# Patient Record
Sex: FEMALE | Race: WHITE | HISPANIC OR LATINO | Employment: PART TIME | URBAN - METROPOLITAN AREA
[De-identification: names, ages, dates, MRNs, and addresses within clinical notes are randomized per-mention and may not be internally consistent; named-entity substitution may affect disease eponyms.]

---

## 2017-03-19 ENCOUNTER — HOSPITAL ENCOUNTER (EMERGENCY)
Facility: MEDICAL CENTER | Age: 22
End: 2017-03-19
Attending: EMERGENCY MEDICINE
Payer: MEDICAID

## 2017-03-19 VITALS
SYSTOLIC BLOOD PRESSURE: 124 MMHG | RESPIRATION RATE: 18 BRPM | HEART RATE: 85 BPM | HEIGHT: 60 IN | TEMPERATURE: 98.2 F | OXYGEN SATURATION: 99 % | DIASTOLIC BLOOD PRESSURE: 71 MMHG | BODY MASS INDEX: 30.56 KG/M2 | WEIGHT: 155.65 LBS

## 2017-03-19 DIAGNOSIS — J02.9 ACUTE PHARYNGITIS, UNSPECIFIED ETIOLOGY: ICD-10-CM

## 2017-03-19 DIAGNOSIS — H65.192 ACUTE NONSUPPURATIVE OTITIS MEDIA OF LEFT EAR: ICD-10-CM

## 2017-03-19 LAB
APPEARANCE UR: CLEAR
BACTERIA #/AREA URNS HPF: ABNORMAL /HPF
BILIRUB UR QL STRIP.AUTO: NEGATIVE
COLOR UR: YELLOW
DEPRECATED S PYO AG THROAT QL EIA: NORMAL
EPI CELLS #/AREA URNS HPF: ABNORMAL /HPF
GLUCOSE UR STRIP.AUTO-MCNC: NEGATIVE MG/DL
HCG UR QL: NEGATIVE
KETONES UR STRIP.AUTO-MCNC: NEGATIVE MG/DL
LEUKOCYTE ESTERASE UR QL STRIP.AUTO: NEGATIVE
MICRO URNS: ABNORMAL
MUCOUS THREADS #/AREA URNS HPF: ABNORMAL /HPF
NITRITE UR QL STRIP.AUTO: NEGATIVE
PH UR STRIP.AUTO: 8 [PH]
PROT UR QL STRIP: 30 MG/DL
RBC # URNS HPF: ABNORMAL /HPF
RBC UR QL AUTO: ABNORMAL
SIGNIFICANT IND 70042: NORMAL
SITE SITE: NORMAL
SOURCE SOURCE: NORMAL
SP GR UR REFRACTOMETRY: 1.02
WBC #/AREA URNS HPF: ABNORMAL /HPF

## 2017-03-19 PROCEDURE — 99284 EMERGENCY DEPT VISIT MOD MDM: CPT

## 2017-03-19 PROCEDURE — 81025 URINE PREGNANCY TEST: CPT

## 2017-03-19 PROCEDURE — 700102 HCHG RX REV CODE 250 W/ 637 OVERRIDE(OP): Performed by: EMERGENCY MEDICINE

## 2017-03-19 PROCEDURE — 81001 URINALYSIS AUTO W/SCOPE: CPT

## 2017-03-19 PROCEDURE — A9270 NON-COVERED ITEM OR SERVICE: HCPCS | Performed by: EMERGENCY MEDICINE

## 2017-03-19 PROCEDURE — 87081 CULTURE SCREEN ONLY: CPT

## 2017-03-19 PROCEDURE — 87880 STREP A ASSAY W/OPTIC: CPT

## 2017-03-19 RX ORDER — AMOXICILLIN 875 MG/1
875 TABLET, COATED ORAL 2 TIMES DAILY
Qty: 20 TAB | Refills: 0 | Status: SHIPPED | OUTPATIENT
Start: 2017-03-19 | End: 2017-04-11

## 2017-03-19 RX ORDER — IBUPROFEN 600 MG/1
600 TABLET ORAL ONCE
Status: COMPLETED | OUTPATIENT
Start: 2017-03-19 | End: 2017-03-19

## 2017-03-19 RX ORDER — AMOXICILLIN 875 MG/1
875 TABLET, COATED ORAL 2 TIMES DAILY
Qty: 20 TAB | Refills: 0 | Status: SHIPPED | OUTPATIENT
Start: 2017-03-19 | End: 2017-03-19

## 2017-03-19 RX ORDER — AMOXICILLIN 250 MG/1
1000 CAPSULE ORAL ONCE
Status: COMPLETED | OUTPATIENT
Start: 2017-03-19 | End: 2017-03-19

## 2017-03-19 RX ADMIN — AMOXICILLIN 1000 MG: 250 CAPSULE ORAL at 02:25

## 2017-03-19 RX ADMIN — IBUPROFEN 600 MG: 600 TABLET, FILM COATED ORAL at 02:25

## 2017-03-19 ASSESSMENT — PAIN SCALES - GENERAL
PAINLEVEL_OUTOF10: 9
PAINLEVEL_OUTOF10: 6

## 2017-03-19 NOTE — ED AVS SNAPSHOT
Center'd Access Code: [email or web address]  Expires: 4/18/2017  2:32 AM    Center'd  A secure, online tool to manage your health information     [employer]’s Center'd® is a secure, online tool that connects you to your personalized health information from the privacy of your home -- day or night - making it very easy for you to manage your healthcare. Once the activation process is completed, you can even access your medical information using the Center'd destinee, which is available for free in the Apple Destinee store or Google Play store.     Center'd provides the following levels of access (as shown below):   My Chart Features   [facility] Primary Care Doctor [facility]  Specialists [facility]  Urgent  Care Non-[facility]  Primary Care  Doctor   Email your healthcare team securely and privately 24/7 X X X X   Manage appointments: schedule your next appointment; view details of past/upcoming appointments X      Request prescription refills. X      View recent personal medical records, including lab and immunizations X X X X   View health record, including health history, allergies, medications X X X X   Read reports about your outpatient visits, procedures, consult and ER notes X X X X   See your discharge summary, which is a recap of your hospital and/or ER visit that includes your diagnosis, lab results, and care plan. X X       How to register for Center'd:  1. Go to  [email or web address].  2. Click on the Sign Up Now box, which takes you to the New Member Sign Up page. You will need to provide the following information:  a. Enter your Center'd Access Code exactly as it appears at the top of this page. (You will not need to use this code after you’ve completed the sign-up process. If you do not sign up before the expiration date, you must request a new code.)   b. Enter your date of birth.   c. Enter your home email address.   d. Click Submit, and follow the next screen’s instructions.  3. Create a Center'd ID. This will be your Center'd  login ID and cannot be changed, so think of one that is secure and easy to remember.  4. Create a [employer] password. You can change your password at any time.  5. Enter your Password Reset Question and Answer. This can be used at a later time if you forget your password.   6. Enter your e-mail address. This allows you to receive e-mail notifications when new information is available in Tangible Play.  7. Click Sign Up. You can now view your health information.    For assistance activating your [employer] account, call [phone]

## 2017-03-19 NOTE — ED AVS SNAPSHOT
3/19/2017          Kristal Spivey  [address] [place] [address]    Dear Kristal:    [facility] wants to ensure your discharge home is safe and you or your loved ones have had all your questions answered regarding your care after you leave the hospital.    You may receive a telephone call within two days of your discharge.  This call is to make certain you understand your discharge instructions as well as ensure we provided you with the best care possible during your stay with us.     The call will only last approximately 3-5 minutes and will be done by a nurse.    Once again, we want to ensure your discharge home is safe and that you have a clear understanding of any next steps in your care.  If you have any questions or concerns, please do not hesitate to contact us, we are here for you.  Thank you for choosing [facility] for your healthcare needs.    Sincerely,    William Wells    [facility]

## 2017-03-19 NOTE — ED PROVIDER NOTES
"ED Provider Note    ED Provider Note    Scribed for Laurie Peñaloza MD by Laurie Peñaloza. 3/19/2017, 2:16 AM.    Primary care provider: Pcp Pt States None  Means of arrival: Private  History obtained from: Patient  History limited by: None    CHIEF COMPLAINT  Chief Complaint   Patient presents with   • Sore Throat   • Ear Pain       HPI  Kristal Spivey is a 21 y.o. female who presents to the Emergency Department for evaluation of sore throat, ear pain, and tactile fever. Patient relates she initially had nausea and vomiting and diarrhea about 2 weeks ago, symptoms resolved. She relates over the last 2 days of mild bilateral sore throat, pain in left ear, described tactile fever with chills and \"fever blisters\" to her tongue. No dysuria, no particular ill contacts around the home, no current vomiting, no dyspnea, no significant cough. Patient's taken no recent over-the-counter remedies other than ibuprofen which he takes to baseline for menstrual cramping, she is otherwise healthy and is status post cholecystectomy after her last pregnancy    REVIEW OF SYSTEMS  Pertinent positives include tactile fever, sore throat, ear pain. Pertinent negatives include no dyspnea, no significant cough.      PAST MEDICAL HISTORY   has a past medical history of Gallstones (2/4/2016); Heart burn; and Cold (2/14/16).    SURGICAL HISTORY   has past surgical history that includes ariana by laparoscopy (3/7/2016).    SOCIAL HISTORY  Social History   Substance Use Topics   • Smoking status: Never Smoker    • Smokeless tobacco: Never Used   • Alcohol Use: No      History   Drug Use No       FAMILY HISTORY  No family history on file.    CURRENT MEDICATIONS  Home Medications     **Home medications have not yet been reviewed for this encounter**          ALLERGIES  Allergies   Allergen Reactions   • Other Environmental Rash     \"dogs\" - \"my skin turns red\"       PHYSICAL EXAM  VITAL SIGNS: /71 mmHg  Pulse 85  " Temp(Src) 36.8 °C (98.2 °F)  Resp 18  Ht 1.524 m (5')  Wt 70.6 kg (155 lb 10.3 oz)  BMI 30.40 kg/m2  SpO2 99%  LMP  (Approximate)    General: Alert, non acute distress  Skin: Warm, dry, normal for ethnicity  Head: Normocephalic, atraumatic  Neck: Trachea midline, no tenderness  Eye: PERRL, normal conjunctiva  ENMT: Oral mucosa moist, no significant pharyngeal erythema nor edema but there is mild exudate noted at the right tonsillar bed. Left tympanic membrane is erythematous and somewhat dull with loss of cone of light reflex.  Cardiovascular: Regular rate and rhythm, No murmur, Normal peripheral perfusion  Respiratory: Lungs CTA, respirations are non-labored, breath sounds are equal  Gastrointestinal: Soft, nontender, non distended, no CVA tenderness.  Musculoskeletal: No swelling, no deformity  Neurological: Alert and oriented to person, place, time, and situation  Lymphatics: Subtle right anterior cervical lymphadenopathy  Psychiatric: Cooperative, appropriate mood & affect      DIAGNOSTIC STUDIES/PROCEDURES    LABS  Results for orders placed or performed during the hospital encounter of 03/19/17   URINALYSIS   Result Value Ref Range    Micro Urine Req Microscopic     Color Yellow     Character Clear     Ph 8.0 5.0-8.0    Glucose Negative Negative mg/dL    Ketones Negative Negative mg/dL    Protein 30 (A) Negative mg/dL    Bilirubin Negative Negative    Nitrite Negative Negative    Leukocyte Esterase Negative Negative    Occult Blood Moderate (A) Negative   BETA-HCG QUALITATIVE URINE   Result Value Ref Range    Beta-Hcg Urine Negative Negative   REFRACTOMETER SG   Result Value Ref Range    Specific Gravity 1.025    RAPID STREP,CULT IF INDICATED   Result Value Ref Range    Significant Indicator NEG     Source THRT     Site      Rapid Strep Screen       Negative for Group A streptococcus.  A negative result may be obtained if the specimen is  inadequate or antigen concentration is below the  sensitivity of  the test. Therefore,a negative result  obtained from a rapid group A Strep test should be followed  up with a culture.     URINE MICROSCOPIC (W/UA)   Result Value Ref Range    WBC 0-2 /hpf    -150 (A) /hpf    Bacteria Few (A) None /hpf    Epithelial Cells Few Few /hpf    Mucous Threads Moderate /hpf     All labs reviewed by me, artifactual hematuria given patient is currently on menses.    COURSE & MEDICAL DECISION MAKING  Pertinent Labs & Imaging studies reviewed. (See chart for details)    2:16 AM - Patient seen and examined at bedside. Patient will be treated with ibuprofen and amoxicillin. Ordered a rapid strep as well as urinalysis to evaluate her symptoms. The differential diagnoses include but are not limited to: Otitis media, streptococcal pharyngitis, viral pharyngitis    Patient Vitals for the past 24 hrs:   BP Temp Pulse Resp SpO2 Height Weight   03/19/17 0241 124/71 mmHg - 85 18 99 % - -   03/19/17 0149 127/77 mmHg 36.8 °C (98.2 °F) 87 18 99 % 1.524 m (5') 70.6 kg (155 lb 10.3 oz)       Decision Making:  This is a 21 y.o. year old female who presents with tactile fever, sore throat, ear pain, lack of cough and ear pain. Percentile criteria, she is high risk for streptococcal pharyngitis, but given only minimal lymphadenopathy and afebrile here I have obtained rapid strep which is negative. However given evidence of otitis media and reactive are indicated, written for amoxicillin appropriate NSAID. Patient otherwise well-appearing and nontoxic.     The patient will return for new or worsening symptoms and is stable at the time of discharge.        DISPOSITION:  Patient will be discharged home in stable condition.    FOLLOW UP:  Grady Jensen M.D.  [id number]  [address]  [facility] [address]  [phone]    Schedule an appointment as soon as possible for a visit in 1 week        OUTPATIENT MEDICATIONS:  Discharge Medication List as of 3/19/2017  2:33 AM              FINAL IMPRESSION  1. Acute  nonsuppurative otitis media of left ear    2. Acute pharyngitis, unspecified etiology          Laurie CALERO (Scribe), am scribing for, and in the presence of, Laurie Peñaloza MD.    Electronically signed by: Laurie Peñaloza (Scribe), 3/19/2017    Laurie CALERO MD personally performed the services described in this documentation, as scribed by Laurie Peñaloza in my presence, and it is both accurate and complete    The note accurately reflects work and decisions made by me.  Laurie Peñaloza  3/19/2017  4:19 AM

## 2017-03-19 NOTE — DISCHARGE INSTRUCTIONS
Otitis Media, Adult  Otitis media is redness, soreness, and puffiness (swelling) in the space just behind your eardrum (middle ear). It may be caused by allergies or infection. It often happens along with a cold.  HOME CARE  · Take your medicine as told. Finish it even if you start to feel better.  · Only take over-the-counter or prescription medicines for pain, discomfort, or fever as told by your doctor.  · Follow up with your doctor as told.  GET HELP IF:  · You have otitis media only in one ear, or bleeding from your nose, or both.  · You notice a lump on your neck.  · You are not getting better in 3-5 days.  · You feel worse instead of better.  GET HELP RIGHT AWAY IF:   · You have pain that is not helped with medicine.  · You have puffiness, redness, or pain around your ear.  · You get a stiff neck.  · You cannot move part of your face (paralysis).  · You notice that the bone behind your ear hurts when you touch it.  MAKE SURE YOU:   · Understand these instructions.  · Will watch your condition.  · Will get help right away if you are not doing well or get worse.     This information is not intended to replace advice given to you by your health care provider. Make sure you discuss any questions you have with your health care provider.     Document Released: 06/05/2009 Document Revised: 01/08/2016 Document Reviewed: 07/15/2014  "[employer] Interactive Patient Education ©2016 "[employer] Inc.

## 2017-03-19 NOTE — ED AVS SNAPSHOT
Home Care Instructions                                                                                                                Kristal Spivey   MRN: [id number]    Department:  [facility], Emergency Dept   Date of Visit:  3/19/2017            [facility], Emergency Dept    [address]    [place] [address]    Phone:  [phone]      You were seen by     Laurie Peñaloza M.D.      Your Diagnosis Was     Acute nonsuppurative otitis media of left ear     H65.192       These are the medications you received during your hospitalization from 03/19/2017 0144 to 03/19/2017 0233     Date/Time Order Dose Route Action    03/19/2017 0225 amoxicillin (AMOXIL) capsule 1,000 mg 1,000 mg Oral Given    03/19/2017 0225 ibuprofen (MOTRIN) tablet 600 mg 600 mg Oral Given      Follow-up Information     1. Follow up with Grady Jensen M.D.. Schedule an appointment as soon as possible for a visit in 1 week.    Specialty:  Family Medicine    Contact information    [address] [place] [address] [address]  [address]  [place] [address]  [phone]        Medication Information     Review all of your home medications and newly ordered medications with your primary doctor and/or pharmacist as soon as possible. Follow medication instructions as directed by your doctor and/or pharmacist.     Please keep your complete medication list with you and share with your physician. Update the information when medications are discontinued, doses are changed, or new medications (including over-the-counter products) are added; and carry medication information at all times in the event of emergency situations.               Medication List      START taking these medications        Instructions    Morning Afternoon Evening Bedtime    amoxicillin 875 MG tablet   Commonly known as:  AMOXIL        Take 1 Tab by mouth 2 times a day.   Dose:  875 mg                          ASK your doctor about these  medications        Instructions    Morning Afternoon Evening Bedtime    citalopram 20 MG Tabs   Commonly known as:  CELEXA        Take 20 mg by mouth every day.   Dose:  20 mg                        ibuprofen 800 MG Tabs   Last time this was given:  600 mg on 3/19/2017  2:25 AM   Commonly known as:  MOTRIN        Take 1 Tab by mouth every 8 hours as needed (Cramping).   Dose:  800 mg                        NUVARING VA        Insert  in vagina.                             Where to Get Your Medications      You can get these medications from any pharmacy     Bring a paper prescription for each of these medications    - amoxicillin 875 MG tablet            Procedures and tests performed during your visit     RAPID STREP,CULT IF INDICATED        Discharge Instructions       Otitis Media, Adult  Otitis media is redness, soreness, and puffiness (swelling) in the space just behind your eardrum (middle ear). It may be caused by allergies or infection. It often happens along with a cold.  HOME CARE  · Take your medicine as told. Finish it even if you start to feel better.  · Only take over-the-counter or prescription medicines for pain, discomfort, or fever as told by your doctor.  · Follow up with your doctor as told.  GET HELP IF:  · You have otitis media only in one ear, or bleeding from your nose, or both.  · You notice a lump on your neck.  · You are not getting better in 3-5 days.  · You feel worse instead of better.  GET HELP RIGHT AWAY IF:   · You have pain that is not helped with medicine.  · You have puffiness, redness, or pain around your ear.  · You get a stiff neck.  · You cannot move part of your face (paralysis).  · You notice that the bone behind your ear hurts when you touch it.  MAKE SURE YOU:   · Understand these instructions.  · Will watch your condition.  · Will get help right away if you are not doing well or get worse.     This information is not intended to replace advice given to you by your health  care provider. Make sure you discuss any questions you have with your health care provider.     Document Released: 06/05/2009 Document Revised: 01/08/2016 Document Reviewed: 07/15/2014  Elsevier Interactive Patient Education ©2016 [employer]            Patient Information     Patient Information    Following emergency treatment: all patient requiring follow-up care must return either to a private physician or a clinic if your condition worsens before you are able to obtain further medical attention, please return to the emergency room.     Billing Information    [facility] we work to make the billing process streamlined for our patients.  Our Representatives are here to answer any questions you may have regarding your hospital bill.  If you have insurance coverage and have supplied your insurance information to us, we will submit a claim to your insurer on your behalf.  Should you have any questions regarding your bill, we can be reached online or by phone as follows:  Online: You are able pay your bills online or live chat with our representatives about any billing questions you may have. We are here to help Monday - Friday from 8:00am to 7:30pm and 9:00am - 12:00pm on Saturdays.  Please visit [email or web address]  for more information.   Phone:  [phone] or [phone]    Please note that your emergency physician, surgeon, pathologist, radiologist, anesthesiologist, and other specialists are not employed by [facility] and will therefore bill separately for their services.  Please contact them directly for any questions concerning their bills at the numbers below:     Emergency Physician Services:  [phone]  [facility]:  [phone]  [facility]:  [phone]  [facility]:  [phone]    1. Your final bill may vary from the amount quoted upon discharge if all procedures are not complete at that time, or if your  doctor has additional procedures of which we are not aware. You will receive an additional bill if you return to the Emergency Department at [facility] for suture removal regardless of the facility of which the sutures were placed.     2. Please arrange for settlement of this account at the emergency registration.    3. All self-pay accounts are due in full at the time of treatment.  If you are unable to meet this obligation then payment is expected within 4-5 days.     4. If you have had radiology studies (CT, X-ray, Ultrasound, MRI), you have received a preliminary result during your emergency department visit. Please contact the radiology department [phone] to receive a copy of your final result. Please discuss the Final result with your primary physician or with the follow up physician provided.     Crisis Hotline:  [place] Crisis Hotline:  4-744-WIMHGGN or 1-402.293.5295  [place] Crisis Hotline:    1-116.321.7159 or 783-795-3977         ED Discharge Follow Up Questions    1. In order to provide you with very good care, we would like to follow up with a phone call in the next few days.  May we have your permission to contact you?     YES /  NO    2. What is the best phone number to call you? (       )_____-__________    3. What is the best time to call you?      Morning  /  Afternoon  /  Evening                   Patient Signature:  ____________________________________________________________    Date:  ____________________________________________________________

## 2017-03-21 LAB
S PYO SPEC QL CULT: NORMAL
SIGNIFICANT IND 70042: NORMAL
SITE SITE: NORMAL
SOURCE SOURCE: NORMAL

## 2017-04-11 ENCOUNTER — HOSPITAL ENCOUNTER (EMERGENCY)
Facility: MEDICAL CENTER | Age: 22
End: 2017-04-11
Attending: EMERGENCY MEDICINE
Payer: MEDICAID

## 2017-04-11 VITALS
BODY MASS INDEX: 30.82 KG/M2 | SYSTOLIC BLOOD PRESSURE: 116 MMHG | HEART RATE: 74 BPM | WEIGHT: 156.97 LBS | HEIGHT: 60 IN | TEMPERATURE: 98.2 F | OXYGEN SATURATION: 96 % | RESPIRATION RATE: 16 BRPM | DIASTOLIC BLOOD PRESSURE: 73 MMHG

## 2017-04-11 DIAGNOSIS — R42 VERTIGO: ICD-10-CM

## 2017-04-11 LAB
ALBUMIN SERPL BCP-MCNC: 4.1 G/DL (ref 3.2–4.9)
ALBUMIN/GLOB SERPL: 1.5 G/DL
ALP SERPL-CCNC: 118 U/L (ref 30–99)
ALT SERPL-CCNC: 67 U/L (ref 2–50)
ANION GAP SERPL CALC-SCNC: 3 MMOL/L (ref 0–11.9)
APPEARANCE UR: ABNORMAL
AST SERPL-CCNC: 41 U/L (ref 12–45)
BASOPHILS # BLD AUTO: 0.2 % (ref 0–1.8)
BASOPHILS # BLD: 0.02 K/UL (ref 0–0.12)
BILIRUB SERPL-MCNC: 0.5 MG/DL (ref 0.1–1.5)
BILIRUB UR QL STRIP.AUTO: NEGATIVE
BUN SERPL-MCNC: 10 MG/DL (ref 8–22)
CALCIUM SERPL-MCNC: 9.2 MG/DL (ref 8.5–10.5)
CHLORIDE SERPL-SCNC: 107 MMOL/L (ref 96–112)
CO2 SERPL-SCNC: 28 MMOL/L (ref 20–33)
COLOR UR: ABNORMAL
CREAT SERPL-MCNC: 0.47 MG/DL (ref 0.5–1.4)
CULTURE IF INDICATED INDCX: NO UA CULTURE
EOSINOPHIL # BLD AUTO: 0.18 K/UL (ref 0–0.51)
EOSINOPHIL NFR BLD: 2.2 % (ref 0–6.9)
EPI CELLS #/AREA URNS HPF: NORMAL /HPF
ERYTHROCYTE [DISTWIDTH] IN BLOOD BY AUTOMATED COUNT: 38.5 FL (ref 35.9–50)
GFR SERPL CREATININE-BSD FRML MDRD: >60 ML/MIN/1.73 M 2
GLOBULIN SER CALC-MCNC: 2.7 G/DL (ref 1.9–3.5)
GLUCOSE SERPL-MCNC: 85 MG/DL (ref 65–99)
GLUCOSE UR STRIP.AUTO-MCNC: NEGATIVE MG/DL
HCG SERPL QL: NEGATIVE
HCT VFR BLD AUTO: 41.7 % (ref 37–47)
HGB BLD-MCNC: 13.9 G/DL (ref 12–16)
IMM GRANULOCYTES # BLD AUTO: 0.02 K/UL (ref 0–0.11)
IMM GRANULOCYTES NFR BLD AUTO: 0.2 % (ref 0–0.9)
KETONES UR STRIP.AUTO-MCNC: NEGATIVE MG/DL
LEUKOCYTE ESTERASE UR QL STRIP.AUTO: NEGATIVE
LIPASE SERPL-CCNC: 19 U/L (ref 11–82)
LYMPHOCYTES # BLD AUTO: 2.97 K/UL (ref 1–4.8)
LYMPHOCYTES NFR BLD: 36.1 % (ref 22–41)
MCH RBC QN AUTO: 28.1 PG (ref 27–33)
MCHC RBC AUTO-ENTMCNC: 33.3 G/DL (ref 33.6–35)
MCV RBC AUTO: 84.2 FL (ref 81.4–97.8)
MICRO URNS: ABNORMAL
MONOCYTES # BLD AUTO: 0.58 K/UL (ref 0–0.85)
MONOCYTES NFR BLD AUTO: 7 % (ref 0–13.4)
NEUTROPHILS # BLD AUTO: 4.46 K/UL (ref 2–7.15)
NEUTROPHILS NFR BLD: 54.3 % (ref 44–72)
NITRITE UR QL STRIP.AUTO: NEGATIVE
NRBC # BLD AUTO: 0 K/UL
NRBC BLD AUTO-RTO: 0 /100 WBC
PH UR STRIP.AUTO: 8.5 [PH]
PLATELET # BLD AUTO: 240 K/UL (ref 164–446)
PMV BLD AUTO: 11.5 FL (ref 9–12.9)
POTASSIUM SERPL-SCNC: 3.9 MMOL/L (ref 3.6–5.5)
PROT SERPL-MCNC: 6.8 G/DL (ref 6–8.2)
PROT UR QL STRIP: NEGATIVE MG/DL
RBC # BLD AUTO: 4.95 M/UL (ref 4.2–5.4)
RBC UR QL AUTO: NEGATIVE
SODIUM SERPL-SCNC: 138 MMOL/L (ref 135–145)
SP GR UR STRIP.AUTO: 1.01
WBC # BLD AUTO: 8.2 K/UL (ref 4.8–10.8)

## 2017-04-11 PROCEDURE — 96374 THER/PROPH/DIAG INJ IV PUSH: CPT

## 2017-04-11 PROCEDURE — 83690 ASSAY OF LIPASE: CPT

## 2017-04-11 PROCEDURE — 81001 URINALYSIS AUTO W/SCOPE: CPT

## 2017-04-11 PROCEDURE — 80053 COMPREHEN METABOLIC PANEL: CPT

## 2017-04-11 PROCEDURE — 36415 COLL VENOUS BLD VENIPUNCTURE: CPT

## 2017-04-11 PROCEDURE — 84703 CHORIONIC GONADOTROPIN ASSAY: CPT

## 2017-04-11 PROCEDURE — A9270 NON-COVERED ITEM OR SERVICE: HCPCS | Performed by: EMERGENCY MEDICINE

## 2017-04-11 PROCEDURE — 700105 HCHG RX REV CODE 258: Performed by: EMERGENCY MEDICINE

## 2017-04-11 PROCEDURE — 700111 HCHG RX REV CODE 636 W/ 250 OVERRIDE (IP): Performed by: EMERGENCY MEDICINE

## 2017-04-11 PROCEDURE — 99284 EMERGENCY DEPT VISIT MOD MDM: CPT

## 2017-04-11 PROCEDURE — 85025 COMPLETE CBC W/AUTO DIFF WBC: CPT

## 2017-04-11 PROCEDURE — 700102 HCHG RX REV CODE 250 W/ 637 OVERRIDE(OP): Performed by: EMERGENCY MEDICINE

## 2017-04-11 RX ORDER — SODIUM CHLORIDE 9 MG/ML
1000 INJECTION, SOLUTION INTRAVENOUS CONTINUOUS
Status: ACTIVE | OUTPATIENT
Start: 2017-04-11 | End: 2017-04-11

## 2017-04-11 RX ORDER — MECLIZINE HYDROCHLORIDE 25 MG/1
25 TABLET ORAL ONCE
Status: COMPLETED | OUTPATIENT
Start: 2017-04-11 | End: 2017-04-11

## 2017-04-11 RX ORDER — MECLIZINE HYDROCHLORIDE 25 MG/1
25 TABLET ORAL 3 TIMES DAILY PRN
Qty: 30 TAB | Refills: 0 | Status: SHIPPED | OUTPATIENT
Start: 2017-04-11 | End: 2017-11-01

## 2017-04-11 RX ORDER — ONDANSETRON 4 MG/1
4 TABLET, ORALLY DISINTEGRATING ORAL EVERY 8 HOURS PRN
Qty: 10 TAB | Refills: 1 | Status: SHIPPED | OUTPATIENT
Start: 2017-04-11 | End: 2017-11-01

## 2017-04-11 RX ORDER — ONDANSETRON 2 MG/ML
4 INJECTION INTRAMUSCULAR; INTRAVENOUS ONCE
Status: COMPLETED | OUTPATIENT
Start: 2017-04-11 | End: 2017-04-11

## 2017-04-11 RX ADMIN — ONDANSETRON 4 MG: 2 INJECTION INTRAMUSCULAR; INTRAVENOUS at 21:11

## 2017-04-11 RX ADMIN — MECLIZINE HYDROCHLORIDE 25 MG: 25 TABLET ORAL at 21:02

## 2017-04-11 RX ADMIN — SODIUM CHLORIDE 1000 ML: 9 INJECTION, SOLUTION INTRAVENOUS at 21:16

## 2017-04-11 ASSESSMENT — PAIN SCALES - GENERAL
PAINLEVEL_OUTOF10: 7
PAINLEVEL_OUTOF10: 7

## 2017-04-11 NOTE — ED AVS SNAPSHOT
Home Care Instructions                                                                                                                Kristal Spivey   MRN: [id number]    Department:  [facility], Emergency Dept   Date of Visit:  4/11/2017            [facility], Emergency Dept    [address] [address]    Phone:  [phone]      You were seen by     Kory Vega D.O.      Your Diagnosis Was     Vertigo     R42       These are the medications you received during your hospitalization from 04/11/2017 1933 to 04/11/2017 2334     Date/Time Order Dose Route Action    04/11/2017 2116 NS infusion 1,000 mL 1,000 mL Intravenous New Bag    04/11/2017 2111 ondansetron (ZOFRAN) syringe/vial injection 4 mg 4 mg Intravenous Given    04/11/2017 2102 meclizine (ANTIVERT) tablet 25 mg 25 mg Oral Given      Follow-up Information     1. Schedule an appointment as soon as possible for a visit with [facility].    Contact information    [address] [address]  [phone]        Medication Information     Review all of your home medications and newly ordered medications with your primary doctor and/or pharmacist as soon as possible. Follow medication instructions as directed by your doctor and/or pharmacist.     Please keep your complete medication list with you and share with your physician. Update the information when medications are discontinued, doses are changed, or new medications (including over-the-counter products) are added; and carry medication information at all times in the event of emergency situations.               Medication List      START taking these medications        Instructions    Morning Afternoon Evening Bedtime    meclizine 25 MG Tabs   Last time this was given:  25 mg on 4/11/2017  9:02 PM   Commonly known as:  ANTIVERT        Take 1 Tab by mouth 3 times a day as needed for Dizziness.   Dose:  25  mg                        ondansetron 4 MG Tbdp   Commonly known as:  ZOFRAN ODT        Take 1 Tab by mouth every 8 hours as needed.   Dose:  4 mg                          ASK your doctor about these medications        Instructions    Morning Afternoon Evening Bedtime    citalopram 20 MG Tabs   Commonly known as:  CELEXA        Take 20 mg by mouth every day.   Dose:  20 mg                        ibuprofen 800 MG Tabs   Commonly known as:  MOTRIN        Take 1 Tab by mouth every 8 hours as needed (Cramping).   Dose:  800 mg                        NUVARING VA        Insert  in vagina.                             Where to Get Your Medications      You can get these medications from any pharmacy     Bring a paper prescription for each of these medications    - meclizine 25 MG Tabs  - ondansetron 4 MG Tbdp            Procedures and tests performed during your visit     CBC WITH DIFFERENTIAL    COMP METABOLIC PANEL    ESTIMATED GFR    HCG QUAL SERUM    LIPASE    URINALYSIS,CULTURE IF INDICATED    URINE MICROSCOPIC (W/UA)        Discharge Instructions       Benign Positional Vertigo  Vertigo means you feel like you or your surroundings are moving when they are not. Benign positional vertigo is the most common form of vertigo. Benign means that the cause of your condition is not serious. Benign positional vertigo is more common in older adults.  CAUSES   Benign positional vertigo is the result of an upset in the labyrinth system. This is an area in the middle ear that helps control your balance. This may be caused by a viral infection, head injury, or repetitive motion. However, often no specific cause is found.  SYMPTOMS   Symptoms of benign positional vertigo occur when you move your head or eyes in different directions. Some of the symptoms may include:  · Loss of balance and falls.  · Vomiting.  · Blurred vision.  · Dizziness.  · Nausea.  · Involuntary eye movements (nystagmus).  DIAGNOSIS   Benign positional vertigo is  usually diagnosed by physical exam. If the specific cause of your benign positional vertigo is unknown, your caregiver may perform imaging tests, such as magnetic resonance imaging (MRI) or computed tomography (CT).  TREATMENT   Your caregiver may recommend movements or procedures to correct the benign positional vertigo. Medicines such as meclizine, benzodiazepines, and medicines for nausea may be used to treat your symptoms. In rare cases, if your symptoms are caused by certain conditions that affect the inner ear, you may need surgery.  HOME CARE INSTRUCTIONS   · Follow your caregiver's instructions.  · Move slowly. Do not make sudden body or head movements.  · Avoid driving.  · Avoid operating heavy machinery.  · Avoid performing any tasks that would be dangerous to you or others during a vertigo episode.  · Drink enough fluids to keep your urine clear or pale yellow.  SEEK IMMEDIATE MEDICAL CARE IF:   · You develop problems with walking, weakness, numbness, or using your arms, hands, or legs.  · You have difficulty speaking.  · You develop severe headaches.  · Your nausea or vomiting continues or gets worse.  · You develop visual changes.  · Your family or friends notice any behavioral changes.  · Your condition gets worse.  · You have a fever.  · You develop a stiff neck or sensitivity to light.  MAKE SURE YOU:   · Understand these instructions.  · Will watch your condition.  · Will get help right away if you are not doing well or get worse.     This information is not intended to replace advice given to you by your health care provider. Make sure you discuss any questions you have with your health care provider.     Document Released: 09/25/2007 Document Revised: 03/11/2013 Document Reviewed: 04/11/2016  [employer] Interactive Patient Education ©2016 [employer]    Dizziness  Dizziness is a common problem. It makes you feel unsteady or lightheaded. You may feel like you are about to pass out (faint).  Dizziness can lead to injury if you stumble or fall. Anyone can get dizzy, but dizziness is more common in older adults. This condition can be caused by a number of things, including:  · Medicines.  · Dehydration.  · Illness.  HOME CARE  Following these instructions may help with your condition:  Eating and Drinking  · Drink enough fluid to keep your pee (urine) clear or pale yellow. This helps to keep you from getting dehydrated. Try to drink more clear fluids, such as water.  · Do not drink alcohol.  · Limit how much caffeine you drink or eat if told by your doctor.  · Limit how much salt you drink or eat if told by your doctor.  Activity  · Avoid making quick movements.  ¨ When you stand up from sitting in a chair, steady yourself until you feel okay.  ¨ In the morning, first sit up on the side of the bed. When you feel okay, stand slowly while you hold onto something. Do this until you know that your balance is fine.  · Move your legs often if you need to  one place for a long time. Tighten and relax your muscles in your legs while you are standing.  · Do not drive or use heavy machinery if you feel dizzy.  · Avoid bending down if you feel dizzy. Place items in your home so that they are easy for you to reach without leaning over.  Lifestyle  · Do not use any tobacco products, including cigarettes, chewing tobacco, or electronic cigarettes. If you need help quitting, ask your doctor.  · Try to lower your stress level, such as with yoga or meditation. Talk with your doctor if you need help.  General Instructions  · Watch your dizziness for any changes.  · Take medicines only as told by your doctor. Talk with your doctor if you think that your dizziness is caused by a medicine that you are taking.  · Tell a friend or a family member that you are feeling dizzy. If he or she notices any changes in your behavior, have this person call your doctor.  · Keep all follow-up visits as told by your doctor. This is  important.  GET HELP IF:  · Your dizziness does not go away.  · Your dizziness or light-headedness gets worse.  · You feel sick to your stomach (nauseous).  · You have trouble hearing.  · You have new symptoms.  · You are unsteady on your feet or you feel like the room is spinning.  GET HELP RIGHT AWAY IF:  · You throw up (vomit) or have diarrhea and are unable to eat or drink anything.  · You have trouble:  ¨ Talking.  ¨ Walking.  ¨ Swallowing.  ¨ Using your arms, hands, or legs.  · You feel generally weak.  · You are not thinking clearly or you have trouble forming sentences. It may take a friend or family member to notice this.  · You have:  ¨ Chest pain.  ¨ Pain in your belly (abdomen).  ¨ Shortness of breath.  ¨ Sweating.  · Your vision changes.  · You are bleeding.  · You have a headache.  · You have neck pain or a stiff neck.  · You have a fever.     This information is not intended to replace advice given to you by your health care provider. Make sure you discuss any questions you have with your health care provider.     Document Released: 12/06/2012 Document Revised: 05/03/2016 Document Reviewed: 12/14/2015  [employer] Interactive Patient Education ©2016 [employer]            Patient Information     Patient Information    Following emergency treatment: all patient requiring follow-up care must return either to a private physician or a clinic if your condition worsens before you are able to obtain further medical attention, please return to the emergency room.     Billing Information    At [facility], we work to make the billing process streamlined for our patients.  Our Representatives are here to answer any questions you may have regarding your hospital bill.  If you have insurance coverage and have supplied your insurance information to us, we will submit a claim to your insurer on your behalf.  Should you have any questions regarding your bill, we can be reached online or by phone as  follows:  Online: You are able pay your bills online or live chat with our representatives about any billing questions you may have. We are here to help Monday - Friday from 8:00am to 7:30pm and 9:00am - 12:00pm on Saturdays.  Please visit [email or web address]  for more information.   Phone:  [phone] or [phone]    Please note that your emergency physician, surgeon, pathologist, radiologist, anesthesiologist, and other specialists are not employed by [facility] and will therefore bill separately for their services.  Please contact them directly for any questions concerning their bills at the numbers below:     Emergency Physician Services:  [phone]  [facility]:  [phone]  [facility]:  [phone]  [facility]:  [phone]    1. Your final bill may vary from the amount quoted upon discharge if all procedures are not complete at that time, or if your doctor has additional procedures of which we are not aware. You will receive an additional bill if you return to the Emergency Department at [facility] hospital for suture removal regardless of the facility of which the sutures were placed.     2. Please arrange for settlement of this account at the emergency registration.    3. All self-pay accounts are due in full at the time of treatment.  If you are unable to meet this obligation then payment is expected within 4-5 days.     4. If you have had radiology studies (CT, X-ray, Ultrasound, MRI), you have received a preliminary result during your emergency department visit. Please contact the radiology department [phone] to receive a copy of your final result. Please discuss the Final result with your primary physician or with the follow up physician provided.     Crisis Hotline:  [place] Crisis Hotline:  3-598-VUJFVIB or 1-870.308.2288  [place] Crisis Hotline:    1-756.957.7681 or 661-086-2315         ED Discharge Follow  Up Questions    1. In order to provide you with very good care, we would like to follow up with a phone call in the next few days.  May we have your permission to contact you?     YES /  NO    2. What is the best phone number to call you? (       )_____-__________    3. What is the best time to call you?      Morning  /  Afternoon  /  Evening                   Patient Signature:  ____________________________________________________________    Date:  ____________________________________________________________

## 2017-04-11 NOTE — ED AVS SNAPSHOT
Etonkids Access Code: [email or web address]  Expires: 4/18/2017  2:32 AM    Etonkids  A secure, online tool to manage your health information     [employer]’s Etonkids® is a secure, online tool that connects you to your personalized health information from the privacy of your home -- day or night - making it very easy for you to manage your healthcare. Once the activation process is completed, you can even access your medical information using the Etonkids destinee, which is available for free in the Apple Destinee store or Google Play store.     Etonkids provides the following levels of access (as shown below):   My Chart Features   [facility] Primary Care Doctor [facility]  Specialists [facility]  Urgent  Care Non-[facility]  Primary Care  Doctor   Email your healthcare team securely and privately 24/7 X X X X   Manage appointments: schedule your next appointment; view details of past/upcoming appointments X      Request prescription refills. X      View recent personal medical records, including lab and immunizations X X X X   View health record, including health history, allergies, medications X X X X   Read reports about your outpatient visits, procedures, consult and ER notes X X X X   See your discharge summary, which is a recap of your hospital and/or ER visit that includes your diagnosis, lab results, and care plan. X X       How to register for Etonkids:  1. Go to  [email or web address].  2. Click on the Sign Up Now box, which takes you to the New Member Sign Up page. You will need to provide the following information:  a. Enter your E[employer]kids Access Code exactly as it appears at the top of this page. (You will not need to use this code after you’ve completed the sign-up process. If you do not sign up before the expiration date, you must request a new code.)   b. Enter your date of birth.   c. Enter your home email address.   d. Click Submit, and follow the next screen’s instructions.  3. Create a Etonkids ID. This will be your Etonkids  login ID and cannot be changed, so think of one that is secure and easy to remember.  4. Create a [employer] password. You can change your password at any time.  5. Enter your Password Reset Question and Answer. This can be used at a later time if you forget your password.   6. Enter your e-mail address. This allows you to receive e-mail notifications when new information is available in [employer].  7. Click Sign Up. You can now view your health information.    For assistance activating your [employer] account, call [phone]

## 2017-04-11 NOTE — ED AVS SNAPSHOT
4/11/2017          Kristal Spivey  [address] [place] [address]    Dear Kristal:    [facility] wants to ensure your discharge home is safe and you or your loved ones have had all your questions answered regarding your care after you leave the hospital.    You may receive a telephone call within two days of your discharge.  This call is to make certain you understand your discharge instructions as well as ensure we provided you with the best care possible during your stay with us.     The call will only last approximately 3-5 minutes and will be done by a nurse.    Once again, we want to ensure your discharge home is safe and that you have a clear understanding of any next steps in your care.  If you have any questions or concerns, please do not hesitate to contact us, we are here for you.  Thank you for choosing [facility] for your healthcare needs.    Sincerely,    William Wells    [facility]

## 2017-04-12 NOTE — ED PROVIDER NOTES
"ED Provider Note    CHIEF COMPLAINT  Chief Complaint   Patient presents with   • Nausea   • Vomiting   • Chills   • Urinary Frequency       HPI  Kristal Spivey is a 21 y.o. female who presents to emergency room today with multiple complaints. Patient states she says chills with vomiting and nausea. She's had some cloudiness to her urine but no frequency. She also admits to dizziness which she describes as worsening with position changes of her head. Symptoms have been present for 3 weeks getting worse. No diarrhea symptoms occur home in the context of her normal activities.    REVIEW OF SYSTEMS  See HPI for further details. All other systems are negative.     PAST MEDICAL HISTORY  Past Medical History   Diagnosis Date   • Gallstones 2/4/2016   • Heart burn    • Cold 2/14/16     mostly resolved       FAMILY HISTORY  [unfilled]    SOCIAL HISTORY  Social History     Social History   • Marital Status: Single     Spouse Name: N/A   • Number of Children: N/A   • Years of Education: N/A     Social History Main Topics   • Smoking status: Never Smoker    • Smokeless tobacco: Never Used   • Alcohol Use: No   • Drug Use: No   • Sexual Activity:     Partners: Male      Comment: Unplanned pregnancy     Other Topics Concern   • None     Social History Narrative       SURGICAL HISTORY  Past Surgical History   Procedure Laterality Date   • Reagan by laparoscopy  3/7/2016     Procedure: REAGAN BY LAPAROSCOPY;  Surgeon: John H Ganser, M.D.;  Location: SURGERY [facility];  Service:        CURRENT MEDICATIONS  Home Medications     Reviewed by Cheryl Tovar R.N. (Registered Nurse) on 04/11/17 at 2251  Med List Status: Not Addressed    Medication Last Dose Status    citalopram (CELEXA) 20 MG Tab 4/11/2017 Active    Etonogestrel-Ethinyl Estradiol (NUVARING VA) 4/11/2017 Active    ibuprofen (MOTRIN) 800 MG Tab prn Active                ALLERGIES  Allergies   Allergen Reactions   • Other Environmental Rash     \"dogs\" - \"my " "skin turns red\"       PHYSICAL EXAM  VITAL SIGNS: /73 mmHg  Pulse 74  Temp(Src) 36.8 °C (98.2 °F)  Resp 16  Ht 1.524 m (5')  Wt 71.2 kg (156 lb 15.5 oz)  BMI 30.66 kg/m2  SpO2 96%  LMP 03/21/2017 (Approximate) Room air O2: 97    Constitutional: Well developed, Well nourished, No acute distress, Non-toxic appearance.   HENT: Normocephalic, Atraumatic, Bilateral external ears normal, Oropharynx moist, No oral exudates, Nose normal.   Eyes: PERRLA, EOMI, Conjunctiva normal, No discharge. Horizontal nystagmus consistent with vertigo and worsening with changes in position changes of the head  Neck: Normal range of motion, No tenderness, Supple, No stridor.   Lymphatic: No lymphadenopathy noted.   Cardiovascular: Normal heart rate, Normal rhythm, No murmurs, No rubs, No gallops.   Thorax & Lungs: Normal breath sounds, No respiratory distress, No wheezing, No chest tenderness.   Abdomen: Bowel sounds normal, Soft, No tenderness, No masses, No pulsatile masses.   Skin: Warm, Dry, No erythema, No rash.   Back: No tenderness, No CVA tenderness.   Extremities: Intact distal pulses, No edema, No tenderness, No cyanosis, No clubbing.   Musculoskeletal: Good range of motion in all major joints. No tenderness to palpation or major deformities noted.   Neurologic: Alert & oriented x 3, Normal motor function, Normal sensory function, No focal deficits noted.   Psychiatric: Affect normal, Judgment normal, Mood normal.       COURSE & MEDICAL DECISION MAKING  Pertinent Labs & Imaging studies reviewed. (See chart for details)  Patient given IV fluids and Zofran with relief of her vomiting she is able to hold down fluids without difficulty. Her liver enzymes are mildly elevated she is already had a cholecystectomy little over a year ago and will need to have these monitored by her primary care physician I discussed this with her. She is placed on meclizine and Zofran for her nausea and benign positional vertigo. I do not " feel that her symptoms are related to her IUD being placed however she will follow-up with her GYN physician and her primary care physician [facility] this week or next 24 hours if her symptoms persist or get worse return promptly to the emergency room. She verbalizes understanding instructions need for follow-up.    FINAL IMPRESSION  1. Acute benign positional vertigo  2. Nausea/vomiting  3.         Electronically signed by: Kory Vega, 4/12/2017 2:33 AM

## 2017-04-12 NOTE — DISCHARGE INSTRUCTIONS
Benign Positional Vertigo  Vertigo means you feel like you or your surroundings are moving when they are not. Benign positional vertigo is the most common form of vertigo. Benign means that the cause of your condition is not serious. Benign positional vertigo is more common in older adults.  CAUSES   Benign positional vertigo is the result of an upset in the labyrinth system. This is an area in the middle ear that helps control your balance. This may be caused by a viral infection, head injury, or repetitive motion. However, often no specific cause is found.  SYMPTOMS   Symptoms of benign positional vertigo occur when you move your head or eyes in different directions. Some of the symptoms may include:  · Loss of balance and falls.  · Vomiting.  · Blurred vision.  · Dizziness.  · Nausea.  · Involuntary eye movements (nystagmus).  DIAGNOSIS   Benign positional vertigo is usually diagnosed by physical exam. If the specific cause of your benign positional vertigo is unknown, your caregiver may perform imaging tests, such as magnetic resonance imaging (MRI) or computed tomography (CT).  TREATMENT   Your caregiver may recommend movements or procedures to correct the benign positional vertigo. Medicines such as meclizine, benzodiazepines, and medicines for nausea may be used to treat your symptoms. In rare cases, if your symptoms are caused by certain conditions that affect the inner ear, you may need surgery.  HOME CARE INSTRUCTIONS   · Follow your caregiver's instructions.  · Move slowly. Do not make sudden body or head movements.  · Avoid driving.  · Avoid operating heavy machinery.  · Avoid performing any tasks that would be dangerous to you or others during a vertigo episode.  · Drink enough fluids to keep your urine clear or pale yellow.  SEEK IMMEDIATE MEDICAL CARE IF:   · You develop problems with walking, weakness, numbness, or using your arms, hands, or legs.  · You have difficulty speaking.  · You develop  severe headaches.  · Your nausea or vomiting continues or gets worse.  · You develop visual changes.  · Your family or friends notice any behavioral changes.  · Your condition gets worse.  · You have a fever.  · You develop a stiff neck or sensitivity to light.  MAKE SURE YOU:   · Understand these instructions.  · Will watch your condition.  · Will get help right away if you are not doing well or get worse.     This information is not intended to replace advice given to you by your health care provider. Make sure you discuss any questions you have with your health care provider.     Document Released: 09/25/2007 Document Revised: 03/11/2013 Document Reviewed: 04/11/2016  [employer] Interactive Patient Education ©2016 Elsevier Inc.    Dizziness  Dizziness is a common problem. It makes you feel unsteady or lightheaded. You may feel like you are about to pass out (faint). Dizziness can lead to injury if you stumble or fall. Anyone can get dizzy, but dizziness is more common in older adults. This condition can be caused by a number of things, including:  · Medicines.  · Dehydration.  · Illness.  HOME CARE  Following these instructions may help with your condition:  Eating and Drinking  · Drink enough fluid to keep your pee (urine) clear or pale yellow. This helps to keep you from getting dehydrated. Try to drink more clear fluids, such as water.  · Do not drink alcohol.  · Limit how much caffeine you drink or eat if told by your doctor.  · Limit how much salt you drink or eat if told by your doctor.  Activity  · Avoid making quick movements.  ¨ When you stand up from sitting in a chair, steady yourself until you feel okay.  ¨ In the morning, first sit up on the side of the bed. When you feel okay, stand slowly while you hold onto something. Do this until you know that your balance is fine.  · Move your legs often if you need to  one place for a long time. Tighten and relax your muscles in your legs while you are  standing.  · Do not drive or use heavy machinery if you feel dizzy.  · Avoid bending down if you feel dizzy. Place items in your home so that they are easy for you to reach without leaning over.  Lifestyle  · Do not use any tobacco products, including cigarettes, chewing tobacco, or electronic cigarettes. If you need help quitting, ask your doctor.  · Try to lower your stress level, such as with yoga or meditation. Talk with your doctor if you need help.  General Instructions  · Watch your dizziness for any changes.  · Take medicines only as told by your doctor. Talk with your doctor if you think that your dizziness is caused by a medicine that you are taking.  · Tell a friend or a family member that you are feeling dizzy. If he or she notices any changes in your behavior, have this person call your doctor.  · Keep all follow-up visits as told by your doctor. This is important.  GET HELP IF:  · Your dizziness does not go away.  · Your dizziness or light-headedness gets worse.  · You feel sick to your stomach (nauseous).  · You have trouble hearing.  · You have new symptoms.  · You are unsteady on your feet or you feel like the room is spinning.  GET HELP RIGHT AWAY IF:  · You throw up (vomit) or have diarrhea and are unable to eat or drink anything.  · You have trouble:  ¨ Talking.  ¨ Walking.  ¨ Swallowing.  ¨ Using your arms, hands, or legs.  · You feel generally weak.  · You are not thinking clearly or you have trouble forming sentences. It may take a friend or family member to notice this.  · You have:  ¨ Chest pain.  ¨ Pain in your belly (abdomen).  ¨ Shortness of breath.  ¨ Sweating.  · Your vision changes.  · You are bleeding.  · You have a headache.  · You have neck pain or a stiff neck.  · You have a fever.     This information is not intended to replace advice given to you by your health care provider. Make sure you discuss any questions you have with your health care provider.     Document Released:  12/06/2012 Document Revised: 05/03/2016 Document Reviewed: 12/14/2015  Elsevier Interactive Patient Education ©2016 Elsevier Inc.

## 2017-04-12 NOTE — ED NOTES
"PO challenge complete, pt tolerated well stating \"I don't have any problems except my head feels dizzy\".  "

## 2017-04-12 NOTE — ED NOTES
Patient to ED triage with complaints of n/v x3 weeks. She states started around the time she had an IUD placed. She does not think she is pregnant and LMP was approximately 3 weeks ago. C/o fo LLQ pain that radiates to low back. She states she does have urinary frequency and today had cloudy urine. She does report chills in last 24 hrs.     Urine specimen cup provided and pt educated on clean catch sample.     Pt educated on ED process and asked to wait in lobby. Patient educated on importance of alerting staff to new or worsening symptoms or concerns.

## 2017-04-15 ENCOUNTER — HOSPITAL ENCOUNTER (EMERGENCY)
Facility: MEDICAL CENTER | Age: 22
End: 2017-04-15
Attending: EMERGENCY MEDICINE
Payer: MEDICAID

## 2017-04-15 VITALS
RESPIRATION RATE: 18 BRPM | TEMPERATURE: 98 F | BODY MASS INDEX: 29.45 KG/M2 | HEART RATE: 86 BPM | SYSTOLIC BLOOD PRESSURE: 123 MMHG | OXYGEN SATURATION: 97 % | HEIGHT: 60 IN | WEIGHT: 150 LBS | DIASTOLIC BLOOD PRESSURE: 84 MMHG

## 2017-04-15 DIAGNOSIS — S05.91XA EYE INJURY, NON-PENETRATING, RIGHT, INITIAL ENCOUNTER: ICD-10-CM

## 2017-04-15 PROCEDURE — 99282 EMERGENCY DEPT VISIT SF MDM: CPT

## 2017-04-15 PROCEDURE — A9270 NON-COVERED ITEM OR SERVICE: HCPCS | Performed by: EMERGENCY MEDICINE

## 2017-04-15 PROCEDURE — 700102 HCHG RX REV CODE 250 W/ 637 OVERRIDE(OP): Performed by: EMERGENCY MEDICINE

## 2017-04-15 RX ORDER — ACETAMINOPHEN 325 MG/1
650 TABLET ORAL ONCE
Status: COMPLETED | OUTPATIENT
Start: 2017-04-15 | End: 2017-04-15

## 2017-04-15 RX ADMIN — ACETAMINOPHEN 650 MG: 325 TABLET, FILM COATED ORAL at 18:35

## 2017-04-15 ASSESSMENT — PAIN SCALES - GENERAL: PAINLEVEL_OUTOF10: 9

## 2017-04-15 NOTE — ED AVS SNAPSHOT
4/15/2017    Kristal Spivey  [address] [place] [address]    Dear Kristal:    [facility] hospital wants to ensure your discharge home is safe and you or your loved ones have had all of your questions answered regarding your care after you leave the hospital.    Below is a list of resources and contact information should you have any questions regarding your hospital stay, follow-up instructions, or active medical symptoms.    Questions or Concerns Regarding… Contact   Medical Questions Related to Your Discharge  (7 days a week, 8am-5pm) Contact a Nurse Care Coordinator   [phone]   Medical Questions Not Related to Your Discharge  (24 hours a day / 7 days a week)  Contact the Nurse Health Line   [phone]    Medications or Discharge Instructions Refer to your discharge packet   or contact your [facility] Primary Care Provider   [phone]   Follow-up Appointment(s) Schedule your appointment via [employer]   or contact Scheduling [phone]   Billing Review your statement via [employer]  or contact Billing [phone]   Medical Records Review your records via [employer]   or contact Medical Records [phone]     You may receive a telephone call within two days of discharge. This call is to make certain you understand your discharge instructions and have the opportunity to have any questions answered. You can also easily access your medical information, test results and upcoming appointments via the [employer] free online health management tool. You can learn more and sign up at [employer]. For assistance setting up your [employer] account, please call [phone].    Once again, we want to ensure your discharge home is safe and that you have a clear understanding of any next steps in your care. If you have any questions or concerns, please do not hesitate to contact us, we are here for you. Thank you for choosing [facility] for your healthcare needs.    Sincerely,    Your [facility] Healthcare Team

## 2017-04-15 NOTE — ED AVS SNAPSHOT
Home Care Instructions                                                                                                                Kristal Spivey   MRN: [id number]    Department:  [facility], Emergency Dept   Date of Visit:  4/15/2017            [facility], Emergency Dept    [address] [address]    Phone:  [phone]      You were seen by     Isac Montano M.D.      Your Diagnosis Was     Eye injury, non-penetrating, right, initial encounter     S05.91XA       These are the medications you received during your hospitalization from 04/15/2017 1506 to 04/15/2017 1842     Date/Time Order Dose Route Action    04/15/2017 1835 acetaminophen (TYLENOL) tablet 650 mg 650 mg Oral Given      Follow-up Information     1. Follow up with Ken Benton M.D..    Specialty:  Ophthalmology    Why:  If symptoms worsen    Contact information    [address]  [place] [address]  [phone]        Medication Information     Review all of your home medications and newly ordered medications with your primary doctor and/or pharmacist as soon as possible. Follow medication instructions as directed by your doctor and/or pharmacist.     Please keep your complete medication list with you and share with your physician. Update the information when medications are discontinued, doses are changed, or new medications (including over-the-counter products) are added; and carry medication information at all times in the event of emergency situations.               Medication List      ASK your doctor about these medications        Instructions    Morning Afternoon Evening Bedtime    citalopram 20 MG Tabs   Commonly known as:  CELEXA        Take 20 mg by mouth every day.   Dose:  20 mg                        ibuprofen 800 MG Tabs   Commonly known as:  MOTRIN        Take 1 Tab by mouth every 8 hours as needed (Cramping).   Dose:  800 mg                           meclizine 25 MG Tabs   Commonly known as:  ANTIVERT        Take 1 Tab by mouth 3 times a day as needed for Dizziness.   Dose:  25 mg                        NUVARING VA        Insert  in vagina.                        ondansetron 4 MG Tbdp   Commonly known as:  ZOFRAN ODT        Take 1 Tab by mouth every 8 hours as needed.   Dose:  4 mg                                Procedures and tests performed during your visit     Nursing Communication        Discharge Instructions       Tylenol or ibuprofen for pain          Patient Information     Patient Information    Following emergency treatment: all patient requiring follow-up care must return either to a private physician or a clinic if your condition worsens before you are able to obtain further medical attention, please return to the emergency room.     Billing Information    At [facility], we work to make the billing process streamlined for our patients.  Our Representatives are here to answer any questions you may have regarding your hospital bill.  If you have insurance coverage and have supplied your insurance information to us, we will submit a claim to your insurer on your behalf.  Should you have any questions regarding your bill, we can be reached online or by phone as follows:  Online: You are able pay your bills online or live chat with our representatives about any billing questions you may have. We are here to help Monday - Friday from 8:00am to 7:30pm and 9:00am - 12:00pm on Saturdays.  Please visit [email or web address]  for more information.   Phone:  [phone] or [phone]    Please note that your emergency physician, surgeon, pathologist, radiologist, anesthesiologist, and other specialists are not employed by [facility] and will therefore bill separately for their services.  Please contact them directly for any questions concerning their bills at the numbers below:     Emergency Physician Services:   [phone]  [facility]:  [phone]  [facility]:  [phone]  [facility]:  [phone]    1. Your final bill may vary from the amount quoted upon discharge if all procedures are not complete at that time, or if your doctor has additional procedures of which we are not aware. You will receive an additional bill if you return to the Emergency Department at [facility] for suture removal regardless of the facility of which the sutures were placed.     2. Please arrange for settlement of this account at the emergency registration.    3. All self-pay accounts are due in full at the time of treatment.  If you are unable to meet this obligation then payment is expected within 4-5 days.     4. If you have had radiology studies (CT, X-ray, Ultrasound, MRI), you have received a preliminary result during your emergency department visit. Please contact the radiology department [phone] to receive a copy of your final result. Please discuss the Final result with your primary physician or with the follow up physician provided.     Crisis Hotline:  [place] Crisis Hotline:  5-230-LBAPCFX or 1-987.339.9250  [place] Crisis Hotline:    1-191.935.7343 or 739-111-4438         ED Discharge Follow Up Questions    1. In order to provide you with very good care, we would like to follow up with a phone call in the next few days.  May we have your permission to contact you?     YES /  NO    2. What is the best phone number to call you? (       )_____-__________    3. What is the best time to call you?      Morning  /  Afternoon  /  Evening                   Patient Signature:  ____________________________________________________________    Date:  ____________________________________________________________
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
## 2017-04-15 NOTE — ED AVS SNAPSHOT
adicate timeads Access Code: [email or web address]  Expires: 4/18/2017  2:32 AM    adicate timeads  A secure, online tool to manage your health information     [employer]’s adicate timeads® is a secure, online tool that connects you to your personalized health information from the privacy of your home -- day or night - making it very easy for you to manage your healthcare. Once the activation process is completed, you can even access your medical information using the adicate timeads destinee, which is available for free in the Apple Destinee store or Google Play store.     adicate timeads provides the following levels of access (as shown below):   My Chart Features   [facility] Primary Care Doctor [facility]  Specialists [facility]  Urgent  Care Non-[facility]  Primary Care  Doctor   Email your healthcare team securely and privately 24/7 X X X X   Manage appointments: schedule your next appointment; view details of past/upcoming appointments X      Request prescription refills. X      View recent personal medical records, including lab and immunizations X X X X   View health record, including health history, allergies, medications X X X X   Read reports about your outpatient visits, procedures, consult and ER notes X X X X   See your discharge summary, which is a recap of your hospital and/or ER visit that includes your diagnosis, lab results, and care plan. X X       How to register for adicate timeads:  1. Go to  [email or web address].  2. Click on the Sign Up Now box, which takes you to the New Member Sign Up page. You will need to provide the following information:  a. Enter your adicate timeads Access Code exactly as it appears at the top of this page. (You will not need to use this code after you’ve completed the sign-up process. If you do not sign up before the expiration date, you must request a new code.)   b. Enter your date of birth.   c. Enter your home email address.   d. Click Submit, and follow the next screen’s instructions.  3. Create a adicate timeads ID. This will be your adicate timeads  login ID and cannot be changed, so think of one that is secure and easy to remember.  4. Create a [employer] password. You can change your password at any time.  5. Enter your Password Reset Question and Answer. This can be used at a later time if you forget your password.   6. Enter your e-mail address. This allows you to receive e-mail notifications when new information is available in [employer].  7. Click Sign Up. You can now view your health information.    For assistance activating your [employer] account, call [phone]
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
## 2017-04-16 NOTE — ED NOTES
Discharge instructions given to pt including follow up w/opthalmoligst and verbalized understanding. Questions answered by RN. Discharged w/steady gait and stable vitals.

## 2017-07-19 ENCOUNTER — OFFICE VISIT (OUTPATIENT)
Dept: MEDICAL GROUP | Facility: MEDICAL CENTER | Age: 22
End: 2017-07-19
Attending: INTERNAL MEDICINE
Payer: MEDICAID

## 2017-07-19 ENCOUNTER — HOSPITAL ENCOUNTER (OUTPATIENT)
Dept: LAB | Facility: MEDICAL CENTER | Age: 22
End: 2017-07-19
Attending: INTERNAL MEDICINE
Payer: MEDICAID

## 2017-07-19 VITALS
TEMPERATURE: 97.3 F | SYSTOLIC BLOOD PRESSURE: 110 MMHG | DIASTOLIC BLOOD PRESSURE: 70 MMHG | RESPIRATION RATE: 16 BRPM | BODY MASS INDEX: 30.04 KG/M2 | HEIGHT: 60 IN | HEART RATE: 88 BPM | WEIGHT: 153 LBS | OXYGEN SATURATION: 96 %

## 2017-07-19 DIAGNOSIS — Z97.5 IUD CONTRACEPTION: ICD-10-CM

## 2017-07-19 DIAGNOSIS — F33.1 MODERATE EPISODE OF RECURRENT MAJOR DEPRESSIVE DISORDER (HCC): ICD-10-CM

## 2017-07-19 DIAGNOSIS — E66.9 OBESITY (BMI 30-39.9): ICD-10-CM

## 2017-07-19 DIAGNOSIS — Z02.1 PRE-EMPLOYMENT HEALTH SCREENING EXAMINATION: ICD-10-CM

## 2017-07-19 PROBLEM — R42 VERTIGO: Status: ACTIVE | Noted: 2017-07-19

## 2017-07-19 PROBLEM — K76.0 FATTY LIVER: Status: ACTIVE | Noted: 2017-07-19

## 2017-07-19 LAB — HBV SURFACE AB SERPL IA-ACNC: 535.9 MIU/ML (ref 0–10)

## 2017-07-19 PROCEDURE — 99204 OFFICE O/P NEW MOD 45 MIN: CPT | Performed by: INTERNAL MEDICINE

## 2017-07-19 PROCEDURE — 86787 VARICELLA-ZOSTER ANTIBODY: CPT

## 2017-07-19 PROCEDURE — 86735 MUMPS ANTIBODY: CPT

## 2017-07-19 PROCEDURE — 86762 RUBELLA ANTIBODY: CPT

## 2017-07-19 PROCEDURE — 36415 COLL VENOUS BLD VENIPUNCTURE: CPT

## 2017-07-19 PROCEDURE — 86765 RUBEOLA ANTIBODY: CPT

## 2017-07-19 PROCEDURE — 86706 HEP B SURFACE ANTIBODY: CPT

## 2017-07-19 RX ORDER — BUPROPION HYDROCHLORIDE 150 MG/1
TABLET, EXTENDED RELEASE ORAL
Qty: 60 TAB | Refills: 1 | Status: SHIPPED | OUTPATIENT
Start: 2017-07-19 | End: 2017-10-12

## 2017-07-19 ASSESSMENT — PATIENT HEALTH QUESTIONNAIRE - PHQ9: CLINICAL INTERPRETATION OF PHQ2 SCORE: 0

## 2017-07-19 ASSESSMENT — PAIN SCALES - GENERAL: PAINLEVEL: 4=SLIGHT-MODERATE PAIN

## 2017-07-19 NOTE — ASSESSMENT & PLAN NOTE
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 She brings in a copy of her immunizations from [facility] today and she has completed all of her vaccines series.

## 2017-07-19 NOTE — ASSESSMENT & PLAN NOTE
Patient reports struggling with depression for several years. About one year ago, she saw her previous PCP. Initially they tried therapy which the patient did not find effective. After that, she was started on Celexa 20 mg daily which she took for about 5 months. She did not find this medication helpful and was subsequently switched to sertraline 50 mg daily. She has been on this for the past 3 months. She has not tried any other medications for depression. She does not feel like the sertraline has helped much. She states that she feels fatigued all the time, has been shutting everyone out of her life, feels lack of motivation, is not eating well.  Her aunt is a nurse and recommended she try Wellbutrin. She also states that her grandfather takes Wellbutrin for depression and it works well for him.

## 2017-07-19 NOTE — MR AVS SNAPSHOT
"        Kristal NazarioAideArsenio   2017 3:30 PM   Office Visit   MRN: [id number]    [facility]   Dept Phone:  [phone]    Description:  Female : 1995   Provider:  Margareth Villegas M.D.           Reason for Visit     Establish Care need PCP, TB, Pain, tingling       Allergies as of 2017     Allergen Noted Reactions    Other Environmental 2011   Rash    \"dogs\" - \"my skin turns red\"      You were diagnosed with     Vertigo   [229706]       Moderate episode of recurrent major depressive disorder (CMS-HCC)   [8647659]       Obesity (BMI 30-39.9)   [030110]       Pre-employment health screening examination   [601489]         Vital Signs     Blood Pressure Pulse Temperature Respirations Height Weight    110/70 mmHg 88 36.3 °C (97.3 °F) 16 1.52 m (4' 11.84\") 69.4 kg (153 lb)    Body Mass Index Oxygen Saturation Last Menstrual Period Breastfeeding? Smoking Status       30.04 kg/m2 96% (Approximate) No Never Smoker        Basic Information     Date Of Birth Sex Race Ethnicity Preferred Language    1995 Female White  Origin ([place] Islander,[place] African,[place], etc) English      Your appointments     Aug 22, 2017  3:30 PM   Established Patient with Margareth Villegas M.D.   [facility] ([facility])    [address] [place] [id number]   [phone]           You will be receiving a confirmation call a few days before your appointment from our automated call confirmation system.              Problem List              ICD-10-CM Priority Class Noted - Resolved    Gallstones K80.20   2016 - Present    Cholelithiasis K80.20   3/7/2016 - Present    Fatty liver K76.0   2017 - Present    Vertigo R42   2017 - Present    Moderate episode of recurrent major depressive disorder (CMS-HCC) F33.1   2017 - Present    Obesity (BMI 30-39.9) E66.9   2017 - Present    Pre-employment health screening examination Z02.1   2017 - Present      "   Health Maintenance        Date Due Completion Dates    IMM HEP B VACCINE (1 of 3 - Primary Series) 1995 ---    IMM HEP A VACCINE (1 of 2 - Standard Series) 7/9/1997 ---    IMM HPV VACCINE (1 of 3 - Female 3 Dose Series) 7/9/2007 ---    IMM VARICELLA (CHICKENPOX) VACCINE (1 of 2 - 2 Dose Adolescent Series) 7/9/2009 ---    IMM MENINGOCOCCAL VACCINE (MCV4) (1 of 1) 7/9/2012 ---    PAP SMEAR 7/9/2017 ---    IMM INFLUENZA (1) 9/1/2017 ---    IMM DTaP/Tdap/Td Vaccine (2 - Td) 10/16/2025 10/16/2015            Current Immunizations     Tdap Vaccine 10/16/2015  4:03 PM    Tuberculin Skin Test  Incomplete, 7/31/2015, 7/24/2015      Below and/or attached are the medications your provider expects you to take. Review all of your home medications and newly ordered medications with your provider and/or pharmacist. Follow medication instructions as directed by your provider and/or pharmacist. Please keep your medication list with you and share with your provider. Update the information when medications are discontinued, doses are changed, or new medications (including over-the-counter products) are added; and carry medication information at all times in the event of emergency situations     Allergies:  OTHER ENVIRONMENTAL - Rash               Medications  Valid as of: July 19, 2017 -  4:14 PM    Generic Name Brand Name Tablet Size Instructions for use    BuPROPion HCl (TABLET SR 12 HR) WELLBUTRIN- MG Take one tablet by mouth daily for three days then increase to one tablet in the morning and one in the evening.        Ibuprofen (Tab) MOTRIN 800 MG Take 1 Tab by mouth every 8 hours as needed (Cramping).        Meclizine HCl (Tab) ANTIVERT 25 MG Take 1 Tab by mouth 3 times a day as needed for Dizziness.        Ondansetron (TABLET DISPERSIBLE) ZOFRAN ODT 4 MG Take 1 Tab by mouth every 8 hours as needed.        .                 Medicines prescribed today were sent to:     [id number] - [place], [place] - [address] [facility]     [address] [place] [address]    Phone: [phone] Fax: [phone]    Open 24 Hours?: No      Medication refill instructions:       If your prescription bottle indicates you have medication refills left, it is not necessary to call your provider’s office. Please contact your pharmacy and they will refill your medication.    If your prescription bottle indicates you do not have any refills left, you may request refills at any time through one of the following ways: The online [email or web address] [email or web address] cn system (except Urgent Care), by calling your provider’s office, or by asking your pharmacy to contact your provider’s office with a refill request. Medication refills are processed only during regular business hours and may not be available until the next business day. Your provider may request additional information or to have a follow-up visit with you prior to refilling your medication.   *Please Note: Medication refills are assigned a new Rx number when refilled electronically. Your pharmacy may indicate that no refills were authorized even though a new prescription for the same medication is available at the pharmacy. Please request the medicine by name with the pharmacy before contacting your provider for a refill.        Your To Do List     Future Labs/Procedures Complete By Expires    HEP B SURFACE AB  As directed 7/20/2018    MEASLES/MUMPS/RUBELLA IMMUNITY  As directed 7/19/2018    VARICELLA ZOSTER IGG AB  As directed 7/20/2018         [place]. com. cn Status: Patient Declined

## 2017-07-19 NOTE — ASSESSMENT & PLAN NOTE
Patient reports that since she has been more depressed lately, she is not eating very much. She has maybe one meal a day. She has not been motivated to do any exercise.

## 2017-07-20 ENCOUNTER — TELEPHONE (OUTPATIENT)
Dept: MEDICAL GROUP | Facility: MEDICAL CENTER | Age: 22
End: 2017-07-20

## 2017-07-20 LAB
MEV IGG SER IA-ACNC: NORMAL
MUV IGG SER IA-ACNC: POSITIVE
RUBV AB SER QL: 133.8 IU/ML
VZV IGG SER IA-ACNC: POSITIVE

## 2017-07-20 NOTE — TELEPHONE ENCOUNTER
----- Message from Margareth Villegas M.D. 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  We can print out the results and fill out her paperwork when she comes in on Monday for her TB testing.    Margareth Villegas M.D.

## 2017-07-20 NOTE — TELEPHONE ENCOUNTER
Pt. Aware that results will be printed and given on Monday by Phyllis in office when she comes in for her PPD

## 2017-07-20 NOTE — PROGRESS NOTES
Kristal Cesar is a 22 y.o. female here for depression, need for labs and PPD test, establish care    HPI: Previous PCP through [facility], discharged from their practice for no shows    Moderate episode of recurrent major depressive disorder (CMS-HCC)  Patient reports struggling with depression for several years. About one year ago, she saw her previous PCP. Initially they tried therapy which the patient did not find effective. After that, she was started on Celexa 20 mg daily which she took for about 5 months. She did not find this medication helpful and was subsequently switched to sertraline 50 mg daily. She has been on this for the past 3 months. She has not tried any other medications for depression. She does not feel like the sertraline has helped much. She states that she feels fatigued all the time, has been shutting everyone out of her life, feels lack of motivation, is not eating well.  Her aunt is a nurse and recommended she try Wellbutrin. She also states that her grandfather takes Wellbutrin for depression and it works well for him.     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, and is required to have titers of measles, mumps, rubella, hep B, and varicella checked prior to starting the program. She brings in a copy of her immunizations from [facility] today and she has completed all of her vaccines series.    Obesity (BMI 30-39.9)  Patient reports that since she has been more depressed lately, she is not eating very much. She has maybe one meal a day. She has not been motivated to do any exercise.      Current medicines (including changes today)  Current Outpatient Prescriptions   Medication Sig Dispense Refill   • buPROPion SR (WELLBUTRIN-SR) 150 MG TABLET SR 12 HR sustained-release tablet Take one tablet by mouth daily for three days then increase to one tablet in the morning and one in the evening. 60 Tab 1   • ibuprofen (MOTRIN) 800 MG  "Tab Take 1 Tab by mouth every 8 hours as needed (Cramping). 30 Tab 1   • ondansetron (ZOFRAN ODT) 4 MG TABLET DISPERSIBLE Take 1 Tab by mouth every 8 hours as needed. 10 Tab 1   • meclizine (ANTIVERT) 25 MG Tab Take 1 Tab by mouth 3 times a day as needed for Dizziness. 30 Tab 0     No current facility-administered medications for this visit.     She  has a past medical history of Depression.  She  has past surgical history that includes ariana by laparoscopy (3/7/2016).  Social History   Substance Use Topics   • Smoking status: Never Smoker    • Smokeless tobacco: Never Used   • Alcohol Use: No     Social History     Social History Narrative     Family History   Problem Relation Age of Onset   • No Known Problems Mother    • Diabetes Father    • Hypertension Father    • Hyperlipidemia Father    • No Known Problems Sister    • Cancer Neg Hx            ROS  As above in history of present illness  All other systems reviewed and are negative     Objective:     Blood pressure 110/70, pulse 88, temperature 36.3 °C (97.3 °F), resp. rate 16, height 1.52 m (4' 11.84\"), weight 69.4 kg (153 lb), SpO2 96 %, not currently breastfeeding. Body mass index is 30.04 kg/(m^2).  Physical Exam:    Constitutional: Alert, no distress.  Skin: Warm, dry, good turgor, no rashes in visible areas.  Eye: Equal, round and reactive, conjunctiva clear, lids normal.  ENMT: Lips without lesions, good dentition, oropharynx clear.  Neck: Trachea midline, no masses, no thyromegaly. No cervical or supraclavicular lymphadenopathy.  Respiratory: Unlabored respiratory effort, lungs clear to auscultation, no wheezes, no ronchi.  Cardiovascular: Normal S1, S2, no murmur, no edema.  Abdomen: Soft, non-tender, no masses, no hepatosplenomegaly.  Psych: Alert and oriented x3, depressed mood, normal affect.        Assessment and Plan:   The following treatment plan was discussed    1. Moderate episode of recurrent major depressive disorder (CMS-HCC)  Patient has " not had benefit with the citalopram or Zoloft although they were both in moderate doses. We discussed increasing her Zoloft 200 mg but she would prefer to switch to a different medication. She would like to try bupropion as this has worked well for her grandfather. Then today with weaning instructions offered Zoloft as below.  We discussed common side effects, patient to follow-up in 4 weeks  -Decrease Zoloft to one half tablet for 3 days then stop. Concurrently start Wellbutrin 150 mg once a day for 3 days and then increase to twice daily  - buPROPion SR (WELLBUTRIN-SR) 150 MG TABLET SR 12 HR sustained-release tablet; Take one tablet by mouth daily for three days then increase to one tablet in the morning and one in the evening.  Dispense: 60 Tab; Refill: 1  -Follow up in 4 weeks    2. Obesity (BMI 30-39.9)  - Patient identified as having weight management issue.  Appropriate orders and counseling given.    3. 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 These labs were ordered today. She will return next week for the first PPD placement.   - PPD SKIN TEST  - MEASLES/MUMPS/RUBELLA IMMUNITY; Future  - VARICELLA ZOSTER IGG AB; Future  - HEP B SURFACE AB; Future    4. IUD contraception  Currently has an IUD in place which she is tolerating well. We will need to address removal in 10 years    HCM  Patient has a gynecologist, plans to have her Pap done through them    Followup: Return in about 4 weeks (around 8/16/2017) for depression.

## 2017-07-24 ENCOUNTER — NON-PROVIDER VISIT (OUTPATIENT)
Dept: MEDICAL GROUP | Facility: MEDICAL CENTER | Age: 22
End: 2017-07-24
Attending: INTERNAL MEDICINE
Payer: MEDICAID

## 2017-07-24 DIAGNOSIS — Z11.1 PPD SCREENING TEST: ICD-10-CM

## 2017-08-04 ENCOUNTER — NON-PROVIDER VISIT (OUTPATIENT)
Dept: MEDICAL GROUP | Facility: MEDICAL CENTER | Age: 22
End: 2017-08-04
Attending: INTERNAL MEDICINE
Payer: MEDICAID

## 2017-08-04 DIAGNOSIS — Z02.0 PRE-SCHOOL HEALTH EXAMINATION: ICD-10-CM

## 2017-08-06 ENCOUNTER — HOSPITAL ENCOUNTER (EMERGENCY)
Facility: MEDICAL CENTER | Age: 22
End: 2017-08-07
Attending: EMERGENCY MEDICINE
Payer: MEDICAID
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
## 2017-08-06 DIAGNOSIS — S06.0X0A CONCUSSION, WITHOUT LOC, INITIAL ENCOUNTER: ICD-10-CM

## 2017-08-06 DIAGNOSIS — S00.83XA FACIAL CONTUSION, INITIAL ENCOUNTER: ICD-10-CM

## 2017-08-06 PROCEDURE — A9270 NON-COVERED ITEM OR SERVICE: HCPCS | Performed by: EMERGENCY MEDICINE

## 2017-08-06 PROCEDURE — 700102 HCHG RX REV CODE 250 W/ 637 OVERRIDE(OP): Performed by: EMERGENCY MEDICINE

## 2017-08-06 PROCEDURE — 99283 EMERGENCY DEPT VISIT LOW MDM: CPT

## 2017-08-06 RX ORDER — ACETAMINOPHEN 325 MG/1
650 TABLET ORAL ONCE
Status: COMPLETED | OUTPATIENT
Start: 2017-08-07 | End: 2017-08-06

## 2017-08-06 RX ADMIN — ACETAMINOPHEN 650 MG: 325 TABLET, FILM COATED ORAL at 23:50
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
## 2017-08-06 NOTE — ED AVS SNAPSHOT
Codealike Access Code: Activation code not generated  Current Codealike Status: Patient Declined    [employer]  A secure, online tool to manage your health information     [employer]’s Codealike® is a secure, online tool that connects you to your personalized health information from the privacy of your home -- day or night - making it very easy for you to manage your healthcare. Once the activation process is completed, you can even access your medical information using the Codealike destinee, which is available for free in the Apple Destinee store or Google Play store.     Codealike provides the following levels of access (as shown below):   My Chart Features   [facility] Primary Care Doctor [facility]  Specialists [facility]  Urgent  Care Non-[facility]  Primary Care  Doctor   Email your healthcare team securely and privately 24/7 X X X X   Manage appointments: schedule your next appointment; view details of past/upcoming appointments X      Request prescription refills. X      View recent personal medical records, including lab and immunizations X X X X   View health record, including health history, allergies, medications X X X X   Read reports about your outpatient visits, procedures, consult and ER notes X X X X   See your discharge summary, which is a recap of your hospital and/or ER visit that includes your diagnosis, lab results, and care plan. X X       How to register for Codealike:  1. Go to  [email or web address].  2. Click on the Sign Up Now box, which takes you to the New Member Sign Up page. You will need to provide the following information:  a. Enter your Codealike Access Code exactly as it appears at the top of this page. (You will not need to use this code after you’ve completed the sign-up process. If you do not sign up before the expiration date, you must request a new code.)   b. Enter your date of birth.   c. Enter your home email address.   d. Click Submit, and follow the next screen’s instructions.  3. Create a Codealike ID.  This will be your [employer] login ID and cannot be changed, so think of one that is secure and easy to remember.  4. Create a [employer] password. You can change your password at any time.  5. Enter your Password Reset Question and Answer. This can be used at a later time if you forget your password.   6. Enter your e-mail address. This allows you to receive e-mail notifications when new information is available in [employer].  7. Click Sign Up. You can now view your health information.    For assistance activating your [employer] account, call [phone]
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
## 2017-08-07 ENCOUNTER — APPOINTMENT (OUTPATIENT)
Dept: RADIOLOGY | Facility: MEDICAL CENTER | Age: 22
End: 2017-08-07
Attending: EMERGENCY MEDICINE
Payer: MEDICAID

## 2017-08-07 VITALS
WEIGHT: 148.81 LBS | RESPIRATION RATE: 16 BRPM | HEIGHT: 60 IN | SYSTOLIC BLOOD PRESSURE: 115 MMHG | TEMPERATURE: 98 F | OXYGEN SATURATION: 98 % | DIASTOLIC BLOOD PRESSURE: 64 MMHG | HEART RATE: 70 BPM | BODY MASS INDEX: 29.22 KG/M2

## 2017-08-07 LAB — TB WHEAL 3D P 5 TU DIAM: NORMAL MM

## 2017-08-07 PROCEDURE — 70450 CT HEAD/BRAIN W/O DYE: CPT

## 2017-08-07 PROCEDURE — 72125 CT NECK SPINE W/O DYE: CPT
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
## 2017-08-07 NOTE — DISCHARGE INSTRUCTIONS
Concussion, Adult  A concussion, or closed-head injury, is a brain injury caused by a direct blow to the head or by a quick and sudden movement (jolt) of the head or neck. Concussions are usually not life-threatening. Even so, the effects of a concussion can be serious. If you have had a concussion before, you are more likely to experience concussion-like symptoms after a direct blow to the head.   CAUSES  · Direct blow to the head, such as from running into another player during a soccer game, being hit in a fight, or hitting your head on a hard surface.  · A jolt of the head or neck that causes the brain to move back and forth inside the skull, such as in a car crash.  SIGNS AND SYMPTOMS  The signs of a concussion can be hard to notice. Early on, they may be missed by you, family members, and health care providers. You may look fine but act or feel differently.  Symptoms are usually temporary, but they may last for days, weeks, or even longer. Some symptoms may appear right away while others may not show up for hours or days. Every head injury is different. Symptoms include:  · Mild to moderate headaches that will not go away.  · A feeling of pressure inside your head.  · Having more trouble than usual:  ¨ Learning or remembering things you have heard.  ¨ Answering questions.  ¨ Paying attention or concentrating.  ¨ Organizing daily tasks.  ¨ Making decisions and solving problems.  · Slowness in thinking, acting or reacting, speaking, or reading.  · Getting lost or being easily confused.  · Feeling tired all the time or lacking energy (fatigued).  · Feeling drowsy.  · Sleep disturbances.  ¨ Sleeping more than usual.  ¨ Sleeping less than usual.  ¨ Trouble falling asleep.  ¨ Trouble sleeping (insomnia).  · Loss of balance or feeling lightheaded or dizzy.  · Nausea or vomiting.  · Numbness or tingling.  · Increased sensitivity to:  ¨ Sounds.  ¨ Lights.  ¨ Distractions.  · Vision problems or eyes that tire  easily.  · Diminished sense of taste or smell.  · Ringing in the ears.  · Mood changes such as feeling sad or anxious.  · Becoming easily irritated or angry for little or no reason.  · Lack of motivation.  · Seeing or hearing things other people do not see or hear (hallucinations).  DIAGNOSIS  Your health care provider can usually diagnose a concussion based on a description of your injury and symptoms. He or she will ask whether you passed out (lost consciousness) and whether you are having trouble remembering events that happened right before and during your injury.  Your evaluation might include:  · A brain scan to look for signs of injury to the brain. Even if the test shows no injury, you may still have a concussion.  · Blood tests to be sure other problems are not present.  TREATMENT  · Concussions are usually treated in an emergency department, in urgent care, or at a clinic. You may need to stay in the hospital overnight for further treatment.  · Tell your health care provider if you are taking any medicines, including prescription medicines, over-the-counter medicines, and natural remedies. Some medicines, such as blood thinners (anticoagulants) and aspirin, may increase the chance of complications. Also tell your health care provider whether you have had alcohol or are taking illegal drugs. This information may affect treatment.  · Your health care provider will send you home with important instructions to follow.  · How fast you will recover from a concussion depends on many factors. These factors include how severe your concussion is, what part of your brain was injured, your age, and how healthy you were before the concussion.  · Most people with mild injuries recover fully. Recovery can take time. In general, recovery is slower in older persons. Also, persons who have had a concussion in the past or have other medical problems may find that it takes longer to recover from their current injury.  HOME  CARE INSTRUCTIONS  General Instructions  · Carefully follow the directions your health care provider gave you.  · Only take over-the-counter or prescription medicines for pain, discomfort, or fever as directed by your health care provider.  · Take only those medicines that your health care provider has approved.  · Do not drink alcohol until your health care provider says you are well enough to do so. Alcohol and certain other drugs may slow your recovery and can put you at risk of further injury.  · If it is harder than usual to remember things, write them down.  · If you are easily distracted, try to do one thing at a time. For example, do not try to watch TV while fixing dinner.  · Talk with family members or close friends when making important decisions.  · Keep all follow-up appointments. Repeated evaluation of your symptoms is recommended for your recovery.  · Watch your symptoms and tell others to do the same. Complications sometimes occur after a concussion. Older adults with a brain injury may have a higher risk of serious complications, such as a blood clot on the brain.  · Tell your teachers, school nurse, school counselor, , , or  about your injury, symptoms, and restrictions. Tell them about what you can or cannot do. They should watch for:  ¨ Increased problems with attention or concentration.  ¨ Increased difficulty remembering or learning new information.  ¨ Increased time needed to complete tasks or assignments.  ¨ Increased irritability or decreased ability to cope with stress.  ¨ Increased symptoms.  · Rest. Rest helps the brain to heal. Make sure you:  ¨ Get plenty of sleep at night. Avoid staying up late at night.  ¨ Keep the same bedtime hours on weekends and weekdays.  ¨ Rest during the day. Take daytime naps or rest breaks when you feel tired.  · Limit activities that require a lot of thought or concentration. These include:  ¨ Doing homework or job-related  work.  ¨ Watching TV.  ¨ Working on the computer.  · Avoid any situation where there is potential for another head injury (football, hockey, soccer, basketball, martial arts, downhill snow sports and horseback riding). Your condition will get worse every time you experience a concussion. You should avoid these activities until you are evaluated by the appropriate follow-up health care providers.  Returning To Your Regular Activities  You will need to return to your normal activities slowly, not all at once. You must give your body and brain enough time for recovery.  · Do not return to sports or other athletic activities until your health care provider tells you it is safe to do so.  · Ask your health care provider when you can drive, ride a bicycle, or operate heavy machinery. Your ability to react may be slower after a brain injury. Never do these activities if you are dizzy.  · Ask your health care provider about when you can return to work or school.  Preventing Another Concussion  It is very important to avoid another brain injury, especially before you have recovered. In rare cases, another injury can lead to permanent brain damage, brain swelling, or death. The risk of this is greatest during the first 7-10 days after a head injury. Avoid injuries by:  · Wearing a seat belt when riding in a car.  · Drinking alcohol only in moderation.  · Wearing a helmet when biking, skiing, skateboarding, skating, or doing similar activities.  · Avoiding activities that could lead to a second concussion, such as contact or recreational sports, until your health care provider says it is okay.  · Taking safety measures in your home.  ¨ Remove clutter and tripping hazards from floors and stairways.  ¨ Use grab bars in bathrooms and handrails by stairs.  ¨ Place non-slip mats on floors and in bathtubs.  ¨ Improve lighting in dim areas.  SEEK MEDICAL CARE IF:  · You have increased problems paying attention or  concentrating.  · You have increased difficulty remembering or learning new information.  · You need more time to complete tasks or assignments than before.  · You have increased irritability or decreased ability to cope with stress.  · You have more symptoms than before.  Seek medical care if you have any of the following symptoms for more than 2 weeks after your injury:  · Lasting (chronic) headaches.  · Dizziness or balance problems.  · Nausea.  · Vision problems.  · Increased sensitivity to noise or light.  · Depression or mood swings.  · Anxiety or irritability.  · Memory problems.  · Difficulty concentrating or paying attention.  · Sleep problems.  · Feeling tired all the time.  SEEK IMMEDIATE MEDICAL CARE IF:  · You have severe or worsening headaches. These may be a sign of a blood clot in the brain.  · You have weakness (even if only in one hand, leg, or part of the face).  · You have numbness.  · You have decreased coordination.  · You vomit repeatedly.  · You have increased sleepiness.  · One pupil is larger than the other.  · You have convulsions.  · You have slurred speech.  · You have increased confusion. This may be a sign of a blood clot in the brain.  · You have increased restlessness, agitation, or irritability.  · You are unable to recognize people or places.  · You have neck pain.  · It is difficult to wake you up.  · You have unusual behavior changes.  · You lose consciousness.  MAKE SURE YOU:  · Understand these instructions.  · Will watch your condition.  · Will get help right away if you are not doing well or get worse.     This information is not intended to replace advice given to you by your health care provider. Make sure you discuss any questions you have with your health care provider.     Document Released: 03/09/2005 Document Revised: 01/08/2016 Document Reviewed: 07/10/2014  [employer] Interactive Patient Education ©2016 [employer]

## 2017-08-08 ENCOUNTER — PATIENT OUTREACH (OUTPATIENT)
Dept: HEALTH INFORMATION MANAGEMENT | Facility: OTHER | Age: 22
End: 2017-08-08

## 2017-08-31 PROCEDURE — 99284 EMERGENCY DEPT VISIT MOD MDM: CPT

## 2017-08-31 ASSESSMENT — PAIN SCALES - GENERAL: PAINLEVEL_OUTOF10: 9

## 2017-09-01 ENCOUNTER — HOSPITAL ENCOUNTER (EMERGENCY)
Facility: MEDICAL CENTER | Age: 22
End: 2017-09-01
Attending: EMERGENCY MEDICINE
Payer: MEDICAID

## 2017-09-01 VITALS
DIASTOLIC BLOOD PRESSURE: 78 MMHG | HEART RATE: 67 BPM | WEIGHT: 152.78 LBS | SYSTOLIC BLOOD PRESSURE: 114 MMHG | HEIGHT: 60 IN | TEMPERATURE: 98.3 F | OXYGEN SATURATION: 97 % | BODY MASS INDEX: 29.99 KG/M2 | RESPIRATION RATE: 16 BRPM

## 2017-09-01 DIAGNOSIS — R55 NEAR SYNCOPE: ICD-10-CM

## 2017-09-01 LAB
ALBUMIN SERPL BCP-MCNC: 4.4 G/DL (ref 3.2–4.9)
ALBUMIN/GLOB SERPL: 1.5 G/DL
ALP SERPL-CCNC: 96 U/L (ref 30–99)
ALT SERPL-CCNC: 28 U/L (ref 2–50)
ANION GAP SERPL CALC-SCNC: 10 MMOL/L (ref 0–11.9)
APPEARANCE UR: CLEAR
AST SERPL-CCNC: 21 U/L (ref 12–45)
BACTERIA #/AREA URNS HPF: ABNORMAL /HPF
BASOPHILS # BLD AUTO: 0.2 % (ref 0–1.8)
BASOPHILS # BLD: 0.02 K/UL (ref 0–0.12)
BILIRUB SERPL-MCNC: 0.6 MG/DL (ref 0.1–1.5)
BILIRUB UR QL STRIP.AUTO: NEGATIVE
BUN SERPL-MCNC: 9 MG/DL (ref 8–22)
CALCIUM SERPL-MCNC: 9.6 MG/DL (ref 8.5–10.5)
CHLORIDE SERPL-SCNC: 104 MMOL/L (ref 96–112)
CO2 SERPL-SCNC: 26 MMOL/L (ref 20–33)
COLOR UR: YELLOW
CREAT SERPL-MCNC: 0.51 MG/DL (ref 0.5–1.4)
EKG IMPRESSION: NORMAL
EOSINOPHIL # BLD AUTO: 0.11 K/UL (ref 0–0.51)
EOSINOPHIL NFR BLD: 1.2 % (ref 0–6.9)
EPI CELLS #/AREA URNS HPF: ABNORMAL /HPF
ERYTHROCYTE [DISTWIDTH] IN BLOOD BY AUTOMATED COUNT: 37.2 FL (ref 35.9–50)
GFR SERPL CREATININE-BSD FRML MDRD: >60 ML/MIN/1.73 M 2
GLOBULIN SER CALC-MCNC: 2.9 G/DL (ref 1.9–3.5)
GLUCOSE SERPL-MCNC: 88 MG/DL (ref 65–99)
GLUCOSE UR STRIP.AUTO-MCNC: NEGATIVE MG/DL
HCG SERPL QL: NEGATIVE
HCT VFR BLD AUTO: 40.3 % (ref 37–47)
HGB BLD-MCNC: 13.2 G/DL (ref 12–16)
IMM GRANULOCYTES # BLD AUTO: 0.03 K/UL (ref 0–0.11)
IMM GRANULOCYTES NFR BLD AUTO: 0.3 % (ref 0–0.9)
KETONES UR STRIP.AUTO-MCNC: NEGATIVE MG/DL
LEUKOCYTE ESTERASE UR QL STRIP.AUTO: ABNORMAL
LYMPHOCYTES # BLD AUTO: 3.52 K/UL (ref 1–4.8)
LYMPHOCYTES NFR BLD: 38.8 % (ref 22–41)
MCH RBC QN AUTO: 26.7 PG (ref 27–33)
MCHC RBC AUTO-ENTMCNC: 32.8 G/DL (ref 33.6–35)
MCV RBC AUTO: 81.4 FL (ref 81.4–97.8)
MICRO URNS: ABNORMAL
MONOCYTES # BLD AUTO: 0.48 K/UL (ref 0–0.85)
MONOCYTES NFR BLD AUTO: 5.3 % (ref 0–13.4)
NEUTROPHILS # BLD AUTO: 4.91 K/UL (ref 2–7.15)
NEUTROPHILS NFR BLD: 54.2 % (ref 44–72)
NITRITE UR QL STRIP.AUTO: NEGATIVE
NRBC # BLD AUTO: 0 K/UL
NRBC BLD AUTO-RTO: 0 /100 WBC
PH UR STRIP.AUTO: 6 [PH]
PLATELET # BLD AUTO: 273 K/UL (ref 164–446)
PMV BLD AUTO: 11.5 FL (ref 9–12.9)
POTASSIUM SERPL-SCNC: 3.6 MMOL/L (ref 3.6–5.5)
PROT SERPL-MCNC: 7.3 G/DL (ref 6–8.2)
PROT UR QL STRIP: 30 MG/DL
RBC # BLD AUTO: 4.95 M/UL (ref 4.2–5.4)
RBC # URNS HPF: ABNORMAL /HPF
RBC UR QL AUTO: ABNORMAL
SODIUM SERPL-SCNC: 140 MMOL/L (ref 135–145)
SP GR UR STRIP.AUTO: 1.01
UROBILINOGEN UR STRIP.AUTO-MCNC: NORMAL MG/DL
WBC # BLD AUTO: 9.1 K/UL (ref 4.8–10.8)
WBC #/AREA URNS HPF: ABNORMAL /HPF

## 2017-09-01 PROCEDURE — 84703 CHORIONIC GONADOTROPIN ASSAY: CPT

## 2017-09-01 PROCEDURE — 700105 HCHG RX REV CODE 258: Performed by: EMERGENCY MEDICINE

## 2017-09-01 PROCEDURE — A9270 NON-COVERED ITEM OR SERVICE: HCPCS | Performed by: EMERGENCY MEDICINE

## 2017-09-01 PROCEDURE — 700102 HCHG RX REV CODE 250 W/ 637 OVERRIDE(OP): Performed by: EMERGENCY MEDICINE

## 2017-09-01 PROCEDURE — 36415 COLL VENOUS BLD VENIPUNCTURE: CPT

## 2017-09-01 PROCEDURE — 96360 HYDRATION IV INFUSION INIT: CPT

## 2017-09-01 PROCEDURE — 93005 ELECTROCARDIOGRAM TRACING: CPT | Performed by: EMERGENCY MEDICINE

## 2017-09-01 PROCEDURE — 85025 COMPLETE CBC W/AUTO DIFF WBC: CPT

## 2017-09-01 PROCEDURE — 81001 URINALYSIS AUTO W/SCOPE: CPT

## 2017-09-01 PROCEDURE — 80053 COMPREHEN METABOLIC PANEL: CPT

## 2017-09-01 RX ORDER — ACETAMINOPHEN 325 MG/1
650 TABLET ORAL ONCE
Status: COMPLETED | OUTPATIENT
Start: 2017-09-01 | End: 2017-09-01

## 2017-09-01 RX ORDER — SODIUM CHLORIDE 9 MG/ML
1000 INJECTION, SOLUTION INTRAVENOUS ONCE
Status: COMPLETED | OUTPATIENT
Start: 2017-09-01 | End: 2017-09-01

## 2017-09-01 RX ADMIN — ACETAMINOPHEN 650 MG: 325 TABLET, FILM COATED ORAL at 01:35

## 2017-09-01 RX ADMIN — SODIUM CHLORIDE 1000 ML: 9 INJECTION, SOLUTION INTRAVENOUS at 01:35

## 2017-09-01 NOTE — ED PROVIDER NOTES
ED Provider Note    CHIEF COMPLAINT  Chief Complaint   Patient presents with   • Dizziness     sudden onset 1700   • N/V     sudden onset 1700, x3 emesis   • Headache     sudden onset 1700, constant throbbing pain   • Abdominal Pain     right and left lower quadrants, radiating to low back   • Chills   • Near Syncopal     1700       [facility]  Kristal Cesar is a 22 y.o. female who presentsTo the emergency department with dizziness. The patient states she was in class when she stood up and started to feel dizzy. She went to the bathroom and had multiple episodes of emesis. Subsequent she also developed a headache that she describes as throbbing and diffusely located. She is also developed some cramping abdominal pain in the right lower quadrant and left upper quadrant. She said some periodic chills but no fevers. She also felt like she was given a pass out. She did not have any focal weakness. She states that she is finishing her menstrual cycle currently. She has not otherwise had any diarrhea.    REVIEW OF SYSTEMS  See HPI for further details. All other systems are negative.     PAST MEDICAL HISTORY  Past Medical History:   Diagnosis Date   • Depression        SOCIAL HISTORY  Social History     Social History   • Marital status: Single     Spouse name: N/A   • Number of children: N/A   • Years of education: N/A     Social History Main Topics   • Smoking status: Never Smoker   • Smokeless tobacco: Never Used   • Alcohol use No   • Drug use: No   • Sexual activity: Not Currently     Partners: Male     Birth control/ protection: IUD      Comment: Unplanned pregnancy     Other Topics Concern   • Not on file     Social History Narrative   • No narrative on file           PHYSICAL EXAM  VITAL SIGNS: /78   Pulse 70   Temp 36.8 °C (98.3 °F)   Resp 16   Ht 1.524 m (5')   Wt 69.3 kg (152 lb 12.5 oz)   LMP  (Approximate)   SpO2 98%   BMI 29.84 kg/m²   Constitutional: Well developed, Well nourished, No  acute distress, Non-toxic appearance.   HENT: Normocephalic, Atraumatic, tympanic membranes are intact and nonerythematous bilaterally, Oropharynx moist without exudates or erythema, Nose normal.   Eyes: PERRLA, EOMI, Conjunctiva normal.  Neck: Supple without meningismus  Lymphatic: No lymphadenopathy noted.   Cardiovascular: Normal heart rate, Normal rhythm, No murmurs, No rubs, No gallops.   Thorax & Lungs: Normal breath sounds, No respiratory distress, No wheezing, No chest tenderness.   Abdomen:Right lower quadrant discomfort to deep palpation, left upper quadrant to deep palpation, no rebound, no guarding, normal bowel sounds  Skin: Warm, Dry, No erythema, No rash.   Back: No tenderness, No CVA tenderness.   Extremities: Atraumatic with symmetric distal pulses, No edema, No tenderness, No cyanosis, No clubbing.   Neurologic: Alert & oriented x 3, cranial nerves II through XII are intact, Normal motor function, Normal sensory function, No focal deficits noted.   Psychiatric: Affect normal, Judgment normal, Mood normal.     EKG  Twelve-lead EKG interpreted by myself shows a normal sinus rhythm with a ventricular rate of 63, normal QRS, normal intervals, no ST segment elevation or depression, normal T waves.      COURSE & MEDICAL DECISION MAKING  Pertinent Labs & Imaging studies reviewed. (See chart for details)  This a 22-year-old female who presents emergency department after near syncopal episode when she stood up. I suspect this to be more from dehydration. The blood work does not show any evidence of anemia. Metabolic panel does not show any metabolic derangements. I could not appreciate any evidence of an infectious source. She also has some right lower quadrant and left upper quadrant abdominal pain. This been ongoing for quite some time. This could be some inflammatory versus irritable bowel. Again she does not have a leukocytosis nor fever to support a surgical source. The patient's urine pregnancy test  is negative. Her urine is slightly contaminated but she does not have any dysuria therefore it unlikely she has a urinary tract infection. The patient received intravenous fluids. She continues to have some abdominal pain at the time of discharge but is neurologically intact and she is mechanically stable. She'll be discharged with clear instructions to drink lots of fluids and follow up with her primary care provider in 4-5 days for continued outpatient workup.    FINAL IMPRESSION  1. Near syncope  2. Dizziness  3. Abdominal pain     Disposition  The patient will be discharged in stable condition    Electronically signed by: Wes Delaney, 9/1/2017 1:26 AM

## 2017-09-01 NOTE — ED NOTES
Kristal Cesar ambulatory to triage:  Chief Complaint   Patient presents with   • Dizziness     sudden onset 1700   • N/V     sudden onset 1700, x3 emesis   • Headache     sudden onset 1700, constant throbbing pain   • Abdominal Pain     right and left lower quadrants, radiating to low back   • Chills   • Syncope     1700     Explained wait time and triage process to pt. Pt placed in senior lobby, told to notify ED tech or triage RN of any changes, verbalized understanding.

## 2017-09-01 NOTE — ED NOTES
Patient was educated on discharge instructions.  Patient was informed about diagnosis, symptom management, risks, and home care instructions.  Patient verbalized understanding and signed discharge instructions.  Copy of discharge instructions in chart.  Patient ambulated out with steady gait.  Patient has personal belongings and PIV removed, tip intact.

## 2017-09-01 NOTE — DISCHARGE INSTRUCTIONS
Near-Syncope  Near-syncope (commonly known as near fainting) is sudden weakness, dizziness, or feeling like you might pass out. During an episode of near-syncope, you may also develop pale skin, have tunnel vision, or feel sick to your stomach (nauseous). Near-syncope may occur when getting up after sitting or while standing for a long time. It is caused by a sudden decrease in blood flow to the brain. This decrease can result from various causes or triggers, most of which are not serious. However, because near-syncope can sometimes be a sign of something serious, a medical evaluation is required. The specific cause is often not determined.  HOME CARE INSTRUCTIONS   Monitor your condition for any changes. The following actions may help to alleviate any discomfort you are experiencing:  · Have someone stay with you until you feel stable.  · Lie down right away and prop your feet up if you start feeling like you might faint. Breathe deeply and steadily. Wait until all the symptoms have passed. Most of these episodes last only a few minutes. You may feel tired for several hours.    · Drink enough fluids to keep your urine clear or pale yellow.    · If you are taking blood pressure or heart medicine, get up slowly when seated or lying down. Take several minutes to sit and then stand. This can reduce dizziness.  · Follow up with your health care provider as directed.   SEEK IMMEDIATE MEDICAL CARE IF:   · You have a severe headache.    · You have unusual pain in the chest, abdomen, or back.    · You are bleeding from the mouth or rectum, or you have black or tarry stool.    · You have an irregular or very fast heartbeat.    · You have repeated fainting or have seizure-like jerking during an episode.    · You faint when sitting or lying down.    · You have confusion.    · You have difficulty walking.    · You have severe weakness.    · You have vision problems.    MAKE SURE YOU:   · Understand these instructions.  · Will  watch your condition.  · Will get help right away if you are not doing well or get worse.     This information is not intended to replace advice given to you by your health care provider. Make sure you discuss any questions you have with your health care provider.     Document Released: 12/18/2006 Document Revised: 12/23/2014 Document Reviewed: 05/23/2014  [email or web address] Interactive Patient Education ©2016 [employer]

## 2017-09-02 ENCOUNTER — PATIENT OUTREACH (OUTPATIENT)
Dept: HEALTH INFORMATION MANAGEMENT | Facility: OTHER | Age: 22
End: 2017-09-02

## 2017-10-12 ENCOUNTER — OFFICE VISIT (OUTPATIENT)
Dept: MEDICAL GROUP | Facility: MEDICAL CENTER | Age: 22
End: 2017-10-12
Attending: INTERNAL MEDICINE
Payer: MEDICAID

## 2017-10-12 VITALS
BODY MASS INDEX: 30.23 KG/M2 | RESPIRATION RATE: 16 BRPM | WEIGHT: 154 LBS | HEIGHT: 60 IN | SYSTOLIC BLOOD PRESSURE: 112 MMHG | OXYGEN SATURATION: 95 % | HEART RATE: 110 BPM | TEMPERATURE: 97.9 F | DIASTOLIC BLOOD PRESSURE: 78 MMHG

## 2017-10-12 DIAGNOSIS — F33.41 RECURRENT MAJOR DEPRESSIVE DISORDER, IN PARTIAL REMISSION (HCC): ICD-10-CM

## 2017-10-12 DIAGNOSIS — Z97.5 IUD CONTRACEPTION: ICD-10-CM

## 2017-10-12 DIAGNOSIS — J06.9 VIRAL URI WITH COUGH: ICD-10-CM

## 2017-10-12 PROCEDURE — 99214 OFFICE O/P EST MOD 30 MIN: CPT | Performed by: INTERNAL MEDICINE

## 2017-10-12 PROCEDURE — 99213 OFFICE O/P EST LOW 20 MIN: CPT | Performed by: INTERNAL MEDICINE

## 2017-10-12 RX ORDER — BUPROPION HYDROCHLORIDE 200 MG/1
200 TABLET, EXTENDED RELEASE ORAL 2 TIMES DAILY
Qty: 60 TAB | Refills: 2 | Status: SHIPPED | OUTPATIENT
Start: 2017-10-12 | End: 2018-03-08

## 2017-10-12 RX ORDER — GUAIFENESIN 600 MG/1
600 TABLET, EXTENDED RELEASE ORAL
Qty: 20 TAB | Refills: 0 | Status: SHIPPED | OUTPATIENT
Start: 2017-10-12 | End: 2017-11-01

## 2017-10-12 ASSESSMENT — PAIN SCALES - GENERAL: PAINLEVEL: NO PAIN

## 2017-10-13 PROBLEM — F33.41 RECURRENT MAJOR DEPRESSIVE DISORDER, IN PARTIAL REMISSION (HCC): Status: ACTIVE | Noted: 2017-07-19

## 2017-10-13 PROBLEM — Z02.1 PRE-EMPLOYMENT HEALTH SCREENING EXAMINATION: Status: RESOLVED | Noted: 2017-07-19 | Resolved: 2017-10-13

## 2017-10-13 RX ORDER — FEXOFENADINE HCL 180 MG/1
180 TABLET ORAL DAILY
Qty: 30 TAB | COMMUNITY
Start: 2017-10-13 | End: 2018-11-09

## 2017-10-13 NOTE — ASSESSMENT & PLAN NOTE
Patient reports that she was previously being followed at the [place] [facility]. Her previous provider had concern that her IUD was out of place. She was going to order an ultrasound but the clinic closed down so the patient never had this done.

## 2017-10-13 NOTE — ASSESSMENT & PLAN NOTE
Patient reports improvement in her depression symptoms since starting on the Wellbutrin 150 twice a day, however she still states that about 3 days a week, she'll have a bad day where she doesn't want to interact with people, just wants to stay in her room, and feels sad. She denies suicidal ideation. Overall, she feels she is at least 50% improved since starting the Wellbutrin.

## 2017-10-13 NOTE — PROGRESS NOTES
Subjective:   Kristal Cesar is a 22 y.o. female here today for Follow-up depression, requesting ultrasound for concern for misplaced IUD, cough and sore throat    Viral URI with cough  Patient reports lately she's been having worsened sore throat, cough, congestion for about a week now. She usually has bad seasonal allergies this time of year and has been taking over-the-counter Allegra but this doesn't seem to be helping. She's been using NyQuil and ibuprofen for her symptoms. She also reports itchy watery eyes.    Recurrent major depressive disorder, in partial remission (CMS-HCC)  Patient reports improvement in her depression symptoms since starting on the Wellbutrin 150 twice a day, however she still states that about 3 days a week, she'll have a bad day where she doesn't want to interact with people, just wants to stay in her room, and feels sad. She denies suicidal ideation. Overall, she feels she is at least 50% improved since starting the Wellbutrin.    IUD contraception  Patient reports that she was previously being followed at the [place] [facility]. Her previous provider had concern that her IUD was out of place. She was going to order an ultrasound but the clinic closed down so the patient never had this done.       Current medicines (including changes today)  Current Outpatient Prescriptions   Medication Sig Dispense Refill   • fexofenadine (ALLEGRA) 180 MG tablet Take 1 Tab by mouth every day. 30 Tab    • buPROPion (WELLBUTRIN SR) 200 MG SR tablet Take 1 Tab by mouth 2 times a day. 60 Tab 2   • guaifenesin LA (MUCINEX) 600 MG TABLET SR 12 HR Take 1 Tab by mouth 2 times daily with meals as needed. 20 Tab 0   • ibuprofen (MOTRIN) 800 MG Tab Take 1 Tab by mouth every 8 hours as needed (Cramping). 30 Tab 1   • ondansetron (ZOFRAN ODT) 4 MG TABLET DISPERSIBLE Take 1 Tab by mouth every 8 hours as needed. 10 Tab 1   • meclizine (ANTIVERT) 25 MG Tab Take 1 Tab by mouth 3 times a day as  needed for Dizziness. 30 Tab 0     No current facility-administered medications for this visit.      She  has a past medical history of Depression.    ROS   Denies fevers and chills  Denies chest pain  As above in HPI     Objective:     Blood pressure 112/78, pulse (!) 110, temperature 36.6 °C (97.9 °F), resp. rate 16, height 1.524 m (5'), weight 69.9 kg (154 lb), SpO2 95 %, not currently breastfeeding. Body mass index is 30.08 kg/m².   Physical Exam:  Constitutional: Alert, no distress.  Skin: Warm, dry, good turgor, no rashes in visible areas.  Eye: Equal, round and reactive, conjunctiva clear, lids normal.  ENMT: Lips without lesions, good dentition, oropharynx clear.  Neck: Trachea midline, no masses, no thyromegaly. No cervical or supraclavicular lymphadenopathy  Respiratory: Unlabored respiratory effort, lungs clear to auscultation, no wheezes, no ronchi.  Psych: Alert and oriented x3, normal affect and mood.      Assessment and Plan:   The following treatment plan was discussed    1. Viral URI with cough  Symptoms most consistent with viral upper respiratory symptoms on top of seasonal allergy symptoms. I advised patient to continue her over-the-counter Allegra and start taking some over-the-counter decongestants as well. We discussed Afrin nasal spray for 3-4 days, Sudafed sinus, allergy eye drops for itchy watery eyes.  Supportive care advised.  - guaifenesin LA (MUCINEX) 600 MG TABLET SR 12 HR; Take 1 Tab by mouth 2 times daily with meals as needed.  Dispense: 20 Tab; Refill: 0    2. IUD contraception  Concern IUD is out of place. We will obtain the ultrasound for further evaluation  - US-GYN-PELVIS TRANSVAGINAL; Future    3. Recurrent major depressive disorder, in partial remission (CMS-HCC)  Partially controlled although patient is still having 3 bad days a week. We will increase her Wellbutrin dose to 200 mg twice daily and see her back in 4 weeks.  - buPROPion (WELLBUTRIN SR) 200 MG SR tablet; Take 1  Tab by mouth 2 times a day.  Dispense: 60 Tab; Refill: 2  -follow up 4 weeks      Followup: Return in about 4 weeks (around 11/9/2017) for depression.

## 2017-10-13 NOTE — ASSESSMENT & PLAN NOTE
Patient reports lately she's been having worsened sore throat, cough, congestion for about a week now. She usually has bad seasonal allergies this time of year and has been taking over-the-counter Allegra but this doesn't seem to be helping. She's been using NyQuil and ibuprofen for her symptoms. She also reports itchy watery eyes.

## 2017-11-01 ENCOUNTER — OFFICE VISIT (OUTPATIENT)
Dept: MEDICAL GROUP | Facility: MEDICAL CENTER | Age: 22
End: 2017-11-01
Attending: INTERNAL MEDICINE
Payer: MEDICAID

## 2017-11-01 ENCOUNTER — HOSPITAL ENCOUNTER (OUTPATIENT)
Facility: MEDICAL CENTER | Age: 22
End: 2017-11-01
Attending: NURSE PRACTITIONER
Payer: MEDICAID

## 2017-11-01 ENCOUNTER — HOSPITAL ENCOUNTER (EMERGENCY)
Facility: MEDICAL CENTER | Age: 22
End: 2017-11-01
Payer: MEDICAID

## 2017-11-01 VITALS
RESPIRATION RATE: 12 BRPM | WEIGHT: 155.42 LBS | SYSTOLIC BLOOD PRESSURE: 127 MMHG | OXYGEN SATURATION: 96 % | DIASTOLIC BLOOD PRESSURE: 77 MMHG | HEIGHT: 63 IN | TEMPERATURE: 99 F | BODY MASS INDEX: 27.54 KG/M2 | HEART RATE: 90 BPM

## 2017-11-01 VITALS
DIASTOLIC BLOOD PRESSURE: 60 MMHG | HEART RATE: 80 BPM | BODY MASS INDEX: 30.43 KG/M2 | RESPIRATION RATE: 16 BRPM | HEIGHT: 60 IN | OXYGEN SATURATION: 100 % | SYSTOLIC BLOOD PRESSURE: 100 MMHG | TEMPERATURE: 97.5 F | WEIGHT: 155 LBS

## 2017-11-01 DIAGNOSIS — N89.8 ITCHING IN THE VAGINAL AREA: ICD-10-CM

## 2017-11-01 DIAGNOSIS — B37.31 YEAST VAGINITIS: ICD-10-CM

## 2017-11-01 DIAGNOSIS — R30.0 DYSURIA: ICD-10-CM

## 2017-11-01 DIAGNOSIS — N92.6 MISSED PERIOD: ICD-10-CM

## 2017-11-01 LAB
APPEARANCE UR: CLEAR
APPEARANCE UR: NORMAL
BACTERIA #/AREA URNS HPF: ABNORMAL /HPF
BILIRUB UR QL STRIP.AUTO: NEGATIVE
BILIRUB UR STRIP-MCNC: NEGATIVE MG/DL
COLOR UR AUTO: YELLOW
COLOR UR: YELLOW
CULTURE IF INDICATED INDCX: YES UA CULTURE
EPI CELLS #/AREA URNS HPF: ABNORMAL /HPF
GLUCOSE UR STRIP.AUTO-MCNC: NEGATIVE MG/DL
GLUCOSE UR STRIP.AUTO-MCNC: NEGATIVE MG/DL
HYALINE CASTS #/AREA URNS LPF: ABNORMAL /LPF
INT CON NEG: NEGATIVE
INT CON POS: POSITIVE
KETONES UR STRIP.AUTO-MCNC: NEGATIVE MG/DL
KETONES UR STRIP.AUTO-MCNC: NEGATIVE MG/DL
LEUKOCYTE ESTERASE UR QL STRIP.AUTO: ABNORMAL
LEUKOCYTE ESTERASE UR QL STRIP.AUTO: NORMAL
MICRO URNS: ABNORMAL
NITRITE UR QL STRIP.AUTO: NEGATIVE
NITRITE UR QL STRIP.AUTO: POSITIVE
PH UR STRIP.AUTO: 8 [PH]
PH UR STRIP.AUTO: 8 [PH] (ref 5–8)
POC URINE PREGNANCY TEST: NEGATIVE
PROT UR QL STRIP: NEGATIVE MG/DL
PROT UR QL STRIP: NORMAL MG/DL
RBC # URNS HPF: ABNORMAL /HPF
RBC UR QL AUTO: ABNORMAL
RBC UR QL AUTO: NORMAL
SP GR UR STRIP.AUTO: 1.01
SP GR UR STRIP.AUTO: 1.01
UROBILINOGEN UR STRIP-MCNC: 0.2 MG/DL
UROBILINOGEN UR STRIP.AUTO-MCNC: 0.2 MG/DL
WBC #/AREA URNS HPF: ABNORMAL /HPF

## 2017-11-01 PROCEDURE — 302449 STATCHG TRIAGE ONLY (STATISTIC)

## 2017-11-01 PROCEDURE — 99214 OFFICE O/P EST MOD 30 MIN: CPT | Performed by: NURSE PRACTITIONER

## 2017-11-01 PROCEDURE — 81001 URINALYSIS AUTO W/SCOPE: CPT

## 2017-11-01 PROCEDURE — 81025 URINE PREGNANCY TEST: CPT | Performed by: NURSE PRACTITIONER

## 2017-11-01 PROCEDURE — 81002 URINALYSIS NONAUTO W/O SCOPE: CPT | Performed by: NURSE PRACTITIONER

## 2017-11-01 PROCEDURE — 87186 SC STD MICRODIL/AGAR DIL: CPT

## 2017-11-01 PROCEDURE — 87591 N.GONORRHOEAE DNA AMP PROB: CPT

## 2017-11-01 PROCEDURE — 87660 TRICHOMONAS VAGIN DIR PROBE: CPT

## 2017-11-01 PROCEDURE — 87070 CULTURE OTHR SPECIMN AEROBIC: CPT | Mod: 59

## 2017-11-01 PROCEDURE — 87077 CULTURE AEROBIC IDENTIFY: CPT

## 2017-11-01 PROCEDURE — 87510 GARDNER VAG DNA DIR PROBE: CPT

## 2017-11-01 PROCEDURE — 87480 CANDIDA DNA DIR PROBE: CPT

## 2017-11-01 PROCEDURE — 87205 SMEAR GRAM STAIN: CPT

## 2017-11-01 PROCEDURE — 87086 URINE CULTURE/COLONY COUNT: CPT

## 2017-11-01 PROCEDURE — 99213 OFFICE O/P EST LOW 20 MIN: CPT | Performed by: NURSE PRACTITIONER

## 2017-11-01 PROCEDURE — 87491 CHLMYD TRACH DNA AMP PROBE: CPT

## 2017-11-01 RX ORDER — NITROFURANTOIN 25; 75 MG/1; MG/1
100 CAPSULE ORAL 2 TIMES DAILY
Qty: 10 CAP | Refills: 0 | Status: SHIPPED | OUTPATIENT
Start: 2017-11-01 | End: 2017-11-14

## 2017-11-01 RX ORDER — FLUCONAZOLE 150 MG/1
TABLET ORAL
Qty: 2 TAB | Refills: 0 | Status: SHIPPED | OUTPATIENT
Start: 2017-11-01 | End: 2017-11-14

## 2017-11-01 ASSESSMENT — PAIN SCALES - GENERAL: PAINLEVEL: NO PAIN

## 2017-11-01 NOTE — ED NOTES
".  Chief Complaint   Patient presents with   • Painful Urination     x 1 week with blood in urine   • Vaginal Discharge     Yellow discharge x 1 week, denies new sexual partners or unprotected sex     ./77   Pulse 90   Temp 37.2 °C (99 °F)   Resp 12   Ht 1.6 m (5' 3\")   Wt 70.5 kg (155 lb 6.8 oz)   LMP  (Approximate)   SpO2 96%   BMI 27.53 kg/m²     Ambulatory to triage with above complaints, given urine specimen cup and clean catch instructions, educated on triage process, placed in lobby, told to inform staff of any changes in condition.    "

## 2017-11-01 NOTE — ASSESSMENT & PLAN NOTE
"Pt reports itchy to vaginal area.  When \"pee it hurts\".  Tried hot water showers and did not help.  Hx of Yeast infection.  REports yellow discharge. Slight odor.    Monogomous.  "

## 2017-11-01 NOTE — PROGRESS NOTES
"    Chief Complaint: pain on urination, vaginal area itchiness    HPI:  Jeferson presents to the clinic as Same Day appt.  Her PCP, Dr Villegas is not available to see her today.    Jeferson's PMH includes:    Depression  Fatty Liver  IUD Contraception  Obesity  Vertigo  Viral Illness w cough  Laparoscopic Miranda    Review of Records shows    11/1/17 Presentation to [facility] ER Triage w reported Painful Urination 1 wk with blood in urine, Vaginal Yellow Discharge x 1 wk,  No new sexual partners or unprotected sex per note. Pt left w/o being seen and made appt to be seen here today.    10/12/17 Clinic Appt w Dr Villegas rt f/u on Depression, URI w cough, and IUD concern  Mucinex RX, Wellbutrin RX, U/S GYN Pelvis/transvaginal ordered to determine IUD placement    Dysuria  Pt reports she has had some burning on urination off and on for about 7 days.  She is unsure when her LMP was. She is asking for a pregnancy test today.  Urine POC pregn Test in clinic today = Negative  Urine Dip:   Results for JEFERSON FRANCO (MRN [id number]) as of 11/1/2017 17:38   Ref. Range 11/1/2017 16:51   POC Color Latest Ref Range: Negative  yellow   POC Appearance Latest Ref Range: Negative  cloudy   POC Specific Gravity Latest Ref Range: <1.005 - >1.030  1.010   POC Urine PH Latest Ref Range: 5.0 - 8.0  8.0   POC Glucose Latest Ref Range: Negative mg/dL negative   POC Ketones Latest Ref Range: Negative mg/dL negative   POC Protein Latest Ref Range: Negative mg/dL trace   POC Nitrites Latest Ref Range: Negative  negative   POC Leukocyte Esterase Latest Ref Range: Negative  moderate   POC Blood Latest Ref Range: Negative  moderate   POC Biliruben Latest Ref Range: Negative mg/dL negative   POC Urobiligen Latest Ref Range: Negative (0.2) mg/dL 0.2           Itching in the vaginal area  Pt reports itchy to vaginal area.  When \"pee it hurts\". Itching to vaginal area for about a week.  Tried hot water showers and did not help.  Hx of Yeast " "infection. Pt reports   \"It sort of feels like a yeast infection\"  REports yellow discharge. Slight odor.  Monogamous and denies partner has any symptoms.  Does have IUD she is concerned about and has U/S scheduled for 11/6/17      Patient Active Problem List    Diagnosis Date Noted   • Dysuria 11/01/2017   • Itching in the vaginal area 11/01/2017   • Viral URI with cough 10/12/2017   • Fatty liver 07/19/2017   • Vertigo 07/19/2017   • Recurrent major depressive disorder, in partial remission (CMS-HCC) 07/19/2017   • Obesity (BMI 30-39.9) 07/19/2017   • IUD contraception 07/19/2017       Allergies:Other environmental    Current Outpatient Prescriptions   Medication Sig Dispense Refill   • fluconazole (DIFLUCAN) 150 MG tablet Take one tablet today. If symptoms persist after 3 days may take 2nd tablet. 2 Tab 0   • nitrofurantoin monohydr macro (MACROBID) 100 MG Cap Take 1 Cap by mouth 2 times a day. 10 Cap 0   • fexofenadine (ALLEGRA) 180 MG tablet Take 1 Tab by mouth every day. 30 Tab    • buPROPion (WELLBUTRIN SR) 200 MG SR tablet Take 1 Tab by mouth 2 times a day. 60 Tab 2   • ibuprofen (MOTRIN) 800 MG Tab Take 1 Tab by mouth every 8 hours as needed (Cramping). 30 Tab 1     No current facility-administered medications for this visit.        Social History   Substance Use Topics   • Smoking status: Never Smoker   • Smokeless tobacco: Never Used   • Alcohol use No       Family History   Problem Relation Age of Onset   • No Known Problems Mother    • Diabetes Father    • Hypertension Father    • Hyperlipidemia Father    • No Known Problems Sister    • Cancer Neg Hx        ROS:  Review of Systems   See HPI Above      Exam:  Blood pressure 100/60, pulse 80, temperature 36.4 °C (97.5 °F), resp. rate 16, height 1.524 m (5'), weight 70.3 kg (155 lb), SpO2 100 %, not currently breastfeeding.  General:  Well nourished, well developed female in NAD  HENT:Head is grossly normal. PERRL.  Neck: Supple. Trachea is " "midline.  Pulmonary: Speaks in full sentences with ease.  Normal effort.  Cardiovascular: Regular rate and rhythm.  Abdomen-Abdomen is soft, No tenderness.  Upper extremities-  Good ROM  Lower extremities- neg for edema, redness, tenderness.  Neuro- A & O x 4. Speech clear and appropriate.    Pelvic Exam- Chaperone by Diamante Jimenez MA. Speculum exam showing normal labia, mild creamy white liquid discharge,  No obvious lesions or ulcerations. Culture swabs obtained and Pt tolerated well.      Current medications, allergies, and problem list reviewed with patient and updated in  [facility] today.    Assessment/Plan: Differential includes UTI, cystitis, Vaginal Bacterial infection, Yeast Vaginitis  1. Dysuria ( R/O UTI) URINALYSIS,CULTURE IF INDICATED  Pt to call in 2-3 days for results of urine tests      nitrofurantoin monohydr macro (MACROBID) 100 MG Cap-Cecilia to hold onto this \"contingent\" antibiotic pending results of UA/culture.    POCT Urinalysis-occ bld, leuks, protein   2. Itching in the vaginal area  VAGINAL PATHOGENS DNA PANEL    CHLAMYDIA/GC PCR URINE OR SWAB    CULTURE UROGENITAL W/ GRAM STN    Refrain from sex until free of symptoms   3. Yeast vaginitis  fluconazole (DIFLUCAN) 150 MG tablet   May repeat in 3 days if symptoms persist.   4. Missed period  POCT Pregnancy= Negative   Follow up by phone in 2-3 days. Call or return if questions, concerns, or worsening condition.  "

## 2017-11-02 ENCOUNTER — TELEPHONE (OUTPATIENT)
Dept: MEDICAL GROUP | Facility: MEDICAL CENTER | Age: 22
End: 2017-11-02

## 2017-11-02 LAB
C TRACH DNA SPEC QL NAA+PROBE: NEGATIVE
CANDIDA DNA VAG QL PROBE+SIG AMP: POSITIVE
G VAGINALIS DNA VAG QL PROBE+SIG AMP: NEGATIVE
GRAM STN SPEC: NORMAL
N GONORRHOEA DNA SPEC QL NAA+PROBE: NEGATIVE
SIGNIFICANT IND 70042: NORMAL
SOURCE SOURCE: NORMAL
SPECIMEN SOURCE: NORMAL
T VAGINALIS DNA VAG QL PROBE+SIG AMP: NEGATIVE

## 2017-11-02 NOTE — TELEPHONE ENCOUNTER
Please call Kristal and let her know the preliminary Urine test shows  Moderate bacteria in urine (gram pos Rods) and small amt of yeast.  I want her to fill the Macrobid antibiotic RX we gave her and start that  For probable Bladder infection.  We will get more final results on the urine and vaginal specimens  In 2-3 days.  Thanks  Caleb

## 2017-11-04 LAB
BACTERIA UR CULT: ABNORMAL
SIGNIFICANT IND 70042: ABNORMAL
SOURCE SOURCE: ABNORMAL

## 2017-11-05 LAB
BACTERIA GENITAL AEROBE CULT: NORMAL
GRAM STN SPEC: NORMAL
SIGNIFICANT IND 70042: NORMAL
SOURCE SOURCE: NORMAL

## 2017-11-06 ENCOUNTER — HOSPITAL ENCOUNTER (OUTPATIENT)
Dept: RADIOLOGY | Facility: MEDICAL CENTER | Age: 22
End: 2017-11-06
Attending: INTERNAL MEDICINE
Payer: MEDICAID

## 2017-11-06 DIAGNOSIS — Z97.5 IUD CONTRACEPTION: ICD-10-CM

## 2017-11-14 ENCOUNTER — APPOINTMENT (OUTPATIENT)
Dept: MEDICAL GROUP | Facility: MEDICAL CENTER | Age: 22
End: 2017-11-14
Attending: INTERNAL MEDICINE
Payer: MEDICAID

## 2017-11-14 VITALS
HEIGHT: 60 IN | HEART RATE: 98 BPM | DIASTOLIC BLOOD PRESSURE: 78 MMHG | WEIGHT: 155 LBS | OXYGEN SATURATION: 96 % | TEMPERATURE: 98.9 F | SYSTOLIC BLOOD PRESSURE: 108 MMHG | RESPIRATION RATE: 16 BRPM | BODY MASS INDEX: 30.43 KG/M2

## 2017-11-14 DIAGNOSIS — E66.9 OBESITY (BMI 30-39.9): ICD-10-CM

## 2017-11-14 DIAGNOSIS — Z13.29 SCREENING FOR THYROID DISORDER: ICD-10-CM

## 2017-11-14 PROBLEM — R30.0 DYSURIA: Status: RESOLVED | Noted: 2017-11-01 | Resolved: 2017-11-14

## 2017-11-14 PROBLEM — N89.8 ITCHING IN THE VAGINAL AREA: Status: RESOLVED | Noted: 2017-11-01 | Resolved: 2017-11-14

## 2017-11-14 PROBLEM — J06.9 VIRAL URI WITH COUGH: Status: RESOLVED | Noted: 2017-10-12 | Resolved: 2017-11-14

## 2017-11-14 PROCEDURE — 99213 OFFICE O/P EST LOW 20 MIN: CPT | Performed by: INTERNAL MEDICINE

## 2017-11-14 PROCEDURE — 99214 OFFICE O/P EST MOD 30 MIN: CPT | Performed by: INTERNAL MEDICINE

## 2017-11-14 ASSESSMENT — PAIN SCALES - GENERAL: PAINLEVEL: NO PAIN

## 2017-11-15 NOTE — ASSESSMENT & PLAN NOTE
Patient reports that she has been keeping careful track of her caloric intake using a nap on her phone for the past 2 months and has been sticking to 1300 carey a day. She also reports exercising at the gym for about an hour 4 days a week. Despite this, she has not been able to lose any weight. She is stable when compared to previous clinic visits at 155 pounds.

## 2017-11-15 NOTE — PROGRESS NOTES
Subjective:   Kristal Cesar is a 22 y.o. female here today for Discuss difficulty with weight loss    Obesity (BMI 30-39.9)  Patient reports that she has been keeping careful track of her caloric intake using a nap on her phone for the past 2 months and has been sticking to 1300 carey a day. She also reports exercising at the gym for about an hour 4 days a week. Despite this, she has not been able to lose any weight. She is stable when compared to previous clinic visits at 155 pounds.        Current medicines (including changes today)  Current Outpatient Prescriptions   Medication Sig Dispense Refill   • fexofenadine (ALLEGRA) 180 MG tablet Take 1 Tab by mouth every day. 30 Tab    • buPROPion (WELLBUTRIN SR) 200 MG SR tablet Take 1 Tab by mouth 2 times a day. 60 Tab 2   • ibuprofen (MOTRIN) 800 MG Tab Take 1 Tab by mouth every 8 hours as needed (Cramping). 30 Tab 1     No current facility-administered medications for this visit.      She  has a past medical history of Depression.    ROS   As above in HPI     Objective:     Blood pressure 108/78, pulse 98, temperature 37.2 °C (98.9 °F), resp. rate 16, height 1.524 m (5'), weight 70.3 kg (155 lb), SpO2 96 %, not currently breastfeeding. Body mass index is 30.27 kg/m².   Physical Exam:  Constitutional: Alert, no distress.  Skin: Warm, dry, good turgor, no rashes in visible areas.  Eye: Equal, round and reactive, conjunctiva clear, lids normal.  Psych: Alert and oriented x3, normal affect and mood.      Assessment and Plan:   The following treatment plan was discussed    1. Obesity (BMI 30-39.9)  Patient reports caloric restriction to 1300 carey a day for 2 months with one hour of exercise 4 days a week and still inability to lose weight. We briefly talked about a medication like phentermine for appetite suppression. We will obtain thyroid studies to rule out hypothyroidism although I think this is less likely. She will follow-up with me in 3 months and if  still no ability to lose weight, would consider starting an appetite suppressant if patient would like.  -Follow up 3 months, consider appetite suppressant at that time if patient still unable to lose weight  - TSH WITH REFLEX TO FT4; Future    2. Screening for thyroid disorder  - TSH WITH REFLEX TO FT4; Future      Followup: Return in about 3 months (around 2/14/2018), or if symptoms worsen or fail to improve.

## 2017-11-17 ENCOUNTER — APPOINTMENT (OUTPATIENT)
Dept: RADIOLOGY | Facility: MEDICAL CENTER | Age: 22
End: 2017-11-17
Attending: INTERNAL MEDICINE
Payer: MEDICAID

## 2017-11-28 ENCOUNTER — HOSPITAL ENCOUNTER (EMERGENCY)
Facility: MEDICAL CENTER | Age: 22
End: 2017-11-29
Attending: EMERGENCY MEDICINE
Payer: MEDICAID

## 2017-11-28 VITALS
BODY MASS INDEX: 30.64 KG/M2 | WEIGHT: 156.09 LBS | HEIGHT: 60 IN | DIASTOLIC BLOOD PRESSURE: 68 MMHG | HEART RATE: 78 BPM | OXYGEN SATURATION: 98 % | SYSTOLIC BLOOD PRESSURE: 108 MMHG | RESPIRATION RATE: 20 BRPM | TEMPERATURE: 97.9 F

## 2017-11-28 DIAGNOSIS — S41.112A LACERATION OF LEFT UPPER EXTREMITY, INITIAL ENCOUNTER: ICD-10-CM

## 2017-11-28 DIAGNOSIS — G44.209 TENSION HEADACHE: ICD-10-CM

## 2017-11-28 PROCEDURE — 700102 HCHG RX REV CODE 250 W/ 637 OVERRIDE(OP): Performed by: EMERGENCY MEDICINE

## 2017-11-28 PROCEDURE — 304999 HCHG REPAIR-SIMPLE/INTERMED LEVEL 1

## 2017-11-28 PROCEDURE — A9270 NON-COVERED ITEM OR SERVICE: HCPCS | Performed by: EMERGENCY MEDICINE

## 2017-11-28 PROCEDURE — 700101 HCHG RX REV CODE 250: Performed by: EMERGENCY MEDICINE

## 2017-11-28 PROCEDURE — 304217 HCHG IRRIGATION SYSTEM

## 2017-11-28 PROCEDURE — 303747 HCHG EXTRA SUTURE

## 2017-11-28 PROCEDURE — 99283 EMERGENCY DEPT VISIT LOW MDM: CPT

## 2017-11-28 RX ORDER — IBUPROFEN 600 MG/1
600 TABLET ORAL ONCE
Status: COMPLETED | OUTPATIENT
Start: 2017-11-28 | End: 2017-11-28

## 2017-11-28 RX ORDER — LIDOCAINE HCL/EPINEPHRINE/PF 2%-1:200K
20 VIAL (ML) INJECTION ONCE
Status: COMPLETED | OUTPATIENT
Start: 2017-11-28 | End: 2017-11-28

## 2017-11-28 RX ADMIN — IBUPROFEN 600 MG: 600 TABLET, FILM COATED ORAL at 22:24

## 2017-11-28 RX ADMIN — LIDOCAINE HYDROCHLORIDE,EPINEPHRINE BITARTRATE 20 ML: 20; .005 INJECTION, SOLUTION EPIDURAL; INFILTRATION; INTRACAUDAL; PERINEURAL at 22:24

## 2017-11-28 ASSESSMENT — PAIN SCALES - GENERAL
PAINLEVEL_OUTOF10: 1
PAINLEVEL_OUTOF10: 8

## 2017-11-29 NOTE — ED NOTES
Chief Complaint   Patient presents with   • Laceration     Skin tear/Laceration to L outer upper arm after hitting arm on door frame.   • Headache     Ambulatory to triage for above.  -Tetanus.    .Explained wait time and triage process to pt. Pt placed back out in lobby, told to notify ED tech or triage RN of any changes, verbalized nderstanding.    /68   Pulse 83   Temp 36.4 °C (97.6 °F)   Resp 18   Ht 1.524 m (5')   Wt 70.8 kg (156 lb 1.4 oz)   LMP  (Approximate)   SpO2 95%   BMI 30.48 kg/m²

## 2017-11-29 NOTE — ED PROVIDER NOTES
ED Provider Note    Scribed for Alex Salazar M.D. by Laila Rico. 11/28/2017  10:03 PM    Primary care provider: Margareth Villegas M.D.  Means of arrival: Walk-in  History obtained from: Patient  History limited by: None    CHIEF COMPLAINT  Chief Complaint   Patient presents with   • Laceration     Skin tear/Laceration to L outer upper arm after hitting arm on door frame.   • Headache       HPI  Kristal Cesar is a 22 y.o. female who presents to the Emergency Department with a laceration to the left lateral upper arm. She sustained this laceration at approximately 6:00 PM when she struck her left arm against a door frame. The patient denies fever, swelling, or other symptoms. Her tetanus vaccination is not up to date.     The patient also complains of a frontal headache onset tonight, and reports a history of migraines. She has not taken any medication for her headache. The patient denies vision changes or vomiting.     REVIEW OF SYSTEMS  Pertinent positives include laceration, headache. Pertinent negatives include no fever swelling, vision changes, vomiting. E.    PAST MEDICAL HISTORY   has a past medical history of Allergy; Depression; and Obesity (BMI 30-39.9).    SURGICAL HISTORY   has a past surgical history that includes ariana by laparoscopy (3/7/2016).    SOCIAL HISTORY  Social History   Substance Use Topics   • Smoking status: Never Smoker   • Smokeless tobacco: Never Used   • Alcohol use No      History   Drug Use No       FAMILY HISTORY  Family History   Problem Relation Age of Onset   • No Known Problems Mother    • Diabetes Father    • Hypertension Father    • Hyperlipidemia Father    • No Known Problems Sister    • Cancer Neg Hx        CURRENT MEDICATIONS  Home Medications     Reviewed by Lorin Ortiz R.N. (Registered Nurse) on 11/28/17 at 9899  Med List Status: <None>   Medication Last Dose Status   buPROPion (WELLBUTRIN SR) 200 MG SR tablet 11/1/2017 Active   fexofenadine  "(ALLEGRA) 180 MG tablet 11/1/2017 Active   ibuprofen (MOTRIN) 800 MG Tab 11/1/2017 Active                ALLERGIES  Allergies   Allergen Reactions   • Other Environmental Rash     \"dogs\" - \"my skin turns red\"       PHYSICAL EXAM  VITAL SIGNS: /68   Pulse 83   Temp 36.4 °C (97.6 °F)   Resp 18   Ht 1.524 m (5')   Wt 70.8 kg (156 lb 1.4 oz)   LMP  (Approximate)   SpO2 95%   BMI 30.48 kg/m²     Constitutional: Well developed, Well nourished, mild distress, Non-toxic appearance.   HENT: Normocephalic, Atraumatic.    Eyes: PERRL, EOMI, Conjunctiva normal, No discharge.   Neck: Bilateral paraspinal muscle tenderness.   Skin: Warm, Dry.3 cm gaping superficial laceration to the left triceps area.   Neurologic: Awake, alert. Moves all extremities spontaneously.  Psychiatric: Affect normal, Mood normal.    Laceration Repair Procedure Note    Indication: Laceration    Procedure: The patient was placed in the appropriate position and anesthesia around the laceration was obtained by infiltration using 2% Lidocaine with epinephrine. The area was then irrigated with high pressure normal saline. The laceration was closed with 5-0 Ethilon using interrupted sutures. There were no additional lacerations requiring repair. The wound area was then dressed with a sterile dressing.      Total repaired wound length: 3 centimeters    Other Items: Suture count: 6    The patient tolerated the procedure well.    Complications: None      COURSE & MEDICAL DECISION MAKING  Nursing notes, VS, PMSFHx reviewed in chart.    10:03 PM - Patient seen and examined at bedside. I explained to the patient I will suture the laceration and she will be discharged home.     1130  Began laceration repair. See procedure note above for further details. We discussed that she will be discharged with instructions to keep the suture site clean, and to wash it multiple times daily with soap and water. I advised her to follow-up with primary care for suture " removal and reevaluation, and to return to the [facility] ED with any new or worsening symptoms, including red streaking up the arm, fevers, or swelling at the suture site.      Decision Making:  Laceration to the left triceps area sutured, have the patient return in 7-10 days for suture removal, the patient also has a tension headache, ibuprofen was opened for this,    The patient will return for new or worsening symptoms and is stable at the time of discharge.    The patient is referred to a primary physician for blood pressure management, diabetic screening, and for all other preventative health concerns.    DISPOSITION:  Patient will be discharged home in stable condition.    FOLLOW UP:  [facility], Emergency Dept  [facility] [facility] [address] [phone]  In 10 days  For suture removal      OUTPATIENT MEDICATIONS:  Discharge Medication List as of 11/28/2017 11:52 PM            FINAL IMPRESSION  1. Laceration of left upper extremity, initial encounter    2. Tension headache       Laceration repair completed by ERP     Laila CALERO (Scribe), am scribing for, and in the presence of, Alex Salazar M.D..    Electronically signed by: Laila Rico (Scribe), 11/28/2017    Alex CALERO M.D. personally performed the services described in this documentation, as scribed by Laila Rico in my presence, and it is both accurate and complete.    The note accurately reflects work and decisions made by me.  Alex Salazar  11/29/2017  12:41 AM

## 2017-11-29 NOTE — DISCHARGE INSTRUCTIONS
Laceration Care, Adult  A laceration is a cut that goes through all of the layers of the skin and into the tissue that is right under the skin. Some lacerations heal on their own. Others need to be closed with stitches (sutures), staples, skin adhesive strips, or skin glue. Proper laceration care minimizes the risk of infection and helps the laceration to heal better.  HOW TO CARE FOR YOUR LACERATION  If sutures or staples were used:  · Keep the wound clean and dry.  · If you were given a bandage (dressing), you should change it at least one time per day or as told by your health care provider. You should also change it if it becomes wet or dirty.  · Keep the wound completely dry for the first 24 hours or as told by your health care provider. After that time, you may shower or bathe. However, make sure that the wound is not soaked in water until after the sutures or staples have been removed.  · Clean the wound one time each day or as told by your health care provider:  ¨ Wash the wound with soap and water.  ¨ Rinse the wound with water to remove all soap.  ¨ Pat the wound dry with a clean towel. Do not rub the wound.  · After cleaning the wound, apply a thin layer of antibiotic ointment as told by your health care provider. This will help to prevent infection and keep the dressing from sticking to the wound.  · Have the sutures or staples removed as told by your health care provider.  If skin adhesive strips were used:  · Keep the wound clean and dry.  · If you were given a bandage (dressing), you should change it at least one time per day or as told by your health care provider. You should also change it if it becomes dirty or wet.  · Do not get the skin adhesive strips wet. You may shower or bathe, but be careful to keep the wound dry.  · If the wound gets wet, pat it dry with a clean towel. Do not rub the wound.  · Skin adhesive strips fall off on their own. You may trim the strips as the wound heals. Do not  remove skin adhesive strips that are still stuck to the wound. They will fall off in time.  If skin glue was used:  · Try to keep the wound dry, but you may briefly wet it in the shower or bath. Do not soak the wound in water, such as by swimming.  · After you have showered or bathed, gently pat the wound dry with a clean towel. Do not rub the wound.  · Do not do any activities that will make you sweat heavily until the skin glue has fallen off on its own.  · Do not apply liquid, cream, or ointment medicine to the wound while the skin glue is in place. Using those may loosen the film before the wound has healed.  · If you were given a bandage (dressing), you should change it at least one time per day or as told by your health care provider. You should also change it if it becomes dirty or wet.  · If a dressing is placed over the wound, be careful not to apply tape directly over the skin glue. Doing that may cause the glue to be pulled off before the wound has healed.  · Do not pick at the glue. The skin glue usually remains in place for 5-10 days, then it falls off of the skin.  General Instructions  · Take over-the-counter and prescription medicines only as told by your health care provider.  · If you were prescribed an antibiotic medicine or ointment, take or apply it as told by your doctor. Do not stop using it even if your condition improves.  · To help prevent scarring, make sure to cover your wound with sunscreen whenever you are outside after stitches are removed, after adhesive strips are removed, or when glue remains in place and the wound is healed. Make sure to wear a sunscreen of at least 30 SPF.  · Do not scratch or pick at the wound.  · Keep all follow-up visits as told by your health care provider. This is important.  · Check your wound every day for signs of infection. Watch for:  ¨ Redness, swelling, or pain.  ¨ Fluid, blood, or pus.  · Raise (elevate) the injured area above the level of your heart  while you are sitting or lying down, if possible.  SEEK MEDICAL CARE IF:  · You received a tetanus shot and you have swelling, severe pain, redness, or bleeding at the injection site.  · You have a fever.  · A wound that was closed breaks open.  · You notice a bad smell coming from your wound or your dressing.  · You notice something coming out of the wound, such as wood or glass.  · Your pain is not controlled with medicine.  · You have increased redness, swelling, or pain at the site of your wound.  · You have fluid, blood, or pus coming from your wound.  · You notice a change in the color of your skin near your wound.  · You need to change the dressing frequently due to fluid, blood, or pus draining from the wound.  · You develop a new rash.  · You develop numbness around the wound.  SEEK IMMEDIATE MEDICAL CARE IF:  · You develop severe swelling around the wound.  · Your pain suddenly increases and is severe.  · You develop painful lumps near the wound or on skin that is anywhere on your body.  · You have a red streak going away from your wound.  · The wound is on your hand or foot and you cannot properly move a finger or toe.  · The wound is on your hand or foot and you notice that your fingers or toes look pale or bluish.     This information is not intended to replace advice given to you by your health care provider. Make sure you discuss any questions you have with your health care provider.     Document Released: 12/18/2006 Document Revised: 05/03/2016 Document Reviewed: 12/14/2015  [employer] Interactive Patient Education ©2016 [employer]      Tension Headache  A tension headache is a feeling of pain, pressure, or aching often felt over the front and sides of the head. The pain can be dull or can feel tight (constricting). It is the most common type of headache. Tension headaches are not normally associated with nausea or vomiting and do not get worse with physical activity. Tension headaches can last 30  minutes to several days.   CAUSES   The exact cause is not known, but it may be caused by chemicals and hormones in the brain that lead to pain. Tension headaches often begin after stress, anxiety, or depression. Other triggers may include:  · Alcohol.  · Caffeine (too much or withdrawal).  · Respiratory infections (colds, flu, sinus infections).  · Dental problems or teeth clenching.  · Fatigue.  · Holding your head and neck in one position too long while using a computer.  SYMPTOMS   · Pressure around the head.    · Dull, aching head pain.    · Pain felt over the front and sides of the head.    · Tenderness in the muscles of the head, neck, and shoulders.  DIAGNOSIS   A tension headache is often diagnosed based on:   · Symptoms.    · Physical examination.    · A CT scan or MRI of your head. These tests may be ordered if symptoms are severe or unusual.  TREATMENT   Medicines may be given to help relieve symptoms.   HOME CARE INSTRUCTIONS   · Only take over-the-counter or prescription medicines for pain or discomfort as directed by your health care provider.    · Lie down in a dark, quiet room when you have a headache.    · Keep a journal to find out what may be triggering your headaches. For example, write down:  ¨ What you eat and drink.  ¨ How much sleep you get.  ¨ Any change to your diet or medicines.  · Try massage or other relaxation techniques.    · Ice packs or heat applied to the head and neck can be used. Use these 3 to 4 times per day for 15 to 20 minutes each time, or as needed.    · Limit stress.    · Sit up straight, and do not tense your muscles.    · Quit smoking if you smoke.  · Limit alcohol use.  · Decrease the amount of caffeine you drink, or stop drinking caffeine.  · Eat and exercise regularly.  · Get 7 to 9 hours of sleep, or as recommended by your health care provider.  · Avoid excessive use of pain medicine as recurrent headaches can occur.     SEEK MEDICAL CARE IF:   · You have problems  with the medicines you were prescribed.  · Your medicines do not work.  · You have a change from the usual headache.  · You have nausea or vomiting.  SEEK IMMEDIATE MEDICAL CARE IF:   · Your headache becomes severe.  · You have a fever.  · You have a stiff neck.  · You have loss of vision.  · You have muscular weakness or loss of muscle control.  · You lose your balance or have trouble walking.  · You feel faint or pass out.  · You have severe symptoms that are different from your first symptoms.     This information is not intended to replace advice given to you by your health care provider. Make sure you discuss any questions you have with your health care provider.     Document Released: 12/18/2006 Document Revised: 05/03/2016 Document Reviewed: 04/11/2016  [employer] Interactive Patient Education ©2016 [employer]

## 2017-11-29 NOTE — ED NOTES
Pt ambulatory  Vital signs stable  Pt handed d/c paperwork with understanding stated  Pt states will follow up with PCP

## 2017-11-30 ENCOUNTER — APPOINTMENT (OUTPATIENT)
Dept: RADIOLOGY | Facility: MEDICAL CENTER | Age: 22
End: 2017-11-30
Attending: INTERNAL MEDICINE
Payer: MEDICAID

## 2017-11-30 ENCOUNTER — PATIENT OUTREACH (OUTPATIENT)
Dept: HEALTH INFORMATION MANAGEMENT | Facility: OTHER | Age: 22
End: 2017-11-30

## 2017-11-30 NOTE — PROGRESS NOTES
Placed discharge outreach phone call to patient s/p ER discharge 11/29/17.  Left voicemail providing my contact information and instructions to call with any questions or concerns.

## 2017-12-12 ENCOUNTER — OFFICE VISIT (OUTPATIENT)
Dept: MEDICAL GROUP | Facility: MEDICAL CENTER | Age: 22
End: 2017-12-12
Attending: INTERNAL MEDICINE
Payer: MEDICAID

## 2017-12-12 ENCOUNTER — HOSPITAL ENCOUNTER (EMERGENCY)
Facility: MEDICAL CENTER | Age: 22
End: 2017-12-12
Attending: EMERGENCY MEDICINE
Payer: MEDICAID

## 2017-12-12 ENCOUNTER — HOSPITAL ENCOUNTER (OUTPATIENT)
Dept: RADIOLOGY | Facility: MEDICAL CENTER | Age: 22
End: 2017-12-12
Attending: INTERNAL MEDICINE
Payer: MEDICAID

## 2017-12-12 VITALS
HEART RATE: 72 BPM | BODY MASS INDEX: 30.48 KG/M2 | WEIGHT: 156.09 LBS | TEMPERATURE: 97.6 F | DIASTOLIC BLOOD PRESSURE: 68 MMHG | RESPIRATION RATE: 16 BRPM | SYSTOLIC BLOOD PRESSURE: 128 MMHG | OXYGEN SATURATION: 96 %

## 2017-12-12 VITALS
TEMPERATURE: 99.9 F | HEART RATE: 98 BPM | OXYGEN SATURATION: 99 % | RESPIRATION RATE: 16 BRPM | WEIGHT: 154 LBS | HEIGHT: 60 IN | BODY MASS INDEX: 30.23 KG/M2 | DIASTOLIC BLOOD PRESSURE: 70 MMHG | SYSTOLIC BLOOD PRESSURE: 110 MMHG

## 2017-12-12 DIAGNOSIS — R11.2 NAUSEA, VOMITING, AND DIARRHEA: ICD-10-CM

## 2017-12-12 DIAGNOSIS — Z83.3 FAMILY HISTORY OF DIABETES MELLITUS: ICD-10-CM

## 2017-12-12 DIAGNOSIS — Z87.19 HISTORY OF MELENA: ICD-10-CM

## 2017-12-12 DIAGNOSIS — R10.12 LUQ ABDOMINAL PAIN: ICD-10-CM

## 2017-12-12 DIAGNOSIS — R19.7 NAUSEA, VOMITING, AND DIARRHEA: ICD-10-CM

## 2017-12-12 DIAGNOSIS — Z13.29 SCREENING FOR THYROID DISORDER: ICD-10-CM

## 2017-12-12 DIAGNOSIS — S41.112D LACERATION OF ARM, LEFT, SUBSEQUENT ENCOUNTER: ICD-10-CM

## 2017-12-12 LAB
APPEARANCE UR: CLEAR
COLOR UR AUTO: YELLOW
FLUAV RNA SPEC QL NAA+PROBE: NEGATIVE
FLUBV RNA SPEC QL NAA+PROBE: NEGATIVE
GLUCOSE UR QL STRIP.AUTO: NEGATIVE MG/DL
HCG UR QL: NEGATIVE
KETONES UR QL STRIP.AUTO: NEGATIVE MG/DL
LEUKOCYTE ESTERASE UR QL STRIP.AUTO: ABNORMAL
NITRITE UR QL STRIP.AUTO: NEGATIVE
PH UR STRIP.AUTO: 7 [PH]
PROT UR QL STRIP: 30 MG/DL
RBC UR QL AUTO: ABNORMAL
SP GR UR: 1.01

## 2017-12-12 PROCEDURE — 87502 INFLUENZA DNA AMP PROBE: CPT

## 2017-12-12 PROCEDURE — 99213 OFFICE O/P EST LOW 20 MIN: CPT | Performed by: INTERNAL MEDICINE

## 2017-12-12 PROCEDURE — 700111 HCHG RX REV CODE 636 W/ 250 OVERRIDE (IP): Performed by: EMERGENCY MEDICINE

## 2017-12-12 PROCEDURE — 81002 URINALYSIS NONAUTO W/O SCOPE: CPT

## 2017-12-12 PROCEDURE — 81025 URINE PREGNANCY TEST: CPT

## 2017-12-12 PROCEDURE — 76830 TRANSVAGINAL US NON-OB: CPT

## 2017-12-12 PROCEDURE — 99214 OFFICE O/P EST MOD 30 MIN: CPT | Performed by: INTERNAL MEDICINE

## 2017-12-12 PROCEDURE — 99284 EMERGENCY DEPT VISIT MOD MDM: CPT

## 2017-12-12 PROCEDURE — 96372 THER/PROPH/DIAG INJ SC/IM: CPT

## 2017-12-12 RX ORDER — ONDANSETRON 4 MG/1
4 TABLET, ORALLY DISINTEGRATING ORAL EVERY 8 HOURS PRN
Qty: 10 TAB | Refills: 0 | Status: SHIPPED | OUTPATIENT
Start: 2017-12-12 | End: 2017-12-12

## 2017-12-12 RX ORDER — ONDANSETRON 8 MG/1
8 TABLET, ORALLY DISINTEGRATING ORAL EVERY 8 HOURS PRN
Qty: 15 TAB | Refills: 0 | Status: SHIPPED | OUTPATIENT
Start: 2017-12-12 | End: 2018-03-08 | Stop reason: SDUPTHER

## 2017-12-12 RX ORDER — KETOROLAC TROMETHAMINE 30 MG/ML
15 INJECTION, SOLUTION INTRAMUSCULAR; INTRAVENOUS ONCE
Status: COMPLETED | OUTPATIENT
Start: 2017-12-12 | End: 2017-12-12

## 2017-12-12 RX ORDER — RANITIDINE 150 MG/1
150 TABLET ORAL 2 TIMES DAILY PRN
Qty: 30 TAB | Refills: 1 | Status: SHIPPED | OUTPATIENT
Start: 2017-12-12 | End: 2017-12-28 | Stop reason: SDUPTHER

## 2017-12-12 RX ORDER — ONDANSETRON 4 MG/1
4 TABLET, ORALLY DISINTEGRATING ORAL ONCE
Status: COMPLETED | OUTPATIENT
Start: 2017-12-12 | End: 2017-12-12

## 2017-12-12 RX ADMIN — KETOROLAC TROMETHAMINE 15 MG: 30 INJECTION, SOLUTION INTRAMUSCULAR at 05:44

## 2017-12-12 RX ADMIN — ONDANSETRON 4 MG: 4 TABLET, ORALLY DISINTEGRATING ORAL at 03:48

## 2017-12-12 ASSESSMENT — PAIN SCALES - GENERAL
PAINLEVEL: 3=SLIGHT PAIN
PAINLEVEL_OUTOF10: 8

## 2017-12-12 NOTE — ED NOTES
Pt discharged home. Explained discharge and medication instructions. Questions and comments addressed. Pt verbalized understanding of instructions. Pt advised to follow-up with Dr Villegas or return to ED for any new or worsening of symptoms. Pt is ambulating well and steady on feet. VS stable. Pt ambulatory to ED lobby now.

## 2017-12-12 NOTE — ED NOTES
Pt ambulatory to yellow 65.   Agree with triage note. Pt provided urine sample. Influenza swab completed and sent to lab.  Chart up and ready for ERP now.

## 2017-12-12 NOTE — ED NOTES
Chief Complaint   Patient presents with   • Nausea/Vomiting/Diarrhea     Since 1700 last evening.    • Sore Throat   • Dizziness     Ambulatory to triage for above.  NAD.  C/o fever/chills at home.     /63   Pulse 76   Temp 37 °C (98.6 °F)   Resp 14   Wt 70.8 kg (156 lb 1.4 oz)   LMP  (Approximate)   SpO2 95%   BMI 30.48 kg/m²

## 2017-12-12 NOTE — ED PROVIDER NOTES
"CHIEF COMPLAINT  Chief Complaint   Patient presents with   • Nausea/Vomiting/Diarrhea     Since 1700 last evening.    • Sore Throat   • Dizziness       HPI  Kristal Cesar is a 22 y.o. female who presents with nausea, vomiting, diarrhea symptoms since approximately 5 PM this evening. Multiple sick contacts at home with flulike symptoms. Denies hematemesis or hematochezia or melena. No dysuria or hematuria. Has a slight sore throat however she states that this is only secondary to repeated bouts of emesis earlier in the evening. Has an appointment with her primary care physician later in the day however states that she wanted further evaluation here prior to that appointment. Denies any abdominal pain symptoms.    REVIEW OF SYSTEMS  See HPI for further details. All other systems are negative.     PAST MEDICAL HISTORY   has a past medical history of Allergy; Depression; and Obesity (BMI 30-39.9).    SOCIAL HISTORY  Social History     Social History Main Topics   • Smoking status: Never Smoker   • Smokeless tobacco: Never Used   • Alcohol use No   • Drug use: No   • Sexual activity: Not Currently     Partners: Male     Birth control/ protection: IUD      Comment: Unplanned pregnancy       SURGICAL HISTORY   has a past surgical history that includes ariana by laparoscopy (3/7/2016).    CURRENT MEDICATIONS  Home Medications     Reviewed by Chaya Thomas R.N. (Registered Nurse) on 12/12/17 at 0316  Med List Status: Complete   Medication Last Dose Status   buPROPion (WELLBUTRIN SR) 200 MG SR tablet 12/11/2017 Active   fexofenadine (ALLEGRA) 180 MG tablet 12/11/2017 Active   ibuprofen (MOTRIN) 800 MG Tab 12/11/2017 Active                ALLERGIES  Allergies   Allergen Reactions   • Other Environmental Rash     \"dogs\" - \"my skin turns red\"       PHYSICAL EXAM  VITAL SIGNS: /63   Pulse 76   Temp 37 °C (98.6 °F)   Resp 14   Wt 70.8 kg (156 lb 1.4 oz)   LMP  (Approximate)   SpO2 95%   BMI 30.48 kg/m² "   Pulse ox interpretation: I interpret this pulse ox as normal.  Constitutional: Alert in no apparent distress.  HENT: No signs of trauma, Bilateral external ears normal, Nose normal. Posterior pharynx is unremarkable without exudates or erythema. No stridor.  Eyes: Pupils are equal and reactive, Conjunctiva normal, Non-icteric.   Cardiovascular: Regular rate and rhythm, no murmurs.   Thorax & Lungs: Normal breath sounds, No respiratory distress, No wheezing, No chest tenderness.   Abdomen: Bowel sounds normal, Soft, No tenderness, No masses, No pulsatile masses. No peritoneal signs.  Skin: Warm, Dry, No erythema, No rash.   Extremities: Intact distal pulses, No edema, No tenderness, No cyanosis      DIAGNOSTIC STUDIES / PROCEDURES      LABS  Labs Reviewed   POC UA - Abnormal; Notable for the following:        Result Value    POC Blood Trace-intact (*)     POC Protein 30 (*)     POC Leukocyte Esterase Small (*)     All other components within normal limits   INFLUENZA A/B BY PCR   POC URINE PREGNANCY       RADIOLOGY  No orders to display     COURSE & MEDICAL DECISION MAKING  Pertinent Labs & Imaging studies reviewed. (See chart for details)  22 y.o. female presenting with nausea, vomiting, diarrhea since this evening. No associated fevers. Moist mucous membranes. No tachycardia. No abdominal tenderness benign abdominal exam on physical examination. Patient most likely with a gastroenteritis-like syndrome of a viral etiology. Was given Zofran and tolerated by mouth. Instructed to follow-up with her primary care physician later in the day. Was discharged home with a prescription for Zofran for further symptomatically management. Instructed to take in adequate oral fluid hydration and to start off with a bland diet and to advance as tolerated.     Patient did have a mild headache and was given Toradol for this with improvement. Stated some slight lightheadedness/dizziness likely secondary to repeated bouts of emesis.  Was able to tolerate by mouth. No indication for IV fluid hydrate patient at this time.    No indication of a surgical abdominal process at this time. Has a prior history of cholecystectomy. No indication of pancreatitis. No right lower quadrant tenderness to suggest appendicitis. Patient is in no distress and with benign abdominal exam without distention. Unlikely bowel obstruction.    The patient will return for worsening symptoms or failure of improvement and is stable at the time of discharge. The patient verbalizes understanding in their own words.    /68   Pulse 72   Temp 36.4 °C (97.6 °F)   Resp 16   Wt 70.8 kg (156 lb 1.4 oz)   LMP  (Approximate)   SpO2 96%   BMI 30.48 kg/m²     The patient was referred to primary care where they will receive further BP management.      Margareth Villegas M.D.  [address] [place] [id number] [phone]    In 2 days      [facility], Emergency Dept  [address] [place] [id number] [phone]    As needed, If symptoms worsen      FINAL IMPRESSION  1. Nausea, vomiting, and diarrhea            Electronically signed by: Isac Schofield 12/12/2017 3:37 AM

## 2017-12-12 NOTE — DISCHARGE INSTRUCTIONS
Nausea and Vomiting  Nausea is a sick feeling that often comes before throwing up (vomiting). Vomiting is a reflex where stomach contents come out of your mouth. Vomiting can cause severe loss of body fluids (dehydration). Children and elderly adults can become dehydrated quickly, especially if they also have diarrhea. Nausea and vomiting are symptoms of a condition or disease. It is important to find the cause of your symptoms.  CAUSES   · Direct irritation of the stomach lining. This irritation can result from increased acid production (gastroesophageal reflux disease), infection, food poisoning, taking certain medicines (such as nonsteroidal anti-inflammatory drugs), alcohol use, or tobacco use.  · Signals from the brain. These signals could be caused by a headache, heat exposure, an inner ear disturbance, increased pressure in the brain from injury, infection, a tumor, or a concussion, pain, emotional stimulus, or metabolic problems.  · An obstruction in the gastrointestinal tract (bowel obstruction).  · Illnesses such as diabetes, hepatitis, gallbladder problems, appendicitis, kidney problems, cancer, sepsis, atypical symptoms of a heart attack, or eating disorders.  · Medical treatments such as chemotherapy and radiation.  · Receiving medicine that makes you sleep (general anesthetic) during surgery.  DIAGNOSIS  Your caregiver may ask for tests to be done if the problems do not improve after a few days. Tests may also be done if symptoms are severe or if the reason for the nausea and vomiting is not clear. Tests may include:  · Urine tests.  · Blood tests.  · Stool tests.  · Cultures (to look for evidence of infection).  · X-rays or other imaging studies.  Test results can help your caregiver make decisions about treatment or the need for additional tests.  TREATMENT  You need to stay well hydrated. Drink frequently but in small amounts. You may wish to drink water, sports drinks, clear broth, or eat frozen  ice pops or gelatin dessert to help stay hydrated. When you eat, eating slowly may help prevent nausea. There are also some antinausea medicines that may help prevent nausea.  HOME CARE INSTRUCTIONS   · Take all medicine as directed by your caregiver.  · If you do not have an appetite, do not force yourself to eat. However, you must continue to drink fluids.  · If you have an appetite, eat a normal diet unless your caregiver tells you differently.  ¨ Eat a variety of complex carbohydrates (rice, wheat, potatoes, bread), lean meats, yogurt, fruits, and vegetables.  ¨ Avoid high-fat foods because they are more difficult to digest.  · Drink enough water and fluids to keep your urine clear or pale yellow.  · If you are dehydrated, ask your caregiver for specific rehydration instructions. Signs of dehydration may include:  ¨ Severe thirst.  ¨ Dry lips and mouth.  ¨ Dizziness.  ¨ Dark urine.  ¨ Decreasing urine frequency and amount.  ¨ Confusion.  ¨ Rapid breathing or pulse.  SEEK IMMEDIATE MEDICAL CARE IF:   · You have blood or brown flecks (like coffee grounds) in your vomit.  · You have black or bloody stools.  · You have a severe headache or stiff neck.  · You are confused.  · You have severe abdominal pain.  · You have chest pain or trouble breathing.  · You do not urinate at least once every 8 hours.  · You develop cold or clammy skin.  · You continue to vomit for longer than 24 to 48 hours.  · You have a fever.  MAKE SURE YOU:   · Understand these instructions.  · Will watch your condition.  · Will get help right away if you are not doing well or get worse.     This information is not intended to replace advice given to you by your health care provider. Make sure you discuss any questions you have with your health care provider.     Document Released: 12/18/2006 Document Revised: 03/11/2013 Document Reviewed: 05/16/2012  [employer] Interactive Patient Education ©2016 [employer]      Diarrhea  Diarrhea is frequent  loose and watery bowel movements. It can cause you to feel weak and dehydrated. Dehydration can cause you to become tired and thirsty, have a dry mouth, and have decreased urination that often is dark yellow. Diarrhea is a sign of another problem, most often an infection that will not last long. In most cases, diarrhea typically lasts 2-3 days. However, it can last longer if it is a sign of something more serious. It is important to treat your diarrhea as directed by your caregiver to lessen or prevent future episodes of diarrhea.  CAUSES   Some common causes include:  · Gastrointestinal infections caused by viruses, bacteria, or parasites.  · Food poisoning or food allergies.  · Certain medicines, such as antibiotics, chemotherapy, and laxatives.  · Artificial sweeteners and fructose.  · Digestive disorders.  HOME CARE INSTRUCTIONS  · Ensure adequate fluid intake (hydration): Have 1 cup (8 oz) of fluid for each diarrhea episode. Avoid fluids that contain simple sugars or sports drinks, fruit juices, whole milk products, and sodas. Your urine should be clear or pale yellow if you are drinking enough fluids. Hydrate with an oral rehydration solution that you can purchase at pharmacies, retail stores, and online. You can prepare an oral rehydration solution at home by mixing the following ingredients together:  ¨  - tsp table salt.  ¨ ¾ tsp baking soda.  ¨  tsp salt substitute containing potassium chloride.  ¨ 1  tablespoons sugar.  ¨ 1 L (34 oz) of water.  · Certain foods and beverages may increase the speed at which food moves through the gastrointestinal (GI) tract. These foods and beverages should be avoided and include:  ¨ Caffeinated and alcoholic beverages.  ¨ High-fiber foods, such as raw fruits and vegetables, nuts, seeds, and whole grain breads and cereals.  ¨ Foods and beverages sweetened with sugar alcohols, such as xylitol, sorbitol, and mannitol.  · Some foods may be well tolerated and may help thicken  stool including:  ¨ Starchy foods, such as rice, toast, pasta, low-sugar cereal, oatmeal, grits, baked potatoes, crackers, and bagels.  ¨ Bananas.  ¨ Applesauce.  · Add probiotic-rich foods to help increase healthy bacteria in the GI tract, such as yogurt and fermented milk products.  · Wash your hands well after each diarrhea episode.  · Only take over-the-counter or prescription medicines as directed by your caregiver.  · Take a warm bath to relieve any burning or pain from frequent diarrhea episodes.  SEEK IMMEDIATE MEDICAL CARE IF:   · You are unable to keep fluids down.  · You have persistent vomiting.  · You have blood in your stool, or your stools are black and tarry.  · You do not urinate in 6-8 hours, or there is only a small amount of very dark urine.  · You have abdominal pain that increases or localizes.  · You have weakness, dizziness, confusion, or light-headedness.  · You have a severe headache.  · Your diarrhea gets worse or does not get better.  · You have a fever or persistent symptoms for more than 2-3 days.  · You have a fever and your symptoms suddenly get worse.  MAKE SURE YOU:   · Understand these instructions.  · Will watch your condition.  · Will get help right away if you are not doing well or get worse.     This information is not intended to replace advice given to you by your health care provider. Make sure you discuss any questions you have with your health care provider.     Document Released: 12/08/2003 Document Revised: 01/08/2016 Document Reviewed: 08/25/2013  [employer] Interactive Patient Education ©2016 [employer]

## 2017-12-13 ENCOUNTER — PATIENT MESSAGE (OUTPATIENT)
Dept: MEDICAL GROUP | Facility: MEDICAL CENTER | Age: 22
End: 2017-12-13

## 2017-12-13 ENCOUNTER — PATIENT OUTREACH (OUTPATIENT)
Dept: HEALTH INFORMATION MANAGEMENT | Facility: OTHER | Age: 22
End: 2017-12-13

## 2017-12-13 DIAGNOSIS — T83.32XD MALPOSITIONED INTRAUTERINE DEVICE (IUD), SUBSEQUENT ENCOUNTER: ICD-10-CM

## 2017-12-13 PROBLEM — S41.112D: Status: ACTIVE | Noted: 2017-12-13

## 2017-12-13 NOTE — ASSESSMENT & PLAN NOTE
Patient reports for the past 3 weeks she has had intermittent bouts of left upper quadrant pain associated with nausea and sometimes vomiting. Seems to be worse with food. She does get heartburn intermittently. She has noticed several dark black bowel movements. She has a history of a cholecystectomy and states that she has had diarrhea since then, which has not changed recently. She reports chills but denies fevers.

## 2017-12-13 NOTE — ASSESSMENT & PLAN NOTE
Patient was seen in the emergency room about a week ago for a laceration she sustained from a fence to the triceps region of the left arm. They placed 5 sutures but 2 of them have fallen out. Patient comes in today for removal of the remaining sutures. She denies wound tenderness, drainage,increased redness.

## 2017-12-13 NOTE — PROGRESS NOTES
Subjective:   Kristal Cesar is a 22 y.o. female here today for Suture removal, nausea with abdominal pain ×3 weeks    LUQ abdominal pain  Patient reports for the past 3 weeks she has had intermittent bouts of left upper quadrant pain associated with nausea and sometimes vomiting. Seems to be worse with food. She does get heartburn intermittently. She has noticed several dark black bowel movements. She has a history of a cholecystectomy and states that she has had diarrhea since then, which has not changed recently. She reports chills but denies fevers. She was seen in the emergency room earlier today for this problem. She had a pregnancy test which was negative. She was tested for influenza A and B which were also negative. She had a UA which showed small leuk esterase but otherwise normal. She was told she had flulike symptoms and was discharged with a prescription for Zofran. She did not get this filled. She received some Zofran in the emergency room which she states helped for about an hour before wearing off.    History of melena  Patient reports several dark black bowel movements recently. She denies bright red blood in her stool. She has abdominal pain as discussed above. She has never been seen by a GI specialist. She denies illicit drug or alcohol use.    Laceration of arm, left, subsequent encounter  Patient was seen in the emergency room about a week ago for a laceration she sustained from a fence to the triceps region of the left arm. They placed 5 sutures but 2 of them have fallen out. Patient comes in today for removal of the remaining sutures. She denies wound tenderness, drainage,increased redness.       Current medicines (including changes today)  Current Outpatient Prescriptions   Medication Sig Dispense Refill   • ranitidine (ZANTAC) 150 MG Tab Take 1 Tab by mouth 2 times a day as needed for Heartburn. 30 Tab 1   • ondansetron (ZOFRAN ODT) 8 MG TABLET DISPERSIBLE Take 1 Tab by mouth  every 8 hours as needed for Nausea. 15 Tab 0   • fexofenadine (ALLEGRA) 180 MG tablet Take 1 Tab by mouth every day. 30 Tab    • buPROPion (WELLBUTRIN SR) 200 MG SR tablet Take 1 Tab by mouth 2 times a day. 60 Tab 2   • ibuprofen (MOTRIN) 800 MG Tab Take 1 Tab by mouth every 8 hours as needed (Cramping). 30 Tab 1     No current facility-administered medications for this visit.      She  has a past medical history of Allergy; Depression; and Obesity (BMI 30-39.9).    ROS   As above in HPI  Complains of increased moodiness lately, trouble sleeping, wonders if this could be related to her Wellbutrin     Objective:     Blood pressure 110/70, pulse 98, temperature 37.7 °C (99.9 °F), resp. rate 16, height 1.524 m (5'), weight 69.9 kg (154 lb), SpO2 99 %, not currently breastfeeding. Body mass index is 30.08 kg/m².   Physical Exam:  Constitutional: Alert, no distress.  Skin: Warm, dry, good turgor, Left upper arm laceration appears well healed with some scabbing. 3 sutures are visualized. No surrounding erythema, tenderness, drainage.  Eye: Equal, round and reactive, conjunctiva clear, lids normal.  Abdomen: Soft, mild tenderness to palpation in left upper quadrant without rebound or guarding, no masses, no hepatosplenomegaly.  Psych: Alert and oriented x3, normal affect and mood.    Results and Imaging:      Ref. Range 12/12/2017 03:24 12/12/2017 03:26 12/12/2017 04:00   POC Color Unknown Yellow     POC Appearance Unknown Clear     POC Specific Gravity Latest Ref Range: 1.005 - 1.030  1.015     POC Urine PH Latest Ref Range: 5.0 - 8.0  7.0     POC Glucose Latest Ref Range: Negative mg/dL Negative     POC Ketones Latest Ref Range: Negative mg/dL Negative     POC Protein Latest Ref Range: Negative mg/dL 30 (A)     POC Nitrites Latest Ref Range: Negative  Negative     POC Leukocyte Esterase Latest Ref Range: Negative  Small (A)     POC Blood Latest Ref Range: Negative  Trace-intact (A)     Influenza virus A RNA Latest Ref  Range: Negative    Negative   POC Urine Pregnancy Test Latest Ref Range: Negative   Negative    Influenza virus B, PCR Latest Ref Range: Negative    Negative       Procedure note:  Using a suture removal kit, 3 sutures were removed from the healed laceration of the left upper arm. Patient tolerated procedure well without complications.    Assessment and Plan:   The following treatment plan was discussed    1. Laceration of arm, left, subsequent encounter  Sutures removed today. Patient instructed on keeping the area clean, using some Vaseline to keep it moist to help prevent severe scarring    2. History of melena  Concern for gastric/duodenal ulcer given patient's pain, melena, nausea. We will check an H. pylori. If negative, and symptoms persist, would consider referral to GI.  - H.PYLORI STOOL ANTIGEN; Future    3. LUQ abdominal pain  Concern for gastric or duodenal ulcer. Will start on empiric antacid therapy with ranitidine. Unfortunately PPIs are not covered by insurance. H. pylori testing ordered today. If symptoms persist and testing is unrevealing will refer to GI  - H.PYLORI STOOL ANTIGEN; Future  - ranitidine (ZANTAC) 150 MG Tab; Take 1 Tab by mouth 2 times a day as needed for Heartburn.  Dispense: 30 Tab; Refill: 1    4. Screening for thyroid disorder  - TSH WITH REFLEX TO FT4; Future    5. Family history of diabetes mellitus  - HEMOGLOBIN A1C; Future        Followup: Return in about 2 weeks (around 12/26/2017) for abdominal pain, discuss depression meds.

## 2017-12-13 NOTE — PROGRESS NOTES
Placed discharge outreach phone call to patient s/p ER discharge 12/12/17.  Left voicemail providing my contact information and instructions to call with any questions or concerns.

## 2017-12-13 NOTE — ASSESSMENT & PLAN NOTE
Patient reports several dark black bowel movements recently. She denies bright red blood in her stool. She has abdominal pain as discussed above. She has never been seen by a GI specialist. She denies illicit drug or alcohol use.

## 2017-12-28 ENCOUNTER — OFFICE VISIT (OUTPATIENT)
Dept: MEDICAL GROUP | Facility: MEDICAL CENTER | Age: 22
End: 2017-12-28
Attending: INTERNAL MEDICINE
Payer: MEDICAID

## 2017-12-28 ENCOUNTER — HOSPITAL ENCOUNTER (OUTPATIENT)
Dept: LAB | Facility: MEDICAL CENTER | Age: 22
End: 2017-12-28
Attending: INTERNAL MEDICINE
Payer: MEDICAID

## 2017-12-28 VITALS
SYSTOLIC BLOOD PRESSURE: 100 MMHG | RESPIRATION RATE: 16 BRPM | OXYGEN SATURATION: 98 % | DIASTOLIC BLOOD PRESSURE: 68 MMHG | TEMPERATURE: 98.6 F | HEART RATE: 88 BPM | HEIGHT: 60 IN | BODY MASS INDEX: 30.63 KG/M2 | WEIGHT: 156 LBS

## 2017-12-28 DIAGNOSIS — L30.9 ECZEMA, UNSPECIFIED TYPE: ICD-10-CM

## 2017-12-28 DIAGNOSIS — Z83.3 FAMILY HISTORY OF DIABETES MELLITUS: ICD-10-CM

## 2017-12-28 DIAGNOSIS — Z13.29 SCREENING FOR THYROID DISORDER: ICD-10-CM

## 2017-12-28 DIAGNOSIS — Z97.5 IUD CONTRACEPTION: ICD-10-CM

## 2017-12-28 DIAGNOSIS — E66.9 OBESITY (BMI 30-39.9): ICD-10-CM

## 2017-12-28 DIAGNOSIS — F51.01 PRIMARY INSOMNIA: ICD-10-CM

## 2017-12-28 PROBLEM — R10.12 LUQ ABDOMINAL PAIN: Status: RESOLVED | Noted: 2017-12-12 | Resolved: 2017-12-28

## 2017-12-28 PROBLEM — S41.112D: Status: RESOLVED | Noted: 2017-12-13 | Resolved: 2017-12-28

## 2017-12-28 LAB
EST. AVERAGE GLUCOSE BLD GHB EST-MCNC: 111 MG/DL
HBA1C MFR BLD: 5.5 % (ref 0–5.6)
TSH SERPL DL<=0.005 MIU/L-ACNC: 1.57 UIU/ML (ref 0.38–5.33)

## 2017-12-28 PROCEDURE — 99214 OFFICE O/P EST MOD 30 MIN: CPT | Performed by: INTERNAL MEDICINE

## 2017-12-28 PROCEDURE — 99212 OFFICE O/P EST SF 10 MIN: CPT | Performed by: INTERNAL MEDICINE

## 2017-12-28 PROCEDURE — 36415 COLL VENOUS BLD VENIPUNCTURE: CPT

## 2017-12-28 PROCEDURE — 84443 ASSAY THYROID STIM HORMONE: CPT

## 2017-12-28 PROCEDURE — 83036 HEMOGLOBIN GLYCOSYLATED A1C: CPT

## 2017-12-28 RX ORDER — ETONOGESTREL AND ETHINYL ESTRADIOL VAGINAL RING .015; .12 MG/D; MG/D
RING VAGINAL
Qty: 1 EACH | Refills: 3 | Status: SHIPPED | OUTPATIENT
Start: 2017-12-28 | End: 2018-11-09

## 2017-12-28 RX ORDER — TRAZODONE HYDROCHLORIDE 50 MG/1
TABLET ORAL
Qty: 30 TAB | Refills: 1 | Status: SHIPPED | OUTPATIENT
Start: 2017-12-28 | End: 2018-03-08 | Stop reason: SDUPTHER

## 2017-12-28 RX ORDER — TRIAMCINOLONE ACETONIDE 1 MG/G
CREAM TOPICAL
Qty: 1 TUBE | Refills: 1 | Status: SHIPPED | OUTPATIENT
Start: 2017-12-28 | End: 2018-03-27

## 2017-12-28 RX ORDER — RANITIDINE 150 MG/1
150 TABLET ORAL 2 TIMES DAILY PRN
Qty: 30 TAB | Refills: 1 | Status: SHIPPED | OUTPATIENT
Start: 2017-12-28 | End: 2018-11-09

## 2017-12-28 ASSESSMENT — PAIN SCALES - GENERAL: PAINLEVEL: 3=SLIGHT PAIN

## 2017-12-29 NOTE — ASSESSMENT & PLAN NOTE
Patient has a history of eczema and has had her face affected in the past. She's noticed lately patches of itchy red flaky skin beneath both of her eyes and that her allergies acting up. She was told by previous doctor not to use her eczema cream that she uses on her body on her face. She has just been applying regular lotion to the areas without improvement.

## 2017-12-29 NOTE — PROGRESS NOTES
Subjective:   Kristal Cesar is a 22 y.o. female here today for Trouble sleeping, follow-up on transvaginal ultrasounds, weight concerns    Primary insomnia  Patient reports that since the birth of her son about a year and a half ago, she has struggled with sleeping at night. States that her mind is racing and she has trouble turning her thoughts off. She feels like her depression is better controlled but her insomnia has not improved. She takes over-the-counter NyQuil but finds herself groggy in the mornings when she does this. She is also tried over-the-counter melatonin without improvement in symptoms.    IUD contraception  Patient had a transvaginal ultrasound which showed that her IUD is misplaced in the lower uterine segment. We discussed that in this situation, it is not providing her maximally effective birth control. She wishes to have it removed and replaced. She is very worried about becoming pregnant and would like a backup method. She does not use condoms. She has had a NuvaRing in the past which she has really liked, but her concern with continued use was that she forgot to change it out. She was referred to GYN but was told that due to her insurance she could not have the IUD replaced in their office. She was told to contact [facility] and has an appointment with them in January. She was told by them that they may or may not be able to replace the IUD depending on her insurance.    Obesity (BMI 30-39.9)  Patient is very concerned that she has not been able to lose weight despite diligent efforts. Her weight is 156 today. She has been counting calories and exercising regularly. At last visit, we had ordered thyroid labs which she has not had done yet.    Eczema  Patient has a history of eczema and has had her face affected in the past. She's noticed lately patches of itchy red flaky skin beneath both of her eyes and that her allergies acting up. She was told by previous doctor not  to use her eczema cream that she uses on her body on her face. She has just been applying regular lotion to the areas without improvement.       Current medicines (including changes today)  Current Outpatient Prescriptions   Medication Sig Dispense Refill   • ethinyl estradiol-etonogestrel (NUVARING) 0.12-0.015 MG/24HR vaginal ring Insert ring vaginally for 3 weeks then remove for one week 1 Each 3   • ranitidine (ZANTAC) 150 MG Tab Take 1 Tab by mouth 2 times a day as needed for Heartburn. 30 Tab 1   • trazodone (DESYREL) 50 MG Tab Take 1-2 tabs at night as needed for sleep 30 Tab 1   • triamcinolone acetonide (KENALOG) 0.1 % Cream Apply very thin layer to dry skin on face 1 Tube 1   • ondansetron (ZOFRAN ODT) 8 MG TABLET DISPERSIBLE Take 1 Tab by mouth every 8 hours as needed for Nausea. 15 Tab 0   • fexofenadine (ALLEGRA) 180 MG tablet Take 1 Tab by mouth every day. 30 Tab    • buPROPion (WELLBUTRIN SR) 200 MG SR tablet Take 1 Tab by mouth 2 times a day. 60 Tab 2   • ibuprofen (MOTRIN) 800 MG Tab Take 1 Tab by mouth every 8 hours as needed (Cramping). 30 Tab 1     No current facility-administered medications for this visit.      She  has a past medical history of Allergy; Depression; and Obesity (BMI 30-39.9).    ROS   As above in HPI  Complaining of stuffy nose and sore throat for the past few days     Objective:     Blood pressure 100/68, pulse 88, temperature 37 °C (98.6 °F), resp. rate 16, height 1.524 m (5'), weight 70.8 kg (156 lb), SpO2 98 %, not currently breastfeeding. Body mass index is 30.47 kg/m².   Physical Exam:  Constitutional: Alert, no distress.  Skin: Warm, dry, good turgor, Red scaly rash beneath both eyes consistent with eczema.  Eye: Equal, round and reactive, conjunctiva clear, lids normal.  ENMT: Lips without lesions, good dentition, oropharynx clear, TM's clear bilaterally  Neck: Trachea midline, no masses, no thyromegaly. No cervical or supraclavicular lymphadenopathy  Psych: Alert and  oriented x3, normal affect and mood.      Assessment and Plan:   The following treatment plan was discussed    1. Primary insomnia  Uncontrolled. Patient's has been too drowsy with OTC antihistamine and no response to melatonin. We will try her on a low-dose of trazodone in follow-up in 4 weeks.  -Start trazodone 50 mg 1-2 tablets daily at bedtime when necessary  -Four-week follow-up    2. IUD contraception  We discussed that patient needs to follow up with [facility] for IUD removal and replacement as I am not able to replace IUDs in clinic here. Because her IUD is somewhat less effective given its malpositioning, I have given her a prescription for NuvaRing to use until she is able to get into [facility] and have it replaced.  -Follow up [facility]  -NuvaRing    3. Obesity (BMI 30-39.9)  We will obtain thyroid labs. Patient would like to try phentermine if there is no obvious medical etiology for her weight gain.  -Follow up thyroid labs  - Patient identified as having weight management issue.  Appropriate orders and counseling given.    4. Eczema  Prescription for triamcinolone cream given to patient. We discussed using very sparingly, as infrequent as possible to control her symptoms as it can cause hyperpigmentation and skin thinning on the face. She expressed understanding.      Followup: Return in about 4 weeks (around 1/25/2018) for insomnia, review labs.

## 2017-12-29 NOTE — ASSESSMENT & PLAN NOTE
Patient is very concerned that she has not been able to lose weight despite diligent efforts. Her weight is 156 today. She has been counting calories and exercising regularly. At last visit, we had ordered thyroid labs which she has not had done yet.

## 2017-12-29 NOTE — ASSESSMENT & PLAN NOTE
Patient had a transvaginal ultrasound which showed that her IUD is misplaced in the lower uterine segment. We discussed that in this situation, it is not providing her maximally effective birth control. She wishes to have it removed and replaced. She is very worried about becoming pregnant and would like a backup method. She does not use condoms. She has had a NuvaRing in the past which she has really liked, but her concern with continued use was that she forgot to change it out. She was referred to GYN but was told that due to her insurance she could not have the IUD replaced in their office. She was told to contact [facility] and has an appointment with them in January. She was told by them that they may or may not be able to replace the IUD depending on her insurance.

## 2017-12-29 NOTE — ASSESSMENT & PLAN NOTE
Patient reports that since the birth of her son about a year and a half ago, she has struggled with sleeping at night. States that her mind is racing and she has trouble turning her thoughts off. She feels like her depression is better controlled but her insomnia has not improved. She takes over-the-counter NyQuil but finds herself groggy in the mornings when she does this. She is also tried over-the-counter melatonin without improvement in symptoms.

## 2018-01-03 ENCOUNTER — PATIENT MESSAGE (OUTPATIENT)
Dept: MEDICAL GROUP | Facility: MEDICAL CENTER | Age: 23
End: 2018-01-03

## 2018-03-08 ENCOUNTER — OFFICE VISIT (OUTPATIENT)
Dept: MEDICAL GROUP | Facility: MEDICAL CENTER | Age: 23
End: 2018-03-08
Attending: FAMILY MEDICINE
Payer: MEDICAID

## 2018-03-08 VITALS
HEART RATE: 80 BPM | DIASTOLIC BLOOD PRESSURE: 60 MMHG | WEIGHT: 151 LBS | BODY MASS INDEX: 29.64 KG/M2 | HEIGHT: 60 IN | RESPIRATION RATE: 16 BRPM | SYSTOLIC BLOOD PRESSURE: 114 MMHG | OXYGEN SATURATION: 97 % | TEMPERATURE: 97.8 F

## 2018-03-08 DIAGNOSIS — F33.41 RECURRENT MAJOR DEPRESSIVE DISORDER, IN PARTIAL REMISSION (HCC): ICD-10-CM

## 2018-03-08 DIAGNOSIS — M79.671 RIGHT FOOT PAIN: ICD-10-CM

## 2018-03-08 DIAGNOSIS — L30.9 ECZEMA, UNSPECIFIED TYPE: ICD-10-CM

## 2018-03-08 PROCEDURE — 99212 OFFICE O/P EST SF 10 MIN: CPT | Performed by: FAMILY MEDICINE

## 2018-03-08 PROCEDURE — 99213 OFFICE O/P EST LOW 20 MIN: CPT | Performed by: FAMILY MEDICINE

## 2018-03-08 RX ORDER — ONDANSETRON 8 MG/1
8 TABLET, ORALLY DISINTEGRATING ORAL EVERY 8 HOURS PRN
Qty: 15 TAB | Refills: 0 | Status: SHIPPED | OUTPATIENT
Start: 2018-03-08 | End: 2018-11-09

## 2018-03-08 RX ORDER — TRAZODONE HYDROCHLORIDE 50 MG/1
TABLET ORAL
Qty: 30 TAB | Refills: 5 | Status: SHIPPED | OUTPATIENT
Start: 2018-03-08 | End: 2018-03-27

## 2018-03-08 RX ORDER — TRIAMCINOLONE ACETONIDE 1 MG/G
1 OINTMENT TOPICAL 2 TIMES DAILY
Qty: 45 G | Refills: 0 | Status: SHIPPED | OUTPATIENT
Start: 2018-03-08 | End: 2018-03-27

## 2018-03-08 RX ORDER — BUPROPION HYDROCHLORIDE 200 MG/1
200 TABLET, EXTENDED RELEASE ORAL 2 TIMES DAILY
Qty: 60 TAB | Refills: 5 | Status: ON HOLD | OUTPATIENT
Start: 2018-03-08 | End: 2019-07-07

## 2018-03-08 ASSESSMENT — PAIN SCALES - GENERAL: PAINLEVEL: 8=MODERATE-SEVERE PAIN

## 2018-03-09 ENCOUNTER — HOSPITAL ENCOUNTER (OUTPATIENT)
Dept: RADIOLOGY | Facility: MEDICAL CENTER | Age: 23
End: 2018-03-09
Attending: FAMILY MEDICINE
Payer: MEDICAID

## 2018-03-09 DIAGNOSIS — M79.671 RIGHT FOOT PAIN: ICD-10-CM

## 2018-03-09 PROCEDURE — 73630 X-RAY EXAM OF FOOT: CPT | Mod: RT
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
## 2018-03-26 ENCOUNTER — APPOINTMENT (OUTPATIENT)
Dept: RADIOLOGY | Facility: MEDICAL CENTER | Age: 23
End: 2018-03-26
Attending: EMERGENCY MEDICINE
Payer: MEDICAID

## 2018-03-26 ENCOUNTER — HOSPITAL ENCOUNTER (EMERGENCY)
Facility: MEDICAL CENTER | Age: 23
End: 2018-03-26
Attending: EMERGENCY MEDICINE
Payer: MEDICAID

## 2018-03-26 VITALS
SYSTOLIC BLOOD PRESSURE: 111 MMHG | TEMPERATURE: 98.4 F | OXYGEN SATURATION: 97 % | HEART RATE: 67 BPM | WEIGHT: 147.49 LBS | BODY MASS INDEX: 28.96 KG/M2 | RESPIRATION RATE: 16 BRPM | DIASTOLIC BLOOD PRESSURE: 75 MMHG | HEIGHT: 60 IN

## 2018-03-26 DIAGNOSIS — N73.9 PID (PELVIC INFLAMMATORY DISEASE): ICD-10-CM

## 2018-03-26 LAB
ALBUMIN SERPL BCP-MCNC: 4.5 G/DL (ref 3.2–4.9)
ALBUMIN/GLOB SERPL: 1.8 G/DL
ALP SERPL-CCNC: 87 U/L (ref 30–99)
ALT SERPL-CCNC: 30 U/L (ref 2–50)
ANION GAP SERPL CALC-SCNC: 9 MMOL/L (ref 0–11.9)
APPEARANCE UR: CLEAR
AST SERPL-CCNC: 18 U/L (ref 12–45)
B-HCG SERPL-ACNC: <0.6 MIU/ML (ref 0–5)
BACTERIA #/AREA URNS HPF: ABNORMAL /HPF
BACTERIA GENITAL QL WET PREP: NORMAL
BASOPHILS # BLD AUTO: 0.2 % (ref 0–1.8)
BASOPHILS # BLD: 0.02 K/UL (ref 0–0.12)
BILIRUB SERPL-MCNC: 0.7 MG/DL (ref 0.1–1.5)
BILIRUB UR QL STRIP.AUTO: NEGATIVE
BUN SERPL-MCNC: 8 MG/DL (ref 8–22)
CALCIUM SERPL-MCNC: 9.4 MG/DL (ref 8.5–10.5)
CHLORIDE SERPL-SCNC: 104 MMOL/L (ref 96–112)
CO2 SERPL-SCNC: 26 MMOL/L (ref 20–33)
COLOR UR: YELLOW
CREAT SERPL-MCNC: 0.66 MG/DL (ref 0.5–1.4)
CULTURE IF INDICATED INDCX: YES UA CULTURE
EOSINOPHIL # BLD AUTO: 0.06 K/UL (ref 0–0.51)
EOSINOPHIL NFR BLD: 0.6 % (ref 0–6.9)
EPI CELLS #/AREA URNS HPF: NEGATIVE /HPF
ERYTHROCYTE [DISTWIDTH] IN BLOOD BY AUTOMATED COUNT: 39.7 FL (ref 35.9–50)
GLOBULIN SER CALC-MCNC: 2.5 G/DL (ref 1.9–3.5)
GLUCOSE SERPL-MCNC: 93 MG/DL (ref 65–99)
GLUCOSE UR STRIP.AUTO-MCNC: NEGATIVE MG/DL
GRAM STN SPEC: NORMAL
HCT VFR BLD AUTO: 42.5 % (ref 37–47)
HGB BLD-MCNC: 14 G/DL (ref 12–16)
HYALINE CASTS #/AREA URNS LPF: ABNORMAL /LPF
IMM GRANULOCYTES # BLD AUTO: 0.02 K/UL (ref 0–0.11)
IMM GRANULOCYTES NFR BLD AUTO: 0.2 % (ref 0–0.9)
KETONES UR STRIP.AUTO-MCNC: 15 MG/DL
LEUKOCYTE ESTERASE UR QL STRIP.AUTO: ABNORMAL
LIPASE SERPL-CCNC: 13 U/L (ref 11–82)
LYMPHOCYTES # BLD AUTO: 1.85 K/UL (ref 1–4.8)
LYMPHOCYTES NFR BLD: 19.8 % (ref 22–41)
MCH RBC QN AUTO: 28 PG (ref 27–33)
MCHC RBC AUTO-ENTMCNC: 32.9 G/DL (ref 33.6–35)
MCV RBC AUTO: 85 FL (ref 81.4–97.8)
MICRO URNS: ABNORMAL
MONOCYTES # BLD AUTO: 0.65 K/UL (ref 0–0.85)
MONOCYTES NFR BLD AUTO: 7 % (ref 0–13.4)
NEUTROPHILS # BLD AUTO: 6.72 K/UL (ref 2–7.15)
NEUTROPHILS NFR BLD: 72.2 % (ref 44–72)
NITRITE UR QL STRIP.AUTO: NEGATIVE
NRBC # BLD AUTO: 0 K/UL
NRBC BLD-RTO: 0 /100 WBC
PH UR STRIP.AUTO: >=9 [PH]
PLATELET # BLD AUTO: 217 K/UL (ref 164–446)
PMV BLD AUTO: 12.1 FL (ref 9–12.9)
POTASSIUM SERPL-SCNC: 3.5 MMOL/L (ref 3.6–5.5)
PROT SERPL-MCNC: 7 G/DL (ref 6–8.2)
PROT UR QL STRIP: 30 MG/DL
RBC # BLD AUTO: 5 M/UL (ref 4.2–5.4)
RBC # URNS HPF: ABNORMAL /HPF
RBC UR QL AUTO: NEGATIVE
SIGNIFICANT IND 70042: NORMAL
SIGNIFICANT IND 70042: NORMAL
SITE SITE: NORMAL
SITE SITE: NORMAL
SODIUM SERPL-SCNC: 139 MMOL/L (ref 135–145)
SOURCE SOURCE: NORMAL
SOURCE SOURCE: NORMAL
SP GR UR STRIP.AUTO: 1.02
UROBILINOGEN UR STRIP.AUTO-MCNC: 1 MG/DL
WBC # BLD AUTO: 9.3 K/UL (ref 4.8–10.8)
WBC #/AREA URNS HPF: ABNORMAL /HPF

## 2018-03-26 PROCEDURE — 80053 COMPREHEN METABOLIC PANEL: CPT

## 2018-03-26 PROCEDURE — 87205 SMEAR GRAM STAIN: CPT

## 2018-03-26 PROCEDURE — 81001 URINALYSIS AUTO W/SCOPE: CPT

## 2018-03-26 PROCEDURE — 83690 ASSAY OF LIPASE: CPT

## 2018-03-26 PROCEDURE — 87077 CULTURE AEROBIC IDENTIFY: CPT | Mod: 91

## 2018-03-26 PROCEDURE — 700102 HCHG RX REV CODE 250 W/ 637 OVERRIDE(OP): Performed by: EMERGENCY MEDICINE

## 2018-03-26 PROCEDURE — 87591 N.GONORRHOEAE DNA AMP PROB: CPT

## 2018-03-26 PROCEDURE — 76817 TRANSVAGINAL US OBSTETRIC: CPT

## 2018-03-26 PROCEDURE — 700111 HCHG RX REV CODE 636 W/ 250 OVERRIDE (IP): Performed by: EMERGENCY MEDICINE

## 2018-03-26 PROCEDURE — 84702 CHORIONIC GONADOTROPIN TEST: CPT

## 2018-03-26 PROCEDURE — 96374 THER/PROPH/DIAG INJ IV PUSH: CPT

## 2018-03-26 PROCEDURE — A9270 NON-COVERED ITEM OR SERVICE: HCPCS | Performed by: EMERGENCY MEDICINE

## 2018-03-26 PROCEDURE — 87076 CULTURE ANAEROBE IDENT EACH: CPT

## 2018-03-26 PROCEDURE — 87491 CHLMYD TRACH DNA AMP PROBE: CPT

## 2018-03-26 PROCEDURE — 36415 COLL VENOUS BLD VENIPUNCTURE: CPT

## 2018-03-26 PROCEDURE — 87086 URINE CULTURE/COLONY COUNT: CPT

## 2018-03-26 PROCEDURE — 99285 EMERGENCY DEPT VISIT HI MDM: CPT

## 2018-03-26 PROCEDURE — 85025 COMPLETE CBC W/AUTO DIFF WBC: CPT

## 2018-03-26 PROCEDURE — 87070 CULTURE OTHR SPECIMN AEROBIC: CPT | Mod: XU

## 2018-03-26 RX ORDER — CEFDINIR 300 MG/1
300 CAPSULE ORAL 2 TIMES DAILY
Qty: 14 CAP | Refills: 0 | Status: SHIPPED | OUTPATIENT
Start: 2018-03-26 | End: 2018-03-28

## 2018-03-26 RX ORDER — CEFTRIAXONE 1 G/1
1 INJECTION, POWDER, FOR SOLUTION INTRAMUSCULAR; INTRAVENOUS ONCE
Status: COMPLETED | OUTPATIENT
Start: 2018-03-26 | End: 2018-03-26

## 2018-03-26 RX ORDER — DOXYCYCLINE 100 MG/1
100 TABLET ORAL ONCE
Status: COMPLETED | OUTPATIENT
Start: 2018-03-26 | End: 2018-03-26

## 2018-03-26 RX ORDER — DOXYCYCLINE 100 MG/1
100 TABLET ORAL 2 TIMES DAILY
Qty: 20 TAB | Refills: 0 | Status: SHIPPED | OUTPATIENT
Start: 2018-03-26 | End: 2018-03-28

## 2018-03-26 RX ADMIN — CEFTRIAXONE SODIUM 1 G: 1 INJECTION, POWDER, FOR SOLUTION INTRAMUSCULAR; INTRAVENOUS at 14:12

## 2018-03-26 RX ADMIN — DOXYCYCLINE 100 MG: 100 TABLET ORAL at 14:12

## 2018-03-26 ASSESSMENT — ENCOUNTER SYMPTOMS
VOMITING: 0
BACK PAIN: 1
FEVER: 0
NAUSEA: 1
ABDOMINAL PAIN: 1

## 2018-03-26 NOTE — ED NOTES
Discharge instructions provided to pt. Copy of instructions and rx for abx x2 provided to pt. Verbalized understanding. Instructed to follow up with PCP or return to ed with worsening symptoms. Educated on worsening symptoms. Educated on diet and fluid intake. Educated on pain management. Pt discharged to home. PT ambulated out of ED.

## 2018-03-26 NOTE — ED PROVIDER NOTES
ED Provider Note    Scribed for Nico Chauhan M.D. by Trudy Rueda. 3/26/2018, 11:26 AM.    Primary care provider: Margareth Villegas M.D.  Means of arrival: Walk in  History obtained from: Patient  History limited by: None    CHIEF COMPLAINT  Chief Complaint   Patient presents with   • Abdominal Pain       HPI  Kristal Cesar is a 22 y.o. female who presents to the Emergency Department for abdominal pain with an onset of 1 day. Patient took a home pregnancy test one week ago for nausea and breast test. The test resulted positive even though she has an IUD. She is scheduled to confirm the pregnancy with her primary care provider tomorrow. History of . Her last menstrual period was one month ago and was abnormal with spotting only. She began to experience an acute onset of low back pain yesterday evening. She complains of diffuse, suprapubic abdominal pain described as severe cramping.  She has vaginal spotting with some clots and tissue when wiping earlier today. She has significant yellow vaginal discharge. She denies exposure to a sexually transmitted disease. Negative for fever, vomiting, dysuria or hematuria.       REVIEW OF SYSTEMS  Review of Systems   Constitutional: Negative for fever.   Gastrointestinal: Positive for abdominal pain (diffuse suprapubic) and nausea. Negative for vomiting.   Genitourinary: Negative for dysuria and hematuria.        Positive yellow vaginal discharge and vaginal bleeding.   Musculoskeletal: Positive for back pain (low).        Positive breast tenderness.   All other systems reviewed and are negative.    See HPI for further details. C.      PAST MEDICAL HISTORY  Patient has a past medical history of Allergy; Depression; and Obesity (BMI 30-39.9). History of . Her last menstrual period was one month ago.  She denies a history of a sexually transmitted disease.      SURGICAL HISTORY  Patient has a past surgical history that includes ariana by laparoscopy  "(3/7/2016).      SOCIAL HISTORY  Social History   Substance Use Topics   • Smoking status: Never Smoker   • Smokeless tobacco: Never Used   • Alcohol use No      History   Drug Use No       FAMILY HISTORY  Family History   Problem Relation Age of Onset   • No Known Problems Mother    • Diabetes Father    • Hypertension Father    • Hyperlipidemia Father    • No Known Problems Sister    • Cancer Neg Hx        CURRENT MEDICATIONS  Home Medications     Reviewed by Lisa Flores R.N. (Registered Nurse) on 03/26/18 at 1121  Med List Status: Complete   Medication Last Dose Status   buPROPion (WELLBUTRIN SR) 200 MG SR tablet taking Active   ethinyl estradiol-etonogestrel (NUVARING) 0.12-0.015 MG/24HR vaginal ring not takin Active   fexofenadine (ALLEGRA) 180 MG tablet not taking Active   ibuprofen (MOTRIN) 800 MG Tab not taking Active   ondansetron (ZOFRAN ODT) 8 MG TABLET DISPERSIBLE not taking Active   ranitidine (ZANTAC) 150 MG Tab not taking Active   traZODone (DESYREL) 50 MG Tab not taking Active   triamcinolone acetonide (KENALOG) 0.1 % Cream not taking Active   triamcinolone acetonide (KENALOG) 0.1 % Ointment not taking Active                ALLERGIES  Allergies   Allergen Reactions   • Other Environmental Rash     \"dogs\" - \"my skin turns red\"       PHYSICAL EXAM  VITAL SIGNS: /68   Pulse 99   Temp 37 °C (98.6 °F)   Resp 14   Ht 1.524 m (5')   Wt 66.9 kg (147 lb 7.8 oz)   LMP 01/23/2018   SpO2 95%   BMI 28.80 kg/m²     Vitals reviewed.    Constitutional: Well developed, Well nourished, No acute distress, Non-toxic appearance.   HENT: Normocephalic, Atraumatic, Bilateral external ears normal, Oropharynx moist, No oral exudates, Nose normal.   Eyes: PERRL, EOMI, Conjunctiva normal, No discharge.   Neck: Normal range of motion, No tenderness, Supple, No stridor.   Cardiovascular: Normal heart rate, Normal rhythm, No murmurs, No rubs, No gallops.   Thorax & Lungs: Normal breath sounds, No respiratory " distress, No wheezing, No chest tenderness.   Abdomen: Bowel sounds normal, Soft, Suprapubic abdominal tenderness but no right lower quadrant tenderness.  Skin: Warm, Dry, No erythema, No rash.   Back: No tenderness, No CVA tenderness.   Pelvic: Female nurse present at bedside.  Yellow discharge noted in the vault.  Yellow discharge and a string coming from the os.  There is no cervical motion tenderness, but no chandelier sign.  No bleeding.  Musculoskeletal: Good range of motion in all major joints. No edema. No tenderness to palpation or major deformities noted.   Neurologic: Alert, Normal motor function, Normal sensory function, No focal deficits noted.   Psychiatric: Affect normal.      LABS  Results for orders placed or performed during the hospital encounter of 03/26/18   CBC WITH DIFFERENTIAL   Result Value Ref Range    WBC 9.3 4.8 - 10.8 K/uL    RBC 5.00 4.20 - 5.40 M/uL    Hemoglobin 14.0 12.0 - 16.0 g/dL    Hematocrit 42.5 37.0 - 47.0 %    MCV 85.0 81.4 - 97.8 fL    MCH 28.0 27.0 - 33.0 pg    MCHC 32.9 (L) 33.6 - 35.0 g/dL    RDW 39.7 35.9 - 50.0 fL    Platelet Count 217 164 - 446 K/uL    MPV 12.1 9.0 - 12.9 fL    Neutrophils-Polys 72.20 (H) 44.00 - 72.00 %    Lymphocytes 19.80 (L) 22.00 - 41.00 %    Monocytes 7.00 0.00 - 13.40 %    Eosinophils 0.60 0.00 - 6.90 %    Basophils 0.20 0.00 - 1.80 %    Immature Granulocytes 0.20 0.00 - 0.90 %    Nucleated RBC 0.00 /100 WBC    Neutrophils (Absolute) 6.72 2.00 - 7.15 K/uL    Lymphs (Absolute) 1.85 1.00 - 4.80 K/uL    Monos (Absolute) 0.65 0.00 - 0.85 K/uL    Eos (Absolute) 0.06 0.00 - 0.51 K/uL    Baso (Absolute) 0.02 0.00 - 0.12 K/uL    Immature Granulocytes (abs) 0.02 0.00 - 0.11 K/uL    NRBC (Absolute) 0.00 K/uL   COMP METABOLIC PANEL   Result Value Ref Range    Sodium 139 135 - 145 mmol/L    Potassium 3.5 (L) 3.6 - 5.5 mmol/L    Chloride 104 96 - 112 mmol/L    Co2 26 20 - 33 mmol/L    Anion Gap 9.0 0.0 - 11.9    Glucose 93 65 - 99 mg/dL    Bun 8 8 - 22 mg/dL     Creatinine 0.66 0.50 - 1.40 mg/dL    Calcium 9.4 8.5 - 10.5 mg/dL    AST(SGOT) 18 12 - 45 U/L    ALT(SGPT) 30 2 - 50 U/L    Alkaline Phosphatase 87 30 - 99 U/L    Total Bilirubin 0.7 0.1 - 1.5 mg/dL    Albumin 4.5 3.2 - 4.9 g/dL    Total Protein 7.0 6.0 - 8.2 g/dL    Globulin 2.5 1.9 - 3.5 g/dL    A-G Ratio 1.8 g/dL   LIPASE   Result Value Ref Range    Lipase 13 11 - 82 U/L   URINALYSIS CULTURE, IF INDICATED   Result Value Ref Range    Color Yellow     Character Clear     Specific Gravity 1.020 <1.035    Ph >=9.0 (A) 5.0 - 8.0    Glucose Negative Negative mg/dL    Ketones 15 (A) Negative mg/dL    Protein 30 (A) Negative mg/dL    Bilirubin Negative Negative    Urobilinogen, Urine 1.0 Negative    Nitrite Negative Negative    Leukocyte Esterase Moderate (A) Negative    Occult Blood Negative Negative    Micro Urine Req Microscopic     Culture Indicated Yes UA Culture   HCG QUANTITATIVE SERUM   Result Value Ref Range    Bhcg <0.6 0.0 - 5.0 mIU/mL   ESTIMATED GFR   Result Value Ref Range    GFR If African American >60 >60 mL/min/1.73 m 2    GFR If Non African American >60 >60 mL/min/1.73 m 2   URINE MICROSCOPIC (W/UA)   Result Value Ref Range    WBC 20-50 (A) /hpf    RBC 2-5 (A) /hpf    Bacteria Few (A) None /hpf    Epithelial Cells Negative /hpf    Hyaline Cast 6-10 (A) /lpf      All labs reviewed by me.      RADIOLOGY  US-OB PELVIS TRANSVAGINAL   Final Result      1.  No intrauterine pregnancy seen. If the patient does have a positive pregnancy test, differential includes early pregnancy, complete , and ectopic pregnancy. Correlation with quantitative beta-hCG and follow-up ultrasound is recommended.      2.  IUD present within the lower uterine segment.           The radiologist's interpretation of all radiological studies have been reviewed by me.      COURSE & MEDICAL DECISION MAKING  Pertinent Labs & Imaging studies reviewed. (See chart for details)    Differential Diagnoses include but are not limited  to: IUP, ectopic pregnancy, threatened miscarriage or UTI.  STD, pelvic infection, infected IUD and PID.    11:24 AM Obtained and reviewed patient's electronic medical record which indicate a transvaginal ultrasound in 11/2017 showing an IUD in the lower uterine segment of the uterus.    11:28 AM Patient seen and examined at bedside. Patient presents for abdominal pain.  Exam indicates suprapubic tenderness.      Initial orders in the Emergency Department included US transvaginal and laboratory testing: CBC with differential, CMP, estimated GFR, lipase, UA, wet prep, chlamydia and GC, HCG quantitative serum, POC UA and POC urine pregnancy.     1:55 PM On repeat evaluation, pelvic exam was completed at bedside with the assistance of a female nurse as noted above. Ultrasound and lab results were discussed.     2:04 PM Urine shows evidence of infection. Ordered 1 g of Rocephin IV and 100 mg of Adoxa PO.    2:35 PM Paged GYN.    Spoke with Dr. Anderson, GYN, regarding the patient's presenting symptoms and diagnostic results.  Plus occult IUD.  This is done.  There is antibiotic choice and will see her in follow-up.  Rh was previously noted be positive.  This is not repeated.  Ultrasound was nondiagnostic.  She otherwise appears well.  Discharged with return precautions and follow-up.  She felt better on examination.    Discharge plan was discussed with the patient and includes following up with Dr. Anderson.  I think she has a pelvic infection.  Unclear if this is a STD or not.  Cultures are sent, she is on antibiotics for PID.  She also has a UTI which started on antibiotics.      Patient's repeated abdominal remains benign, no tenderness present.  Vital signs are normal.    The patient will return for new or persisting symptoms including more pain, fever, vomiting, or other concerns.      DISPOSITION  Patient will be discharged home in stable condition.      FOLLOW UP  Winter Anderson M.D.  [address]  [phone]  [place] [address]  [phone]    Schedule an appointment as soon as possible for a visit in 2 days      Your Physician  Varies    Schedule an appointment as soon as possible for a visit in 2 days        The patient is referred to a primary physician for blood pressure management, diabetic screening, and for all other preventative health concerns.      OUTPATIENT MEDICATIONS  New Prescriptions    CEFDINIR (OMNICEF) 300 MG CAP    Take 1 Cap by mouth 2 times a day for 7 days.    DOXYCYCLINE MONOHYDRATE (ADOXA) 100 MG TABLET    Take 1 Tab by mouth 2 times a day for 10 days.       DIAGNOSIS  1. PID (pelvic inflammatory disease)           The note accurately reflects work and decisions made by me.  Nico Chauhan  3/26/2018  3:12 PM     ITrudy (Scribe), am scribing for, and in the presence of, Nico Chauhan M.D.    Electronically signed by: Trudy Rueda (Scribe), 3/26/2018    Nico CALERO M.D. personally performed the services described in this documentation, as scribed by Trudy Rueda in my presence, and it is both accurate and complete.

## 2018-03-26 NOTE — ED NOTES
Pt updated with the poc. piv established blood collected sent to lab.  Instructed by RN to call once ready to provide urine specimen. Waiting for Ultrasound.

## 2018-03-26 NOTE — DISCHARGE INSTRUCTIONS
Take Anaprox as prescribed.  Return to the ER for pain, fever, or other concerns.  Follow up with your doctor or the GYN doctor.      Pelvic Inflammatory Disease  Introduction  Pelvic inflammatory disease (PID) refers to an infection in some or all of the female organs. The infection can be in the uterus, ovaries, fallopian tubes, or the surrounding tissues in the pelvis. PID can cause abdominal or pelvic pain that comes on suddenly (acute pelvic pain). PID is a serious infection because it can lead to lasting (chronic) pelvic pain or the inability to have children (infertility).  What are the causes?  This condition is most often caused by an infection that is spread during sexual contact. However, the infection can also be caused by the normal bacteria that are found in the vaginal tissues if these bacteria travel upward into the reproductive organs. PID can also occur following:  · The birth of a baby.  · A miscarriage.  · An .  · Major pelvic surgery.  · The use of an intrauterine device (IUD).  · A sexual assault.  What increases the risk?  This condition is more likely to develop in women who:  · Are younger than 25 years of age.  · Are sexually active at a young age.  · Use nonbarrier contraception.  · Have multiple sexual partners.  · Have sex with someone who has symptoms of an STD (sexually transmitted disease).  · Use oral contraception.  At times, certain behaviors can also increase the possibility of getting PID, such as:  · Using a vaginal douche.  · Having an IUD in place.  What are the signs or symptoms?  Symptoms of this condition include:  · Abdominal or pelvic pain.  · Fever.  · Chills.  · Abnormal vaginal discharge.  · Abnormal uterine bleeding.  · Unusual pain shortly after the end of a menstrual period.  · Painful urination.  · Pain with sexual intercourse.  · Nausea and vomiting.  How is this diagnosed?  To diagnose this condition, your health care provider will do a physical exam and  take your medical history. A pelvic exam typically reveals great tenderness in the uterus and the surrounding pelvic tissues. You may also have tests, such as:  · Lab tests, including a pregnancy test, blood tests, and urine test.  · Culture tests of the vagina and cervix to check for an STD.  · Ultrasound.  · A laparoscopic procedure to look inside the pelvis.  · Examining vaginal secretions under a microscope.  How is this treated?  Treatment for this condition may involve one or more approaches.  · Antibiotic medicines may be prescribed to be taken by mouth.  · Sexual partners may need to be treated if the infection is caused by an STD.  · For more severe cases, hospitalization may be needed to give antibiotics directly into a vein through an IV tube.  · Surgery may be needed if other treatments do not help, but this is rare.  It may take weeks until you are completely well. If you are diagnosed with PID, you should also be checked for human immunodeficiency virus (HIV). Your health care provider may test you for infection again 3 months after treatment. You should not have unprotected sex.  Follow these instructions at home:  · Take over-the-counter and prescription medicines only as told by your health care provider.  · If you were prescribed an antibiotic medicine, take it as told by your health care provider. Do not stop taking the antibiotic even if you start to feel better.  · Do not have sexual intercourse until treatment is completed or as told by your health care provider. If PID is confirmed, your recent sexual partners will need treatment, especially if you had unprotected sex.  · Keep all follow-up visits as told by your health care provider. This is important.  Contact a health care provider if:  · You have increased or abnormal vaginal discharge.  · Your pain does not improve.  · You vomit.  · You have a fever.  · You cannot tolerate your medicines.  · Your partner has an STD.  · You have pain when  you urinate.  Get help right away if:  · You have increased abdominal or pelvic pain.  · You have chills.  · Your symptoms are not better in 72 hours even with treatment.  This information is not intended to replace advice given to you by your health care provider. Make sure you discuss any questions you have with your health care provider.  Document Released: 12/18/2006 Document Revised: 05/25/2017 Document Reviewed: 01/25/2016  © 2017 [employer]

## 2018-03-26 NOTE — ED NOTES
Assisted Md with pelvic, IUD removed, pt tolerated well. Swabs sent to lab. Updated on POC. No needs.

## 2018-03-26 NOTE — LETTER
3/28/2018               Kristal Carbajalela Mansi Cesar  [address]  [place] [address]        Dear Kristal (MR#[id number])    As we have been unable to contact you by phone, this letter is sent in regards to your, recent visit to the [facility] Emergency Department on 3/26/2018.  During the visit, tests were performed to assist the physician in a medical diagnosis.  A review of those tests requires that we notify you of the following:    Your culture test was positive for Chlamydia, a sexually transmitted infection. This was treated appropriately with the antibiotics prescribed for you (doxycycline) on discharge. It is important that you continue your antibiotic until finished.   Based on the above findings it is recommended that you seek testing for the presence of additional sexually transmitted infections from the Health Department. Also, it is advised that you inform your sexual partner(s) within the previous 60 days of the above findings and direct them to the Health Department for testing. Should your symptoms progress, it is important that you follow up with your primary care physician, your local urgent care office, or return to the emergency department for further work up in order to prevent long term health issues.      Thank you for your cooperation in the matter.    Sincerely,  ED Culture Follow-Up Staff  Elena Dexter, PharmD    [facility], Emergency Department  [address], [place] [address]  [phone] (ED Culture Line)  [phone]

## 2018-03-27 ENCOUNTER — OFFICE VISIT (OUTPATIENT)
Dept: MEDICAL GROUP | Facility: MEDICAL CENTER | Age: 23
End: 2018-03-27
Attending: INTERNAL MEDICINE
Payer: MEDICAID

## 2018-03-27 ENCOUNTER — HOSPITAL ENCOUNTER (OUTPATIENT)
Facility: MEDICAL CENTER | Age: 23
End: 2018-03-27
Attending: INTERNAL MEDICINE
Payer: MEDICAID

## 2018-03-27 ENCOUNTER — PATIENT OUTREACH (OUTPATIENT)
Dept: HEALTH INFORMATION MANAGEMENT | Facility: OTHER | Age: 23
End: 2018-03-27

## 2018-03-27 VITALS
HEIGHT: 60 IN | BODY MASS INDEX: 28.47 KG/M2 | SYSTOLIC BLOOD PRESSURE: 120 MMHG | DIASTOLIC BLOOD PRESSURE: 74 MMHG | RESPIRATION RATE: 16 BRPM | TEMPERATURE: 97.9 F | HEART RATE: 88 BPM | OXYGEN SATURATION: 99 % | WEIGHT: 145 LBS

## 2018-03-27 DIAGNOSIS — L30.9 ECZEMA, UNSPECIFIED TYPE: ICD-10-CM

## 2018-03-27 DIAGNOSIS — N89.8 VAGINAL DISCHARGE: ICD-10-CM

## 2018-03-27 DIAGNOSIS — F51.01 PRIMARY INSOMNIA: ICD-10-CM

## 2018-03-27 DIAGNOSIS — F33.41 RECURRENT MAJOR DEPRESSIVE DISORDER, IN PARTIAL REMISSION (HCC): ICD-10-CM

## 2018-03-27 DIAGNOSIS — N92.6 MISSED PERIOD: ICD-10-CM

## 2018-03-27 PROBLEM — Z97.5 IUD CONTRACEPTION: Status: RESOLVED | Noted: 2017-07-19 | Resolved: 2018-03-27

## 2018-03-27 LAB
C TRACH DNA SPEC QL NAA+PROBE: POSITIVE
INT CON NEG: NEGATIVE
INT CON POS: POSITIVE
N GONORRHOEA DNA SPEC QL NAA+PROBE: NEGATIVE
POC URINE PREGNANCY TEST: NEGATIVE
SPECIMEN SOURCE: ABNORMAL

## 2018-03-27 PROCEDURE — 99214 OFFICE O/P EST MOD 30 MIN: CPT | Performed by: INTERNAL MEDICINE

## 2018-03-27 PROCEDURE — 87660 TRICHOMONAS VAGIN DIR PROBE: CPT

## 2018-03-27 PROCEDURE — 87591 N.GONORRHOEAE DNA AMP PROB: CPT

## 2018-03-27 PROCEDURE — 87491 CHLMYD TRACH DNA AMP PROBE: CPT

## 2018-03-27 PROCEDURE — 81025 URINE PREGNANCY TEST: CPT | Performed by: INTERNAL MEDICINE

## 2018-03-27 PROCEDURE — 87480 CANDIDA DNA DIR PROBE: CPT

## 2018-03-27 PROCEDURE — 87510 GARDNER VAG DNA DIR PROBE: CPT

## 2018-03-27 PROCEDURE — 99213 OFFICE O/P EST LOW 20 MIN: CPT | Performed by: INTERNAL MEDICINE

## 2018-03-27 RX ORDER — TRAZODONE HYDROCHLORIDE 100 MG/1
100 TABLET ORAL NIGHTLY PRN
Qty: 30 TAB | Refills: 3 | Status: SHIPPED | OUTPATIENT
Start: 2018-03-27 | End: 2018-11-09

## 2018-03-27 ASSESSMENT — PAIN SCALES - GENERAL: PAINLEVEL: NO PAIN

## 2018-03-28 ENCOUNTER — TELEPHONE (OUTPATIENT)
Dept: MEDICAL GROUP | Facility: MEDICAL CENTER | Age: 23
End: 2018-03-28

## 2018-03-28 LAB
BACTERIA UR CULT: ABNORMAL
BACTERIA UR CULT: ABNORMAL
CANDIDA DNA VAG QL PROBE+SIG AMP: NEGATIVE
G VAGINALIS DNA VAG QL PROBE+SIG AMP: NEGATIVE
SIGNIFICANT IND 70042: ABNORMAL
SITE SITE: ABNORMAL
SOURCE SOURCE: ABNORMAL
T VAGINALIS DNA VAG QL PROBE+SIG AMP: NEGATIVE

## 2018-03-28 RX ORDER — AZITHROMYCIN 250 MG/1
TABLET, FILM COATED ORAL
Qty: 4 TAB | Refills: 0 | Status: SHIPPED | OUTPATIENT
Start: 2018-03-28 | End: 2018-04-26

## 2018-03-28 NOTE — ASSESSMENT & PLAN NOTE
Patient states that her insomnia is well controlled with high 100 mg of trazodone at night. She was taking 2 of the 50 mg tablets and is asking for a prescription.

## 2018-03-28 NOTE — ASSESSMENT & PLAN NOTE
Patient states that she has been having vaginal discharge for about a week. She reports associated severe pelvic pain yesterday. She went to the ER. She had a pelvic exam done as well as a pelvic ultrasound. She had her IUD removed. She was told that it might be pelvic inflammatory disease and that her IUD might be infected. IUD culture returned positive for group B strep. She states testing for gonorrhea and chlamydia were done although I don't see evidence of this. Trichomonas, yeast, and bacterial vaginosis testing was negative. She was started on doxycycline and Ceftin ear. She continues to have discharge. She denies fevers and chills.

## 2018-03-28 NOTE — ASSESSMENT & PLAN NOTE
States that she is late on her menstrual cycle this month. She went to the emergency room for abdominal pain/pelvic pain as well as a positive pregnancy test at home. She had a transvaginal ultrasound in the ER which did not show any evidence of pregnancy. Her home pregnancy test was one week ago.

## 2018-03-28 NOTE — PROGRESS NOTES
Subjective:   Kristal Cesar is a 22 y.o. female here today for follow up ER visit for vaginal discharge, eczema, insomnia, depression    Primary insomnia  Patient states that her insomnia is well controlled with high 100 mg of trazodone at night. She was taking 2 of the 50 mg tablets and is asking for a prescription.    Eczema  Patient reports 10 years rash on her hand and in the armpit region. She was using the Kenalog ointment from her last visit but states that it is very greasy and It is difficult for her to apply. She has not noticed much improvement in her symptoms. She is using it once a day.    Missed period  States that she is late on her menstrual cycle this month. She went to the emergency room for abdominal pain/pelvic pain as well as a positive pregnancy test at home. She had a transvaginal ultrasound in the ER which did not show any evidence of pregnancy. Her home pregnancy test was one week ago.    Vaginal discharge  Patient states that she has been having vaginal discharge for about a week. She reports associated severe pelvic pain yesterday. She went to the ER. She had a pelvic exam done as well as a pelvic ultrasound. She had her IUD removed. She was told that it might be pelvic inflammatory disease and that her IUD might be infected. IUD culture returned positive for group B strep. She states testing for gonorrhea and chlamydia were done although I don't see evidence of this. Trichomonas, yeast, and bacterial vaginosis testing was negative. She was started on doxycycline and Ceftin ear. She continues to have discharge. She denies fevers and chills.    Recurrent major depressive disorder, in partial remission (CMS-HCC)  Patient reports that the Wellbutrin at the boosted dose has controlled her depression symptoms well. She would like to continue taking it.       Current medicines (including changes today)  Current Outpatient Prescriptions   Medication Sig Dispense Refill   •  hydrocortisone 2.5 % Cream topical cream Apply small amount to rash on hands and arms twice daily as needed 45 g 0   • traZODone (DESYREL) 100 MG Tab Take 1 Tab by mouth at bedtime as needed for Sleep. 30 Tab 3   • doxycycline monohydrate (ADOXA) 100 MG tablet Take 1 Tab by mouth 2 times a day for 10 days. 20 Tab 0   • cefdinir (OMNICEF) 300 MG Cap Take 1 Cap by mouth 2 times a day for 7 days. 14 Cap 0   • buPROPion (WELLBUTRIN SR) 200 MG SR tablet Take 1 Tab by mouth 2 times a day. 60 Tab 5   • ondansetron (ZOFRAN ODT) 8 MG TABLET DISPERSIBLE Take 1 Tab by mouth every 8 hours as needed for Nausea. 15 Tab 0   • ethinyl estradiol-etonogestrel (NUVARING) 0.12-0.015 MG/24HR vaginal ring Insert ring vaginally for 3 weeks then remove for one week 1 Each 3   • ranitidine (ZANTAC) 150 MG Tab Take 1 Tab by mouth 2 times a day as needed for Heartburn. 30 Tab 1   • fexofenadine (ALLEGRA) 180 MG tablet Take 1 Tab by mouth every day. 30 Tab    • ibuprofen (MOTRIN) 800 MG Tab Take 1 Tab by mouth every 8 hours as needed (Cramping). 30 Tab 1     No current facility-administered medications for this visit.      She  has a past medical history of Allergy; Depression; and Obesity (BMI 30-39.9).    ROS   As above in HPI     Objective:     Blood pressure 120/74, pulse 88, temperature 36.6 °C (97.9 °F), resp. rate 16, height 1.524 m (5'), weight 65.8 kg (145 lb), last menstrual period 01/23/2018, SpO2 99 %, not currently breastfeeding. Body mass index is 28.32 kg/m².   Physical Exam:  Constitutional: Alert, no distress.  Skin: Warm, dry, good turgor, cracked scaling rash over ulnar aspect of palm on right.  Eye: Equal, round and reactive, conjunctiva clear, lids normal.  : external genitalia normal, cervix with small amount of yellowish discharge, no cervical motion tenderness      Results and Imaging:   POC pregnancy test in clinic today was negative    Assessment and Plan:   The following treatment plan was discussed    1. Missed  period  Confirmed not pregnant with negative pregnancy test and normal transvaginal US.  Now that IUD is removed, she will start nuva ring which she has used in the past.  - POCT Pregnancy    2. Recurrent major depressive disorder, in partial remission (CMS-HCC)  Stable, controlled with wellbutrin  -cont wellbutrin 200 mg BID    3. Vaginal discharge  Difficult to follow what was done for patient in the ER.  Given continued discharge, will repeat testing as below.  Low suspicion for PID at this point.  Unclear whether GBS + IUD culture is clinically significant.  -continue abx prescribed by Er  - VAGINAL PATHOGENS DNA PANEL; Future  - CHLAMYDIA/GC PCR URINE OR SWAB; Future    4. Primary insomnia  Stable, well controlled with current meds.  Refilled today.  - traZODone (DESYREL) 100 MG Tab; Take 1 Tab by mouth at bedtime as needed for Sleep.  Dispense: 30 Tab; Refill: 3    5. Eczema, unspecified type  Did not respond well to an ointment preparation suspect due to difficulty using. We will try a similar potency corticosteroid and increasing.  - hydrocortisone 2.5 % Cream topical cream; Apply small amount to rash on hands and arms twice daily as needed  Dispense: 45 g; Refill: 0        Followup: Return if symptoms worsen or fail to improve.

## 2018-03-28 NOTE — ASSESSMENT & PLAN NOTE
Patient reports 10 years rash on her hand and in the armpit region. She was using the Kenalog ointment from her last visit but states that it is very greasy and It is difficult for her to apply. She has not noticed much improvement in her symptoms. She is using it once a day.

## 2018-03-28 NOTE — TELEPHONE ENCOUNTER
Please inform patient that her STD testing came back positive for chlamydia. We need to treat her with a shot of ceftriaxone. She needs to come to the office to receive this. We also need to treat her with 4 pills of azithromycin all at once. She should stop her doxycycline and Ceftdinir that she was given from the ER and just use these medicines.  She may get sick after taking the azithromycin all at once. If she throws up the pills she needs to let us know so we can send a prescription.  She needs to notify all of her sexual partner so that they can be treated.  The azithromycin has been sent to her [facility] pharmacy.    Margareth Villegas M.D.

## 2018-03-28 NOTE — ED NOTES
ED Positive Culture Follow-up/Notification Note:    Date: 3/28/18     Patient seen in the ED on 3/26/2018 for PID.   1. PID (pelvic inflammatory disease)       Discharge Medication List as of 3/26/2018  3:12 PM      START taking these medications    Details   doxycycline monohydrate (ADOXA) 100 MG tablet Take 1 Tab by mouth 2 times a day for 10 days., Disp-20 Tab, R-0, Print Rx Paper      cefdinir (OMNICEF) 300 MG Cap Take 1 Cap by mouth 2 times a day for 7 days., Disp-14 Cap, R-0, Print Rx Paper             Allergies: Other environmental     Vitals:    03/26/18 1054 03/26/18 1055 03/26/18 1516   BP: 112/68  111/75   Pulse: 99  67   Resp: 14  16   Temp: 37 °C (98.6 °F)  36.9 °C (98.4 °F)   SpO2: 95%  97%   Weight:  66.9 kg (147 lb 7.8 oz)    Height: 1.524 m (5')         Final cultures:   Results     Procedure Component Value Units Date/Time    CHLAMYDIA & GC BY PCR [[id number]]  (Abnormal) Collected:  03/26/18 1401    Order Status:  Completed Specimen:  Genital from Genital Updated:  03/27/18 1756     Source Other     C. trachomatis by PCR POSITIVE (A)     N. gonorrhoeae by PCR Negative    URINE CULTURE(NEW) [[id number]] Collected:  03/26/18 1259    Order Status:  Completed Specimen:  Urine Updated:  03/27/18 1542     Significant Indicator NEG     Source UR     Site --     Urine Culture Culture in progress.    CULTURE WOUND W/ GRAM STAIN [[id number]]  (Abnormal) Collected:  03/26/18 1454    Order Status:  Completed Specimen:  Wound from Exudate Updated:  03/27/18 1417     Significant Indicator POS (POS)     Source WND     Site IUD     Culture Result Wound -- (A)     Gram Stain Result Rare WBCs.  Rare Gram positive rods.  Rare Small Gram negative rods.       Culture Result Wound Streptococcus agalactiae (Group B)  Heavy growth   (A)    Narrative:       Previous comment was modified by UPRBA at 15:49 on 03/26/18.  INFECTED iud  INFECTED IUD.Culture Plate Are Requested To Put For Seven Days Including  Farzaneh Screen.    GRAM  STAIN [[id number]] Collected:  03/26/18 1454    Order Status:  Completed Specimen:  Wound Updated:  03/26/18 2144     Significant Indicator .     Source WND     Site IUD     Gram Stain Result Rare WBCs.  Rare Gram positive rods.  Rare Small Gram negative rods.      Narrative:       Previous comment was modified by FLORENCIOBA at 15:49 on 03/26/18.  INFECTED iud  INFECTED IUD.Culture Plate Are Requested To Put For Seven Days Including  Farzaneh Screen.    WET PREP [[id number]] Collected:  03/26/18 1401    Order Status:  Completed Specimen:  Genital from Vaginal Updated:  03/26/18 1425     Significant Indicator NEG     Source GEN     Site VAGINAL     Wet Prep For Parasites No yeast.  No motile Trichomonas seen.  No clue cells seen.  Moderate WBC's seen.      URINALYSIS CULTURE, IF INDICATED [[id number]]  (Abnormal) Collected:  03/26/18 1259    Order Status:  Completed Specimen:  Urine Updated:  03/26/18 1319     Color Yellow     Character Clear     Specific Gravity 1.020     Ph >=9.0 (A)     Glucose Negative mg/dL      Ketones 15 (A) mg/dL      Protein 30 (A) mg/dL      Bilirubin Negative     Urobilinogen, Urine 1.0     Nitrite Negative     Leukocyte Esterase Moderate (A)     Occult Blood Negative     Micro Urine Req Microscopic     Culture Indicated Yes UA Culture           Plan:   Pt screened positive for Chlamydia trachomatis  -Attempted to contact pt to provide culture results, assess her clinical status, and provide STI education. Msg left at 1400 on 3/28.  -Pt was discharged on 10 days of doxycycline therapy from the ED  --Recommend extending doxycycline therapy an additional 4 days to complete 14 days of therapy.     IUD culture (+) for GBS  -Patient's IUD was removed in the ED  -GBS is a normal colonizer of the  tract - it is difficult to determine whether this is a commensal organism or a pathogen  -Pt was concurrently prescribed cefdinir x7 days in the ED which will confer activity against GBS    Pt already followed up  with her PCP who continued abx and re-ordered a GC swab. Will defer further management to patient's PCP; however, I do recommend extending the duration of doxycycline to complete a total of 14 days of therapy as stipulated by CDC guidelines  -I called Dr. Villegas's medical assistant and relayed the above information to him. He will pass the information on to Dr. Villegas  -Sent letter to inform patient of positive culture results, provide STI education, and encourage return correspondence    Addendum: Pt returned my call. STI education provided, culture results relayed. Pt states she is improving overall, but still has vaginal discomfort, discharge, and itching. Denies: fever/chills.  -Will extend abx therapy x5 days to complete 14 days of therapy. The additional day was added because pt threw up a dose of both doxy/cefdinir yesterday and reports intermittent NV(may throw up another dose). Pt educated on the need to continue x14 days of therapy and she was given strict return precautions.   -Doxycycline 100mg po bid x5 days; #10; 0 RF's; per [facility] protocol.     Elena Dexter, PharmD, BCPS    Addendum: Pts IUD culture grew Prevotella melaninogenica. I am concerned this pathogen may be contributing to pt's PID.   -Attempted to contact pt to assess her clinica status and determine if this is a true pathogen vs colonizer. Msg left at 1601 - pt was instructed to call ED culture staff.  -Will call in a new Rx for metronidazole 500mg po bid x14 days; #28; 0 RFs; per [facility] protocol. -Rx was called in to pt's pharmacy in case pt was still symptomatic when she returned ED culture staff's call, there would be minimal delay    Elena Dexter, ConstanceD, BCPS

## 2018-03-28 NOTE — ASSESSMENT & PLAN NOTE
Patient reports that the Wellbutrin at the boosted dose has controlled her depression symptoms well. She would like to continue taking it.   No

## 2018-03-29 ENCOUNTER — TELEPHONE (OUTPATIENT)
Dept: MEDICAL GROUP | Facility: MEDICAL CENTER | Age: 23
End: 2018-03-29

## 2018-03-29 LAB
C TRACH DNA SPEC QL NAA+PROBE: POSITIVE
N GONORRHOEA DNA SPEC QL NAA+PROBE: NEGATIVE
SPECIMEN SOURCE: ABNORMAL

## 2018-03-30 ENCOUNTER — NON-PROVIDER VISIT (OUTPATIENT)
Dept: MEDICAL GROUP | Facility: MEDICAL CENTER | Age: 23
End: 2018-03-30
Attending: INTERNAL MEDICINE
Payer: MEDICAID

## 2018-03-30 VITALS
WEIGHT: 145 LBS | SYSTOLIC BLOOD PRESSURE: 118 MMHG | OXYGEN SATURATION: 97 % | HEART RATE: 88 BPM | TEMPERATURE: 98.1 F | DIASTOLIC BLOOD PRESSURE: 74 MMHG | HEIGHT: 60 IN | BODY MASS INDEX: 28.47 KG/M2 | RESPIRATION RATE: 16 BRPM

## 2018-03-30 PROCEDURE — 700111 HCHG RX REV CODE 636 W/ 250 OVERRIDE (IP): Performed by: INTERNAL MEDICINE

## 2018-03-30 PROCEDURE — 96372 THER/PROPH/DIAG INJ SC/IM: CPT

## 2018-03-30 RX ORDER — CEFTRIAXONE SODIUM 250 MG/1
250 INJECTION, POWDER, FOR SOLUTION INTRAMUSCULAR; INTRAVENOUS ONCE
Status: COMPLETED | OUTPATIENT
Start: 2018-03-30 | End: 2018-03-30

## 2018-03-30 RX ADMIN — CEFTRIAXONE SODIUM 250 MG: 250 INJECTION, POWDER, FOR SOLUTION INTRAMUSCULAR; INTRAVENOUS at 13:06

## 2018-03-30 ASSESSMENT — PAIN SCALES - GENERAL: PAINLEVEL: NO PAIN

## 2018-03-30 NOTE — PROGRESS NOTES
Pt presented to the [facility] as requested for Ceftriaxone injection. Pt was advised of the potential side effects. Pt was given Im injection of ceftriaxone 250 mg in the left dorsal gluteal. Medication was reconstituted with .09 ml of xylocaine 1%. Pt tolerated well with no complaints. Pt was advised to follow up in one week if symptoms persist.

## 2018-04-02 LAB
BACTERIA WND AEROBE CULT: ABNORMAL
GRAM STN SPEC: ABNORMAL
SIGNIFICANT IND 70042: ABNORMAL
SITE SITE: ABNORMAL
SOURCE SOURCE: ABNORMAL

## 2018-04-24 ENCOUNTER — OFFICE VISIT (OUTPATIENT)
Dept: MEDICAL GROUP | Facility: MEDICAL CENTER | Age: 23
End: 2018-04-24
Attending: FAMILY MEDICINE
Payer: MEDICAID

## 2018-04-24 VITALS
HEIGHT: 60 IN | RESPIRATION RATE: 16 BRPM | DIASTOLIC BLOOD PRESSURE: 72 MMHG | OXYGEN SATURATION: 96 % | BODY MASS INDEX: 28.66 KG/M2 | SYSTOLIC BLOOD PRESSURE: 112 MMHG | HEART RATE: 76 BPM | WEIGHT: 146 LBS | TEMPERATURE: 98.1 F

## 2018-04-24 DIAGNOSIS — L30.8 OTHER ECZEMA: ICD-10-CM

## 2018-04-24 DIAGNOSIS — N76.0 ACUTE VAGINITIS: ICD-10-CM

## 2018-04-24 LAB
APPEARANCE UR: NORMAL
BILIRUB UR STRIP-MCNC: NORMAL MG/DL
COLOR UR AUTO: NORMAL
GLUCOSE UR STRIP.AUTO-MCNC: NORMAL MG/DL
KETONES UR STRIP.AUTO-MCNC: NORMAL MG/DL
LEUKOCYTE ESTERASE UR QL STRIP.AUTO: NORMAL
NITRITE UR QL STRIP.AUTO: NORMAL
PH UR STRIP.AUTO: 6.5 [PH] (ref 5–8)
PROT UR QL STRIP: NORMAL MG/DL
RBC UR QL AUTO: NORMAL
SP GR UR STRIP.AUTO: 1.01
UROBILINOGEN UR STRIP-MCNC: NORMAL MG/DL

## 2018-04-24 PROCEDURE — 81002 URINALYSIS NONAUTO W/O SCOPE: CPT | Performed by: FAMILY MEDICINE

## 2018-04-24 PROCEDURE — 99213 OFFICE O/P EST LOW 20 MIN: CPT | Performed by: FAMILY MEDICINE

## 2018-04-24 RX ORDER — FLUCONAZOLE 150 MG/1
150 TABLET ORAL DAILY
Qty: 2 TAB | Refills: 0 | Status: SHIPPED | OUTPATIENT
Start: 2018-04-24 | End: 2018-04-26

## 2018-04-24 RX ORDER — TRIAMCINOLONE ACETONIDE 1 MG/G
1 OINTMENT TOPICAL 2 TIMES DAILY
Qty: 30 G | Refills: 2 | Status: SHIPPED | OUTPATIENT
Start: 2018-04-24 | End: 2018-11-09

## 2018-04-24 NOTE — PROGRESS NOTES
Ms. Rcio is a try surgical course on 1 is a significant part he is pretty depressed. Acute Subjective:      Kristal Cesar is a 22 y.o. female who presents with Vaginitis            HPI 1. Acute vaginitis-patient presents with a three-day history of perineal irritation with tenderness when she is wiping. She is not noticing any obvious vaginal discharge at this time. There's been no unexplained vaginal bleeding. She did just start using her Nuva ring contraception last week. She is concerned she may have a vaginal yeast infection. Patient did take azithromycin approximately 3-1/2 weeks ago.  2. Eczema-patient notes a persistent area of scaling and dryness on the heel of her right hand. Also notes persistent redness typically without the scaling in her axillary areas. She reports that she has a history of sensitive skin and has been told that she has eczema in the past. She intentionally uses very mild antiperspirants. Also tends to seek out unscented soaps currently using a [employer]ove product. She does have to wash her hands fairly frequently due to taking care of her infant son.    ROS negative for hematuria, unexplained fevers, chest pain       insulin Objective:     /72   Pulse 76   Temp 36.7 °C (98.1 °F)   Resp 16   Ht 1.524 m (5')   Wt 66.2 kg (146 lb)   LMP 01/23/2018   SpO2 96%   BMI 28.51 kg/m²      Physical Exam  General-alert cooperative female in no acute distress  Right hand-moderate to find scaling and dryness noted broadly over the heel of the right hand. No ulcers or crusts are seen    Abdomen-Abdomen is soft, nontender, normal bowel sounds, no masses, guarding, or organomegaly.  UA-3+ WBCs negative for nitrates, RBCs       Assessment/Plan:     1. Acute vaginitis      2. Other eczema    Plan: 1. Will treat with Diflucan 150 mg once daily for 2 days  2. Patient will start daily applying a moisturizer such as Eucerin  3. Rx for Kenalog and 0.1% in an ointment base to be used for  more serious breakthrough episodes of eczema on her right hand, patient knows to limit duration of steroid application to 2 weeks or less.

## 2018-04-26 ENCOUNTER — HOSPITAL ENCOUNTER (OUTPATIENT)
Facility: MEDICAL CENTER | Age: 23
End: 2018-04-26
Attending: INTERNAL MEDICINE
Payer: MEDICAID

## 2018-04-26 ENCOUNTER — OFFICE VISIT (OUTPATIENT)
Dept: MEDICAL GROUP | Facility: MEDICAL CENTER | Age: 23
End: 2018-04-26
Attending: INTERNAL MEDICINE
Payer: MEDICAID

## 2018-04-26 VITALS
HEART RATE: 98 BPM | HEIGHT: 60 IN | TEMPERATURE: 98.1 F | SYSTOLIC BLOOD PRESSURE: 118 MMHG | WEIGHT: 144 LBS | DIASTOLIC BLOOD PRESSURE: 70 MMHG | OXYGEN SATURATION: 100 % | RESPIRATION RATE: 16 BRPM | BODY MASS INDEX: 28.27 KG/M2

## 2018-04-26 DIAGNOSIS — L98.9 PERINEAL IRRITATION IN FEMALE: ICD-10-CM

## 2018-04-26 DIAGNOSIS — R30.0 DYSURIA: ICD-10-CM

## 2018-04-26 PROBLEM — N92.6 MISSED PERIOD: Status: RESOLVED | Noted: 2018-03-27 | Resolved: 2018-04-26

## 2018-04-26 LAB
APPEARANCE UR: CLEAR
BILIRUB UR STRIP-MCNC: NEGATIVE MG/DL
COLOR UR AUTO: YELLOW
GLUCOSE UR STRIP.AUTO-MCNC: NEGATIVE MG/DL
KETONES UR STRIP.AUTO-MCNC: NEGATIVE MG/DL
LEUKOCYTE ESTERASE UR QL STRIP.AUTO: NORMAL
NITRITE UR QL STRIP.AUTO: NEGATIVE
PH UR STRIP.AUTO: 6 [PH] (ref 5–8)
PROT UR QL STRIP: 30 MG/DL
RBC UR QL AUTO: NEGATIVE
SP GR UR STRIP.AUTO: 1.02
UROBILINOGEN UR STRIP-MCNC: NEGATIVE MG/DL

## 2018-04-26 PROCEDURE — 87491 CHLMYD TRACH DNA AMP PROBE: CPT

## 2018-04-26 PROCEDURE — 99214 OFFICE O/P EST MOD 30 MIN: CPT | Performed by: INTERNAL MEDICINE

## 2018-04-26 PROCEDURE — 87510 GARDNER VAG DNA DIR PROBE: CPT

## 2018-04-26 PROCEDURE — 81002 URINALYSIS NONAUTO W/O SCOPE: CPT | Performed by: INTERNAL MEDICINE

## 2018-04-26 PROCEDURE — 87480 CANDIDA DNA DIR PROBE: CPT

## 2018-04-26 PROCEDURE — 87591 N.GONORRHOEAE DNA AMP PROB: CPT

## 2018-04-26 PROCEDURE — 87660 TRICHOMONAS VAGIN DIR PROBE: CPT

## 2018-04-26 PROCEDURE — 99213 OFFICE O/P EST LOW 20 MIN: CPT | Performed by: INTERNAL MEDICINE

## 2018-04-26 RX ORDER — SULFAMETHOXAZOLE AND TRIMETHOPRIM 800; 160 MG/1; MG/1
1 TABLET ORAL 2 TIMES DAILY
Qty: 6 TAB | Refills: 0 | Status: SHIPPED | OUTPATIENT
Start: 2018-04-26 | End: 2018-04-29

## 2018-04-26 ASSESSMENT — PAIN SCALES - GENERAL: PAINLEVEL: 3=SLIGHT PAIN

## 2018-04-26 NOTE — ASSESSMENT & PLAN NOTE
Patient reports a burning sensation when she urinates. She denies fevers, chills, urinary urgency or frequency. She has a small amount of low back pain. She denies abdominal pain or suprapubic tenderness.

## 2018-04-26 NOTE — ASSESSMENT & PLAN NOTE
Reports that for the past 4 days, she has had itching, burning, and pain in the perineal area. She denies any rashes or lesions. She recently purchased some over-the-counter anti-itch cream that is safe for general use and has been applying that. She finds it to be somewhat helpful. She was in clinic on 4/20/18 and saw Dr. Albarran. She had concerns for yeast infection and he prescribed her Diflucan ×2 doses. She reports taking both doses and no improvement in symptoms. It does not look like he did a pelvic exam at that time. She is currently sexually active with one male partner. She reports that he is uncircumcised and recently had a yeast infection. They purchased over-the-counter treatment for it yesterday.

## 2018-04-26 NOTE — PROGRESS NOTES
Subjective:   Kristal Cesar is a 22 y.o. female here today for burning with urination and itching/pain in the vaginal region    Perineal irritation in female  Reports that for the past 4 days, she has had itching, burning, and pain in the perineal area. She denies any rashes or lesions. She recently purchased some over-the-counter anti-itch cream that is safe for general use and has been applying that. She finds it to be somewhat helpful. She was in clinic on 4/20/18 and saw Dr. Albarran. She had concerns for yeast infection and he prescribed her Diflucan ×2 doses. She reports taking both doses and no improvement in symptoms. It does not look like he did a pelvic exam at that time. She is currently sexually active with one male partner. She reports that he is uncircumcised and recently had a yeast infection. They purchased over-the-counter treatment for it yesterday.    Dysuria  Patient reports a burning sensation when she urinates. She denies fevers, chills, urinary urgency or frequency. She has a small amount of low back pain. She denies abdominal pain or suprapubic tenderness.       Current medicines (including changes today)  Current Outpatient Prescriptions   Medication Sig Dispense Refill   • sulfamethoxazole-trimethoprim (BACTRIM DS) 800-160 MG tablet Take 1 Tab by mouth 2 times a day for 3 days. 6 Tab 0   • triamcinolone acetonide (KENALOG) 0.1 % Ointment Apply 1 Application to affected area(s) 2 times a day. 30 g 2   • hydrocortisone 2.5 % Cream topical cream Apply small amount to rash on hands and arms twice daily as needed 45 g 0   • traZODone (DESYREL) 100 MG Tab Take 1 Tab by mouth at bedtime as needed for Sleep. 30 Tab 3   • buPROPion (WELLBUTRIN SR) 200 MG SR tablet Take 1 Tab by mouth 2 times a day. 60 Tab 5   • ondansetron (ZOFRAN ODT) 8 MG TABLET DISPERSIBLE Take 1 Tab by mouth every 8 hours as needed for Nausea. 15 Tab 0   • ethinyl estradiol-etonogestrel (NUVARING) 0.12-0.015  MG/24HR vaginal ring Insert ring vaginally for 3 weeks then remove for one week 1 Each 3   • ranitidine (ZANTAC) 150 MG Tab Take 1 Tab by mouth 2 times a day as needed for Heartburn. 30 Tab 1   • fexofenadine (ALLEGRA) 180 MG tablet Take 1 Tab by mouth every day. 30 Tab    • ibuprofen (MOTRIN) 800 MG Tab Take 1 Tab by mouth every 8 hours as needed (Cramping). 30 Tab 1     No current facility-administered medications for this visit.      She  has a past medical history of Allergy; Depression; and Obesity (BMI 30-39.9).    ROS   Denies vaginal discharge  As above in HPI     Objective:     Blood pressure 118/70, pulse 98, temperature 36.7 °C (98.1 °F), resp. rate 16, height 1.524 m (5'), weight 65.3 kg (144 lb), last menstrual period 01/23/2018, SpO2 100 %, not currently breastfeeding. Body mass index is 28.12 kg/m².   Physical Exam:  Constitutional: Alert, no distress.  Skin: Warm, dry, good turgor, no rashes in visible areas.  Eye: Equal, round and reactive, conjunctiva clear, lids normal.  : external genitalia with some redness, no rash or open sores, no significant vaginal discharge visualized although speculum exam not done    Results and Imaging:   POC UA in clinic today showed moderate leuk esterase, increased specific gravity, otherwise normal    Assessment and Plan:   The following treatment plan was discussed    1. Perineal irritation in female  Unclear etiology. No rashes or lesions.  No response to Diflucan.  Swabs done today for vaginal pathogens and gc/chlamydia  - VAGINAL PATHOGENS DNA PANEL; Future  - CHLAMYDIA/GC PCR URINE OR SWAB; Future    2. Dysuria  With positive leukocyte esterase on UA. Not enough urine specimen was provided to culture. We will treat empirically given symptoms with Bactrim for 3 days.  - sulfamethoxazole-trimethoprim (BACTRIM DS) 800-160 MG tablet; Take 1 Tab by mouth 2 times a day for 3 days.  Dispense: 6 Tab; Refill: 0  - POCT Urinalysis      Followup: Return if symptoms  worsen or fail to improve.

## 2018-04-27 LAB
C TRACH DNA SPEC QL NAA+PROBE: NEGATIVE
CANDIDA DNA VAG QL PROBE+SIG AMP: NEGATIVE
G VAGINALIS DNA VAG QL PROBE+SIG AMP: POSITIVE
N GONORRHOEA DNA SPEC QL NAA+PROBE: NEGATIVE
SPECIMEN SOURCE: NORMAL
T VAGINALIS DNA VAG QL PROBE+SIG AMP: NEGATIVE

## 2018-05-01 RX ORDER — METRONIDAZOLE 500 MG/1
500 TABLET ORAL 2 TIMES DAILY
Qty: 14 TAB | Refills: 0 | Status: SHIPPED | OUTPATIENT
Start: 2018-05-01 | End: 2018-05-08

## 2018-05-02 ENCOUNTER — TELEPHONE (OUTPATIENT)
Dept: MEDICAL GROUP | Facility: MEDICAL CENTER | Age: 23
End: 2018-05-02

## 2018-05-02 NOTE — TELEPHONE ENCOUNTER
----- Message from Margareth Villegas M.D. sent at 5/1/2018  6:20 PM PDT -----  Please inform patient testing came back positive for bacterial vaginosis.  Negative for gonorrhea, chlamydia, and yeast.  I will send a script to her pharmacy for flagyl 500 mg twice daily x 7 days.  Margareth Villegas M.D.

## 2018-05-02 NOTE — TELEPHONE ENCOUNTER
Phone Number Called: [phone] (home)     Message: Unable to reach pt vm is full sent [employer] message.     Left Message for patient to call back: N\A

## 2018-06-27 ENCOUNTER — OFFICE VISIT (OUTPATIENT)
Dept: MEDICAL GROUP | Facility: MEDICAL CENTER | Age: 23
End: 2018-06-27
Attending: INTERNAL MEDICINE
Payer: MEDICAID

## 2018-06-27 VITALS
HEIGHT: 60 IN | DIASTOLIC BLOOD PRESSURE: 78 MMHG | TEMPERATURE: 98.2 F | HEART RATE: 90 BPM | RESPIRATION RATE: 16 BRPM | OXYGEN SATURATION: 97 % | WEIGHT: 143 LBS | BODY MASS INDEX: 28.07 KG/M2 | SYSTOLIC BLOOD PRESSURE: 118 MMHG

## 2018-06-27 DIAGNOSIS — M25.531 RIGHT WRIST PAIN: ICD-10-CM

## 2018-06-27 DIAGNOSIS — J02.9 SORE THROAT: ICD-10-CM

## 2018-06-27 DIAGNOSIS — Z14.8: ICD-10-CM

## 2018-06-27 LAB
INT CON NEG: NEGATIVE
INT CON POS: POSITIVE
S PYO AG THROAT QL: NEGATIVE

## 2018-06-27 PROCEDURE — 99214 OFFICE O/P EST MOD 30 MIN: CPT | Performed by: INTERNAL MEDICINE

## 2018-06-27 ASSESSMENT — PAIN SCALES - GENERAL: PAINLEVEL: 4=SLIGHT-MODERATE PAIN
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
## 2018-06-27 NOTE — ASSESSMENT & PLAN NOTE
Patient states that her son has been diagnosed with a significant chromosomal abnormality. Not clear on what specifically he has but she states that he is unable to eat, requires a G-tube, has no lower teeth, and has been sick for most of his life. She is requesting genetic counseling because she is interested in having more children. She was told by his pediatrician that his medical problems are inherited and she wants to know whether she is a carrier or whether this was her son's father.

## 2018-06-27 NOTE — ASSESSMENT & PLAN NOTE
Patient reports a sore throat for the past 3 days. Her son was recently diagnosed with strep throat in the emergency room. She denies fevers or chills. Has been feeling like her ears are plugged and some nasal congestion. No cough or trouble breathing. Complaining of body aches.
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
## 2018-07-13 ENCOUNTER — APPOINTMENT (OUTPATIENT)
Dept: RADIOLOGY | Facility: MEDICAL CENTER | Age: 23
End: 2018-07-13
Attending: EMERGENCY MEDICINE
Payer: MEDICAID

## 2018-07-13 ENCOUNTER — HOSPITAL ENCOUNTER (EMERGENCY)
Facility: MEDICAL CENTER | Age: 23
End: 2018-07-13
Attending: EMERGENCY MEDICINE
Payer: MEDICAID

## 2018-07-13 VITALS
DIASTOLIC BLOOD PRESSURE: 86 MMHG | WEIGHT: 145 LBS | BODY MASS INDEX: 28.47 KG/M2 | TEMPERATURE: 97.7 F | OXYGEN SATURATION: 97 % | RESPIRATION RATE: 18 BRPM | HEIGHT: 60 IN | HEART RATE: 78 BPM | SYSTOLIC BLOOD PRESSURE: 131 MMHG

## 2018-07-13 DIAGNOSIS — R10.31 RLQ ABDOMINAL PAIN: ICD-10-CM

## 2018-07-13 LAB
ALBUMIN SERPL BCP-MCNC: 4.3 G/DL (ref 3.2–4.9)
ALBUMIN/GLOB SERPL: 1.5 G/DL
ALP SERPL-CCNC: 73 U/L (ref 30–99)
ALT SERPL-CCNC: 18 U/L (ref 2–50)
ANION GAP SERPL CALC-SCNC: 7 MMOL/L (ref 0–11.9)
APPEARANCE UR: CLEAR
AST SERPL-CCNC: 14 U/L (ref 12–45)
B-HCG SERPL-ACNC: <0.6 MIU/ML (ref 0–5)
BASOPHILS # BLD AUTO: 0.2 % (ref 0–1.8)
BASOPHILS # BLD: 0.02 K/UL (ref 0–0.12)
BILIRUB SERPL-MCNC: 0.3 MG/DL (ref 0.1–1.5)
BILIRUB UR QL STRIP.AUTO: NEGATIVE
BUN SERPL-MCNC: 15 MG/DL (ref 8–22)
CALCIUM SERPL-MCNC: 9.5 MG/DL (ref 8.5–10.5)
CHLORIDE SERPL-SCNC: 106 MMOL/L (ref 96–112)
CO2 SERPL-SCNC: 25 MMOL/L (ref 20–33)
COLOR UR: YELLOW
CREAT SERPL-MCNC: 0.81 MG/DL (ref 0.5–1.4)
EOSINOPHIL # BLD AUTO: 0.14 K/UL (ref 0–0.51)
EOSINOPHIL NFR BLD: 1.5 % (ref 0–6.9)
ERYTHROCYTE [DISTWIDTH] IN BLOOD BY AUTOMATED COUNT: 39.5 FL (ref 35.9–50)
GLOBULIN SER CALC-MCNC: 2.8 G/DL (ref 1.9–3.5)
GLUCOSE SERPL-MCNC: 93 MG/DL (ref 65–99)
GLUCOSE UR STRIP.AUTO-MCNC: NEGATIVE MG/DL
HCT VFR BLD AUTO: 41.7 % (ref 37–47)
HGB BLD-MCNC: 13.7 G/DL (ref 12–16)
IMM GRANULOCYTES # BLD AUTO: 0.02 K/UL (ref 0–0.11)
IMM GRANULOCYTES NFR BLD AUTO: 0.2 % (ref 0–0.9)
KETONES UR STRIP.AUTO-MCNC: NEGATIVE MG/DL
LEUKOCYTE ESTERASE UR QL STRIP.AUTO: NEGATIVE
LIPASE SERPL-CCNC: 17 U/L (ref 11–82)
LYMPHOCYTES # BLD AUTO: 2.56 K/UL (ref 1–4.8)
LYMPHOCYTES NFR BLD: 28.1 % (ref 22–41)
MCH RBC QN AUTO: 28.1 PG (ref 27–33)
MCHC RBC AUTO-ENTMCNC: 32.9 G/DL (ref 33.6–35)
MCV RBC AUTO: 85.5 FL (ref 81.4–97.8)
MICRO URNS: NORMAL
MONOCYTES # BLD AUTO: 0.56 K/UL (ref 0–0.85)
MONOCYTES NFR BLD AUTO: 6.2 % (ref 0–13.4)
NEUTROPHILS # BLD AUTO: 5.8 K/UL (ref 2–7.15)
NEUTROPHILS NFR BLD: 63.8 % (ref 44–72)
NITRITE UR QL STRIP.AUTO: NEGATIVE
NRBC # BLD AUTO: 0 K/UL
NRBC BLD-RTO: 0 /100 WBC
PH UR STRIP.AUTO: 6 [PH]
PLATELET # BLD AUTO: 272 K/UL (ref 164–446)
PMV BLD AUTO: 11.3 FL (ref 9–12.9)
POTASSIUM SERPL-SCNC: 4 MMOL/L (ref 3.6–5.5)
PROT SERPL-MCNC: 7.1 G/DL (ref 6–8.2)
PROT UR QL STRIP: NEGATIVE MG/DL
RBC # BLD AUTO: 4.88 M/UL (ref 4.2–5.4)
RBC UR QL AUTO: NEGATIVE
SODIUM SERPL-SCNC: 138 MMOL/L (ref 135–145)
SP GR UR STRIP.AUTO: 1.01
UROBILINOGEN UR STRIP.AUTO-MCNC: 0.2 MG/DL
WBC # BLD AUTO: 9.1 K/UL (ref 4.8–10.8)

## 2018-07-13 PROCEDURE — 80053 COMPREHEN METABOLIC PANEL: CPT

## 2018-07-13 PROCEDURE — 83690 ASSAY OF LIPASE: CPT

## 2018-07-13 PROCEDURE — 81003 URINALYSIS AUTO W/O SCOPE: CPT

## 2018-07-13 PROCEDURE — 99284 EMERGENCY DEPT VISIT MOD MDM: CPT

## 2018-07-13 PROCEDURE — 74177 CT ABD & PELVIS W/CONTRAST: CPT

## 2018-07-13 PROCEDURE — 85025 COMPLETE CBC W/AUTO DIFF WBC: CPT

## 2018-07-13 PROCEDURE — 36415 COLL VENOUS BLD VENIPUNCTURE: CPT

## 2018-07-13 PROCEDURE — 700117 HCHG RX CONTRAST REV CODE 255: Performed by: EMERGENCY MEDICINE

## 2018-07-13 PROCEDURE — 84702 CHORIONIC GONADOTROPIN TEST: CPT

## 2018-07-13 RX ORDER — MORPHINE SULFATE 4 MG/ML
4 INJECTION, SOLUTION INTRAMUSCULAR; INTRAVENOUS ONCE
Status: DISCONTINUED | OUTPATIENT
Start: 2018-07-13 | End: 2018-07-14 | Stop reason: HOSPADM

## 2018-07-13 RX ADMIN — IOHEXOL 100 ML: 350 INJECTION, SOLUTION INTRAVENOUS at 21:12

## 2018-07-13 ASSESSMENT — LIFESTYLE VARIABLES: DO YOU DRINK ALCOHOL: NO

## 2018-07-14 ENCOUNTER — PATIENT OUTREACH (OUTPATIENT)
Dept: HEALTH INFORMATION MANAGEMENT | Facility: OTHER | Age: 23
End: 2018-07-14

## 2018-07-14 NOTE — ED PROVIDER NOTES
"ED Provider Note    CHIEF COMPLAINT  Chief Complaint   Patient presents with   • Abdominal Pain     right side, started yesterday    • Nausea        HPI    Primary care provider: Margareth Villegas M.D.   History obtained from: Patient  History limited by: None     Kristal Cesar is a 23 y.o. female who presents to the ED complaining of right mid to lower abdominal pain for past 3 days.  Patient unable to describe the pain except that \"it hurts really bad.\"  She denies radiation of the pain or pain anywhere else.  She states that she is 4 days late for her period but had a negative home pregnancy test.  She feels like she may be pregnant because her breasts are tender and she had nausea especially for certain smells.  No vomiting.  She reports a temperature of 100.5 at home.  She also reports diarrhea and thought that it was because she visited [place] recently.  No foreign travels or ill contacts.  She denies dysuria or vaginal symptoms.  She reports history of chlamydia that was treated.  She denies any other sexually transmitted infections.  She reports history of cholecystectomy but no other abdominal surgeries.  She tried taking Tylenol and Advil without improvement of her pain has not noticed anything that makes her pain better or worse.    REVIEW OF SYSTEMS  Please see HPI for pertinent positives/negatives.  All other systems reviewed and are negative.     PAST MEDICAL HISTORY  Past Medical History:   Diagnosis Date   • Allergy    • Depression    • Obesity (BMI 30-39.9)         SURGICAL HISTORY  Past Surgical History:   Procedure Laterality Date   • REAGAN BY LAPAROSCOPY  3/7/2016    Procedure: REAGAN BY LAPAROSCOPY;  Surgeon: John H Ganser, M.D.;  Location: SURGERY [facility];  Service:         SOCIAL HISTORY  Social History     Social History Main Topics   • Smoking status: Never Smoker   • Smokeless tobacco: Never Used   • Alcohol use No   • Drug use: No   • Sexual activity: Not " "Currently     Partners: Male     Birth control/ protection: IUD        FAMILY HISTORY  Family History   Problem Relation Age of Onset   • No Known Problems Mother    • Diabetes Father    • Hypertension Father    • Hyperlipidemia Father    • No Known Problems Sister    • Cancer Neg Hx         CURRENT MEDICATIONS  Home Medications    **Home medications have not yet been reviewed for this encounter**          ALLERGIES  Allergies   Allergen Reactions   • Other Environmental Rash     \"dogs\" - \"my skin turns red\"        PHYSICAL EXAM  VITAL SIGNS: /86   Pulse 78   Temp 36.5 °C (97.7 °F)   Resp 18   Ht 1.524 m (5')   Wt 65.8 kg (145 lb)   LMP 01/23/2018   SpO2 97%   BMI 28.32 kg/m²  @FRANSICO[[id number]::@     Pulse ox interpretation: 98% I interpret this pulse ox as normal     Constitutional: Well developed, well nourished, alert playing on her cell phone in no apparent distress, nontoxic appearance    HENT: No external signs of trauma, normocephalic, oropharynx moist and clear, nose normal    Eyes: PERRL, conjunctiva without erythema, no discharge, no icterus    Neck: Soft and supple, trachea midline, no stridor, no tenderness, no LAD, no JVD, good ROM    Cardiovascular: Regular rate and rhythm, no murmurs/rubs/gallops, strong distal pulses and good perfusion    Thorax & Lungs: No respiratory distress, CTAB   Abdomen: Soft, mild right mid to lower quadrant tenderness palpation, nondistended, no guarding, no rebound, normal BS    Back: No CVAT    Extremities: No clubbing, no cyanosis, no edema, no gross deformity, good ROM, no tenderness, intact distal pulses with brisk cap refill    Skin: Warm, dry, no pallor/cyanosis, no rash noted    Lymphatic: No lymphadenopathy noted    Neuro: A/O times 3, no focal deficits noted    Psychiatric: Cooperative, normal mood and affect           DIAGNOSTIC STUDIES / PROCEDURES        LABS  All labs reviewed by me.     Results for orders placed or performed during the hospital " encounter of 07/13/18   CBC WITH DIFFERENTIAL   Result Value Ref Range    WBC 9.1 4.8 - 10.8 K/uL    RBC 4.88 4.20 - 5.40 M/uL    Hemoglobin 13.7 12.0 - 16.0 g/dL    Hematocrit 41.7 37.0 - 47.0 %    MCV 85.5 81.4 - 97.8 fL    MCH 28.1 27.0 - 33.0 pg    MCHC 32.9 (L) 33.6 - 35.0 g/dL    RDW 39.5 35.9 - 50.0 fL    Platelet Count 272 164 - 446 K/uL    MPV 11.3 9.0 - 12.9 fL    Neutrophils-Polys 63.80 44.00 - 72.00 %    Lymphocytes 28.10 22.00 - 41.00 %    Monocytes 6.20 0.00 - 13.40 %    Eosinophils 1.50 0.00 - 6.90 %    Basophils 0.20 0.00 - 1.80 %    Immature Granulocytes 0.20 0.00 - 0.90 %    Nucleated RBC 0.00 /100 WBC    Neutrophils (Absolute) 5.80 2.00 - 7.15 K/uL    Lymphs (Absolute) 2.56 1.00 - 4.80 K/uL    Monos (Absolute) 0.56 0.00 - 0.85 K/uL    Eos (Absolute) 0.14 0.00 - 0.51 K/uL    Baso (Absolute) 0.02 0.00 - 0.12 K/uL    Immature Granulocytes (abs) 0.02 0.00 - 0.11 K/uL    NRBC (Absolute) 0.00 K/uL   COMP METABOLIC PANEL   Result Value Ref Range    Sodium 138 135 - 145 mmol/L    Potassium 4.0 3.6 - 5.5 mmol/L    Chloride 106 96 - 112 mmol/L    Co2 25 20 - 33 mmol/L    Anion Gap 7.0 0.0 - 11.9    Glucose 93 65 - 99 mg/dL    Bun 15 8 - 22 mg/dL    Creatinine 0.81 0.50 - 1.40 mg/dL    Calcium 9.5 8.5 - 10.5 mg/dL    AST(SGOT) 14 12 - 45 U/L    ALT(SGPT) 18 2 - 50 U/L    Alkaline Phosphatase 73 30 - 99 U/L    Total Bilirubin 0.3 0.1 - 1.5 mg/dL    Albumin 4.3 3.2 - 4.9 g/dL    Total Protein 7.1 6.0 - 8.2 g/dL    Globulin 2.8 1.9 - 3.5 g/dL    A-G Ratio 1.5 g/dL   LIPASE   Result Value Ref Range    Lipase 17 11 - 82 U/L   HCG QUANTITATIVE   Result Value Ref Range    Bhcg <0.6 0.0 - 5.0 mIU/mL   URINALYSIS,CULTURE IF INDICATED   Result Value Ref Range    Color Yellow     Character Clear     Specific Gravity 1.013 <1.035    Ph 6.0 5.0 - 8.0    Glucose Negative Negative mg/dL    Ketones Negative Negative mg/dL    Protein Negative Negative mg/dL    Bilirubin Negative Negative    Urobilinogen, Urine 0.2 Negative     Nitrite Negative Negative    Leukocyte Esterase Negative Negative    Occult Blood Negative Negative    Micro Urine Req see below    ESTIMATED GFR   Result Value Ref Range    GFR If African American >60 >60 mL/min/1.73 m 2    GFR If Non African American >60 >60 mL/min/1.73 m 2        RADIOLOGY  The radiologist's interpretation of all radiological studies have been reviewed by me.     CT-ABDOMEN-PELVIS WITH   Final Result         1.  No acute abnormality.   2.  Hepatomegaly and hepatic steatosis             COURSE & MEDICAL DECISION MAKING  Nursing notes, VS, PMSFHx reviewed in chart.     Review of past medical records shows the patient was seen in this ED March 26, 2018 for abdominal pain.  Transvaginal pelvic ultrasound without evidence of acute findings.      Differential diagnoses considered include but are not limited to: Appendicitis,  ileus, KS/renal colic, UTI/pyelo, gastroenteritis, colitis, pregnancy/ectopic, PID, endometriosis, ovarian cyst/torsion       Patient presents to the ED with above complaint.  Labs were essentially unremarkable and CT abdomen/pelvis with findings as above.  Patient declined pelvic exam or transvaginal ultrasound.  She also declined pain medicine.  Findings discussed with the patient.  She is noted to be busy playing on her cell phone in no acute distress and nontoxic in appearance.  There are no signs of acute abdomen on recheck to suggest a surgical process.  However, I discussed with her worrisome signs and symptoms and return to ED precautions and she was advised on outpatient follow-up.  Patient verbalized understanding and agreed with plan of care with no further questions or concerns.      The patient is referred to a primary physician for blood pressure management, diabetic screening, and for all other preventative health concerns.       FINAL IMPRESSION  1. RLQ abdominal pain           DISPOSITION  Patient will be discharged home in stable condition.       FOLLOW  UP  Margareth Villegas M.D.  [address]  [address]  [facility] [address]  [phone]    Call in 3 days      [facility], Emergency Dept  [address] [id number] [phone]    If symptoms worsen         OUTPATIENT MEDICATIONS  Discharge Medication List as of 7/13/2018  9:57 PM             Electronically signed by: Mike Best, 7/13/2018 8:01 PM      Portions of this record were made with voice recognition software.  Despite my review, spelling/grammar/context errors may still remain.  Interpretation of this chart should be taken in this context.

## 2018-07-14 NOTE — ED NOTES
All lines and monitors discontinued. Discharge instructions given, questions answered.    Kristal out of ER.

## 2018-07-14 NOTE — ED NOTES
[place] 1 Fall Risk Assessment Tool     Present to ED because of fall  No  (Syncope, seizure, or ALOC)  Age>70   No  Altered Mental Status:  (Intoxicated with Alcohol or Substance Confusion,  Inability to follow instructions, disorientation) No  Impaired Mobility:  Ambulates or transfers with assistive devices or assist  Ambulates with unsteady gait and no assistance  Unable to ambulate or transfer   No  Nursing Judgement  (Bowel or bladder incontinence, diarrhea, urinary   frequency or urgency, leg weakness, orthostatic   hypotension, dizziness or vertigo, narcotic use).   No   Fall Risk Interventions     Familiarize the patient with environment.  Place call light within reach and demonstrate call light use.  Place stretcher in low position and brakes locked  Keep floor surfaces clean and dry.  Keep patient care areas uncluttered.  Assess patient hourly for: Pain, Personal Needs, Position change, and call   light access

## 2018-07-14 NOTE — ED TRIAGE NOTES
22 y/o female ambulate to triage   Chief Complaint   Patient presents with   • Abdominal Pain     right side, started yesterday    • Nausea     Pt states she is 4 days late and has breast tenderness

## 2018-07-17 ENCOUNTER — TELEPHONE (OUTPATIENT)
Dept: MEDICAL GROUP | Facility: PHYSICIAN GROUP | Age: 23
End: 2018-07-17

## 2018-07-17 NOTE — TELEPHONE ENCOUNTER
----- Message from Kristal Cesar sent at 7/15/2018  1:50 AM PDT -----  Regarding: Non-Urgent Medical Question  Contact: [phone]  I have a question about CBC with Differential resulted on 7/13/18, 6:13 PM.    I don't understand this part. Should I be worried?

## 2018-07-17 NOTE — TELEPHONE ENCOUNTER
Please inform patient everything came back normal and there is nothing to worry about.  The one number on the CBC that says it is low is not concerning in the context of otherwise normal numbers.  Margareth Villegas M.D.

## 2018-07-17 NOTE — TELEPHONE ENCOUNTER
Pt messaged she got labs done 7/13/18 by another provider, they were release via [employer]AR[employer] and havent been explained. Please advise pt

## 2018-08-23 ENCOUNTER — OFFICE VISIT (OUTPATIENT)
Dept: MEDICAL GROUP | Facility: MEDICAL CENTER | Age: 23
End: 2018-08-23
Attending: INTERNAL MEDICINE
Payer: MEDICAID

## 2018-08-23 ENCOUNTER — HOSPITAL ENCOUNTER (OUTPATIENT)
Facility: MEDICAL CENTER | Age: 23
End: 2018-08-23
Attending: NURSE PRACTITIONER
Payer: MEDICAID

## 2018-08-23 ENCOUNTER — HOSPITAL ENCOUNTER (OUTPATIENT)
Dept: LAB | Facility: MEDICAL CENTER | Age: 23
End: 2018-08-23
Attending: NURSE PRACTITIONER
Payer: MEDICAID

## 2018-08-23 VITALS
DIASTOLIC BLOOD PRESSURE: 72 MMHG | SYSTOLIC BLOOD PRESSURE: 116 MMHG | HEART RATE: 91 BPM | RESPIRATION RATE: 17 BRPM | WEIGHT: 145.3 LBS | TEMPERATURE: 98.4 F | HEIGHT: 60 IN | OXYGEN SATURATION: 92 % | BODY MASS INDEX: 28.53 KG/M2

## 2018-08-23 DIAGNOSIS — Z20.2 POSSIBLE EXPOSURE TO STD: ICD-10-CM

## 2018-08-23 DIAGNOSIS — N92.6 IRREGULAR PERIODS: ICD-10-CM

## 2018-08-23 DIAGNOSIS — L98.9 PERINEAL IRRITATION IN FEMALE: ICD-10-CM

## 2018-08-23 LAB
APPEARANCE UR: CLEAR
BILIRUB UR STRIP-MCNC: NEGATIVE MG/DL
COLOR UR AUTO: YELLOW
GLUCOSE UR STRIP.AUTO-MCNC: NEGATIVE MG/DL
HCG SERPL QL: NEGATIVE
HCG UR QL: NEGATIVE
KETONES UR STRIP.AUTO-MCNC: NEGATIVE MG/DL
LEUKOCYTE ESTERASE UR QL STRIP.AUTO: NEGATIVE
NITRITE UR QL STRIP.AUTO: NEGATIVE
PH UR STRIP.AUTO: 6 [PH] (ref 5–8)
PROT UR QL STRIP: NEGATIVE MG/DL
RBC UR QL AUTO: NEGATIVE
SP GR UR REFRACTOMETRY: 1.01
SP GR UR STRIP.AUTO: 1.02
UROBILINOGEN UR STRIP-MCNC: 0.2 MG/DL

## 2018-08-23 PROCEDURE — 99212 OFFICE O/P EST SF 10 MIN: CPT | Performed by: INTERNAL MEDICINE

## 2018-08-23 PROCEDURE — 99214 OFFICE O/P EST MOD 30 MIN: CPT | Performed by: NURSE PRACTITIONER

## 2018-08-23 PROCEDURE — 87491 CHLMYD TRACH DNA AMP PROBE: CPT

## 2018-08-23 PROCEDURE — 87591 N.GONORRHOEAE DNA AMP PROB: CPT

## 2018-08-23 PROCEDURE — 81025 URINE PREGNANCY TEST: CPT

## 2018-08-23 PROCEDURE — 36415 COLL VENOUS BLD VENIPUNCTURE: CPT

## 2018-08-23 PROCEDURE — 81002 URINALYSIS NONAUTO W/O SCOPE: CPT | Performed by: INTERNAL MEDICINE

## 2018-08-23 PROCEDURE — 84703 CHORIONIC GONADOTROPIN ASSAY: CPT

## 2018-08-23 NOTE — ASSESSMENT & PLAN NOTE
LMP 7/14/18   States tends to have irregular period timing from 4-6 weeks  Period is late. Concerned .  Has take a number of pregnancy tests and have been negative.  Denies vaginal bleeding except on 8/14/18  After sexual intercourse had vaginal spotting  But stopped that night.  Denies vaginal discharge  Has had Chlamydia in past, wants testing..  IN clinic Neg urine preg test. Urine dip in clinic completely normal  Will send for GC Chlamydia  Pt reports wants Serum pregnancy test.

## 2018-08-23 NOTE — PROGRESS NOTES
Chief Complaint:   Chief Complaint   Patient presents with   • Menstrual Problem     tested seld mulit times all came back neg       HPI:  Kristal is here today for  Concern about delayed period, menstrual issues, asking for STD testing    Her PCP, Dr. Villegas is not available today.    Her PMH includes  Chromosomal anomaly-genetic  Fatty Liver  History of Melena  Obesity  Insomnia  Depression  Wrist pain  Vertigo  Cholecystectomy  Irregular periods    Review of Records:  7/17/18 Telephone encounter by Dr Villegas to patient w reassurance about her CBC results  7/13/18 ER visit for RLQ Abd pain, Late Period concern. Labs- neg UA, Neg pregnancy test  CT Abd/Pelvis normal except showing signs of Fatty Liver  6/27/18 Clinic appt w Dr Villegas  For wrist pain, sore throat, request for genetic testing  ---> Referred to Genetics.    Irregular periods  LMP 7/14/18   States tends to have irregular period timing from 4-6 weeks  Period is late. Concerned .  Has take a number of pregnancy tests and have been negative.  Denies vaginal bleeding except on 8/14/18  After sexual intercourse had vaginal spotting  But stopped that night.  Denies vaginal discharge  Has had Chlamydia in past, wants testing..  IN clinic Neg urine preg test. Urine dip in clinic completely normal  Will send for GC Chlamydia  Pt reports wants Serum pregnancy test.      Patient Active Problem List    Diagnosis Date Noted   • Irregular periods 08/23/2018   • Chromosomal anomaly carrier 06/27/2018   • Right wrist pain 06/27/2018   • Sore throat 06/27/2018   • Perineal irritation in female 03/27/2018   • Primary insomnia 12/28/2017   • Eczema 12/28/2017   • History of melena 12/12/2017   • Dysuria 11/01/2017   • Fatty liver 07/19/2017   • Vertigo 07/19/2017   • Recurrent major depressive disorder, in partial remission (HCC) 07/19/2017   • Obesity (BMI 30-39.9) 07/19/2017       Allergies:Other environmental    Medicines as of today:  Current Outpatient  Prescriptions   Medication Sig Dispense Refill   • triamcinolone acetonide (KENALOG) 0.1 % Ointment Apply 1 Application to affected area(s) 2 times a day. 30 g 2   • hydrocortisone 2.5 % Cream topical cream Apply small amount to rash on hands and arms twice daily as needed 45 g 0   • traZODone (DESYREL) 100 MG Tab Take 1 Tab by mouth at bedtime as needed for Sleep. 30 Tab 3   • buPROPion (WELLBUTRIN SR) 200 MG SR tablet Take 1 Tab by mouth 2 times a day. 60 Tab 5   • ondansetron (ZOFRAN ODT) 8 MG TABLET DISPERSIBLE Take 1 Tab by mouth every 8 hours as needed for Nausea. 15 Tab 0   • ethinyl estradiol-etonogestrel (NUVARING) 0.12-0.015 MG/24HR vaginal ring Insert ring vaginally for 3 weeks then remove for one week 1 Each 3   • ranitidine (ZANTAC) 150 MG Tab Take 1 Tab by mouth 2 times a day as needed for Heartburn. 30 Tab 1   • fexofenadine (ALLEGRA) 180 MG tablet Take 1 Tab by mouth every day. 30 Tab    • ibuprofen (MOTRIN) 800 MG Tab Take 1 Tab by mouth every 8 hours as needed (Cramping). 30 Tab 1     No current facility-administered medications for this visit.        Social History   Substance Use Topics   • Smoking status: Never Smoker   • Smokeless tobacco: Never Used   • Alcohol use No       Past Medical History:   Diagnosis Date   • Allergy    • Depression    • Obesity (BMI 30-39.9)        Family History   Problem Relation Age of Onset   • No Known Problems Mother    • Diabetes Father    • Hypertension Father    • Hyperlipidemia Father    • No Known Problems Sister    • Cancer Neg Hx        ROS:  Review of Systems   See HPI Above    Exam:  Blood pressure 116/72, pulse 91, temperature 36.9 °C (98.4 °F), resp. rate 17, height 1.524 m (5'), weight 65.9 kg (145 lb 4.8 oz), last menstrual period 01/23/2018, SpO2 92 %. Body mass index is 28.38 kg/m².    General:  Well nourished, well developed female in NAD  HENT:Head is grossly normal. PERRL.  Neck: Supple. Trachea is midline.  Pulmonary: Speaks in full sentences w  ease.  Normal effort.   Cardiovascular: Regular rate and rhythm.  Abdomen-Abdomen is soft, No tenderness.  Upper extremities- Good ROM  Lower extremities- neg for edema, redness, tenderness.  Neuro- A & O x 4. Speech clear and appropriate.Anxious affect.    Current medications, allergies, and problem list reviewed with patient and updated in EPIC today.    Assessment/Plan:  1. Possible exposure to STD  CHLAMYDIA/GC PCR URINE OR SWAB  Urine sent to lab for testing   2. Perineal irritation in female  REFERRAL TO GYNECOLOGY   3. Irregular periods  REFERRAL TO GYNECOLOGY    HCG QUAL SERUM    To call Or my chart message for results on Monday 8/27/18   To f/u with her PCP, Dr Villegas  Return in about 2 weeks (around 9/6/2018) for pcp, Dr Villegas.

## 2018-08-24 LAB
C TRACH DNA SPEC QL NAA+PROBE: NEGATIVE
N GONORRHOEA DNA SPEC QL NAA+PROBE: NEGATIVE
SPECIMEN SOURCE: NORMAL

## 2018-09-05 ENCOUNTER — NON-PROVIDER VISIT (OUTPATIENT)
Dept: OBGYN | Facility: CLINIC | Age: 23
End: 2018-09-05
Payer: MEDICAID

## 2018-09-05 DIAGNOSIS — Z32.02 PREGNANCY EXAMINATION OR TEST, NEGATIVE RESULT: ICD-10-CM

## 2018-09-05 LAB
INT CON NEG: NEGATIVE
INT CON POS: POSITIVE
POC URINE PREGNANCY TEST: NEGATIVE

## 2018-09-05 PROCEDURE — 81025 URINE PREGNANCY TEST: CPT | Performed by: OBSTETRICS & GYNECOLOGY

## 2018-11-09 ENCOUNTER — APPOINTMENT (OUTPATIENT)
Dept: RADIOLOGY | Facility: MEDICAL CENTER | Age: 23
End: 2018-11-09
Attending: EMERGENCY MEDICINE
Payer: MEDICAID

## 2018-11-09 ENCOUNTER — HOSPITAL ENCOUNTER (EMERGENCY)
Facility: MEDICAL CENTER | Age: 23
End: 2018-11-09
Attending: EMERGENCY MEDICINE
Payer: MEDICAID

## 2018-11-09 VITALS
OXYGEN SATURATION: 96 % | HEIGHT: 60 IN | TEMPERATURE: 98.2 F | HEART RATE: 82 BPM | WEIGHT: 154.76 LBS | DIASTOLIC BLOOD PRESSURE: 68 MMHG | RESPIRATION RATE: 16 BRPM | BODY MASS INDEX: 30.38 KG/M2 | SYSTOLIC BLOOD PRESSURE: 101 MMHG

## 2018-11-09 DIAGNOSIS — Z3A.01 LESS THAN 8 WEEKS GESTATION OF PREGNANCY: ICD-10-CM

## 2018-11-09 DIAGNOSIS — N93.9 VAGINAL BLEEDING: ICD-10-CM

## 2018-11-09 DIAGNOSIS — O20.0 THREATENED MISCARRIAGE IN EARLY PREGNANCY: ICD-10-CM

## 2018-11-09 LAB
ALBUMIN SERPL BCP-MCNC: 4.4 G/DL (ref 3.2–4.9)
ALBUMIN/GLOB SERPL: 1.8 G/DL
ALP SERPL-CCNC: 83 U/L (ref 30–99)
ALT SERPL-CCNC: 14 U/L (ref 2–50)
ANION GAP SERPL CALC-SCNC: 9 MMOL/L (ref 0–11.9)
APPEARANCE UR: CLEAR
AST SERPL-CCNC: 13 U/L (ref 12–45)
B-HCG SERPL-ACNC: ABNORMAL MIU/ML (ref 0–5)
BACTERIA GENITAL QL WET PREP: NORMAL
BASOPHILS # BLD AUTO: 0.3 % (ref 0–1.8)
BASOPHILS # BLD: 0.02 K/UL (ref 0–0.12)
BILIRUB SERPL-MCNC: 0.8 MG/DL (ref 0.1–1.5)
BILIRUB UR QL STRIP.AUTO: NEGATIVE
BUN SERPL-MCNC: 9 MG/DL (ref 8–22)
CALCIUM SERPL-MCNC: 9.1 MG/DL (ref 8.5–10.5)
CHLORIDE SERPL-SCNC: 106 MMOL/L (ref 96–112)
CO2 SERPL-SCNC: 23 MMOL/L (ref 20–33)
COLOR UR: YELLOW
CREAT SERPL-MCNC: 0.5 MG/DL (ref 0.5–1.4)
EOSINOPHIL # BLD AUTO: 0.1 K/UL (ref 0–0.51)
EOSINOPHIL NFR BLD: 1.3 % (ref 0–6.9)
ERYTHROCYTE [DISTWIDTH] IN BLOOD BY AUTOMATED COUNT: 39.7 FL (ref 35.9–50)
GLOBULIN SER CALC-MCNC: 2.4 G/DL (ref 1.9–3.5)
GLUCOSE SERPL-MCNC: 69 MG/DL (ref 65–99)
GLUCOSE UR STRIP.AUTO-MCNC: NEGATIVE MG/DL
HCT VFR BLD AUTO: 43.2 % (ref 37–47)
HGB BLD-MCNC: 14.6 G/DL (ref 12–16)
IMM GRANULOCYTES # BLD AUTO: 0.01 K/UL (ref 0–0.11)
IMM GRANULOCYTES NFR BLD AUTO: 0.1 % (ref 0–0.9)
KETONES UR STRIP.AUTO-MCNC: ABNORMAL MG/DL
LEUKOCYTE ESTERASE UR QL STRIP.AUTO: NEGATIVE
LYMPHOCYTES # BLD AUTO: 2 K/UL (ref 1–4.8)
LYMPHOCYTES NFR BLD: 26.7 % (ref 22–41)
MCH RBC QN AUTO: 28.9 PG (ref 27–33)
MCHC RBC AUTO-ENTMCNC: 33.8 G/DL (ref 33.6–35)
MCV RBC AUTO: 85.4 FL (ref 81.4–97.8)
MICRO URNS: ABNORMAL
MONOCYTES # BLD AUTO: 0.64 K/UL (ref 0–0.85)
MONOCYTES NFR BLD AUTO: 8.6 % (ref 0–13.4)
NEUTROPHILS # BLD AUTO: 4.71 K/UL (ref 2–7.15)
NEUTROPHILS NFR BLD: 63 % (ref 44–72)
NITRITE UR QL STRIP.AUTO: NEGATIVE
NRBC # BLD AUTO: 0 K/UL
NRBC BLD-RTO: 0 /100 WBC
NUMBER OF RH DOSES IND 8505RD: NORMAL
PH UR STRIP.AUTO: 5 [PH]
PLATELET # BLD AUTO: 238 K/UL (ref 164–446)
PMV BLD AUTO: 11.3 FL (ref 9–12.9)
POTASSIUM SERPL-SCNC: 3.7 MMOL/L (ref 3.6–5.5)
PROT SERPL-MCNC: 6.8 G/DL (ref 6–8.2)
PROT UR QL STRIP: NEGATIVE MG/DL
RBC # BLD AUTO: 5.06 M/UL (ref 4.2–5.4)
RBC UR QL AUTO: NEGATIVE
RH BLD: NORMAL
SIGNIFICANT IND 70042: NORMAL
SITE SITE: NORMAL
SODIUM SERPL-SCNC: 138 MMOL/L (ref 135–145)
SOURCE SOURCE: NORMAL
SP GR UR STRIP.AUTO: 1.03
UROBILINOGEN UR STRIP.AUTO-MCNC: 0.2 MG/DL
WBC # BLD AUTO: 7.5 K/UL (ref 4.8–10.8)

## 2018-11-09 PROCEDURE — 87491 CHLMYD TRACH DNA AMP PROBE: CPT

## 2018-11-09 PROCEDURE — 36415 COLL VENOUS BLD VENIPUNCTURE: CPT

## 2018-11-09 PROCEDURE — 84702 CHORIONIC GONADOTROPIN TEST: CPT

## 2018-11-09 PROCEDURE — 99284 EMERGENCY DEPT VISIT MOD MDM: CPT

## 2018-11-09 PROCEDURE — 81003 URINALYSIS AUTO W/O SCOPE: CPT

## 2018-11-09 PROCEDURE — 86901 BLOOD TYPING SEROLOGIC RH(D): CPT

## 2018-11-09 PROCEDURE — 85025 COMPLETE CBC W/AUTO DIFF WBC: CPT

## 2018-11-09 PROCEDURE — 80053 COMPREHEN METABOLIC PANEL: CPT

## 2018-11-09 PROCEDURE — 76801 OB US < 14 WKS SINGLE FETUS: CPT

## 2018-11-09 PROCEDURE — 87591 N.GONORRHOEAE DNA AMP PROB: CPT

## 2018-11-09 ASSESSMENT — LIFESTYLE VARIABLES: DO YOU DRINK ALCOHOL: NO

## 2018-11-09 NOTE — ED TRIAGE NOTES
".  Chief Complaint   Patient presents with   • Vaginal Pain     X1-2 weeks; \"hurts when I wipe myself\"; denies any discharge   • Abdominal Cramping     X1-2 weeks   • Pregnancy     , LMP 9/15, positive pregnancy test at [facility] a week ago   • Vaginal Bleeding     \"when i wipe it's just light pink spotting\"     Patient ambulatory to triage for above complaints; A&O X4; NAD observed in triage; pt provided w/ urine specimen cup and instruction.   Patient placed in lobby and educated to notify staff of new or worsening symptoms.     "

## 2018-11-09 NOTE — ED PROVIDER NOTES
"ED Provider Note    Scribed for GLEN Dominguez II* by Jose Thomas. 2018  2:26 PM    Means of Arrival: Walk-in  History obtained by: Patient  Limitations: None    CHIEF COMPLAINT  Chief Complaint   Patient presents with   • Vaginal Pain     X1-2 weeks; \"hurts when I wipe myself\"; denies any discharge   • Abdominal Cramping     X1-2 weeks   • Pregnancy     , LMP 9/15, positive pregnancy test at [facility] a week ago   • Vaginal Bleeding     \"when i wipe it's just light pink spotting\"       HPI  Kristal Cesar is a 23 y.o. female who is status  who presents to the Emergency Department complaining of dysuria and vaginal pain that began 1-2 weeks ago. She has noticed some blood on toilet paper after wiping. Her vaginal pain is exacerbated by wiping. She denies vaginal discharge. Her last normal menstrual period was on 09/15/2018. She had a positive pregnancy test  at [facility] two weeks ago. She has not had an ultrasound yet. She is not taking any antibiotics. Her son was born at 40 weeks. He has acid reflux and CMD.    REVIEW OF SYSTEMS  Review of Systems   Genitourinary: Positive for dysuria.        Positive for vaginal pain and bleeding  Negative for vaginal discharge   All other systems reviewed and are negative.  See HPI for further details.    PAST MEDICAL HISTORY   has a past medical history of Allergy; Depression; and Obesity (BMI 30-39.9).    SOCIAL HISTORY  Social History     Social History Main Topics   • Smoking status: Never Smoker   • Smokeless tobacco: Never Used   • Alcohol use No   • Drug use: No   • Sexual activity: Not Currently     Partners: Male     Birth control/ protection: IUD       SURGICAL HISTORY   has a past surgical history that includes ariana by laparoscopy (3/7/2016).    CURRENT MEDICATIONS  Home Medications     Reviewed by Khushboo Ordonez R.N. (Registered Nurse) on 18 at 1328  Med List Status: Complete   Medication Last " "Dose Status   buPROPion (WELLBUTRIN SR) 200 MG SR tablet taking Active                ALLERGIES  Allergies   Allergen Reactions   • Other Environmental Rash     \"dogs\" - \"my skin turns red\"       PHYSICAL EXAM  VITAL SIGNS: /82   Pulse 94   Temp 36.7 °C (98 °F)   Resp 14   Ht 1.524 m (5')   Wt 70.2 kg (154 lb 12.2 oz)   LMP 09/15/2018   SpO2 97%   BMI 30.23 kg/m²     Pulse ox interpretation: I interpret this pulse ox as normal.  Constitutional: Alert in no apparent distress. Sitting in bed and talking to son.  HENT: No signs of trauma, Bilateral external ears normal, Nose normal.   Eyes: Pupils are equal, Conjunctiva normal, Non-icteric.   Neck: Normal range of motion, No tenderness, Supple, No stridor.   Lymphatic: No lymphadenopathy noted.   Cardiovascular: Regular rate and rhythm, no murmurs. Symmetric distal pulses. No cyanosis of extremities. No peripheral edema of extremities.  Thorax & Lungs: Normal breath sounds, No respiratory distress, No wheezing, No chest tenderness.   Abdomen: Bowel sounds normal, Soft, No tenderness, No masses, No pulsatile masses. No peritoneal signs.  Pelvic: Chaperoned by a female tech, Mild amount of malodorous discharge, no change to cervix  Skin: Warm, Dry, No erythema, No rash.   Musculoskeletal: Good range of motion in all major joints. No tenderness to palpation or major deformities noted.   Neurologic: Alert , Normal motor function, Normal sensory function, No focal deficits noted.   Psychiatric: Affect normal, Judgment normal, Mood normal.     DIAGNOSTIC STUDIES / PROCEDURES    LABS  Pertinent Labs & Imaging studies reviewed. (See chart for details)    RADIOLOGY  US-OB 1ST TRIMESTER WITH TRANSVAGINAL (COMBO)   Final Result      1.  Viable single intrauterine gestation of an estimated gestational age of 6 weeks 4 days for an ROHINI of 7/1/2019.   2.  Possible small perigestational hemorrhage. Follow-up is recommended.        Pertinent Labs & Imaging studies " reviewed. (See chart for details)    COURSE & MEDICAL DECISION MAKING  Pertinent Labs & Imaging studies reviewed. (See chart for details)    2:26 PM This is a 23 y.o. female who presents with dysuria, vaginal pain, bleeding and the differential diagnosis includes but is not limited to UTI, ectopic pregnancy, pregnancy, threatened miscarriage, ruptured ovarian cyst, vaginitis. Ordered for US OB 1st trimester with transvaginal, CBC with differential, CMP, Rh type for rhogam from ED, HCG quantitative, and U/A culture if indicated to evaluate.      4:40 PM I was chaperoned by a female tech while I performed a pelvic exam at this time, as above. Labs within acceptable limits. Pelvic exam with discharge but not classic for any specific pathology. No bleeding. Or lesions. Wet prep and GC testing sent at her request.     4:58 Ultrasound reveals a single intrauterine gestations at six weeks, four days with a heart rate of 115. There is a possible small james gestational hemorrhage.    Wet prep negative. She was instructed to contact her OBGYN doctor for follow up because of the bleeding. If heavy bleeding, worsening pain, or fever she should come back to the ER for re-evaluation.       The patient will return for worsening symptoms and is stable at the time of discharge. The patient verbalizes understanding and will comply.    DISPOSITION:  Patient will be discharged home in stable condition.    FOLLOW UP:  Call your OBGYN doctor for follow up due to vaginal bleeding.  If you are not able to get into clinic by Monday or Tuesday you can come back to the ER to have hormone level (BHCG) rechecked.        [facility], Emergency Dept  [address] [id number] [phone]    worsening pain, frequent bleeding, fever come back to the ER    FINAL IMPRESSION  1. Threatened miscarriage in early pregnancy    2. Vaginal bleeding    3. Less than 8 weeks gestation of pregnancy          Jose CALERO  William (Scribe), am scribing for, and in the presence of, TAMMY Dominguez II.    Electronically signed by: Jose Thomas (Scribe), 11/9/2018    IJunaid II, M* personally performed the services described in this documentation, as scribed by Jose Thomas in my presence, and it is both accurate and complete. C    The note accurately reflects work and decisions made by me.  Junaid Artis II  11/9/2018  8:32 PM

## 2018-11-10 ENCOUNTER — PATIENT OUTREACH (OUTPATIENT)
Dept: HEALTH INFORMATION MANAGEMENT | Facility: OTHER | Age: 23
End: 2018-11-10

## 2018-11-10 NOTE — ED NOTES
Discharge instructions given. All questions answered. Pt to follow-up with OBGYN. Pt verbalized understanding. All belongings with pt. Pt self ambulatory to lobby.

## 2018-11-13 ENCOUNTER — TELEPHONE (OUTPATIENT)
Dept: MEDICAL GROUP | Facility: MEDICAL CENTER | Age: 23
End: 2018-11-13

## 2018-11-13 DIAGNOSIS — Z30.9 ENCOUNTER FOR CONTRACEPTIVE MANAGEMENT, UNSPECIFIED TYPE: ICD-10-CM

## 2018-11-19 ENCOUNTER — HOSPITAL ENCOUNTER (OUTPATIENT)
Dept: LAB | Facility: MEDICAL CENTER | Age: 23
End: 2018-11-19
Attending: SPECIALIST
Payer: MEDICAID

## 2018-11-19 LAB
ABO GROUP BLD: NORMAL
BASOPHILS # BLD AUTO: 0.9 % (ref 0–1.8)
BASOPHILS # BLD: 0.06 K/UL (ref 0–0.12)
BLD GP AB SCN SERPL QL: NORMAL
EOSINOPHIL # BLD AUTO: 0.06 K/UL (ref 0–0.51)
EOSINOPHIL NFR BLD: 0.9 % (ref 0–6.9)
ERYTHROCYTE [DISTWIDTH] IN BLOOD BY AUTOMATED COUNT: 40.8 FL (ref 35.9–50)
HBV SURFACE AG SER QL: NEGATIVE
HCT VFR BLD AUTO: 41.8 % (ref 37–47)
HGB BLD-MCNC: 13.9 G/DL (ref 12–16)
HIV 1+2 AB+HIV1 P24 AG SERPL QL IA: NON REACTIVE
LYMPHOCYTES # BLD AUTO: 1.77 K/UL (ref 1–4.8)
LYMPHOCYTES NFR BLD: 24.6 % (ref 22–41)
MANUAL DIFF BLD: NORMAL
MCH RBC QN AUTO: 28.8 PG (ref 27–33)
MCHC RBC AUTO-ENTMCNC: 33.3 G/DL (ref 33.6–35)
MCV RBC AUTO: 86.5 FL (ref 81.4–97.8)
MONOCYTES # BLD AUTO: 0.44 K/UL (ref 0–0.85)
MONOCYTES NFR BLD AUTO: 6.1 % (ref 0–13.4)
MORPHOLOGY BLD-IMP: NORMAL
NEUTROPHILS # BLD AUTO: 4.86 K/UL (ref 2–7.15)
NEUTROPHILS NFR BLD: 65.8 % (ref 44–72)
NEUTS BAND NFR BLD MANUAL: 1.7 % (ref 0–10)
NRBC # BLD AUTO: 0 K/UL
NRBC BLD-RTO: 0 /100 WBC
PLATELET # BLD AUTO: 232 K/UL (ref 164–446)
PLATELET BLD QL SMEAR: NORMAL
PMV BLD AUTO: 12.2 FL (ref 9–12.9)
RBC # BLD AUTO: 4.83 M/UL (ref 4.2–5.4)
RBC BLD AUTO: NORMAL
RH BLD: NORMAL
RUBV AB SER QL: 124.4 IU/ML
TREPONEMA PALLIDUM IGG+IGM AB [PRESENCE] IN SERUM OR PLASMA BY IMMUNOASSAY: NON REACTIVE
WBC # BLD AUTO: 7.2 K/UL (ref 4.8–10.8)

## 2018-11-19 PROCEDURE — 86762 RUBELLA ANTIBODY: CPT

## 2018-11-19 PROCEDURE — 86901 BLOOD TYPING SEROLOGIC RH(D): CPT

## 2018-11-19 PROCEDURE — 36415 COLL VENOUS BLD VENIPUNCTURE: CPT

## 2018-11-19 PROCEDURE — 86900 BLOOD TYPING SEROLOGIC ABO: CPT

## 2018-11-19 PROCEDURE — 87340 HEPATITIS B SURFACE AG IA: CPT

## 2018-11-19 PROCEDURE — 86850 RBC ANTIBODY SCREEN: CPT

## 2018-11-19 PROCEDURE — 85007 BL SMEAR W/DIFF WBC COUNT: CPT

## 2018-11-19 PROCEDURE — 85027 COMPLETE CBC AUTOMATED: CPT

## 2018-11-19 PROCEDURE — 87389 HIV-1 AG W/HIV-1&-2 AB AG IA: CPT

## 2018-11-19 PROCEDURE — 86780 TREPONEMA PALLIDUM: CPT

## 2019-01-04 ENCOUNTER — HOSPITAL ENCOUNTER (EMERGENCY)
Facility: MEDICAL CENTER | Age: 24
End: 2019-01-05
Attending: EMERGENCY MEDICINE
Payer: MEDICAID

## 2019-01-04 ENCOUNTER — APPOINTMENT (OUTPATIENT)
Dept: RADIOLOGY | Facility: MEDICAL CENTER | Age: 24
End: 2019-01-04
Attending: EMERGENCY MEDICINE
Payer: MEDICAID

## 2019-01-04 DIAGNOSIS — O26.892 ABDOMINAL PAIN DURING PREGNANCY IN SECOND TRIMESTER: ICD-10-CM

## 2019-01-04 DIAGNOSIS — R10.9 ABDOMINAL PAIN DURING PREGNANCY IN SECOND TRIMESTER: ICD-10-CM

## 2019-01-04 LAB
APPEARANCE UR: CLEAR
BASOPHILS # BLD AUTO: 0.2 % (ref 0–1.8)
BASOPHILS # BLD: 0.02 K/UL (ref 0–0.12)
BILIRUB UR QL STRIP.AUTO: NEGATIVE
COLOR UR: YELLOW
EOSINOPHIL # BLD AUTO: 0.1 K/UL (ref 0–0.51)
EOSINOPHIL NFR BLD: 0.9 % (ref 0–6.9)
ERYTHROCYTE [DISTWIDTH] IN BLOOD BY AUTOMATED COUNT: 38.5 FL (ref 35.9–50)
GLUCOSE UR STRIP.AUTO-MCNC: NEGATIVE MG/DL
HCT VFR BLD AUTO: 37.5 % (ref 37–47)
HGB BLD-MCNC: 12.8 G/DL (ref 12–16)
IMM GRANULOCYTES # BLD AUTO: 0.03 K/UL (ref 0–0.11)
IMM GRANULOCYTES NFR BLD AUTO: 0.3 % (ref 0–0.9)
KETONES UR STRIP.AUTO-MCNC: NEGATIVE MG/DL
LEUKOCYTE ESTERASE UR QL STRIP.AUTO: NEGATIVE
LYMPHOCYTES # BLD AUTO: 2.46 K/UL (ref 1–4.8)
LYMPHOCYTES NFR BLD: 23.3 % (ref 22–41)
MCH RBC QN AUTO: 28.8 PG (ref 27–33)
MCHC RBC AUTO-ENTMCNC: 34.1 G/DL (ref 33.6–35)
MCV RBC AUTO: 84.5 FL (ref 81.4–97.8)
MICRO URNS: NORMAL
MONOCYTES # BLD AUTO: 0.69 K/UL (ref 0–0.85)
MONOCYTES NFR BLD AUTO: 6.5 % (ref 0–13.4)
NEUTROPHILS # BLD AUTO: 7.26 K/UL (ref 2–7.15)
NEUTROPHILS NFR BLD: 68.8 % (ref 44–72)
NITRITE UR QL STRIP.AUTO: NEGATIVE
NRBC # BLD AUTO: 0 K/UL
NRBC BLD-RTO: 0 /100 WBC
PH UR STRIP.AUTO: 7 [PH]
PLATELET # BLD AUTO: 220 K/UL (ref 164–446)
PMV BLD AUTO: 11.3 FL (ref 9–12.9)
PROT UR QL STRIP: NEGATIVE MG/DL
RBC # BLD AUTO: 4.44 M/UL (ref 4.2–5.4)
RBC UR QL AUTO: NEGATIVE
SP GR UR STRIP.AUTO: 1.01
UROBILINOGEN UR STRIP.AUTO-MCNC: 1 MG/DL
WBC # BLD AUTO: 10.6 K/UL (ref 4.8–10.8)

## 2019-01-04 PROCEDURE — 83690 ASSAY OF LIPASE: CPT

## 2019-01-04 PROCEDURE — 80053 COMPREHEN METABOLIC PANEL: CPT

## 2019-01-04 PROCEDURE — 85025 COMPLETE CBC W/AUTO DIFF WBC: CPT

## 2019-01-04 PROCEDURE — 81003 URINALYSIS AUTO W/O SCOPE: CPT

## 2019-01-04 PROCEDURE — 84702 CHORIONIC GONADOTROPIN TEST: CPT

## 2019-01-04 PROCEDURE — 99284 EMERGENCY DEPT VISIT MOD MDM: CPT

## 2019-01-04 ASSESSMENT — PAIN DESCRIPTION - DESCRIPTORS: DESCRIPTORS: CRAMPING

## 2019-01-05 ENCOUNTER — APPOINTMENT (OUTPATIENT)
Dept: RADIOLOGY | Facility: MEDICAL CENTER | Age: 24
End: 2019-01-05
Attending: EMERGENCY MEDICINE
Payer: MEDICAID

## 2019-01-05 VITALS
HEIGHT: 60 IN | DIASTOLIC BLOOD PRESSURE: 78 MMHG | BODY MASS INDEX: 29.52 KG/M2 | RESPIRATION RATE: 16 BRPM | SYSTOLIC BLOOD PRESSURE: 115 MMHG | WEIGHT: 150.35 LBS | TEMPERATURE: 97.2 F | HEART RATE: 78 BPM | OXYGEN SATURATION: 98 %

## 2019-01-05 LAB
ALBUMIN SERPL BCP-MCNC: 3.8 G/DL (ref 3.2–4.9)
ALBUMIN/GLOB SERPL: 1.5 G/DL
ALP SERPL-CCNC: 62 U/L (ref 30–99)
ALT SERPL-CCNC: 5 U/L (ref 2–50)
ANION GAP SERPL CALC-SCNC: 9 MMOL/L (ref 0–11.9)
AST SERPL-CCNC: 9 U/L (ref 12–45)
B-HCG SERPL-ACNC: ABNORMAL MIU/ML (ref 0–5)
BILIRUB SERPL-MCNC: 0.4 MG/DL (ref 0.1–1.5)
BUN SERPL-MCNC: 9 MG/DL (ref 8–22)
CALCIUM SERPL-MCNC: 9.4 MG/DL (ref 8.5–10.5)
CHLORIDE SERPL-SCNC: 106 MMOL/L (ref 96–112)
CO2 SERPL-SCNC: 23 MMOL/L (ref 20–33)
CREAT SERPL-MCNC: 0.47 MG/DL (ref 0.5–1.4)
GLOBULIN SER CALC-MCNC: 2.5 G/DL (ref 1.9–3.5)
GLUCOSE SERPL-MCNC: 77 MG/DL (ref 65–99)
LIPASE SERPL-CCNC: 17 U/L (ref 11–82)
POTASSIUM SERPL-SCNC: 3.7 MMOL/L (ref 3.6–5.5)
PROT SERPL-MCNC: 6.3 G/DL (ref 6–8.2)
SODIUM SERPL-SCNC: 138 MMOL/L (ref 135–145)

## 2019-01-05 PROCEDURE — 76815 OB US LIMITED FETUS(S): CPT

## 2019-01-05 ASSESSMENT — PAIN SCALES - GENERAL: PAINLEVEL_OUTOF10: 2

## 2019-01-05 NOTE — ED PROVIDER NOTES
"ED Provider Note    CHIEF COMPLAINT  Chief Complaint   Patient presents with   • Pregnancy     Pt reports being 4 months pregnant .  1st dleivery was premature with chromosomal issues, and this pregnancy is considered high-risk per pt.    • Abdominal Cramping     Pt reports cramping started 2 hours ago and is centered on lower abdominal area.  Denies any vaginal bleeding.         HPI  Kristal Cesar is a 23 y.o. Female, , reportedly 15 weeks pregnant, followed by Dr. Saini, who presents pelvic cramping over 2 hours.  No history of trauma.  No vaginal bleeding.  Has had recurrent nausea and vomiting symptoms throughout her pregnancy.  No fevers.  No chest pain or trouble breathing.  No dysuria or hematuria.    She is scheduled to follow-up with high risk OB, Dr. Payne, secondary to prior delivery 2 years ago with chromosomal abnormality.  Last ultrasound was at about 8 weeks gestation.  States that she did have first trimester bleeding that resolved since then.    No new medications.  She is on antidepressants.    REVIEW OF SYSTEMS  See HPI for further details. All other systems are negative.     PAST MEDICAL HISTORY   has a past medical history of Allergy; Depression; and Obesity (BMI 30-39.9).    SOCIAL HISTORY  Social History     Social History Main Topics   • Smoking status: Never Smoker   • Smokeless tobacco: Never Used   • Alcohol use No   • Drug use: No   • Sexual activity: Not Currently     Partners: Male     Birth control/ protection: IUD       SURGICAL HISTORY   has a past surgical history that includes ariana by laparoscopy (3/7/2016).    CURRENT MEDICATIONS  Home Medications     Reviewed by Kami Kim R.N. (Registered Nurse) on 19 at 2206  Med List Status: Partial   Medication Last Dose Status   buPROPion (WELLBUTRIN SR) 200 MG SR tablet 1/3/2019 Active                ALLERGIES  Allergies   Allergen Reactions   • Other Environmental Rash     \"dogs\" - \"my skin turns red\" "       PHYSICAL EXAM  VITAL SIGNS: /79   Pulse 98   Temp 36.2 °C (97.2 °F) (Temporal)   Resp 16   Ht 1.524 m (5')   Wt 68.2 kg (150 lb 5.7 oz)   LMP 09/15/2018   SpO2 98%   BMI 29.36 kg/m²   Pulse ox interpretation: I interpret this pulse ox as normal.  Constitutional: Alert in no apparent distress.  HENT: No signs of trauma, Bilateral external ears normal, Nose normal.   Eyes: Pupils are equal and reactive, Conjunctiva normal, Non-icteric.   Neck: Normal range of motion, No tenderness, Supple, No stridor.   Cardiovascular: Regular rate and rhythm, no murmurs.   Thorax & Lungs: Normal breath sounds, No respiratory distress, No wheezing, No chest tenderness.   Abdomen: Bowel sounds normal, Soft, very minimal suprapubic tenderness, No masses, No pulsatile masses. No peritoneal signs.  Skin: Warm, Dry, No erythema, No rash.   Back: No bony tenderness, No CVA tenderness.   Extremities: Intact distal pulses, No edema, No tenderness, No cyanosis  Neurologic: Alert , Normal motor function and gait, Normal sensory function, No focal deficits noted.       DIAGNOSTIC STUDIES / PROCEDURES      LABS  Labs Reviewed   CBC WITH DIFFERENTIAL - Abnormal; Notable for the following:        Result Value    Neutrophils (Absolute) 7.26 (*)     All other components within normal limits   COMP METABOLIC PANEL - Abnormal; Notable for the following:     Creatinine 0.47 (*)     AST(SGOT) 9 (*)     All other components within normal limits   HCG QUANTITATIVE - Abnormal; Notable for the following:     Bhcg 87665.5 (*)     All other components within normal limits   LIPASE   URINALYSIS,CULTURE IF INDICATED   ESTIMATED GFR       RADIOLOGY  US-OB LIMITED TRANSABDOMINAL   Final Result      1. Single intrauterine pregnancy of an estimated gestational age of Average 15w 4d with an estimated date of delivery of 6/25/2019.   2. Fetal biometry as above.   3. Thickened anterior uterine wall, likely related to uterine contraction.           COURSE & MEDICAL DECISION MAKING  Pertinent Labs & Imaging studies reviewed. (See chart for details)  23 y.o. female presenting with abdominal pain and cramping for the past 2 hours -estimated 15 weeks gestation, followed by Dr. Saini from OB/GYN.  No vaginal bleeding.  No history of trauma.  No dysuria or hematuria.  Has had chronic nausea and vomiting throughout her pregnancy.  States that she is scheduled to see the high risk OB for prior history of genetic abnormalities and prior pregnancy.  Last ultrasound was about 7 weeks ago.    Benign abdominal exam upon arrival.  Patient is in no distress.  Vitals are reassuring.  Laboratory studies are unremarkable without leukocytosis or metabolic acidosis.  No liver enzyme elevation.  Normal platelets.    Ultrasound of the abdomen was performed.  Showed single IUP.  Possible uterine contraction.    Reevaluated the patient at bedside.  She is feeling improved.  Benign abdominal exam.  Uncertain if her discomfort is from contractions.  Does not appear to be  labor.  No loss of fluid.  No evidence of placenta previa or abruption or uterine rupture.  May be experiencing some round ligament pain.    Recommending that the patient follow-up with her OB/GYN as soon as possible for further treatment and evaluation.  No findings at this time to warrant acute interventions given the patient's overall well clinical appearance.    The patient will not drink alcohol nor drive with prescribed medications.   The patient will return for worsening symptoms or failure of improvement and is stable at the time of discharge. The patient verbalizes understanding in their own words.    /78   Pulse 78   Temp 36.2 °C (97.2 °F) (Temporal)   Resp 16   Ht 1.524 m (5')   Wt 68.2 kg (150 lb 5.7 oz)   LMP 09/15/2018   SpO2 98%   BMI 29.36 kg/m²     The patient was referred to primary care where they will receive further BP management.      Margareth Villegas M.D.  [address]  [facility] [address]  [phone]    Schedule an appointment as soon as possible for a visit       [facility], Emergency Dept  [address] [facility] [id number] [phone]    As needed, If symptoms worsen    Duke Saini M.D.  [address]  [facility] [address]  [phone]    Call         FINAL IMPRESSION  1. Abdominal pain during pregnancy in second trimester          Electronically signed by: Isac Schofield, 1/4/2019 11:19 PM

## 2019-01-05 NOTE — ED TRIAGE NOTES
Chief Complaint   Patient presents with   • Pregnancy     Pt reports being 4 months pregnant .  1st dleivery was premature with chromosomal issues, and this pregnancy is considered high-risk per pt.    • Abdominal Cramping     Pt reports cramping started 2 hours ago and is centered on lower abdominal area.  Denies any vaginal bleeding.         Pt amb to triage with steady gait for above complaint.  C/O 9/10 cramping.  States she has had some constipation issues, last normal BM last night.    Pt is alert and oriented, speaking in full sentences, follows commands and responds appropriately to questions. NAD. Resp are even and unlabored.    Pt placed in lobby. Pt educated on triage process. Pt encouraged to alert staff for any changes.

## 2019-01-06 ENCOUNTER — PATIENT OUTREACH (OUTPATIENT)
Dept: HEALTH INFORMATION MANAGEMENT | Facility: OTHER | Age: 24
End: 2019-01-06

## 2019-01-06 NOTE — PROGRESS NOTES
Placed discharge outreach phone call to pt s/p ER discharge 1/5/19.  Left voicemail providing my contact information and instructions to call with any questions or concerns.

## 2019-03-13 ENCOUNTER — HOSPITAL ENCOUNTER (OUTPATIENT)
Dept: LAB | Facility: MEDICAL CENTER | Age: 24
End: 2019-03-13
Attending: SPECIALIST
Payer: MEDICAID

## 2019-03-13 ENCOUNTER — HOSPITAL ENCOUNTER (OUTPATIENT)
Dept: RADIOLOGY | Facility: MEDICAL CENTER | Age: 24
End: 2019-03-13
Attending: SPECIALIST
Payer: MEDICAID

## 2019-03-13 DIAGNOSIS — M79.661 BILATERAL CALF PAIN: ICD-10-CM

## 2019-03-13 DIAGNOSIS — M79.662 BILATERAL CALF PAIN: ICD-10-CM

## 2019-03-13 LAB — FIBRONECTIN FETAL SPEC QL: NEGATIVE

## 2019-03-13 PROCEDURE — 82731 ASSAY OF FETAL FIBRONECTIN: CPT

## 2019-03-13 PROCEDURE — 93970 EXTREMITY STUDY: CPT

## 2019-03-28 ENCOUNTER — HOSPITAL ENCOUNTER (OUTPATIENT)
Facility: MEDICAL CENTER | Age: 24
End: 2019-03-29
Attending: SPECIALIST | Admitting: SPECIALIST
Payer: MEDICAID

## 2019-03-28 LAB
APPEARANCE UR: CLEAR
COLOR UR AUTO: YELLOW
GLUCOSE UR QL STRIP.AUTO: NEGATIVE MG/DL
KETONES UR QL STRIP.AUTO: NEGATIVE MG/DL
LEUKOCYTE ESTERASE UR QL STRIP.AUTO: ABNORMAL
NITRITE UR QL STRIP.AUTO: NEGATIVE
PH UR STRIP.AUTO: 7 [PH]
PROT UR QL STRIP: NEGATIVE MG/DL
RBC UR QL AUTO: NEGATIVE
SP GR UR: 1.01

## 2019-03-28 PROCEDURE — 82731 ASSAY OF FETAL FIBRONECTIN: CPT

## 2019-03-28 PROCEDURE — 81002 URINALYSIS NONAUTO W/O SCOPE: CPT

## 2019-03-29 VITALS — SYSTOLIC BLOOD PRESSURE: 110 MMHG | DIASTOLIC BLOOD PRESSURE: 65 MMHG | HEART RATE: 88 BPM

## 2019-03-29 LAB — FIBRONECTIN FETAL SPEC QL: NEGATIVE

## 2019-03-29 NOTE — PROGRESS NOTES
Pt is a ; ROIHNI of ; making her 36w5d. Pt here c/o pelvic pain/pressure. Denies UCs, LOF, VB and reports +FM. UA collected. Dr Noriega notified. Orders for FFN. SVE and if closed/negative pt to be discharged home.     SVE at closed/thick/posterior. Soft. FFN sent. FFN negative.     General discharge instructions, PTL precautions and round ligament pain discussed with pt and FOB. Disposable warm pack provided for pt. Discussed OTC Tylenol for back pain. All questions answered at this time. Pt signed discharge instructions and ambulated out in stable condition with FOB at side.

## 2019-04-03 ENCOUNTER — OFFICE VISIT (OUTPATIENT)
Dept: MEDICAL GROUP | Facility: MEDICAL CENTER | Age: 24
End: 2019-04-03
Attending: INTERNAL MEDICINE
Payer: MEDICAID

## 2019-04-03 VITALS
DIASTOLIC BLOOD PRESSURE: 78 MMHG | OXYGEN SATURATION: 96 % | RESPIRATION RATE: 16 BRPM | SYSTOLIC BLOOD PRESSURE: 122 MMHG | HEIGHT: 60 IN | BODY MASS INDEX: 32 KG/M2 | HEART RATE: 100 BPM | TEMPERATURE: 98.1 F | WEIGHT: 163 LBS

## 2019-04-03 DIAGNOSIS — H66.001 ACUTE SUPPURATIVE OTITIS MEDIA OF RIGHT EAR WITHOUT SPONTANEOUS RUPTURE OF TYMPANIC MEMBRANE, RECURRENCE NOT SPECIFIED: ICD-10-CM

## 2019-04-03 DIAGNOSIS — J02.9 SORE THROAT: ICD-10-CM

## 2019-04-03 PROBLEM — L98.9 PERINEAL IRRITATION IN FEMALE: Status: RESOLVED | Noted: 2018-03-27 | Resolved: 2019-04-03

## 2019-04-03 LAB
INT CON NEG: NEGATIVE
INT CON POS: POSITIVE
S PYO AG THROAT QL: NEGATIVE

## 2019-04-03 PROCEDURE — 99212 OFFICE O/P EST SF 10 MIN: CPT | Performed by: INTERNAL MEDICINE

## 2019-04-03 PROCEDURE — 87880 STREP A ASSAY W/OPTIC: CPT | Performed by: INTERNAL MEDICINE

## 2019-04-03 PROCEDURE — 99214 OFFICE O/P EST MOD 30 MIN: CPT | Performed by: INTERNAL MEDICINE

## 2019-04-03 RX ORDER — AMOXICILLIN AND CLAVULANATE POTASSIUM 875; 125 MG/1; MG/1
1 TABLET, FILM COATED ORAL 2 TIMES DAILY
Qty: 14 TAB | Refills: 0 | Status: SHIPPED | OUTPATIENT
Start: 2019-04-03 | End: 2019-04-10

## 2019-04-03 ASSESSMENT — PAIN SCALES - GENERAL: PAINLEVEL: 3=SLIGHT PAIN

## 2019-04-04 NOTE — ASSESSMENT & PLAN NOTE
She reports acute onset of severe right-sided ear pain about 2 days ago.  She denies drainage from the ear, fevers, chills.  She reports having frequent ear infections as a child and is concerned this might be going on now.  She reports diminished hearing on the right side.  Her left ear is now starting to feel full and painful but not nearly as bad as the right.  She denies dental pain and headaches.

## 2019-04-04 NOTE — ASSESSMENT & PLAN NOTE
Patient reports a sore throat for about the past week.  She also reports a slight runny nose and a mild cough.  She denies fevers and chills.  No recent sick contacts.

## 2019-04-04 NOTE — PROGRESS NOTES
Subjective:   Kristal Cesar is a 23 y.o. female here today for sore throat and ear pain    Sore throat  Patient reports a sore throat for about the past week.  She also reports a slight runny nose and a mild cough.  She denies fevers and chills.  No recent sick contacts.    Acute suppurative otitis media of right ear without spontaneous rupture of tympanic membrane  She reports acute onset of severe right-sided ear pain about 2 days ago.  She denies drainage from the ear, fevers, chills.  She reports having frequent ear infections as a child and is concerned this might be going on now.  She reports diminished hearing on the right side.  Her left ear is now starting to feel full and painful but not nearly as bad as the right.  She denies dental pain and headaches.       Current medicines (including changes today)  Current Outpatient Prescriptions   Medication Sig Dispense Refill   • amoxicillin-clavulanate (AUGMENTIN) 875-125 MG Tab Take 1 Tab by mouth 2 times a day for 7 days. 14 Tab 0   • antipyrine-benzocaine (AURALGAN) otic solution Place 1-4 Drops in ear every 2 hours as needed. 1 Bottle 0   • buPROPion (WELLBUTRIN SR) 200 MG SR tablet Take 1 Tab by mouth 2 times a day. 60 Tab 5     No current facility-administered medications for this visit.      She  has a past medical history of Allergy; Depression; and Obesity (BMI 30-39.9).    ROS   Denies chest pain, shortness of breath  As above in HPI     Objective:     Vitals:    04/03/19 1811   BP: 122/78   Pulse: 100   Resp: 16   Temp: 36.7 °C (98.1 °F)   SpO2: 96%        Body mass index is 31.83 kg/m².   Physical Exam:  Constitutional: Alert, no distress.  Skin: Warm, dry, good turgor, no rashes in visible areas.  Eye: Equal, round and reactive, conjunctiva clear, lids normal.  ENMT: Lips without lesions, good dentition, Oropharynx is slightly injected.  There is no tonsillar exudate., R TM erythematous and purulent effusion present, L TM bulging with  serous effusion  Neck: Trachea midline, no masses, no thyromegaly. No cervical or supraclavicular lymphadenopathy  Respiratory: Unlabored respiratory effort    Results and Imaging:   POC rapid strep in clinic today was negative    Assessment and Plan:   The following treatment plan was discussed    1. Sore throat  With negative rapid strep, likely sequelae of initial viral URI which is progressed to a separative otitis media.  - POCT Rapid Strep A    2. Acute suppurative otitis media of right ear without spontaneous rupture of tympanic membrane, recurrence not specified  There is evidence of infection on exam therefore we will treat with Augmentin.  Discussed that this is pregnancy category B.  Patient was advised to follow-up if no improvement in symptoms upon initiation of antibiotics.  - amoxicillin-clavulanate (AUGMENTIN) 875-125 MG Tab; Take 1 Tab by mouth 2 times a day for 7 days.  Dispense: 14 Tab; Refill: 0        Followup: Return if symptoms worsen or fail to improve.

## 2019-04-05 ENCOUNTER — OFFICE VISIT (OUTPATIENT)
Dept: MEDICAL GROUP | Facility: MEDICAL CENTER | Age: 24
End: 2019-04-05
Attending: INTERNAL MEDICINE
Payer: MEDICAID

## 2019-04-05 VITALS
TEMPERATURE: 97.7 F | RESPIRATION RATE: 16 BRPM | BODY MASS INDEX: 32 KG/M2 | HEART RATE: 98 BPM | DIASTOLIC BLOOD PRESSURE: 74 MMHG | WEIGHT: 163 LBS | HEIGHT: 60 IN | SYSTOLIC BLOOD PRESSURE: 120 MMHG

## 2019-04-05 DIAGNOSIS — L30.8 OTHER ECZEMA: ICD-10-CM

## 2019-04-05 DIAGNOSIS — H66.001 ACUTE SUPPURATIVE OTITIS MEDIA OF RIGHT EAR WITHOUT SPONTANEOUS RUPTURE OF TYMPANIC MEMBRANE, RECURRENCE NOT SPECIFIED: ICD-10-CM

## 2019-04-05 PROBLEM — R30.0 DYSURIA: Status: RESOLVED | Noted: 2017-11-01 | Resolved: 2019-04-05

## 2019-04-05 PROBLEM — J02.9 SORE THROAT: Status: RESOLVED | Noted: 2018-06-27 | Resolved: 2019-04-05

## 2019-04-05 PROCEDURE — 99214 OFFICE O/P EST MOD 30 MIN: CPT | Performed by: INTERNAL MEDICINE

## 2019-04-05 PROCEDURE — 99212 OFFICE O/P EST SF 10 MIN: CPT | Performed by: INTERNAL MEDICINE

## 2019-04-05 NOTE — ASSESSMENT & PLAN NOTE
She reports about a week ago, she developed an itchy raw rash around her eyes and on her hands.  This was before she took the augmentin but she forgot to mention it at her last visit.  She has not tried any OTC topical therapies or taken any OTC meds as she is currently pregnant and did not know what was safe.  Has a history of eczema and reports this is similar.  Denies new skin care products, detergents.  Denies shortness of breath, difficulty swallowing.

## 2019-04-06 NOTE — PROGRESS NOTES
Subjective:   Kristal Cesar is a 23 y.o. female here today for rash on face/hands    Eczema  She reports about a week ago, she developed an itchy raw rash around her eyes and on her hands.  This was before she took the augmentin but she forgot to mention it at her last visit.  She has not tried any OTC topical therapies or taken any OTC meds as she is currently pregnant and did not know what was safe.  Has a history of eczema and reports this is similar.  Denies new skin care products, detergents.  Denies shortness of breath, difficulty swallowing.    Acute suppurative otitis media of right ear without spontaneous rupture of tympanic membrane  Reports her ear is feeling somewhat better after starting on the antibiotics.  Denies spontaneous drainage.  Still having pain.  Was unable to get the auralgan drops and is requesting I resend the script for her.  Denies fevers, chills.       Current medicines (including changes today)  Current Outpatient Prescriptions   Medication Sig Dispense Refill   • antipyrine-benzocaine (AURALGAN) otic solution Place 1-4 Drops in ear every 2 hours as needed. 1 Bottle 0   • hydrocortisone 2.5 % Cream topical cream Apply thin layer to rash on hands and around eyes up to twice daily as needed for up to 7 days on hands and 5 days on face 30 g 0   • amoxicillin-clavulanate (AUGMENTIN) 875-125 MG Tab Take 1 Tab by mouth 2 times a day for 7 days. 14 Tab 0   • buPROPion (WELLBUTRIN SR) 200 MG SR tablet Take 1 Tab by mouth 2 times a day. 60 Tab 5     No current facility-administered medications for this visit.      She  has a past medical history of Allergy; Depression; and Obesity (BMI 30-39.9).    ROS   Denies chest pain, shortness of breath  As above in HPI     Objective:     Vitals:    04/05/19 1616   BP: 120/74   Pulse: 98   Resp: 16   Temp: 36.5 °C (97.7 °F)     Body mass index is 31.83 kg/m².   Physical Exam:  Constitutional: Alert, no distress.  Skin: Warm, dry, good  turgor, mildly erythematous patches just below eyebrows and over bridge of nose as well as on several of her fingers.  Eye: Equal, round and reactive, conjunctiva clear, lids normal.  ENMT: Lips without lesions, good dentition, oropharynx clear, R TM remains injected and ear canal is erythematous      Assessment and Plan:   The following treatment plan was discussed    1. Other eczema  Rash consistent with previous eczema flares, will treat with hydrocortisone as below.  Advised limited use on face.  - hydrocortisone 2.5 % Cream topical cream; Apply thin layer to rash on hands and around eyes up to twice daily as needed for up to 7 days on hands and 5 days on face  Dispense: 30 g; Refill: 0    2. Acute suppurative otitis media of right ear without spontaneous rupture of tympanic membrane, recurrence not specified  Improving, instructed to cont abx, resent rx for drops as below  -cont augmentin to completion  - antipyrine-benzocaine (AURALGAN) otic solution; Place 1-4 Drops in ear every 2 hours as needed.  Dispense: 1 Bottle; Refill: 0        Followup: Return if symptoms worsen or fail to improve.

## 2019-04-06 NOTE — ASSESSMENT & PLAN NOTE
Reports her ear is feeling somewhat better after starting on the antibiotics.  Denies spontaneous drainage.  Still having pain.  Was unable to get the auralgan drops and is requesting I resend the script for her.  Denies fevers, chills.

## 2019-04-11 ENCOUNTER — HOSPITAL ENCOUNTER (OUTPATIENT)
Facility: MEDICAL CENTER | Age: 24
End: 2019-04-11
Attending: SPECIALIST | Admitting: SPECIALIST
Payer: MEDICAID

## 2019-04-11 VITALS
HEART RATE: 80 BPM | WEIGHT: 160 LBS | DIASTOLIC BLOOD PRESSURE: 67 MMHG | SYSTOLIC BLOOD PRESSURE: 102 MMHG | RESPIRATION RATE: 16 BRPM | TEMPERATURE: 97.2 F | BODY MASS INDEX: 31.41 KG/M2 | HEIGHT: 60 IN

## 2019-04-11 PROCEDURE — 59025 FETAL NON-STRESS TEST: CPT

## 2019-04-11 NOTE — PROGRESS NOTES
1540 - report received from NATALIA Ramey RN. POC dicussed, care assumed. Pt updated POC, questions answered  1550 - Dr. Saini paged  1555 - Dr. Saini returned paged. Updated on pt status. Orders received to have pt take over the counter Monistat, f/u with urgent care or ED for ear infection and then make an appointment with him for next week. Discharge order received.  1620 - discharge instructions given including  labor precautions, UC's, ROM, VB, abn FM/fetal kick counts, when to return to L&D, f/u with Dr. Saini next week, f/u with urgent care/ED or primary for ear infection, monistat over the counter. Pt verbalized understanding and agreement with instructions and discharge. Discharge paperwork signed. Monitors removed,  1628 - pt discharged home via ambulation in stable condition with personal belongings.

## 2019-04-11 NOTE — DISCHARGE INSTRUCTIONS
General Instructions:  · If you think you are in labor, time contractions (lying on your left side) from the beginning of one contraction to the beginning of the next contraction for at least one hour.  · Increase fluid intake: you should consume 10-12 8 oz glasses of non-caffeinated fluid per day.  · Report any pressure or burning on urination to your physician.  · Monitor fetal movement: If you notice an absence or decrease in fetal movement, drink a large glass of water and rest on your side.  If there is no increase in movement, call your physician or go to the hospital for further evaluation.  · Report any sudden, sharp abdominal pain.  · Report any bleeding.  Spotting or pinkish discharge is normal after vaginal exam.  You may also spot after sexual intercourse.    Pre-term Labor (<37 weeks):  Call your physician or return to the hospital if:  · You have painless regular contractions more than 4 in one hour.  · Your water breaks (remember time and color).  · You have menstrual-like cramps, a low dull backache or pressure in your pelvis or back.  · Your baby does not move enough to complete the daily kick count (10 movements in 2 hours).  · Your baby moves much less often than on the days before or you have not felt your baby move all day.  · Please review the MEDICATION LIST section of your AFTER VISIT SUMMARY document.  · Take your medication as prescribed      Other Instructions:    Follow up at the emergency department/Urgent Care or with your primary care provider for your ear infection.  over the counter 3-day Monistat for your yeast infection and follow up with Dr. Saini next week    Please carefully review your entire AFTER VISIT SUMMARY document for all discharge instructions.

## 2019-04-11 NOTE — PROGRESS NOTES
presents at 28.5 wk gestation for yeast infection symptoms and light spotting when she wipes. Pt has been on abx ear drops for ear infection for 7 days; still feels like she has an infection. Pt states she always gets yeast infections when she takes abx. Denies cramping/UCs or LOF; reports FM. Pt brought in toilet paper with blood; super scant and pink colored. Perineum assessed; mild irritation and scant white discharge seen; no bleeding abrasions  1540: report given to Sera BEDOYA

## 2019-04-24 ENCOUNTER — OFFICE VISIT (OUTPATIENT)
Dept: MEDICAL GROUP | Facility: MEDICAL CENTER | Age: 24
End: 2019-04-24
Attending: INTERNAL MEDICINE
Payer: MEDICAID

## 2019-04-24 VITALS
BODY MASS INDEX: 32.59 KG/M2 | HEART RATE: 120 BPM | DIASTOLIC BLOOD PRESSURE: 78 MMHG | HEIGHT: 60 IN | TEMPERATURE: 98.1 F | OXYGEN SATURATION: 93 % | SYSTOLIC BLOOD PRESSURE: 112 MMHG | WEIGHT: 166 LBS | RESPIRATION RATE: 16 BRPM

## 2019-04-24 DIAGNOSIS — J30.9 ALLERGIC RHINITIS, UNSPECIFIED SEASONALITY, UNSPECIFIED TRIGGER: ICD-10-CM

## 2019-04-24 PROBLEM — H66.001 ACUTE SUPPURATIVE OTITIS MEDIA OF RIGHT EAR WITHOUT SPONTANEOUS RUPTURE OF TYMPANIC MEMBRANE: Status: RESOLVED | Noted: 2019-04-03 | Resolved: 2019-04-24

## 2019-04-24 PROBLEM — R09.81 NASAL CONGESTION: Status: ACTIVE | Noted: 2019-04-24

## 2019-04-24 PROCEDURE — 99214 OFFICE O/P EST MOD 30 MIN: CPT | Performed by: INTERNAL MEDICINE

## 2019-04-24 RX ORDER — GUAIFENESIN 600 MG/1
600 TABLET, EXTENDED RELEASE ORAL
Qty: 30 TAB | Refills: 0 | Status: ON HOLD | OUTPATIENT
Start: 2019-04-24 | End: 2019-07-07

## 2019-04-24 RX ORDER — LORATADINE 10 MG/1
10 TABLET ORAL DAILY
Qty: 30 TAB | Refills: 2 | Status: ON HOLD | OUTPATIENT
Start: 2019-04-24 | End: 2019-07-07

## 2019-04-24 ASSESSMENT — PAIN SCALES - GENERAL: PAINLEVEL: 3=SLIGHT PAIN

## 2019-04-24 NOTE — ASSESSMENT & PLAN NOTE
She reports that for the past 3 days, she has had increased nasal drainage, itchy watery eyes, and sneezing.  She usually gets allergies this time of year.  She is currently pregnant so she was unsure if she could take anything for her allergies.  She also reports some postnasal drip with a mild sore throat and cough.  States that both ears feel full with some pressure but the pain she was having previously has improved.  Denies fevers, chills, chest pain, shortness of breath.

## 2019-04-24 NOTE — PROGRESS NOTES
Subjective:   Kristal Cesar is a 23 y.o. female here today for runny nose, ear pressure, sore throat x3 days    Allergic rhinitis  She reports that for the past 3 days, she has had increased nasal drainage, itchy watery eyes, and sneezing.  She usually gets allergies this time of year.  She is currently pregnant so she was unsure if she could take anything for her allergies.  She also reports some postnasal drip with a mild sore throat and cough.  States that both ears feel full with some pressure but the pain she was having previously has improved.  Denies fevers, chills, chest pain, shortness of breath.  Of note, her young son has been sick with similar symptoms recently.    Current medicines (including changes today)  Current Outpatient Prescriptions   Medication Sig Dispense Refill   • loratadine (CLARITIN) 10 MG Tab Take 1 Tab by mouth every day. 30 Tab 2   • guaiFENesin LA (MUCINEX) 600 MG TABLET SR 12 HR Take 1 Tab by mouth 2 times daily with meals as needed. 30 Tab 0   • buPROPion (WELLBUTRIN SR) 200 MG SR tablet Take 1 Tab by mouth 2 times a day. 60 Tab 5     No current facility-administered medications for this visit.      She  has a past medical history of Allergy; Depression; and Obesity (BMI 30-39.9).    ROS   As above in HPI     Objective:     Vitals:    04/24/19 1051   BP: 112/78   Pulse: (!) 120   Resp: 16   Temp: 36.7 °C (98.1 °F)   SpO2: 93%     Body mass index is 32.42 kg/m².   Physical Exam:  Constitutional: Alert, no distress.  Skin: Warm, dry, good turgor, no rashes in visible areas.  Eye: Equal, round and reactive, conjunctiva clear, lids normal.  ENMT: Lips without lesions, good dentition, lower turbinates are enlarged bilaterally, oropharynx is mildly injected, TM's clear bilaterally  Neck: Trachea midline, no masses, no thyromegaly, small mobile submandibular lymph node that is tender on the left.   Respiratory: Unlabored respiratory effort, lungs clear to auscultation, no  wheezes, no ronchi.  Cardiovascular: Regular rate and rhythm, no murmurs appreciated  Psych: Alert and oriented x3, normal affect and mood.    Assessment and Plan:   The following treatment plan was discussed    1. Allergic rhinitis, unspecified seasonality, unspecified trigger  Symptoms are consistent with allergic rhinitis plus or minus viral upper respiratory infection.  We will start her on some Claritin and Mucinex as below.  She can take Tylenol if needed for sore throat.  There is no evidence of recurrent bacterial infection or otitis media that would benefit from antibiotics at this point.  - loratadine (CLARITIN) 10 MG Tab; Take 1 Tab by mouth every day.  Dispense: 30 Tab; Refill: 2  - guaiFENesin LA (MUCINEX) 600 MG TABLET SR 12 HR; Take 1 Tab by mouth 2 times daily with meals as needed.  Dispense: 30 Tab; Refill: 0        Followup: Return if symptoms worsen or fail to improve.

## 2019-05-01 ENCOUNTER — APPOINTMENT (OUTPATIENT)
Dept: LAB | Facility: MEDICAL CENTER | Age: 24
End: 2019-05-01
Attending: SPECIALIST
Payer: MEDICAID

## 2019-05-01 PROCEDURE — 36415 COLL VENOUS BLD VENIPUNCTURE: CPT

## 2019-05-01 PROCEDURE — 85014 HEMATOCRIT: CPT

## 2019-05-01 PROCEDURE — 82950 GLUCOSE TEST: CPT

## 2019-05-01 PROCEDURE — 85018 HEMOGLOBIN: CPT

## 2019-05-01 PROCEDURE — 82947 ASSAY GLUCOSE BLOOD QUANT: CPT

## 2019-05-20 ENCOUNTER — APPOINTMENT (OUTPATIENT)
Dept: LAB | Facility: MEDICAL CENTER | Age: 24
End: 2019-05-20
Attending: SPECIALIST
Payer: MEDICAID

## 2019-05-20 PROCEDURE — 36415 COLL VENOUS BLD VENIPUNCTURE: CPT

## 2019-05-20 PROCEDURE — 82952 GTT-ADDED SAMPLES: CPT

## 2019-05-20 PROCEDURE — 82951 GLUCOSE TOLERANCE TEST (GTT): CPT

## 2019-07-03 ENCOUNTER — HOSPITAL ENCOUNTER (OUTPATIENT)
Facility: MEDICAL CENTER | Age: 24
End: 2019-07-04
Attending: SPECIALIST | Admitting: SPECIALIST
Payer: MEDICAID

## 2019-07-03 VITALS
HEART RATE: 94 BPM | HEIGHT: 60 IN | TEMPERATURE: 98 F | SYSTOLIC BLOOD PRESSURE: 117 MMHG | BODY MASS INDEX: 35.93 KG/M2 | WEIGHT: 183 LBS | RESPIRATION RATE: 16 BRPM | DIASTOLIC BLOOD PRESSURE: 73 MMHG

## 2019-07-03 LAB — CRYSTALS AMN MICRO: NORMAL

## 2019-07-03 PROCEDURE — 89060 EXAM SYNOVIAL FLUID CRYSTALS: CPT

## 2019-07-04 PROCEDURE — 59025 FETAL NON-STRESS TEST: CPT

## 2019-07-04 NOTE — PROGRESS NOTES
"Pt is a , 40.4 weeks gestation IUP, with c/o abdominal pain \"I found out some bad news that made me cry a lot and then my stomache started hurting off and on, and I just wanted to make sure the baby was okay, and I also think I may have peed myself or maybe my water broke\". No c/o bleeding or dfm. efm and toco placed, vss.  Sterile speculum vaginal exam performed, vaginal swab gathered for fern, sve performed, 2.   Dr. Noriega at nurses station, updated. Received orders to allow pt to ambulate in hallway for 1-2 hours, recheck cervix, if no change discharge home on labor precautions.  Pt ambulating in hallway with family at side.  Pt back in room, monitors placed, sve performed, no change from previous exam.  Addressed discharge instructions with labor precautions, all questions answered. Left off floor with family at side, call bell within reach.  "

## 2019-07-06 ENCOUNTER — ANESTHESIA EVENT (OUTPATIENT)
Dept: OBGYN | Facility: MEDICAL CENTER | Age: 24
End: 2019-07-06
Payer: MEDICAID

## 2019-07-06 ENCOUNTER — HOSPITAL ENCOUNTER (INPATIENT)
Facility: MEDICAL CENTER | Age: 24
LOS: 1 days | End: 2019-07-07
Attending: SPECIALIST | Admitting: OBSTETRICS & GYNECOLOGY
Payer: MEDICAID

## 2019-07-06 ENCOUNTER — ANESTHESIA (OUTPATIENT)
Dept: OBGYN | Facility: MEDICAL CENTER | Age: 24
End: 2019-07-06
Payer: MEDICAID

## 2019-07-06 ENCOUNTER — HOSPITAL ENCOUNTER (OUTPATIENT)
Facility: MEDICAL CENTER | Age: 24
End: 2019-07-06
Attending: SPECIALIST | Admitting: SPECIALIST
Payer: MEDICAID

## 2019-07-06 LAB
BASOPHILS # BLD AUTO: 0.2 % (ref 0–1.8)
BASOPHILS # BLD: 0.03 K/UL (ref 0–0.12)
EOSINOPHIL # BLD AUTO: 0.13 K/UL (ref 0–0.51)
EOSINOPHIL NFR BLD: 1 % (ref 0–6.9)
ERYTHROCYTE [DISTWIDTH] IN BLOOD BY AUTOMATED COUNT: 43.4 FL (ref 35.9–50)
ERYTHROCYTE [DISTWIDTH] IN BLOOD BY AUTOMATED COUNT: 45.1 FL (ref 35.9–50)
HCT VFR BLD AUTO: 34.6 % (ref 37–47)
HCT VFR BLD AUTO: 38.4 % (ref 37–47)
HGB BLD-MCNC: 11.1 G/DL (ref 12–16)
HGB BLD-MCNC: 12.5 G/DL (ref 12–16)
HOLDING TUBE BB 8507: NORMAL
IMM GRANULOCYTES # BLD AUTO: 0.09 K/UL (ref 0–0.11)
IMM GRANULOCYTES NFR BLD AUTO: 0.7 % (ref 0–0.9)
LYMPHOCYTES # BLD AUTO: 2.69 K/UL (ref 1–4.8)
LYMPHOCYTES NFR BLD: 21.1 % (ref 22–41)
MCH RBC QN AUTO: 26.7 PG (ref 27–33)
MCH RBC QN AUTO: 26.8 PG (ref 27–33)
MCHC RBC AUTO-ENTMCNC: 32.1 G/DL (ref 33.6–35)
MCHC RBC AUTO-ENTMCNC: 32.6 G/DL (ref 33.6–35)
MCV RBC AUTO: 81.9 FL (ref 81.4–97.8)
MCV RBC AUTO: 83.6 FL (ref 81.4–97.8)
MONOCYTES # BLD AUTO: 0.85 K/UL (ref 0–0.85)
MONOCYTES NFR BLD AUTO: 6.7 % (ref 0–13.4)
NEUTROPHILS # BLD AUTO: 8.96 K/UL (ref 2–7.15)
NEUTROPHILS NFR BLD: 70.3 % (ref 44–72)
NRBC # BLD AUTO: 0 K/UL
NRBC BLD-RTO: 0 /100 WBC
PLATELET # BLD AUTO: 165 K/UL (ref 164–446)
PLATELET # BLD AUTO: 203 K/UL (ref 164–446)
PMV BLD AUTO: 11.6 FL (ref 9–12.9)
PMV BLD AUTO: 11.9 FL (ref 9–12.9)
RBC # BLD AUTO: 4.14 M/UL (ref 4.2–5.4)
RBC # BLD AUTO: 4.69 M/UL (ref 4.2–5.4)
WBC # BLD AUTO: 12.8 K/UL (ref 4.8–10.8)
WBC # BLD AUTO: 13.8 K/UL (ref 4.8–10.8)

## 2019-07-06 PROCEDURE — 36415 COLL VENOUS BLD VENIPUNCTURE: CPT

## 2019-07-06 PROCEDURE — 700102 HCHG RX REV CODE 250 W/ 637 OVERRIDE(OP): Performed by: OBSTETRICS & GYNECOLOGY

## 2019-07-06 PROCEDURE — 700111 HCHG RX REV CODE 636 W/ 250 OVERRIDE (IP): Performed by: ANESTHESIOLOGY

## 2019-07-06 PROCEDURE — 700105 HCHG RX REV CODE 258: Performed by: OBSTETRICS & GYNECOLOGY

## 2019-07-06 PROCEDURE — 700101 HCHG RX REV CODE 250: Performed by: ANESTHESIOLOGY

## 2019-07-06 PROCEDURE — 770002 HCHG ROOM/CARE - OB PRIVATE (112)

## 2019-07-06 PROCEDURE — 10907ZC DRAINAGE OF AMNIOTIC FLUID, THERAPEUTIC FROM PRODUCTS OF CONCEPTION, VIA NATURAL OR ARTIFICIAL OPENING: ICD-10-PCS | Performed by: OBSTETRICS & GYNECOLOGY

## 2019-07-06 PROCEDURE — 59025 FETAL NON-STRESS TEST: CPT

## 2019-07-06 PROCEDURE — 700111 HCHG RX REV CODE 636 W/ 250 OVERRIDE (IP)

## 2019-07-06 PROCEDURE — 304965 HCHG RECOVERY SERVICES

## 2019-07-06 PROCEDURE — 59409 OBSTETRICAL CARE: CPT

## 2019-07-06 PROCEDURE — 85027 COMPLETE CBC AUTOMATED: CPT

## 2019-07-06 PROCEDURE — 700105 HCHG RX REV CODE 258: Performed by: ANESTHESIOLOGY

## 2019-07-06 PROCEDURE — 700111 HCHG RX REV CODE 636 W/ 250 OVERRIDE (IP): Performed by: OBSTETRICS & GYNECOLOGY

## 2019-07-06 PROCEDURE — 303615 HCHG EPIDURAL/SPINAL ANESTHESIA FOR LABOR

## 2019-07-06 PROCEDURE — 3E033VJ INTRODUCTION OF OTHER HORMONE INTO PERIPHERAL VEIN, PERCUTANEOUS APPROACH: ICD-10-PCS | Performed by: OBSTETRICS & GYNECOLOGY

## 2019-07-06 PROCEDURE — 85025 COMPLETE CBC W/AUTO DIFF WBC: CPT

## 2019-07-06 PROCEDURE — A9270 NON-COVERED ITEM OR SERVICE: HCPCS | Performed by: OBSTETRICS & GYNECOLOGY

## 2019-07-06 RX ORDER — SODIUM CHLORIDE, SODIUM LACTATE, POTASSIUM CHLORIDE, AND CALCIUM CHLORIDE .6; .31; .03; .02 G/100ML; G/100ML; G/100ML; G/100ML
250 INJECTION, SOLUTION INTRAVENOUS PRN
Status: DISCONTINUED | OUTPATIENT
Start: 2019-07-06 | End: 2019-07-06 | Stop reason: HOSPADM

## 2019-07-06 RX ORDER — SODIUM CHLORIDE, SODIUM LACTATE, POTASSIUM CHLORIDE, CALCIUM CHLORIDE 600; 310; 30; 20 MG/100ML; MG/100ML; MG/100ML; MG/100ML
INJECTION, SOLUTION INTRAVENOUS CONTINUOUS
Status: DISPENSED | OUTPATIENT
Start: 2019-07-06 | End: 2019-07-06

## 2019-07-06 RX ORDER — ROPIVACAINE HYDROCHLORIDE 2 MG/ML
INJECTION, SOLUTION EPIDURAL; INFILTRATION; PERINEURAL CONTINUOUS
Status: DISCONTINUED | OUTPATIENT
Start: 2019-07-06 | End: 2019-07-07 | Stop reason: HOSPADM

## 2019-07-06 RX ORDER — BUPIVACAINE HYDROCHLORIDE 2.5 MG/ML
INJECTION, SOLUTION EPIDURAL; INFILTRATION; INTRACAUDAL PRN
Status: DISCONTINUED | OUTPATIENT
Start: 2019-07-06 | End: 2019-07-06 | Stop reason: SURG

## 2019-07-06 RX ORDER — BUPIVACAINE HYDROCHLORIDE 2.5 MG/ML
INJECTION, SOLUTION EPIDURAL; INFILTRATION; INTRACAUDAL
Status: COMPLETED
Start: 2019-07-06 | End: 2019-07-06

## 2019-07-06 RX ORDER — ROPIVACAINE HYDROCHLORIDE 2 MG/ML
INJECTION, SOLUTION EPIDURAL; INFILTRATION; PERINEURAL
Status: COMPLETED
Start: 2019-07-06 | End: 2019-07-06

## 2019-07-06 RX ORDER — BUPIVACAINE HYDROCHLORIDE AND EPINEPHRINE 2.5; 5 MG/ML; UG/ML
INJECTION, SOLUTION EPIDURAL; INFILTRATION; INTRACAUDAL; PERINEURAL PRN
Status: DISCONTINUED | OUTPATIENT
Start: 2019-07-06 | End: 2019-07-06 | Stop reason: SURG

## 2019-07-06 RX ORDER — ALUMINA, MAGNESIA, AND SIMETHICONE 2400; 2400; 240 MG/30ML; MG/30ML; MG/30ML
30 SUSPENSION ORAL EVERY 6 HOURS PRN
Status: DISCONTINUED | OUTPATIENT
Start: 2019-07-06 | End: 2019-07-06 | Stop reason: HOSPADM

## 2019-07-06 RX ORDER — ONDANSETRON 4 MG/1
4 TABLET, ORALLY DISINTEGRATING ORAL EVERY 6 HOURS PRN
Status: DISCONTINUED | OUTPATIENT
Start: 2019-07-06 | End: 2019-07-07 | Stop reason: HOSPADM

## 2019-07-06 RX ORDER — DOCUSATE SODIUM 100 MG/1
100 CAPSULE, LIQUID FILLED ORAL 2 TIMES DAILY PRN
Status: DISCONTINUED | OUTPATIENT
Start: 2019-07-06 | End: 2019-07-07 | Stop reason: HOSPADM

## 2019-07-06 RX ORDER — OXYCODONE HYDROCHLORIDE AND ACETAMINOPHEN 5; 325 MG/1; MG/1
2 TABLET ORAL EVERY 4 HOURS PRN
Status: DISCONTINUED | OUTPATIENT
Start: 2019-07-06 | End: 2019-07-07 | Stop reason: HOSPADM

## 2019-07-06 RX ORDER — VITAMIN A ACETATE, BETA CAROTENE, ASCORBIC ACID, CHOLECALCIFEROL, .ALPHA.-TOCOPHEROL ACETATE, DL-, THIAMINE MONONITRATE, RIBOFLAVIN, NIACINAMIDE, PYRIDOXINE HYDROCHLORIDE, FOLIC ACID, CYANOCOBALAMIN, CALCIUM CARBONATE, FERROUS FUMARATE, ZINC OXIDE, CUPRIC OXIDE 3080; 12; 120; 400; 1; 1.84; 3; 20; 22; 920; 25; 200; 27; 10; 2 [IU]/1; UG/1; MG/1; [IU]/1; MG/1; MG/1; MG/1; MG/1; MG/1; [IU]/1; MG/1; MG/1; MG/1; MG/1; MG/1
1 TABLET, FILM COATED ORAL EVERY MORNING
Status: DISCONTINUED | OUTPATIENT
Start: 2019-07-06 | End: 2019-07-07 | Stop reason: HOSPADM

## 2019-07-06 RX ORDER — ONDANSETRON 2 MG/ML
4 INJECTION INTRAMUSCULAR; INTRAVENOUS EVERY 6 HOURS PRN
Status: DISCONTINUED | OUTPATIENT
Start: 2019-07-06 | End: 2019-07-07 | Stop reason: HOSPADM

## 2019-07-06 RX ORDER — SODIUM CHLORIDE, SODIUM LACTATE, POTASSIUM CHLORIDE, AND CALCIUM CHLORIDE .6; .31; .03; .02 G/100ML; G/100ML; G/100ML; G/100ML
1000 INJECTION, SOLUTION INTRAVENOUS
Status: COMPLETED | OUTPATIENT
Start: 2019-07-06 | End: 2019-07-06

## 2019-07-06 RX ORDER — MISOPROSTOL 200 UG/1
600 TABLET ORAL
Status: DISCONTINUED | OUTPATIENT
Start: 2019-07-06 | End: 2019-07-07 | Stop reason: HOSPADM

## 2019-07-06 RX ORDER — SODIUM CHLORIDE, SODIUM LACTATE, POTASSIUM CHLORIDE, CALCIUM CHLORIDE 600; 310; 30; 20 MG/100ML; MG/100ML; MG/100ML; MG/100ML
INJECTION, SOLUTION INTRAVENOUS PRN
Status: DISCONTINUED | OUTPATIENT
Start: 2019-07-06 | End: 2019-07-07 | Stop reason: HOSPADM

## 2019-07-06 RX ORDER — ACETAMINOPHEN 325 MG/1
325 TABLET ORAL EVERY 4 HOURS PRN
Status: DISCONTINUED | OUTPATIENT
Start: 2019-07-06 | End: 2019-07-07 | Stop reason: HOSPADM

## 2019-07-06 RX ORDER — OXYCODONE HYDROCHLORIDE AND ACETAMINOPHEN 5; 325 MG/1; MG/1
1 TABLET ORAL EVERY 4 HOURS PRN
Status: DISCONTINUED | OUTPATIENT
Start: 2019-07-06 | End: 2019-07-07 | Stop reason: HOSPADM

## 2019-07-06 RX ORDER — IBUPROFEN 600 MG/1
600 TABLET ORAL EVERY 6 HOURS PRN
Status: DISCONTINUED | OUTPATIENT
Start: 2019-07-06 | End: 2019-07-07 | Stop reason: HOSPADM

## 2019-07-06 RX ADMIN — ACETAMINOPHEN 325 MG: 325 TABLET, FILM COATED ORAL at 19:58

## 2019-07-06 RX ADMIN — BUPIVACAINE HYDROCHLORIDE AND EPINEPHRINE 3 ML: 2.5; 5 INJECTION, SOLUTION EPIDURAL; INFILTRATION; INTRACAUDAL; PERINEURAL at 07:12

## 2019-07-06 RX ADMIN — Medication 20 UNITS: at 13:22

## 2019-07-06 RX ADMIN — BUPIVACAINE HYDROCHLORIDE 8 ML: 2.5 INJECTION, SOLUTION EPIDURAL; INFILTRATION; INTRACAUDAL; PERINEURAL at 07:13

## 2019-07-06 RX ADMIN — Medication 125 ML/HR: at 14:42

## 2019-07-06 RX ADMIN — FENTANYL CITRATE 100 MCG: 50 INJECTION, SOLUTION INTRAMUSCULAR; INTRAVENOUS at 07:13

## 2019-07-06 RX ADMIN — SODIUM CHLORIDE, POTASSIUM CHLORIDE, SODIUM LACTATE AND CALCIUM CHLORIDE 1000 ML: 600; 310; 30; 20 INJECTION, SOLUTION INTRAVENOUS at 06:16

## 2019-07-06 RX ADMIN — FENTANYL CITRATE 100 MCG: 50 INJECTION INTRAMUSCULAR; INTRAVENOUS at 07:04

## 2019-07-06 RX ADMIN — SODIUM CHLORIDE, POTASSIUM CHLORIDE, SODIUM LACTATE AND CALCIUM CHLORIDE: 600; 310; 30; 20 INJECTION, SOLUTION INTRAVENOUS at 08:08

## 2019-07-06 RX ADMIN — OXYCODONE HYDROCHLORIDE AND ACETAMINOPHEN 1 TABLET: 5; 325 TABLET ORAL at 19:58

## 2019-07-06 RX ADMIN — ROPIVACAINE HYDROCHLORIDE 100 ML: 2 INJECTION, SOLUTION EPIDURAL; INFILTRATION at 07:27

## 2019-07-06 RX ADMIN — ONDANSETRON 4 MG: 4 TABLET, ORALLY DISINTEGRATING ORAL at 23:41

## 2019-07-06 RX ADMIN — FENTANYL CITRATE 100 MCG: 50 INJECTION, SOLUTION INTRAMUSCULAR; INTRAVENOUS at 12:12

## 2019-07-06 RX ADMIN — BUPIVACAINE HYDROCHLORIDE 6 ML: 2.5 INJECTION, SOLUTION EPIDURAL; INFILTRATION; INTRACAUDAL; PERINEURAL at 12:12

## 2019-07-06 RX ADMIN — IBUPROFEN 600 MG: 600 TABLET ORAL at 14:52

## 2019-07-06 RX ADMIN — IBUPROFEN 600 MG: 600 TABLET ORAL at 23:41

## 2019-07-06 ASSESSMENT — PAIN SCALES - GENERAL: PAIN_LEVEL: 0

## 2019-07-06 ASSESSMENT — LIFESTYLE VARIABLES
EVER_SMOKED: NEVER
ALCOHOL_USE: NO

## 2019-07-06 ASSESSMENT — PATIENT HEALTH QUESTIONNAIRE - PHQ9
2. FEELING DOWN, DEPRESSED, IRRITABLE, OR HOPELESS: NOT AT ALL
1. LITTLE INTEREST OR PLEASURE IN DOING THINGS: NOT AT ALL
SUM OF ALL RESPONSES TO PHQ9 QUESTIONS 1 AND 2: 0

## 2019-07-06 NOTE — H&P
History and Physical      Kristal Cesar is a 23 y.o. female  at 41w0d who presents for labor.    Subjective:   positive  For CTXS.   positive Feels pain   negative for LOF  negative for vaginal bleeding.   positive for fetal movement    ROS: Constitutional: negative  Respiratory: negative  Cardiovascular: negative  Gastrointestinal: negative  Genitourinary:negative    Past Medical History:   Diagnosis Date   • Allergy    • Depression    • Obesity (BMI 30-39.9)      Past Surgical History:   Procedure Laterality Date   • REAGAN BY LAPAROSCOPY  3/7/2016    Procedure: REAGAN BY LAPAROSCOPY;  Surgeon: John H Ganser, M.D.;  Location: SURGERY [facility];  Service:    • OTHER      gallbladder removed     Family History   Problem Relation Age of Onset   • No Known Problems Mother    • Diabetes Father    • Hypertension Father    • Hyperlipidemia Father    • No Known Problems Sister    • Cancer Neg Hx      OB History    Para Term  AB Living   2 1 1     1   SAB TAB Ectopic Molar Multiple Live Births           0 1      # Outcome Date GA Lbr [place]/2nd Weight Sex Delivery Anes PTL Lv   2 Current            1 Term 01/15/16 41w1d  3.314 kg (7 lb 4.9 oz) M Vag-Spont EPI N STEFAN        Social History     Social History   • Marital status: Single     Spouse name: N/A   • Number of children: N/A   • Years of education: N/A     Occupational History   • Not on file.     Social History Main Topics   • Smoking status: Never Smoker   • Smokeless tobacco: Never Used   • Alcohol use No   • Drug use: No   • Sexual activity: Yes     Partners: Male      Comment: currently pregnant     Other Topics Concern   • Not on file     Social History Narrative   • No narrative on file     Allergies: Other environmental    Current Facility-Administered Medications:   •  LR infusion, , Intravenous, Continuous, Damon Noriega M.D., Last Rate: 125 mL/hr at 19 0808  •  fentaNYL (SUBLIMAZE) injection 50 mcg, 50 mcg,  Intravenous, Q HOUR PRN, Damon Noriega M.D.  •  fentaNYL (SUBLIMAZE) injection 100 mcg, 100 mcg, Intravenous, Q HOUR PRN, Damon Noriega M.D., 100 mcg at 07/06/19 0704  •  mag hydrox-al hydrox-simeth (MAALOX PLUS ES or MYLANTA DS) suspension 30 mL, 30 mL, Oral, Q6HRS PRN, Damon Noriega M.D.  •  ropivacaine (NAROPIN) injection, , Epidural, Continuous, Primo Aguilar M.D.  •  ePHEDrine injection 5 mg, 5 mg, Intravenous, Q5 MIN PRN **AND** Notify Anesthesiologist if ephedrine given and for sustained hypotension (more than 2 minutes), , , Once **AND** For Hypotension: Place patient in left lateral tilt to achieve uterine displacement and elevate legs., , , PRN **AND** Hypotension: Oxygen Continuous, , , CONTINUOUS **AND** lactated ringers infusion (BOLUS), 250 mL, Intravenous, PRN, Primo Aguilar M.D.  •  oxytocin (PITOCIN) 20 UNITS/1000ML LR, , , ,     Facility-Administered Medications Ordered in Other Encounters:   •  fentaNYL (SUBLIMAZE) injection, , , PRN, Primo Aguilar M.D., 100 mcg at 07/06/19 0713  •  bupivacaine 0.25% (SENSORCAINE-MARCAINE) pf injection, , , PRN, Primo Aguilar M.D., 8 mL at 07/06/19 0713  •  bupivacaine 0.25% -epinephrine 1:254032 PF (MARCAINE/SENSORCAINE) injection, , , PRN, Primo Aguilar M.D., 3 mL at 07/06/19 0712    Prenatal care with Dr. Saini  with following problems:  Patient Active Problem List    Diagnosis Date Noted   • Allergic rhinitis 04/24/2019   • Irregular periods 08/23/2018   • Chromosomal anomaly carrier 06/27/2018   • Right wrist pain 06/27/2018   • Primary insomnia 12/28/2017   • Eczema 12/28/2017   • History of melena 12/12/2017   • Fatty liver 07/19/2017   • Vertigo 07/19/2017   • Recurrent major depressive disorder, in partial remission (HCC) 07/19/2017   • Obesity (BMI 30-39.9) 07/19/2017               Objective:      /71   Pulse 87   Temp 36.6 °C (97.8 °F) (Temporal)   Resp 18   Ht 1.524 m (5')   Wt 83 kg (183  lb)   SpO2 99%     General:   no acute distress, alert, cooperative   Skin:   normal   HEENT:  PERRLA   Lungs:   CTA bilateral   Heart:   S1, S2 normal, no murmur, click, rub or gallop, regular rate and rhythm, brisk carotid upstroke without bruits, peripheral pulses very brisk, chest is clear without rales or wheezing, no pedal edema, no JVD, no hepatosplenomegaly   Abdomen:   gravid, NT   EFW:  3600gms.   Pelvis:  adequate with gynecoid pelvis   FHT:  146 BPM   Uterine Size: S=D   Presentations: Cephalic   Cervix:     Dilation: 4    Effacement: 75%    Station:  -3    Consistency: Soft    Position: Anterior     Lab Review  Lab:   Blood type: O     Recent Results (from the past 5880 hour(s))   URINALYSIS,CULTURE IF INDICATED    Collection Time: 11/09/18  2:45 PM   Result Value Ref Range    Color Yellow     Character Clear     Specific Gravity 1.030 <1.035    Ph 5.0 5.0 - 8.0    Glucose Negative Negative mg/dL    Ketones Trace (A) Negative mg/dL    Protein Negative Negative mg/dL    Bilirubin Negative Negative    Urobilinogen, Urine 0.2 Negative    Nitrite Negative Negative    Leukocyte Esterase Negative Negative    Occult Blood Negative Negative    Micro Urine Req see below    CBC WITH DIFFERENTIAL    Collection Time: 11/09/18  2:55 PM   Result Value Ref Range    WBC 7.5 4.8 - 10.8 K/uL    RBC 5.06 4.20 - 5.40 M/uL    Hemoglobin 14.6 12.0 - 16.0 g/dL    Hematocrit 43.2 37.0 - 47.0 %    MCV 85.4 81.4 - 97.8 fL    MCH 28.9 27.0 - 33.0 pg    MCHC 33.8 33.6 - 35.0 g/dL    RDW 39.7 35.9 - 50.0 fL    Platelet Count 238 164 - 446 K/uL    MPV 11.3 9.0 - 12.9 fL    Neutrophils-Polys 63.00 44.00 - 72.00 %    Lymphocytes 26.70 22.00 - 41.00 %    Monocytes 8.60 0.00 - 13.40 %    Eosinophils 1.30 0.00 - 6.90 %    Basophils 0.30 0.00 - 1.80 %    Immature Granulocytes 0.10 0.00 - 0.90 %    Nucleated RBC 0.00 /100 WBC    Neutrophils (Absolute) 4.71 2.00 - 7.15 K/uL    Lymphs (Absolute) 2.00 1.00 - 4.80 K/uL    Monos (Absolute)  0.64 0.00 - 0.85 K/uL    Eos (Absolute) 0.10 0.00 - 0.51 K/uL    Baso (Absolute) 0.02 0.00 - 0.12 K/uL    Immature Granulocytes (abs) 0.01 0.00 - 0.11 K/uL    NRBC (Absolute) 0.00 K/uL   COMP METABOLIC PANEL    Collection Time: 11/09/18  2:55 PM   Result Value Ref Range    Sodium 138 135 - 145 mmol/L    Potassium 3.7 3.6 - 5.5 mmol/L    Chloride 106 96 - 112 mmol/L    Co2 23 20 - 33 mmol/L    Anion Gap 9.0 0.0 - 11.9    Glucose 69 65 - 99 mg/dL    Bun 9 8 - 22 mg/dL    Creatinine 0.50 0.50 - 1.40 mg/dL    Calcium 9.1 8.5 - 10.5 mg/dL    AST(SGOT) 13 12 - 45 U/L    ALT(SGPT) 14 2 - 50 U/L    Alkaline Phosphatase 83 30 - 99 U/L    Total Bilirubin 0.8 0.1 - 1.5 mg/dL    Albumin 4.4 3.2 - 4.9 g/dL    Total Protein 6.8 6.0 - 8.2 g/dL    Globulin 2.4 1.9 - 3.5 g/dL    A-G Ratio 1.8 g/dL   RH TYPE FOR RHOGAM FROM E.D.    Collection Time: 11/09/18  2:55 PM   Result Value Ref Range    Emergency Department Rh Typing POS     Number Of Rh Doses Indicated ZERO    HCG QUANTITATIVE    Collection Time: 11/09/18  2:55 PM   Result Value Ref Range    Bhcg 31790.4 (H) 0.0 - 5.0 mIU/mL   ESTIMATED GFR    Collection Time: 11/09/18  2:55 PM   Result Value Ref Range    GFR If African American >60 >60 mL/min/1.73 m 2    GFR If Non African American >60 >60 mL/min/1.73 m 2   WET PREP    Collection Time: 11/09/18  4:45 PM   Result Value Ref Range    Significant Indicator NEG     Source GEN     Site CERVICAL     Wet Prep For Parasites       Few WBCs seen.  No yeast.  No motile Trichomonas seen.  No clue cells seen.     CHLAMYDIA/GC PCR URINE OR SWAB    Collection Time: 11/09/18  4:45 PM   Result Value Ref Range    Source Other     C. trachomatis by PCR Negative Negative    N. gonorrhoeae by PCR Negative Negative   OP PRENATAL PANEL-BLOOD BANK    Collection Time: 11/19/18  2:18 PM   Result Value Ref Range    ABO Grouping Only O     Rh Grouping Only POS     Antibody Screen Scrn NEG    PRENATAL PANEL 3    Collection Time: 11/19/18  2:18 PM    Result Value Ref Range    Rubella IgG Antibody 124.40 IU/mL    Syphilis, Treponemal Qual Non Reactive Non Reactive    Hepatitis B Surface Antigen Negative Negative    WBC 7.2 4.8 - 10.8 K/uL    RBC 4.83 4.20 - 5.40 M/uL    Hemoglobin 13.9 12.0 - 16.0 g/dL    Hematocrit 41.8 37.0 - 47.0 %    MCV 86.5 81.4 - 97.8 fL    MCH 28.8 27.0 - 33.0 pg    MCHC 33.3 (L) 33.6 - 35.0 g/dL    RDW 40.8 35.9 - 50.0 fL    Platelet Count 232 164 - 446 K/uL    MPV 12.2 9.0 - 12.9 fL    Nucleated RBC 0.00 /100 WBC    NRBC (Absolute) 0.00 K/uL    Neutrophils-Polys 65.80 44.00 - 72.00 %    Lymphocytes 24.60 22.00 - 41.00 %    Monocytes 6.10 0.00 - 13.40 %    Eosinophils 0.90 0.00 - 6.90 %    Basophils 0.90 0.00 - 1.80 %    Neutrophils (Absolute) 4.86 2.00 - 7.15 K/uL    Lymphs (Absolute) 1.77 1.00 - 4.80 K/uL    Monos (Absolute) 0.44 0.00 - 0.85 K/uL    Eos (Absolute) 0.06 0.00 - 0.51 K/uL    Baso (Absolute) 0.06 0.00 - 0.12 K/uL   HIV AG/AB COMBO ASSAY SCREENING    Collection Time: 11/19/18  2:18 PM   Result Value Ref Range    HIV Ag/Ab Combo Assay Non Reactive Non Reactive   DIFFERENTIAL MANUAL    Collection Time: 11/19/18  2:18 PM   Result Value Ref Range    Bands-Stabs 1.70 0.00 - 10.00 %    Manual Diff Status PERFORMED    PERIPHERAL SMEAR REVIEW    Collection Time: 11/19/18  2:18 PM   Result Value Ref Range    Peripheral Smear Review see below    PLATELET ESTIMATE    Collection Time: 11/19/18  2:18 PM   Result Value Ref Range    Plt Estimation Normal    MORPHOLOGY    Collection Time: 11/19/18  2:18 PM   Result Value Ref Range    RBC Morphology Normal    URINALYSIS,CULTURE IF INDICATED    Collection Time: 01/04/19 10:12 PM   Result Value Ref Range    Color Yellow     Character Clear     Specific Gravity 1.014 <1.035    Ph 7.0 5.0 - 8.0    Glucose Negative Negative mg/dL    Ketones Negative Negative mg/dL    Protein Negative Negative mg/dL    Bilirubin Negative Negative    Urobilinogen, Urine 1.0 Negative    Nitrite Negative Negative     Leukocyte Esterase Negative Negative    Occult Blood Negative Negative    Micro Urine Req see below    CBC WITH DIFFERENTIAL    Collection Time: 01/04/19 11:26 PM   Result Value Ref Range    WBC 10.6 4.8 - 10.8 K/uL    RBC 4.44 4.20 - 5.40 M/uL    Hemoglobin 12.8 12.0 - 16.0 g/dL    Hematocrit 37.5 37.0 - 47.0 %    MCV 84.5 81.4 - 97.8 fL    MCH 28.8 27.0 - 33.0 pg    MCHC 34.1 33.6 - 35.0 g/dL    RDW 38.5 35.9 - 50.0 fL    Platelet Count 220 164 - 446 K/uL    MPV 11.3 9.0 - 12.9 fL    Neutrophils-Polys 68.80 44.00 - 72.00 %    Lymphocytes 23.30 22.00 - 41.00 %    Monocytes 6.50 0.00 - 13.40 %    Eosinophils 0.90 0.00 - 6.90 %    Basophils 0.20 0.00 - 1.80 %    Immature Granulocytes 0.30 0.00 - 0.90 %    Nucleated RBC 0.00 /100 WBC    Neutrophils (Absolute) 7.26 (H) 2.00 - 7.15 K/uL    Lymphs (Absolute) 2.46 1.00 - 4.80 K/uL    Monos (Absolute) 0.69 0.00 - 0.85 K/uL    Eos (Absolute) 0.10 0.00 - 0.51 K/uL    Baso (Absolute) 0.02 0.00 - 0.12 K/uL    Immature Granulocytes (abs) 0.03 0.00 - 0.11 K/uL    NRBC (Absolute) 0.00 K/uL   COMP METABOLIC PANEL    Collection Time: 01/04/19 11:26 PM   Result Value Ref Range    Sodium 138 135 - 145 mmol/L    Potassium 3.7 3.6 - 5.5 mmol/L    Chloride 106 96 - 112 mmol/L    Co2 23 20 - 33 mmol/L    Anion Gap 9.0 0.0 - 11.9    Glucose 77 65 - 99 mg/dL    Bun 9 8 - 22 mg/dL    Creatinine 0.47 (L) 0.50 - 1.40 mg/dL    Calcium 9.4 8.5 - 10.5 mg/dL    AST(SGOT) 9 (L) 12 - 45 U/L    ALT(SGPT) 5 2 - 50 U/L    Alkaline Phosphatase 62 30 - 99 U/L    Total Bilirubin 0.4 0.1 - 1.5 mg/dL    Albumin 3.8 3.2 - 4.9 g/dL    Total Protein 6.3 6.0 - 8.2 g/dL    Globulin 2.5 1.9 - 3.5 g/dL    A-G Ratio 1.5 g/dL   LIPASE    Collection Time: 01/04/19 11:26 PM   Result Value Ref Range    Lipase 17 11 - 82 U/L   HCG QUANTITATIVE    Collection Time: 01/04/19 11:26 PM   Result Value Ref Range    [facility] 0.0 - 5.0 mIU/mL   ESTIMATED GFR    Collection Time: 01/04/19 11:26 PM   Result Value Ref  Range    GFR If African American >60 >60 mL/min/1.73 m 2    GFR If Non African American >60 >60 mL/min/1.73 m 2   FETAL FIBRONECTIN    Collection Time: 03/13/19  3:15 PM   Result Value Ref Range    Fetal Fibronectin Negative    POC UA    Collection Time: 03/28/19 10:10 PM   Result Value Ref Range    POC Color Yellow     POC Appearance Clear     POC Glucose Negative Negative mg/dL    POC Ketones Negative Negative mg/dL    POC Specific Gravity 1.015 1.005 - 1.030    POC Blood Negative Negative    POC Urine PH 7.0 5.0 - 8.0    POC Protein Negative Negative mg/dL    POC Nitrites Negative Negative    POC Leukocyte Esterase Trace (A) Negative   FETAL FIBRONECTIN    Collection Time: 03/28/19 11:13 PM   Result Value Ref Range    Fetal Fibronectin Negative    POCT Rapid Strep A    Collection Time: 04/03/19  5:16 PM   Result Value Ref Range    Rapid Strep Screen negative     Internal Control Positive Positive     Internal Control Negative Negative    HCT    Collection Time: 05/01/19 11:50 AM   Result Value Ref Range    Hematocrit 37.0 37.0 - 47.0 %   HGB    Collection Time: 05/01/19 11:50 AM   Result Value Ref Range    Hemoglobin 12.0 12.0 - 16.0 g/dL   Blood Glucose    Collection Time: 05/01/19 11:50 AM   Result Value Ref Range    Glucose 68 65 - 99 mg/dL   GLUCOSE 3 HR GESTATIONAL    Collection Time: 05/20/19  8:44 AM   Result Value Ref Range    Baseline Glucose 79 65 - 95 mg/dL    Glucose 1 Hour 90 65 - 180 mg/dL    Glucose 2 Hour 66 65 - 155 mg/dL    Glucose 3 Hour 65 65 - 140 mg/dL   FERN TEST    Collection Time: 07/03/19  9:53 PM   Result Value Ref Range    Fern Test On Amniotic Fluid see below Not present   Hold Blood Bank Specimen (Not Tested)    Collection Time: 07/06/19  6:25 AM   Result Value Ref Range    Holding Tube - Bb DONE    CBC WITH DIFFERENTIAL    Collection Time: 07/06/19  6:25 AM   Result Value Ref Range    WBC 12.8 (H) 4.8 - 10.8 K/uL    RBC 4.69 4.20 - 5.40 M/uL    Hemoglobin 12.5 12.0 - 16.0 g/dL     Hematocrit 38.4 37.0 - 47.0 %    MCV 81.9 81.4 - 97.8 fL    MCH 26.7 (L) 27.0 - 33.0 pg    MCHC 32.6 (L) 33.6 - 35.0 g/dL    RDW 43.4 35.9 - 50.0 fL    Platelet Count 203 164 - 446 K/uL    MPV 11.6 9.0 - 12.9 fL    Neutrophils-Polys 70.30 44.00 - 72.00 %    Lymphocytes 21.10 (L) 22.00 - 41.00 %    Monocytes 6.70 0.00 - 13.40 %    Eosinophils 1.00 0.00 - 6.90 %    Basophils 0.20 0.00 - 1.80 %    Immature Granulocytes 0.70 0.00 - 0.90 %    Nucleated RBC 0.00 /100 WBC    Neutrophils (Absolute) 8.96 (H) 2.00 - 7.15 K/uL    Lymphs (Absolute) 2.69 1.00 - 4.80 K/uL    Monos (Absolute) 0.85 0.00 - 0.85 K/uL    Eos (Absolute) 0.13 0.00 - 0.51 K/uL    Baso (Absolute) 0.03 0.00 - 0.12 K/uL    Immature Granulocytes (abs) 0.09 0.00 - 0.11 K/uL    NRBC (Absolute) 0.00 K/uL        Assessment:   Kristal Cesar at 41w0d  Labor status: Early latent labor.  Obstetrical history significant for   Patient Active Problem List    Diagnosis Date Noted   • Allergic rhinitis 04/24/2019   • Irregular periods 08/23/2018   • Chromosomal anomaly carrier 06/27/2018   • Right wrist pain 06/27/2018   • Primary insomnia 12/28/2017   • Eczema 12/28/2017   • History of melena 12/12/2017   • Fatty liver 07/19/2017   • Vertigo 07/19/2017   • Recurrent major depressive disorder, in partial remission (HCC) 07/19/2017   • Obesity (BMI 30-39.9) 07/19/2017   .      Plan:     Admit to L&D  GBS negative.   epidural.   pitocin augmentation

## 2019-07-06 NOTE — PROGRESS NOTES
0541-Pt returns to L&D c/o more regular, more intense UC's.   0547-SVE 4/80/-2  0553-Left message for Dr. Noriega  0602-Dr. Noriega phoned back, updated on patient's arrival/complaint/status, admit orders received  0613-Report given to BENJAMIN Lakhani RN. Pt transferred to S217

## 2019-07-06 NOTE — PROGRESS NOTES
0613) Report received from EDY Langley RN, POC discussed, assumed care of patient at this time    0620) Patient transferred to room 217 in stable condition, EFM and TOCO placed on patient to obtain tracing, patient would like an epidural at this time    0625) PIV started and labds drawn and sent down    0650) Admission profile and med rec updated    0702) Dr. Aguilar at bedside for epidural placement, bedside report to CHANDRA Farmer RN, POC discussed

## 2019-07-06 NOTE — L&D DELIVERY NOTE
Delivery Note    Obstetrician:   .    Pre-Delivery Diagnosis: post dates.    Post-Delivery Diagnosis: Living  infant(s) or Male    Procedure: Spontaneous vaginal deliverycame in latent phase foloowing epidural membranes ruptured and pt progressed to full dilatation and delivered alive term baby boy in cephalic presentation occipitoanterior  mouth and nose bulb sctioned and rest of the boy delivered delayed cord calmped and cut.         Episiotomy or Incision: none    Indications for instrumental delivery: none    Infant Wt- pending  Apgars: 1' 8  /  5' 9     Placenta and Cord:          Mechanism: spontaneous        Description:  complete, 3 vessel cord, membranes intact    Estimated Blood Loss:  less than 100 ml           Total IV Fluids: 1500ml           Specimens: none.           Complications:  none           Condition: stable      Infant Feeding:    breast    Attending Attestation: I was present and scrubbed for the entire procedure.

## 2019-07-06 NOTE — ANESTHESIA PROCEDURE NOTES
Epidural Block  Performed by: DANG MCCORMICK  Authorized by: DANG MCCORMICK     Patient Location:  OB  Start Time:  7/6/2019 7:13 AM  Reason for Block: labor analgesia    patient identified, IV checked, site marked, risks and benefits discussed, surgical consent, monitors and equipment checked, pre-op evaluation and timeout performed    Patient Position:  Sitting  Prep: ChloraPrep, patient draped and sterile technique    Monitoring:  Blood pressure, continuous pulse oximetry and heart rate  Approach:  Midline  Location:  L3-L4  Injection Technique:  BRADEN air  Skin infiltration:  Lidocaine  Strength:  1%  Dose:  3ml  Needle Type:  Tuohy  Needle Gauge:  17 G  Needle Length:  3.5 in  Loss of resistance::  7  Catheter Size:  19 G  Catheter at Skin Depth:  12  Test Dose:  Lidocaine 1.5% with epinephrine 1-to-200,000

## 2019-07-06 NOTE — PROGRESS NOTES
22yo  =41-0    0032-Pt presents to L&D c/o UC's since earlier today. Pt stated she had an appointment today was 2-3 cm dilated at her appointment. Denies LOF or VB and confirms +FM. TOCO and EFM in place. VS taken. POC discussed.  0043-SVE 2-3/60-2  0109-Reactive NST obtained. Phoned Dr. Noriega, updated on patient arrival/complaint/status, strip reviewed, orders to discharge patient home  0116-Pt discharged home with FOB. Provided general instructions and term labor precautions, understanding verbalized

## 2019-07-06 NOTE — CARE PLAN
Problem: Knowledge Deficit  Goal: Knowledge of disease process/condition, treatment plan, diagnostic tests, and medications will improve  Outcome: PROGRESSING AS EXPECTED  POC was explained to pt and FOB.

## 2019-07-06 NOTE — ANESTHESIA PREPROCEDURE EVALUATION
Relevant Problems   (+) Fatty liver       Physical Exam    Airway   Mallampati: II  TM distance: >3 FB  Neck ROM: full       Cardiovascular - normal exam  Rhythm: regular  Rate: normal  (-) murmur     Dental - normal exam         Pulmonary - normal exam  Breath sounds clear to auscultation     Abdominal    Neurological - normal exam                 Anesthesia Plan    ASA 2       Plan - epidural   Neuraxial block will be labor analgesia              Pertinent diagnostic labs and testing reviewed    Informed Consent:    Anesthetic plan and risks discussed with patient.

## 2019-07-06 NOTE — CARE PLAN
Problem: Infection  Goal: Will remain free from infection  Outcome: PROGRESSING AS EXPECTED  Pt remains free from any sign of infection.

## 2019-07-06 NOTE — H&P
ADDENDUM    DATE OF ADMISSION:      This is an update to a previously dictated history and physical.    Briefly, this is a 23-year-old  2, para 1, now at 41-4/7 weeks'   gestation based on 8-week ultrasound, who initially was scheduled for a   post-term induction of labor at 40 weeks and 1 day, followed by a repeat   planned at 40 weeks and 2 days; however, the patient was not comfortable   proceeding forward with an induction at that time and wished to proceed   forward with continued expectant management.  She is now postdates and wishes   to proceed forward with an induction of labor with Pitocin per protocol.    Risks, benefits and alternatives have been addressed.  She has asked   appropriate questions, signed the appropriate consents, and wishes to proceed   forward with admission as planned with Pitocin per protocol.  Of note, the   patient is group B strep negative, has overall reassuring fetal status.  She   has also undergone post-term  testing to assure overall reassuring   fetal status in the form of biophysical profile and nonstress testing.  The   patient now wishes to proceed forward with admission with an induction of   labor as planned at 41-4/7 weeks' gestation.  Please see the previously   dictated history and physical for any other questions regarding her admission.                   ____________________________________     MD DINO VALLECILLO / LEX    DD:  2019 08:03:42  DT:  2019 08:12:20    D#:  [id number]  Job#:  [id number]

## 2019-07-06 NOTE — PROGRESS NOTES
0700 report received, DR Aguilar in, ID verified, pt in position, epidural cath in, test dose was tolerated well. Fentanyl IV was given to pt since she could not sit for epidural. DR Aguilar was OK with the administration.  0819 pt is comfortable and trying to sleep.  1210 SVE 9 cm 100 +1 pt is uncomfortable and all the boluses that she gave herself have not been enough. Dr Aguilar was called in and he gave pt a bolus.   1218 pt is having anxiety atack over not being able to move her legs and wants oxygen. O2 mask at 8 liters on.  1222 pt feels better and no anxious.  1316 complete and pushing well.Dr Noriega is in the room, pt is pushing well.  1324 male viable baby is delivered by Dr Leann WOOD with 8-9 apgars.  1500 left leg is still not moving normally.  1800 pt states she can not move her left leg too well but it slowly coming back but still not able to walk yet.  1900 report given to night shift RN.

## 2019-07-07 VITALS
SYSTOLIC BLOOD PRESSURE: 123 MMHG | RESPIRATION RATE: 18 BRPM | OXYGEN SATURATION: 93 % | DIASTOLIC BLOOD PRESSURE: 75 MMHG | WEIGHT: 183 LBS | HEART RATE: 85 BPM | HEIGHT: 60 IN | TEMPERATURE: 98.2 F | BODY MASS INDEX: 35.93 KG/M2

## 2019-07-07 PROCEDURE — A9270 NON-COVERED ITEM OR SERVICE: HCPCS | Performed by: OBSTETRICS & GYNECOLOGY

## 2019-07-07 PROCEDURE — 700112 HCHG RX REV CODE 229: Performed by: OBSTETRICS & GYNECOLOGY

## 2019-07-07 PROCEDURE — 700102 HCHG RX REV CODE 250 W/ 637 OVERRIDE(OP): Performed by: OBSTETRICS & GYNECOLOGY

## 2019-07-07 RX ADMIN — IBUPROFEN 600 MG: 600 TABLET ORAL at 17:56

## 2019-07-07 RX ADMIN — DOCUSATE SODIUM 100 MG: 100 CAPSULE, LIQUID FILLED ORAL at 10:22

## 2019-07-07 RX ADMIN — VITAMIN A, VITAMIN C, VITAMIN D-3, VITAMIN E, VITAMIN B-1, VITAMIN B-2, NIACIN, VITAMIN B-6, CALCIUM, IRON, ZINC, COPPER 1 TABLET: 4000; 120; 400; 22; 1.84; 3; 20; 10; 1; 12; 200; 27; 25; 2 TABLET ORAL at 09:04

## 2019-07-07 RX ADMIN — IBUPROFEN 600 MG: 600 TABLET ORAL at 09:04

## 2019-07-07 ASSESSMENT — EDINBURGH POSTNATAL DEPRESSION SCALE (EPDS)
I HAVE BLAMED MYSELF UNNECESSARILY WHEN THINGS WENT WRONG: NOT VERY OFTEN
I HAVE BEEN SO UNHAPPY THAT I HAVE BEEN CRYING: ONLY OCCASIONALLY
I HAVE BEEN SO UNHAPPY THAT I HAVE HAD DIFFICULTY SLEEPING: YES, MOST OF THE TIME
THE THOUGHT OF HARMING MYSELF HAS OCCURRED TO ME: NEVER
THINGS HAVE BEEN GETTING ON TOP OF ME: YES, SOMETIMES I HAVEN'T BEEN COPING AS WELL AS USUAL
I HAVE FELT SAD OR MISERABLE: NOT VERY OFTEN
I HAVE BEEN ABLE TO LAUGH AND SEE THE FUNNY SIDE OF THINGS: AS MUCH AS I ALWAYS COULD
I HAVE LOOKED FORWARD WITH ENJOYMENT TO THINGS: RATHER LESS THAN I USED TO
I HAVE BEEN ANXIOUS OR WORRIED FOR NO GOOD REASON: YES, SOMETIMES
I HAVE FELT SCARED OR PANICKY FOR NO GOOD REASON: NO, NOT MUCH

## 2019-07-07 NOTE — DISCHARGE SUMMARY
Discharge Summary:      Kristal Cesar      Admit Date:   2019  Discharge Date:  2019     Admitting diagnosis:  Pregnancy  Labor and delivery indication for care or intervention  Discharge Diagnosis: Status post vaginal, spontaneous.  Pregnancy Complications: none  Tubal Ligation:  no        History:  Past Medical History:   Diagnosis Date   • Allergy    • Depression    • Obesity (BMI 30-39.9)      OB History    Para Term  AB Living   2 2 2     2   SAB TAB Ectopic Molar Multiple Live Births           0 2      # Outcome Date GA Lbr [place]/2nd Weight Sex Delivery Anes PTL Lv   2 Term 19 41w0d 06:14 / 00:10 3.36 kg (7 lb 6.5 oz) M Vag-Spont EPI N STEFAN   1 Term 01/15/16 41w1d  3.314 kg (7 lb 4.9 oz) M Vag-Spont EPI N STEFAN           Other environmental  Patient Active Problem List    Diagnosis Date Noted   • Allergic rhinitis 2019   • Irregular periods 2018   • 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 , now para 2, was admitted with the above mentioned diagnosis, underwent Active Labor, vaginal, spontaneous. Patient postpartum course was unremarkable, with progressive advancement in diet , ambulation and toleration of oral analgesia. Patient without complaints today and desires discharge.      Vitals:    19 1826 19 1936 19 2229 19 0600   BP: 120/57 139/77 135/79 126/70   Pulse: 96 91 92 89   Resp:     Temp: 36.5 °C (97.7 °F) 36.8 °C (98.3 °F) 36.7 °C (98 °F) 36.4 °C (97.6 °F)   TempSrc: Temporal Temporal Temporal Temporal   SpO2:  96% 95% 96%   Weight:       Height:           Current Facility-Administered Medications    Medication Dose   • ondansetron (ZOFRAN ODT) dispertab 4 mg  4 mg    Or   • ondansetron (ZOFRAN) syringe/vial injection 4 mg  4 mg   • oxytocin (PITOCIN) infusion (for postpartum)  2,000 mL/hr    Followed by   • oxytocin (PITOCIN) infusion (for postpartum)   mL/hr   • ibuprofen (MOTRIN) tablet 600 mg  600 mg   • acetaminophen (TYLENOL) tablet 325 mg  325 mg   • oxyCODONE-acetaminophen (PERCOCET) 5-325 MG per tablet 1 Tab  1 Tab   • oxyCODONE-acetaminophen (PERCOCET) 5-325 MG per tablet 2 Tab  2 Tab   • ropivacaine (NAROPIN) injection     • LR infusion     • miSOPROStol (CYTOTEC) tablet 600 mcg  600 mcg   • docusate sodium (COLACE) capsule 100 mg  100 mg   • prenatal plus vitamin (STUARTNATAL 1+1) 27-1 MG tablet 1 Tab  1 Tab       Exam:  Breast Exam: negative  Abdomen: Abdomen soft, non-tender. BS normal. No masses,  No organomegaly  Fundus Non Tender: yes  Perineum: perineum intact  Extremity: extremities, peripheral pulses and reflexes normal     Labs:  Recent Labs      07/06/19   0625  07/06/19   2211   WBC  12.8*  13.8*   RBC  4.69  4.14*   HEMOGLOBIN  12.5  11.1*   HEMATOCRIT  38.4  34.6*   MCV  81.9  83.6   MCH  26.7*  26.8*   MCHC  32.6*  32.1*   RDW  43.4  45.1   PLATELETCT  203  165   MPV  11.6  11.9        Activity:   Discharge to home  Pelvic Rest x 6 weeks    Assessment:  normal postpartum course  Discharge Assessment: No heavy bleeding or foul vaginal discharge      Follow up: [facility] in 5 weeks for vaginal      Discharge Meds:   No current outpatient prescriptions on file.       Damon Noriega M.D.

## 2019-07-07 NOTE — PROGRESS NOTES
1900: Report received from ELKE Lopez. POC discussed. Pt up to the bathroom, educated on bleeding and james-bottle, all questions answered at this time.  1920: Pt transferred to S353, report given to ELKE Gill. POC discussed.

## 2019-07-07 NOTE — ANESTHESIA POSTPROCEDURE EVALUATION
Patient: Kristal Cesar    Procedure Summary     Date:  07/06/19 Room / Location:      Anesthesia Start:  0703 Anesthesia Stop:  1324    Procedure:  Labor Epidural Diagnosis:      Scheduled Providers:   Responsible Provider:  Primo Aguilar M.D.    Anesthesia Type:  epidural ASA Status:  2          Final Anesthesia Type: epidural  Last vitals  BP   Blood Pressure: 119/58    Temp   36.7 °C (98 °F)    Pulse   Pulse: 92   Resp   18    SpO2   99 %      Anesthesia Post Evaluation    Patient location during evaluation: PACU  Patient participation: complete - patient participated  Level of consciousness: awake and alert  Pain score: 0    Airway patency: patent  Anesthetic complications: no  Cardiovascular status: hemodynamically stable  Respiratory status: acceptable  Hydration status: euvolemic    PONV: none           Nurse Pain Score: 0 (NPRS)

## 2019-07-07 NOTE — ANESTHESIA TIME REPORT
Anesthesia Start and Stop Event Times     Date Time Event    7/6/2019 0703 Anesthesia Start     1324 Anesthesia Stop        Responsible Staff  07/06/19    Name Role Begin End    Primo Aguilar M.D. Anesth 0703 1324        Preop Diagnosis (Free Text):  Pre-op Diagnosis             Preop Diagnosis (Codes):    Post op Diagnosis  Pain during labor      Premium Reason  E. Weekend    Comments:

## 2019-07-07 NOTE — PROGRESS NOTES
Patient admitted to room 353. Bedside report received from BEN Godinez RN. Infant bands verified. Assessment complete. Plan of care discussed. Patient will request pain medication as needed. Patient oriented to room and educated on call light and emergency light. Baby's clipboard reviewed and instructions given for filling it out. Safe sleeping education provided. Call light within reach. Will continue to monitor.

## 2019-07-07 NOTE — ANESTHESIA QCDR
2019 [facility] Clinical Data Registry (for Quality Improvement)     Postoperative nausea/vomiting risk protocol (Adult = 18 yrs and Pediatric 3-17 yrs)- (430 and 463)  General inhalation anesthetic (NOT TIVA) with PONV risk factors: No  Provision of anti-emetic therapy with at least 2 different classes of agents: N/A  Patient DID NOT receive anti-emetic therapy and reason is documented in Medical Record: N/A    Multimodal Pain Management- (AQI59)  Patient undergoing Elective Surgery (i.e. Outpatient, or ASC, or Prescheduled Surgery prior to Hospital Admission): No  Use of Multimodal Pain Management, two or more drugs and/or interventions, NOT including systemic opioids: N/A  Exception: Documented allergy to multiple classes of analgesics: N/A    PACU assessment of acute postoperative pain prior to Anesthesia Care End- Applies to Patients Age = 18- (ABG7)  Initial PACU pain score is which of the following: < 7/10  Patient unable to report pain score: N/A    Post-anesthetic transfer of care checklist/protocol to PACU/ICU- (426 and 427)  Upon conclusion of case, patient transferred to which of the following locations: PACU/Non-ICU  Use of transfer checklist/protocol: Yes  Exclusion: Service Performed in Patient Hospital Room (and thus did not require transfer): N/A    PACU Reintubation- (AQI31)  General anesthesia requiring endotracheal intubation (ETT) along with subsequent extubation in OR or PACU: No  Required reintubation in the PACU: N/A  Extubation was a planned trial documented in the medical record prior to removal of the original airway device: N/A    Unplanned admission to ICU related to anesthesia service up through end of PACU care- (MD51)  Unplanned admission to ICU (not initially anticipated at anesthesia start time): No

## 2019-07-07 NOTE — LACTATION NOTE
DENY reports that her first child was a failure to thrive, so he ended up being tubefed. MOB pumped and provided breastmilk for the first 6 months that was used for the tube feedings. This infant is latching and breastfeeding well since delivery and her plan is to breastfeed. She states that she is a little worried or anxious, since it is all new to her. She asked about pumping because she is leaking, so recommended getting a [place] to collect milk from the other breast as infant is latched and breastfeeding. DENY voices understanding.

## 2019-07-07 NOTE — LACTATION NOTE
"Mother of baby states baby breastfeeds well, denies pain when she breastfeeds, states she feels she \"has no milk\", baby had had multiple voids and stools, baby has had a 2.08% weight loss, assured that at this time there is no medical indication requiring supplementation, discussed supply and demand in regard to milk supply and production, encouraged Q 2-3 hour (more often if feeding cues noted) feedings on both breasts, discussed expected feeding frequency and duration, discussed expected diaper output    Discussed assistance available at [facility] after discharge, mother has [facility], mother requests a breast pump for home, assured she does not need a pump at this time, informed she can get pump from [facility] after discharge, mother continues to request pump for home so hand pump ordered    Encouraged to call for assistance as needed  "

## 2019-07-07 NOTE — DISCHARGE PLANNING
Discharge Planning Assessment Post Partum    Reason for Referral: History of depression  Address: [address] [place] [address]  Type of Living Situation: Lives with son  Mom Diagnosis:  Spontaneous vaginal delivery  Baby Diagnosis: Apgars: 1' 8  /  5' 9   Primary Language: English    Name of Baby: Jorje Calhoun  Father of the Baby: Jorje Calhoun  Involved in baby’s care? Yes, at bedside  Contact Information: [phone]    Prenatal Care: Yes, Dr. Saiin    Mom's PCP: Margareth Villegas  PCP for new baby:Doctor Amadeo Longoria    Support System: FOB and MOB( mother, family, and siblings)  Coping/Bonding between mother & baby: Yes, per MOB and bedside nurse  Source of Feeding: Breast, MOB reports breastfeeding is going well.   Supplies for Infant: Mini crib, car seat, diapers, bottles, clothing, etc     Mom's Insurance: [place] [employer]  Baby Covered on Insurance:Will be added  Mother Employed/School: No,   Other children in the home/names & ages: Azael Sheldon  1/15/2016    Financial Hardship/Income: No, has son with social security that she has income   Mom's Mental status: Alert and oriented times 4. Pleasant and cooperative. Affect: Euthymic.   Services used prior to admit: WIC.     CPS History: Yes, Per MOB with her son Azael who was a failure to thrive baby with a feed tube. MOB reports that CPS assisted her and provided her with support.   Psychiatric History: Yes, depression due to son's chronic medical issues, per MOB. MOB reports she was on Wellbutrin proir to pregnancy. MOB reports that she was in therapy, but stopped due to her son needing to go to medical appointments.  MOB and this writer discussed the importance of self- care. MOB reports she will talk to her bedside RN and her physician  on what medication for depression  is safe for baby while she is breastfeeding. MOB and this writer discussed the symptoms of post-partum depression.   Domestic Violence History: None  Drug/ETOH History:  No    Resources Provided: None, DENY reports she does not needs resources at this time.   Referrals Made: None, DENY does has CPS history, but there are no present concerns. DENY discussed a positive experience with CPS and discussed that she will continue with mental health medications after talking with her bedside RN and with physician.       Clearance for Discharge: MOB and baby cleared by .

## 2019-07-07 NOTE — PROGRESS NOTES
Abdominal binder was delivered to patient.  If any further assistance needed, please call extension [phone] or place order for Ortho Technician assistance as a communication order in [employer].

## 2019-07-08 NOTE — DISCHARGE INSTRUCTIONS
POSTPARTUM DISCHARGE INSTRUCTIONS FOR MOM    YOB: 1995   Age: 23 y.o.               Admit Date: 2019     Discharge Date: 2019  Attending Doctor:  Duke Saini M.D.                  Allergies:  Other environmental    Discharged to home by car. Discharged via wheelchair, hospital escort: Yes.  Special equipment needed: Not Applicable  Belongings with: Personal  Be sure to schedule a follow-up appointment with your primary care doctor or any specialists as instructed.     Discharge Plan:   Diet Plan: Discussed  Activity Level: Discussed  Confirmed Follow up Appointment: Patient to Call and Schedule Appointment  Confirmed Symptoms Management: Discussed  Medication Reconciliation Updated: Yes  Influenza Vaccine Indication: Not indicated: Previously immunized this influenza season and > 8 years of age    REASONS TO CALL YOUR OBSTETRICIAN:  1.   Persistent fever or shaking chills (Temperature higher than 100.4)  2.   Heavy bleeding (soaking more than 1 pad per hour); Passing clots  3.   Foul odor from vagina  4.   Mastitis (Breast infection; breast pain, chills, fever, redness)  5.   Urinary pain, burning or frequency  6.   Episiotomy infection  7.   Abdominal incision infection  8.   Severe depression longer than 24 hours    HAND WASHING  · Prior to handling the baby.  · Before breastfeeding or bottle feeding baby.  · After using the bathroom or changing the baby's diaper.    WOUND CARE  Ask your physician for additional care instructions.  In general:    ·  Incision:      · Keep clean and dry.    · Do NOT lift anything heavier than your baby for up to 6 weeks.    · There should not be any opening or pus.      VAGINAL CARE  · Nothing inside vagina for 6 weeks: no sexual intercourse, tampons or douching.  · Bleeding may continue for 2-4 weeks.  Amount may vary.    · Call your physician for heavy bleeding which means soaking more than 1 pad per hour    BIRTH CONTROL  · It is possible to become  "pregnant at any time after delivery and while breastfeeding.  · Plan to discuss a method of birth control with your physician at your follow up visit. visit.    DIET AND ELIMINATION  · Eating more fiber (bran cereal, fruits, and vegetables) and drinking plenty of fluids will help to avoid constipation.  · Urinary frequency after childbirth is normal.    POSTPARTUM BLUES  During the first few days after birth, you may experience a sense of the \"blues\" which may include impatience, irritability or even crying.  These feeling come and go quickly.  However, as many as 1 in 10 women experience emotional symptoms known as postpartum depression.    Postpartum depression:  May start as early as the second or third day after delivery or take several weeks or months to develop.  Symptoms of \"blues\" are present, but are more intense:  Crying spells; loss of appetite; feelings of hopelessness or loss of control; fear of touching the baby; over concern or no concern at all about the baby; little or no concern about your own appearance/caring for yourself; and/or inability to sleep or excessive sleeping.  Contact your physician if you are experiencing any of these symptoms.    Crisis Hotline:  · [place] Crisis Hotline:  8-386-KFNPPGF  Or 1-300.231.9377  · [place] Crisis Hotline:  1-301.547.9595  Or 738-956-8699    PREVENTING SHAKEN BABY:  If you are angry or stressed, PUT THE BABY IN THE CRIB, step away, take some deep breaths, and wait until you are calm to care for the baby.  DO NOT SHAKE THE BABY.  You are not alone, call a supporter for help.    · Crisis Call Center 24/7 crisis line 675-232-1745 or 1-789.701.8501  · You can also text them, text \"ANSWER\" to 746701    QUIT SMOKING/TOBACCO USE:  I understand the use of any tobacco products increases my chance of suffering from future heart disease and could cause other illnesses which may shorten my life. Quitting the use of tobacco products is the single most important thing I " can do to improve my health. For further information on smoking / tobacco cessation call a Toll Free Quit Line at 1-565.148.4652 (*National [employer] [place]) or 1-986.577.7062 (American Lung Association) or you can access the web based program at www.lungusa.org.    · [place] Tobacco Users Help Line:  (425) 810-7768       Toll Free: 1-380.380.5742  · Quit Tobacco Program [facility] (185)002-7740    DEPRESSION / SUICIDE RISK:  As you are discharged from this [facility], it is important to learn how to keep safe from harming yourself.    Recognize the warning signs:  · Abrupt changes in personality, positive or negative- including increase in energy   · Giving away possessions  · Change in eating patterns- significant weight changes-  positive or negative  · Change in sleeping patterns- unable to sleep or sleeping all the time   · Unwillingness or inability to communicate  · Depression  · Unusual sadness, discouragement and loneliness  · Talk of wanting to die  · Neglect of personal appearance   · Rebelliousness- reckless behavior  · Withdrawal from people/activities they love  · Confusion- inability to concentrate     If you or a loved one observes any of these behaviors or has concerns about self-harm, here's what you can do:  · Talk about it- your feelings and reasons for harming yourself  · Remove any means that you might use to hurt yourself (examples: pills, rope, extension cords, firearm)  · Get professional help from the community (Mental Health, Substance Abuse, psychological counseling)  · Do not be alone:Call your Safe Contact- someone whom you trust who will be there for you.  · Call your local CRISIS HOTLINE 709-4718 or 622-800-6599  · Call your local Children's Mobile Crisis Response Team [place] (307) 425-8887 or www.RunTitle  · Call the toll free National Suicide Prevention Hotlines   · National Suicide Prevention Lifeline 776-585-ESHT (5682)  · National  [facility] 800-SUICIDE (929-5721)    DISCHARGE SURVEY:  Thank you for choosing [facility].  We hope we provided you with very good care.  You may be receiving a survey in the mail.  Please fill it out.  Your opinion is valuable to us.    ADDITIONAL EDUCATIONAL MATERIALS GIVEN TO PATIENT:        My signature on this form indicates that:  1.  I have reviewed and understand the above information  2.  My questions regarding this information have been answered to my satisfaction.  3.  I have formulated a plan with my discharge nurse to obtain my prescribed medication for home.

## 2019-07-08 NOTE — PROGRESS NOTES
Patient off unit with infant and FOB and hospital escort at 2113 in wheelchair. Discharge instructions reviewed and signed; copy given to patient and placed in chart. F/U appointment discussed. Patient verbalizes understanding.

## 2019-07-08 NOTE — CARE PLAN
Problem: Bowel/Gastric:  Goal: Normal bowel function is maintained or improved  Colace given    Problem: Knowledge Deficit  Goal: Knowledge of disease process/condition, treatment plan, diagnostic tests, and medications will improve  Patient updated throughout day on plan of care; questions answered.  Encouraged to voice concerns and needs; verbalized understanding.     Problem: Pain Management  Goal: Pain level will decrease to patient's comfort goal    Intervention: Follow pain managment plan developed in collaboration with patient and Interdisciplinary Team  Patient's pain has been managed with Motrin 600 mg today

## 2019-07-08 NOTE — PROGRESS NOTES
Dr. Noriega notified of EPDS score of 12 and patient discharging. Patient already seen by social for depression and cleared to discharge.   No new orders at this time.

## 2019-07-17 ENCOUNTER — HOSPITAL ENCOUNTER (OUTPATIENT)
Facility: MEDICAL CENTER | Age: 24
End: 2019-07-17
Attending: INTERNAL MEDICINE
Payer: MEDICAID

## 2019-07-17 ENCOUNTER — OFFICE VISIT (OUTPATIENT)
Dept: MEDICAL GROUP | Facility: MEDICAL CENTER | Age: 24
End: 2019-07-17
Attending: INTERNAL MEDICINE
Payer: MEDICAID

## 2019-07-17 VITALS
OXYGEN SATURATION: 96 % | TEMPERATURE: 97.7 F | SYSTOLIC BLOOD PRESSURE: 100 MMHG | HEIGHT: 60 IN | DIASTOLIC BLOOD PRESSURE: 68 MMHG | WEIGHT: 167 LBS | HEART RATE: 66 BPM | BODY MASS INDEX: 32.79 KG/M2 | RESPIRATION RATE: 16 BRPM

## 2019-07-17 DIAGNOSIS — N89.8 VAGINAL ITCHING: ICD-10-CM

## 2019-07-17 DIAGNOSIS — F33.41 RECURRENT MAJOR DEPRESSIVE DISORDER, IN PARTIAL REMISSION (HCC): ICD-10-CM

## 2019-07-17 DIAGNOSIS — R30.0 DYSURIA: ICD-10-CM

## 2019-07-17 DIAGNOSIS — R22.32 MASS OF LEFT AXILLA: ICD-10-CM

## 2019-07-17 PROBLEM — M25.531 RIGHT WRIST PAIN: Status: RESOLVED | Noted: 2018-06-27 | Resolved: 2019-07-17

## 2019-07-17 LAB
APPEARANCE UR: CLEAR
BILIRUB UR STRIP-MCNC: NEGATIVE MG/DL
COLOR UR AUTO: YELLOW
GLUCOSE UR STRIP.AUTO-MCNC: NEGATIVE MG/DL
KETONES UR STRIP.AUTO-MCNC: NEGATIVE MG/DL
LEUKOCYTE ESTERASE UR QL STRIP.AUTO: NORMAL
NITRITE UR QL STRIP.AUTO: NEGATIVE
PH UR STRIP.AUTO: 5.5 [PH] (ref 5–8)
PROT UR QL STRIP: NEGATIVE MG/DL
RBC UR QL AUTO: NORMAL
SP GR UR STRIP.AUTO: 1.01
UROBILINOGEN UR STRIP-MCNC: 0.2 MG/DL

## 2019-07-17 PROCEDURE — 99213 OFFICE O/P EST LOW 20 MIN: CPT | Performed by: INTERNAL MEDICINE

## 2019-07-17 PROCEDURE — 81002 URINALYSIS NONAUTO W/O SCOPE: CPT | Performed by: INTERNAL MEDICINE

## 2019-07-17 PROCEDURE — 87660 TRICHOMONAS VAGIN DIR PROBE: CPT

## 2019-07-17 PROCEDURE — 87510 GARDNER VAG DNA DIR PROBE: CPT

## 2019-07-17 PROCEDURE — 99214 OFFICE O/P EST MOD 30 MIN: CPT | Performed by: INTERNAL MEDICINE

## 2019-07-17 PROCEDURE — 87480 CANDIDA DNA DIR PROBE: CPT

## 2019-07-17 PROCEDURE — 87086 URINE CULTURE/COLONY COUNT: CPT

## 2019-07-17 ASSESSMENT — PAIN SCALES - GENERAL: PAINLEVEL: NO PAIN

## 2019-07-17 NOTE — ASSESSMENT & PLAN NOTE
She reports that during her last pregnancy, she noticed a swelling under her left armpit.  It got larger when she was pregnant and then went back down in size.  This pregnancy, she has also noticed it get larger.  She states that it is slightly tender.  She denies any overlying skin changes or ingrown hair.  She denies any redness, warmth, or erythema.  No problems moving her arm.  No injuries to the area.  She is breast-feeding and not having any difficulty with this.  She denies any recent mastitis.

## 2019-07-17 NOTE — ASSESSMENT & PLAN NOTE
She reports some mild discomfort with urination.  She has noticed some blood in her urine but she is still bleeding after her vaginal delivery less than 2 weeks ago.  She denies fevers, chills, abdominal pain.  She denies increased urinary frequency or urgency

## 2019-07-17 NOTE — ASSESSMENT & PLAN NOTE
She gave birth on July 6 of this year and she states that since then she has had some itching in the vaginal area.  States that she did not tear when giving birth.  She continues to have some vaginal bleeding.  She is not sure if she is having discharge but she did notice it this morning when she gave the urine sample.

## 2019-07-17 NOTE — ASSESSMENT & PLAN NOTE
She has been off of her Wellbutrin since she was about 4 months pregnant.  She has a history of postpartum depression.  Since giving birth, she is worried that the depression is going to come back and she is been feeling more anxious.  She is wondering if it is safe to restart the Wellbutrin.  She is currently breast-feeding and plans to do so as long as it is possible.

## 2019-07-17 NOTE — PROGRESS NOTES
Subjective:   Kristal Cesar is a 24 y.o. female here today for lump in the left armpit, vaginal itching, depression and anxiety    Mass of left axilla  She reports that during her last pregnancy, she noticed a swelling under her left armpit.  It got larger when she was pregnant and then went back down in size.  This pregnancy, she has also noticed it get larger.  She states that it is slightly tender.  She denies any overlying skin changes or ingrown hair.  She denies any redness, warmth, or erythema.  No problems moving her arm.  No injuries to the area.  She is breast-feeding and not having any difficulty with this.  She denies any recent mastitis.    Vaginal itching  She gave birth on July 6 of this year and she states that since then she has had some itching in the vaginal area.  States that she did not tear when giving birth.  She continues to have some vaginal bleeding.  She is not sure if she is having discharge but she did notice it this morning when she gave the urine sample.      Dysuria  She reports some mild discomfort with urination.  She has noticed some blood in her urine but she is still bleeding after her vaginal delivery less than 2 weeks ago.  She denies fevers, chills, abdominal pain.  She denies increased urinary frequency or urgency    Recurrent major depressive disorder, in partial remission (CMS-HCC) (HCC)  She has been off of her Wellbutrin since she was about 4 months pregnant.  She has a history of postpartum depression.  Since giving birth, she is worried that the depression is going to come back and she is been feeling more anxious.  She is wondering if it is safe to restart the Wellbutrin.  She is currently breast-feeding and plans to do so as long as it is possible.       Current medicines (including changes today)  Current Outpatient Prescriptions   Medication Sig Dispense Refill   • sertraline (ZOLOFT) 50 MG Tab Take 1/2 tab daily for 1 week then increase to 1 tab  daily 30 Tab 1     No current facility-administered medications for this visit.      She  has a past medical history of Allergy; Depression; and Obesity (BMI 30-39.9).    ROS   Denies chest pain, shortness of breath  As above in HPI     Objective:     Vitals:    07/17/19 0827   BP: 100/68   Pulse: 66   Resp: 16   Temp: 36.5 °C (97.7 °F)   SpO2: 96%     Body mass index is 32.61 kg/m².   Physical Exam:  Constitutional: Alert, no distress.  Skin: Warm, dry, good turgor, no rashes in visible areas.  Eye: Equal, round and reactive, conjunctiva clear, lids normal.  Abdomen: Soft, non-tender  MSK: Approximately 1 cm mobile somewhat firm slightly tender mass in the left axilla without any overlying skin changes or lesions    Results and Imaging:   POC UA in clinic today was positive for small blood and trace leuk esterase but was otherwise normal    Assessment and Plan:   The following treatment plan was discussed    1. Mass of left axilla  Unclear etiology.  Does not appear to be an abscess or sebaceous cyst.  Differential would include enlarged lymph node or lipoma.  We will get an ultrasound of the area for further evaluation.  - US-CHEST; Future    2. Dysuria  No evidence of UTI on UA.  We will send for culture given the blood and trace leuks although I suspect this is related to her vaginal bleeding postdelivery.  -Urine culture  - POCT Urinalysis    3. Vaginal itching  We will rule out candidiasis and BV  - VAGINAL PATHOGENS DNA PANEL; Future    4. Recurrent major depressive disorder, in partial remission (HCC)  We discussed that Wellbutrin is not the preferred medication for breast-feeding mothers due to risk of seizure in babies.  I recommended she not restart this medicine until she is done breast-feeding.  She is interested in trying an alternative.  We will start her on Zoloft as below.  She will follow-up with me by my chart in 4 weeks regarding efficacy  - sertraline (ZOLOFT) 50 MG Tab; Take 1/2 tab daily for 1  week then increase to 1 tab daily  Dispense: 30 Tab; Refill: 1  -will likely switch back to wellbutrin once done breastfeeding        Followup: Return if symptoms worsen or fail to improve.

## 2019-07-18 LAB
CANDIDA DNA VAG QL PROBE+SIG AMP: NEGATIVE
G VAGINALIS DNA VAG QL PROBE+SIG AMP: POSITIVE
T VAGINALIS DNA VAG QL PROBE+SIG AMP: NEGATIVE

## 2019-07-18 RX ORDER — METRONIDAZOLE 7.5 MG/G
1 GEL VAGINAL
Qty: 1 TUBE | Refills: 0 | Status: SHIPPED | OUTPATIENT
Start: 2019-07-18 | End: 2019-07-23

## 2019-07-20 LAB
BACTERIA UR CULT: NORMAL
SIGNIFICANT IND 70042: NORMAL
SITE SITE: NORMAL
SOURCE SOURCE: NORMAL

## 2019-09-01 ENCOUNTER — HOSPITAL ENCOUNTER (EMERGENCY)
Facility: MEDICAL CENTER | Age: 24
End: 2019-09-01
Attending: EMERGENCY MEDICINE
Payer: MEDICAID

## 2019-09-01 VITALS
WEIGHT: 155.65 LBS | RESPIRATION RATE: 16 BRPM | OXYGEN SATURATION: 96 % | SYSTOLIC BLOOD PRESSURE: 123 MMHG | BODY MASS INDEX: 30.56 KG/M2 | TEMPERATURE: 97.8 F | DIASTOLIC BLOOD PRESSURE: 74 MMHG | HEIGHT: 60 IN | HEART RATE: 63 BPM

## 2019-09-01 DIAGNOSIS — R11.2 NON-INTRACTABLE VOMITING WITH NAUSEA, UNSPECIFIED VOMITING TYPE: ICD-10-CM

## 2019-09-01 DIAGNOSIS — R10.84 GENERALIZED ABDOMINAL PAIN: ICD-10-CM

## 2019-09-01 DIAGNOSIS — R19.7 DIARRHEA, UNSPECIFIED TYPE: ICD-10-CM

## 2019-09-01 LAB
ALBUMIN SERPL BCP-MCNC: 4.7 G/DL (ref 3.2–4.9)
ALBUMIN/GLOB SERPL: 1.6 G/DL
ALP SERPL-CCNC: 120 U/L (ref 30–99)
ALT SERPL-CCNC: 20 U/L (ref 2–50)
ANION GAP SERPL CALC-SCNC: 10 MMOL/L (ref 0–11.9)
APPEARANCE UR: CLEAR
AST SERPL-CCNC: 18 U/L (ref 12–45)
BASOPHILS # BLD AUTO: 0.2 % (ref 0–1.8)
BASOPHILS # BLD: 0.02 K/UL (ref 0–0.12)
BILIRUB SERPL-MCNC: 1.2 MG/DL (ref 0.1–1.5)
BILIRUB UR QL STRIP.AUTO: NEGATIVE
BUN SERPL-MCNC: 11 MG/DL (ref 8–22)
CALCIUM SERPL-MCNC: 9.7 MG/DL (ref 8.5–10.5)
CHLORIDE SERPL-SCNC: 106 MMOL/L (ref 96–112)
CO2 SERPL-SCNC: 23 MMOL/L (ref 20–33)
COLOR UR: YELLOW
CREAT SERPL-MCNC: 0.62 MG/DL (ref 0.5–1.4)
EOSINOPHIL # BLD AUTO: 0.13 K/UL (ref 0–0.51)
EOSINOPHIL NFR BLD: 1.2 % (ref 0–6.9)
ERYTHROCYTE [DISTWIDTH] IN BLOOD BY AUTOMATED COUNT: 46.6 FL (ref 35.9–50)
GLOBULIN SER CALC-MCNC: 2.9 G/DL (ref 1.9–3.5)
GLUCOSE SERPL-MCNC: 85 MG/DL (ref 65–99)
GLUCOSE UR STRIP.AUTO-MCNC: NEGATIVE MG/DL
HCG SERPL QL: NEGATIVE
HCT VFR BLD AUTO: 43.9 % (ref 37–47)
HGB BLD-MCNC: 13.9 G/DL (ref 12–16)
IMM GRANULOCYTES # BLD AUTO: 0.03 K/UL (ref 0–0.11)
IMM GRANULOCYTES NFR BLD AUTO: 0.3 % (ref 0–0.9)
KETONES UR STRIP.AUTO-MCNC: NEGATIVE MG/DL
LEUKOCYTE ESTERASE UR QL STRIP.AUTO: NEGATIVE
LIPASE SERPL-CCNC: 11 U/L (ref 11–82)
LYMPHOCYTES # BLD AUTO: 2 K/UL (ref 1–4.8)
LYMPHOCYTES NFR BLD: 17.8 % (ref 22–41)
MCH RBC QN AUTO: 26.5 PG (ref 27–33)
MCHC RBC AUTO-ENTMCNC: 31.7 G/DL (ref 33.6–35)
MCV RBC AUTO: 83.6 FL (ref 81.4–97.8)
MICRO URNS: NORMAL
MONOCYTES # BLD AUTO: 0.62 K/UL (ref 0–0.85)
MONOCYTES NFR BLD AUTO: 5.5 % (ref 0–13.4)
NEUTROPHILS # BLD AUTO: 8.41 K/UL (ref 2–7.15)
NEUTROPHILS NFR BLD: 75 % (ref 44–72)
NITRITE UR QL STRIP.AUTO: NEGATIVE
NRBC # BLD AUTO: 0 K/UL
NRBC BLD-RTO: 0 /100 WBC
PH UR STRIP.AUTO: 6 [PH] (ref 5–8)
PLATELET # BLD AUTO: 300 K/UL (ref 164–446)
PMV BLD AUTO: 10.8 FL (ref 9–12.9)
POTASSIUM SERPL-SCNC: 4 MMOL/L (ref 3.6–5.5)
PROT SERPL-MCNC: 7.6 G/DL (ref 6–8.2)
PROT UR QL STRIP: NEGATIVE MG/DL
RBC # BLD AUTO: 5.25 M/UL (ref 4.2–5.4)
RBC UR QL AUTO: NEGATIVE
SODIUM SERPL-SCNC: 139 MMOL/L (ref 135–145)
SP GR UR STRIP.AUTO: 1.02
UROBILINOGEN UR STRIP.AUTO-MCNC: 0.2 MG/DL
WBC # BLD AUTO: 11.2 K/UL (ref 4.8–10.8)

## 2019-09-01 PROCEDURE — 80053 COMPREHEN METABOLIC PANEL: CPT

## 2019-09-01 PROCEDURE — 96374 THER/PROPH/DIAG INJ IV PUSH: CPT

## 2019-09-01 PROCEDURE — 36415 COLL VENOUS BLD VENIPUNCTURE: CPT

## 2019-09-01 PROCEDURE — 700105 HCHG RX REV CODE 258: Performed by: EMERGENCY MEDICINE

## 2019-09-01 PROCEDURE — 99285 EMERGENCY DEPT VISIT HI MDM: CPT

## 2019-09-01 PROCEDURE — 700111 HCHG RX REV CODE 636 W/ 250 OVERRIDE (IP): Performed by: EMERGENCY MEDICINE

## 2019-09-01 PROCEDURE — 83690 ASSAY OF LIPASE: CPT

## 2019-09-01 PROCEDURE — 96375 TX/PRO/DX INJ NEW DRUG ADDON: CPT

## 2019-09-01 PROCEDURE — 84703 CHORIONIC GONADOTROPIN ASSAY: CPT

## 2019-09-01 PROCEDURE — 85025 COMPLETE CBC W/AUTO DIFF WBC: CPT

## 2019-09-01 PROCEDURE — 81003 URINALYSIS AUTO W/O SCOPE: CPT

## 2019-09-01 RX ORDER — PROMETHAZINE HYDROCHLORIDE 25 MG/1
25 SUPPOSITORY RECTAL EVERY 6 HOURS PRN
Qty: 5 SUPPOSITORY | Refills: 0 | Status: SHIPPED | OUTPATIENT
Start: 2019-09-01 | End: 2019-09-01 | Stop reason: SDUPTHER

## 2019-09-01 RX ORDER — SODIUM CHLORIDE 9 MG/ML
1000 INJECTION, SOLUTION INTRAVENOUS ONCE
Status: COMPLETED | OUTPATIENT
Start: 2019-09-01 | End: 2019-09-01

## 2019-09-01 RX ORDER — PROMETHAZINE HYDROCHLORIDE 25 MG/1
25 SUPPOSITORY RECTAL EVERY 6 HOURS PRN
Qty: 5 SUPPOSITORY | Refills: 0 | Status: SHIPPED | OUTPATIENT
Start: 2019-09-01 | End: 2019-11-27

## 2019-09-01 RX ORDER — ONDANSETRON 2 MG/ML
4 INJECTION INTRAMUSCULAR; INTRAVENOUS ONCE
Status: COMPLETED | OUTPATIENT
Start: 2019-09-01 | End: 2019-09-01

## 2019-09-01 RX ORDER — PROMETHAZINE HYDROCHLORIDE 25 MG/1
25 TABLET ORAL EVERY 6 HOURS PRN
Qty: 15 TAB | Refills: 0 | Status: SHIPPED | OUTPATIENT
Start: 2019-09-01 | End: 2021-11-24

## 2019-09-01 RX ORDER — MORPHINE SULFATE 4 MG/ML
4 INJECTION, SOLUTION INTRAMUSCULAR; INTRAVENOUS ONCE
Status: COMPLETED | OUTPATIENT
Start: 2019-09-01 | End: 2019-09-01

## 2019-09-01 RX ADMIN — MORPHINE SULFATE 4 MG: 4 INJECTION INTRAVENOUS at 14:32

## 2019-09-01 RX ADMIN — SODIUM CHLORIDE 1000 ML: 9 INJECTION, SOLUTION INTRAVENOUS at 14:06

## 2019-09-01 RX ADMIN — ONDANSETRON 4 MG: 2 INJECTION INTRAMUSCULAR; INTRAVENOUS at 14:06

## 2019-09-01 ASSESSMENT — ENCOUNTER SYMPTOMS
DIARRHEA: 1
VOMITING: 1
FEVER: 1
ABDOMINAL PAIN: 1
BACK PAIN: 0
CHILLS: 1
NAUSEA: 1

## 2019-09-01 ASSESSMENT — LIFESTYLE VARIABLES: DO YOU DRINK ALCOHOL: NO

## 2019-09-01 NOTE — ED NOTES
Kristal Cesar discharged via ambulatory with steady gait.  Discharge instructions given and reviewed, patient educated to follow up with PCP, verbalized understanding.  Prescriptions given x 2.  All personal belongings in possession.  No questions at this time.

## 2019-09-01 NOTE — ED TRIAGE NOTES
Ambulates to triage  Chief Complaint   Patient presents with   • Nausea/Vomiting/Diarrhea     since 1am   • Abdominal Pain     Pain started at 1am, woke her up, pt describes it as labor pains, pt is post partum 7 weeks.  Denies any blood in her vomit or stool.

## 2019-09-01 NOTE — ED NOTES
Medicated for pain at this time.  Patient resting quietly in bed without distress, denies any complaints or needs at this time.  Call bell within reach.

## 2019-09-01 NOTE — ED PROVIDER NOTES
ED Provider Note    Scribed for Tan Andersen M.D. by Cece Andersen. 9/1/2019, 1:28 PM.    Primary care provider: Margareth Villegas M.D.  Means of arrival: Walk in  History obtained from: Patient  History limited by: None     CHIEF COMPLAINT  Chief Complaint   Patient presents with   • Nausea/Vomiting/Diarrhea     since 1am   • Abdominal Pain       HPI  Kristal Cesar is a 24 y.o. female who is seven months post partum who presents to the Emergency Department for evaluation of intermittent lower abdominal pain onset today approximately around 1:00 AM this morning. She states that she started with diarrhea and that it had progressively worsened throughout the day. Patient has associated nausea, vomiting, decreased fluid intake, cold shivers, and cramping. She denies any vaginal bleeding or back pain. The patient denies any recent sick contact.  Patient describes the pain will come on then completely go away.  She describes chills fevers describes diarrhea and some nausea and vomiting.  Patient denies any other symptoms.    REVIEW OF SYSTEMS  Review of Systems   Constitutional: Positive for chills and fever.        Positive for cold shivers.    Gastrointestinal: Positive for abdominal pain (lower), diarrhea, nausea and vomiting.        Negative for vaginal bleeding.   Positive for abdominal cramping.   Musculoskeletal: Negative for back pain.   All other systems reviewed and are negative.      PAST MEDICAL HISTORY   has a past medical history of Allergy, Anxiety, Depression, and Obesity (BMI 30-39.9).    SURGICAL HISTORY   has a past surgical history that includes ariana by laparoscopy (3/7/2016) and other.    SOCIAL HISTORY  Social History     Tobacco Use   • Smoking status: Never Smoker   • Smokeless tobacco: Never Used   Substance Use Topics   • Alcohol use: No   • Drug use: No      Social History     Substance and Sexual Activity   Drug Use No       FAMILY HISTORY  Family History   Problem  "Relation Age of Onset   • No Known Problems Mother    • Diabetes Father    • Hypertension Father    • Hyperlipidemia Father    • No Known Problems Sister    • Cancer Neg Hx        CURRENT MEDICATIONS  Home Medications     Reviewed by Andria Anderson R.N. (Registered Nurse) on 09/01/19 at 1254  Med List Status: Complete   Medication Last Dose Status   sertraline (ZOLOFT) 50 MG Tab 8/30/2019 Active                ALLERGIES  Allergies   Allergen Reactions   • Other Environmental Rash     \"dogs\" - \"my skin turns red\"       PHYSICAL EXAM  VITAL SIGNS: /77   Pulse 73   Temp 36.6 °C (97.9 °F) (Temporal)   Resp 16   Ht 1.524 m (5')   Wt 70.6 kg (155 lb 10.3 oz)   LMP 09/15/2018 (Exact Date)   SpO2 96%   BMI 30.40 kg/m²     Constitutional: Well developed, Well nourished, No acute distress, Non-toxic appearance.   HENT: Normocephalic, Atraumatic, Bilateral external ears normal, oropharynx moist, dry mucous membranes. No oral exudates, Nose normal.   Eyes:conjunctiva is normal, there are no signs of exudate.   Neck: Supple, no meningeal signs.  Lymphatic: No lymphadenopathy noted.   Cardiovascular: Regular rate and rhythm without murmurs gallops or rubs.   Thorax & Lungs: No respiratory distress. Breathing comfortably. Lungs are clear to auscultation bilaterally, there are no wheezes no rales. Chest wall is nontender.  Abdomen: Mild lower abd tenderness, Soft, nondistended. Bowel sounds are present.   Skin: Warm, Dry, No erythema,   Back: No tenderness, No CVA tenderness.  Musculoskeletal: Good range of motion in all major joints. No tenderness to palpation or major deformities noted. Intact distal pulses, no clubbing, no cyanosis, no edema,   Neurologic: Alert & oriented x 3, Moving all extremities. No gross abnormalities.    Psychiatric: Affect normal, Judgment normal, Mood normal.     LABS  Results for orders placed or performed during the hospital encounter of 09/01/19   CBC WITH DIFFERENTIAL   Result " Value Ref Range    WBC 11.2 (H) 4.8 - 10.8 K/uL    RBC 5.25 4.20 - 5.40 M/uL    Hemoglobin 13.9 12.0 - 16.0 g/dL    Hematocrit 43.9 37.0 - 47.0 %    MCV 83.6 81.4 - 97.8 fL    MCH 26.5 (L) 27.0 - 33.0 pg    MCHC 31.7 (L) 33.6 - 35.0 g/dL    RDW 46.6 35.9 - 50.0 fL    Platelet Count 300 164 - 446 K/uL    MPV 10.8 9.0 - 12.9 fL    Neutrophils-Polys 75.00 (H) 44.00 - 72.00 %    Lymphocytes 17.80 (L) 22.00 - 41.00 %    Monocytes 5.50 0.00 - 13.40 %    Eosinophils 1.20 0.00 - 6.90 %    Basophils 0.20 0.00 - 1.80 %    Immature Granulocytes 0.30 0.00 - 0.90 %    Nucleated RBC 0.00 /100 WBC    Neutrophils (Absolute) 8.41 (H) 2.00 - 7.15 K/uL    Lymphs (Absolute) 2.00 1.00 - 4.80 K/uL    Monos (Absolute) 0.62 0.00 - 0.85 K/uL    Eos (Absolute) 0.13 0.00 - 0.51 K/uL    Baso (Absolute) 0.02 0.00 - 0.12 K/uL    Immature Granulocytes (abs) 0.03 0.00 - 0.11 K/uL    NRBC (Absolute) 0.00 K/uL   COMP METABOLIC PANEL   Result Value Ref Range    Sodium 139 135 - 145 mmol/L    Potassium 4.0 3.6 - 5.5 mmol/L    Chloride 106 96 - 112 mmol/L    Co2 23 20 - 33 mmol/L    Anion Gap 10.0 0.0 - 11.9    Glucose 85 65 - 99 mg/dL    Bun 11 8 - 22 mg/dL    Creatinine 0.62 0.50 - 1.40 mg/dL    Calcium 9.7 8.5 - 10.5 mg/dL    AST(SGOT) 18 12 - 45 U/L    ALT(SGPT) 20 2 - 50 U/L    Alkaline Phosphatase 120 (H) 30 - 99 U/L    Total Bilirubin 1.2 0.1 - 1.5 mg/dL    Albumin 4.7 3.2 - 4.9 g/dL    Total Protein 7.6 6.0 - 8.2 g/dL    Globulin 2.9 1.9 - 3.5 g/dL    A-G Ratio 1.6 g/dL   LIPASE   Result Value Ref Range    Lipase 11 11 - 82 U/L   HCG QUAL SERUM   Result Value Ref Range    Beta-Hcg Qualitative Serum Negative Negative   URINALYSIS,CULTURE IF INDICATED   Result Value Ref Range    Color Yellow     Character Clear     Specific Gravity 1.025 <1.035    Ph 6.0 5.0 - 8.0    Glucose Negative Negative mg/dL    Ketones Negative Negative mg/dL    Protein Negative Negative mg/dL    Bilirubin Negative Negative    Urobilinogen, Urine 0.2 Negative    Nitrite  Negative Negative    Leukocyte Esterase Negative Negative    Occult Blood Negative Negative    Micro Urine Req see below    ESTIMATED GFR   Result Value Ref Range    GFR If African American >60 >60 mL/min/1.73 m 2    GFR If Non African American >60 >60 mL/min/1.73 m 2       All labs reviewed by me.      COURSE & MEDICAL DECISION MAKING  Pertinent Labs & Imaging studies reviewed. (See chart for details)    1:28 PM - Patient seen and examined at bedside. Informed the patient that blood work is needed to rule out any immediate problems. Patient will be treated with morphine 4 mg, NS infusion 1,000 mL, ondansetron 4 mg. Ordered estimated GFR, CBC, CMP, Lipase, HCG qual serum, UA to evaluate her symptoms. The differential diagnoses include but are not limited to: Gastroneuritis, UTI.     HYDRATION: Based on the patient's presentation of Acute Diarrhea, Acute Vomiting and Dehydration the patient was given IV fluids. IV Hydration was used because oral hydration was not adequate alone. Upon recheck following hydration, the patient was Improved.    3:06 PM - Patient was reevaluated at bedside. Discussed lab results with the patient. Patient reports that the pain is intermittent, but has mildly improved. Recommend to take Pepto bismol for her symptoms and to consume plenty of liquids. Patient should return if symptoms worsen. Patient agrees with the plan of care and will be discharged home.        Decision Making:  Patient presents emergency part for evaluation but clinically the patient has no significant abdominal findings.  She does describe severe pain that will be intermittent in nature.  Laboratory studies show slightly elevated white blood cell count but the rest electrolytes and urinalysis are completely normal.  I did give the patient a fluid bolus she is feeling improved but still is having some abdominal cramps and did request some pain medication so I did give her a morphine dose as well.  Upon reevaluation she  feels improved I do feel is most likely gastroenteritis however cannot completely rule out the possibility of appendicitis.  At this point clinically I do not feel CT scan is necessary because she is asymptomatic on abdominal exam on repeat however I given the patient instructions to return the emergency department 8 to 12 hours if she has any worsening symptoms continue pushing fluids otherwise follow the primary care physician as needed.    The patient will return for new or worsening symptoms and is stable at the time of discharge.    The patient is referred to a primary physician for blood pressure management, diabetic screening, and for all other preventative health concerns.      DISPOSITION:  Patient will be discharged home in stable condition.    FOLLOW UP:  Margareth Villegas M.D.  [address] [place] [id number]  [phone]    Schedule an appointment as soon as possible for a visit in 1 week  For re-check, Return if any symptoms worsen      OUTPATIENT MEDICATIONS:  New Prescriptions    PROMETHAZINE (PHENERGAN) 25 MG SUPPOS    Insert 1 Suppository in rectum every 6 hours as needed for Nausea/Vomiting.    PROMETHAZINE (PHENERGAN) 25 MG TAB    Take 1 Tab by mouth every 6 hours as needed for Nausea/Vomiting.       FINAL IMPRESSION  1. Non-intractable vomiting with nausea, unspecified vomiting type    2. Generalized abdominal pain    3. Diarrhea, unspecified type          I, Cece Ortiz), am scribing for, and in the presence of, Tan Andersen M.D..    Electronically signed by: Cece Ortiz), 9/1/2019    ITan M.D. personally performed the services described in this documentation, as scribed by Cece Andersen in my presence, and it is both accurate and complete. C.     The note accurately reflects work and decisions made by me.  Tan Andersen  9/1/2019  5:39 PM

## 2019-09-30 ENCOUNTER — PATIENT MESSAGE (OUTPATIENT)
Dept: MEDICAL GROUP | Facility: MEDICAL CENTER | Age: 24
End: 2019-09-30

## 2019-10-01 NOTE — TELEPHONE ENCOUNTER
From: Kristal Cesar  To: Margareth Villegas M.D.  Sent: 9/30/2019 4:41 PM PDT  Subject: Non-Urgent Medical Question    I need to schedule a appointment with you and I'm not able to do it online and when I call it's taking forever!! Can you please help me?

## 2019-10-16 ENCOUNTER — OFFICE VISIT (OUTPATIENT)
Dept: MEDICAL GROUP | Facility: MEDICAL CENTER | Age: 24
End: 2019-10-16
Attending: INTERNAL MEDICINE
Payer: MEDICAID

## 2019-10-16 VITALS
HEIGHT: 60 IN | DIASTOLIC BLOOD PRESSURE: 78 MMHG | HEART RATE: 70 BPM | OXYGEN SATURATION: 95 % | WEIGHT: 159 LBS | RESPIRATION RATE: 16 BRPM | SYSTOLIC BLOOD PRESSURE: 102 MMHG | BODY MASS INDEX: 31.22 KG/M2 | TEMPERATURE: 97.9 F

## 2019-10-16 DIAGNOSIS — Z30.09 COUNSELING FOR BIRTH CONTROL, ORAL CONTRACEPTIVES: ICD-10-CM

## 2019-10-16 DIAGNOSIS — Z23 NEED FOR VACCINATION: ICD-10-CM

## 2019-10-16 DIAGNOSIS — B86 SCABIES: ICD-10-CM

## 2019-10-16 DIAGNOSIS — N91.2 AMENORRHEA: ICD-10-CM

## 2019-10-16 PROBLEM — R22.32 MASS OF LEFT AXILLA: Status: RESOLVED | Noted: 2019-07-17 | Resolved: 2019-10-16

## 2019-10-16 LAB
INT CON NEG: NEGATIVE
INT CON POS: POSITIVE
POC URINE PREGNANCY TEST: NEGATIVE

## 2019-10-16 PROCEDURE — 81025 URINE PREGNANCY TEST: CPT | Performed by: INTERNAL MEDICINE

## 2019-10-16 PROCEDURE — 99214 OFFICE O/P EST MOD 30 MIN: CPT | Performed by: INTERNAL MEDICINE

## 2019-10-16 PROCEDURE — 99213 OFFICE O/P EST LOW 20 MIN: CPT | Performed by: INTERNAL MEDICINE

## 2019-10-16 RX ORDER — PERMETHRIN 50 MG/G
CREAM TOPICAL
Qty: 30 G | Refills: 0 | Status: SHIPPED | OUTPATIENT
Start: 2019-10-16 | End: 2019-11-27

## 2019-10-16 RX ORDER — ACETAMINOPHEN AND CODEINE PHOSPHATE 120; 12 MG/5ML; MG/5ML
1 SOLUTION ORAL DAILY
Qty: 28 TAB | Refills: 5 | Status: SHIPPED | OUTPATIENT
Start: 2019-10-16 | End: 2020-01-15

## 2019-10-16 RX ORDER — TRIAMCINOLONE ACETONIDE 1 MG/G
OINTMENT TOPICAL
Qty: 45 G | Refills: 3 | Status: SHIPPED | OUTPATIENT
Start: 2019-10-16 | End: 2020-01-15 | Stop reason: SDUPTHER

## 2019-10-16 RX ORDER — HYDROXYZINE HYDROCHLORIDE 25 MG/1
25 TABLET, FILM COATED ORAL 3 TIMES DAILY PRN
Qty: 30 TAB | Refills: 0 | Status: SHIPPED | OUTPATIENT
Start: 2019-10-16 | End: 2020-01-15 | Stop reason: SDUPTHER

## 2019-10-16 ASSESSMENT — PAIN SCALES - GENERAL: PAINLEVEL: 2=MINIMAL-SLIGHT

## 2019-10-17 PROBLEM — Z87.19 HISTORY OF MELENA: Status: RESOLVED | Noted: 2017-12-12 | Resolved: 2019-10-17

## 2019-10-17 PROBLEM — R42 VERTIGO: Status: RESOLVED | Noted: 2017-07-19 | Resolved: 2019-10-17

## 2019-10-17 PROBLEM — B86 SCABIES: Status: ACTIVE | Noted: 2019-10-17

## 2019-10-17 PROBLEM — R30.0 DYSURIA: Status: RESOLVED | Noted: 2017-11-01 | Resolved: 2019-10-17

## 2019-10-17 PROBLEM — Z30.09 COUNSELING FOR BIRTH CONTROL, ORAL CONTRACEPTIVES: Status: ACTIVE | Noted: 2019-10-17

## 2019-10-17 PROBLEM — N89.8 VAGINAL ITCHING: Status: RESOLVED | Noted: 2017-11-01 | Resolved: 2019-10-17

## 2019-10-17 NOTE — ASSESSMENT & PLAN NOTE
She reports that for the past several weeks she has had an itchy rash notably between her fingers, and her armpits, on the medial aspects of her thighs, along her belt line.  She states that all of her family members have been affected with similar symptoms including her son, her boyfriend, and her mother-in-law who often babysits her son.  No one has been diagnosed or treated for this.  She has not seen any bedbugs.  She has been vigilantly cleaning the house and washing bedding.

## 2019-10-17 NOTE — PROGRESS NOTES
Subjective:   Kristal Cesar is a 24 y.o. female here today for discuss birth control, itchy rash    Amenorrhea  She is currently breast-feeding and has not had return of menses yet.  She is interested in starting on birth control.  She has been sexually active and she is requesting a pregnancy test prior to doing so.    Scabies  She reports that for the past several weeks she has had an itchy rash notably between her fingers, and her armpits, on the medial aspects of her thighs, along her belt line.  She states that all of her family members have been affected with similar symptoms including her son, her boyfriend, and her mother-in-law who often babysits her son.  No one has been diagnosed or treated for this.  She has not seen any bedbugs.  She has been vigilantly cleaning the house and washing bedding.    Counseling for birth control, oral contraceptives  She would like to restart birth control.  She continues to actively breast-feed her infant.  She would like to start on pills.  We also discussed Depo-Provera, IUD, and Implanon is options but she prefers the pills.       Current medicines (including changes today)  Current Outpatient Medications   Medication Sig Dispense Refill   • triamcinolone acetonide (KENALOG) 0.1 % Ointment Apply thin layer to rash on hands, legs, elbows twice daily for 7-10 days until resolves 45 g 3   • norethindrone (MICRONOR) 0.35 MG tablet Take 1 Tab by mouth every day. 28 Tab 5   • permethrin (ELIMITE) 5 % Cream Apply cream to entire body and leave on for 8-14 hours 30 g 0   • hydrOXYzine HCl (ATARAX) 25 MG Tab Take 1 Tab by mouth 3 times a day as needed for Itching. 30 Tab 0   • promethazine (PHENERGAN) 25 MG Suppos Insert 1 Suppository in rectum every 6 hours as needed for Nausea/Vomiting. 5 Suppository 0   • promethazine (PHENERGAN) 25 MG Tab Take 1 Tab by mouth every 6 hours as needed for Nausea/Vomiting. 15 Tab 0     No current facility-administered medications  for this visit.      She  has a past medical history of Allergy, Anxiety, Depression, and Obesity (BMI 30-39.9).    ROS   Denies chest pain, shortness of breath  As above in HPI     Objective:     Vitals:    10/16/19 1449   BP: 102/78   Pulse: 70   Resp: 16   Temp: 36.6 °C (97.9 °F)   SpO2: 95%     Body mass index is 31.05 kg/m².   Physical Exam:  Constitutional: Alert, no distress.  Skin: Warm, dry, good turgor, classic rash for scabies is visualized in between all of her fingers with small red punctate scabs.  She has a similar appearing rash in her axillary regions, along her waistline, and on her inner thighs.  Eye: Equal, round and reactive, conjunctiva clear, lids normal.  Psych: Alert and oriented x3, normal affect and mood.      Results and Imaging:   POC pregnancy test in clinic today was negative    Assessment and Plan:   The following treatment plan was discussed    1. Need for vaccination  - Influenza Vaccine Quad Injection (PF)    2. Counseling for birth control, oral contraceptives  Given still breast-feeding, we will start her on Micronor.  We discussed the importance of taking her pill at the same time every day and not missing doses.  As she has not had return of menses, counseled her that she can begin the pill at any point in time.  We discussed that she should use a backup method of contraception for the first week that she is on the pill.  - norethindrone (MICRONOR) 0.35 MG tablet; Take 1 Tab by mouth every day.  Dispense: 28 Tab; Refill: 5    3. Amenorrhea  - POCT Pregnancy    4. Scabies  Her rash is classic for scabies and I am especially suspicious because all of her family members are affected.  We will treat her with permethrin but I advised her on the importance of having everyone in the household treated as well.  We discussed proper cleaning technique to eliminate scabies which should be done after everyone has been treated.  Her significant other does not have a PCP and I also wrote him  a prescription for the permethrin cream.  She will have her son see his pediatrician in her mother-in-law see her doctor to get their prescriptions.  Hydroxyzine given today for itching.  - permethrin (ELIMITE) 5 % Cream; Apply cream to entire body and leave on for 8-14 hours  Dispense: 30 g; Refill: 0  - hydrOXYzine HCl (ATARAX) 25 MG Tab; Take 1 Tab by mouth 3 times a day as needed for Itching.  Dispense: 30 Tab; Refill: 0            Followup: Return if symptoms worsen or fail to improve.

## 2019-10-17 NOTE — ASSESSMENT & PLAN NOTE
She would like to restart birth control.  She continues to actively breast-feed her infant.  She would like to start on pills.  We also discussed Depo-Provera, IUD, and Implanon is options but she prefers the pills.

## 2019-10-17 NOTE — ASSESSMENT & PLAN NOTE
She is currently breast-feeding and has not had return of menses yet.  She is interested in starting on birth control.  She has been sexually active and she is requesting a pregnancy test prior to doing so.

## 2019-11-27 ENCOUNTER — OFFICE VISIT (OUTPATIENT)
Dept: MEDICAL GROUP | Facility: MEDICAL CENTER | Age: 24
End: 2019-11-27
Attending: INTERNAL MEDICINE
Payer: MEDICAID

## 2019-11-27 VITALS
HEART RATE: 80 BPM | WEIGHT: 161 LBS | BODY MASS INDEX: 31.61 KG/M2 | DIASTOLIC BLOOD PRESSURE: 70 MMHG | SYSTOLIC BLOOD PRESSURE: 110 MMHG | RESPIRATION RATE: 16 BRPM | OXYGEN SATURATION: 97 % | TEMPERATURE: 97.9 F | HEIGHT: 60 IN

## 2019-11-27 DIAGNOSIS — L70.8 OTHER ACNE: ICD-10-CM

## 2019-11-27 DIAGNOSIS — F33.1 MODERATE EPISODE OF RECURRENT MAJOR DEPRESSIVE DISORDER (HCC): ICD-10-CM

## 2019-11-27 DIAGNOSIS — G56.01 CARPAL TUNNEL SYNDROME OF RIGHT WRIST: ICD-10-CM

## 2019-11-27 DIAGNOSIS — Z23 NEED FOR VACCINATION: ICD-10-CM

## 2019-11-27 PROBLEM — N91.2 AMENORRHEA: Status: RESOLVED | Noted: 2019-10-16 | Resolved: 2019-11-27

## 2019-11-27 PROBLEM — B86 SCABIES: Status: RESOLVED | Noted: 2019-10-17 | Resolved: 2019-11-27

## 2019-11-27 PROBLEM — L70.9 ACNE: Status: ACTIVE | Noted: 2019-11-27

## 2019-11-27 PROCEDURE — 99214 OFFICE O/P EST MOD 30 MIN: CPT | Performed by: INTERNAL MEDICINE

## 2019-11-27 PROCEDURE — 90686 IIV4 VACC NO PRSV 0.5 ML IM: CPT

## 2019-11-27 RX ORDER — CLINDAMYCIN AND BENZOYL PEROXIDE 10; 50 MG/G; MG/G
GEL TOPICAL
Qty: 50 G | Refills: 0 | Status: SHIPPED | OUTPATIENT
Start: 2019-11-27 | End: 2020-01-15

## 2019-11-27 RX ORDER — BUPROPION HYDROCHLORIDE 200 MG/1
200 TABLET, EXTENDED RELEASE ORAL 2 TIMES DAILY
Qty: 60 TAB | Refills: 3 | Status: SHIPPED | OUTPATIENT
Start: 2019-11-27 | End: 2020-01-15 | Stop reason: SDUPTHER

## 2019-11-27 ASSESSMENT — PAIN SCALES - GENERAL: PAINLEVEL: 3=SLIGHT PAIN

## 2019-11-28 NOTE — PROGRESS NOTES
Subjective:   Kristal Cesar is a 24 y.o. female here today for depression, acne, wrist pain    Moderate episode of recurrent major depressive disorder (HCC)  She reports continuing to struggle with depression.  In the past, she has been on Wellbutrin 200 mg twice daily and done well on this dose.  However, we stopped it when she became pregnant.  When she was breast-feeding we started her on Zoloft 50 mg daily but she has not noticed much improvement on this regimen.  States she is planning to stop breast-feeding in the next week or so and would like to get back on the Wellbutrin.    Acne  Has recently noticed acne outbreaks in the axillary and groin regions as well as between her breasts.  States this started back when she was pregnant.  States that the areas in her armpits become tender and red and she is having some hyperpigmentation after they resolve.  She would like to see a dermatologist regarding this.  We did discuss that there are no dermatologists in the [facility] who accept Medicaid and that usual route is to go to [place] to see dermatology.  She plans to call around for several offices to see if she can cash pay for a visit.    Carpal tunnel syndrome of right wrist  Reports that for the past several months she has had pain with numbness and tingling in her right wrist and hand.  It is worse at night and when she wakes up in the morning.  It also worsens when she does a lot of repetitive hand motions including painting, driving, and writing.  She is using a soft wrist brace during the day but is not wearing anything at night.       Current medicines (including changes today)  Current Outpatient Medications   Medication Sig Dispense Refill   • clindamycin-benzoyl peroxide (BENZACLIN) gel Apply thin layer to areas of acne twice daily as needed 50 g 0   • buPROPion (WELLBUTRIN SR) 200 MG SR tablet Take 1 Tab by mouth 2 times a day. 60 Tab 3   • triamcinolone acetonide (KENALOG) 0.1 %  Ointment Apply thin layer to rash on hands, legs, elbows twice daily for 7-10 days until resolves 45 g 3   • norethindrone (MICRONOR) 0.35 MG tablet Take 1 Tab by mouth every day. 28 Tab 5   • hydrOXYzine HCl (ATARAX) 25 MG Tab Take 1 Tab by mouth 3 times a day as needed for Itching. 30 Tab 0   • promethazine (PHENERGAN) 25 MG Tab Take 1 Tab by mouth every 6 hours as needed for Nausea/Vomiting. 15 Tab 0     No current facility-administered medications for this visit.      She  has a past medical history of Allergy, Anxiety, Depression, and Obesity (BMI 30-39.9).    ROS   Denies chest pain, shortness of breath  As above in HPI     Objective:     Vitals:    11/27/19 1509   BP: 110/70   Pulse: 80   Resp: 16   Temp: 36.6 °C (97.9 °F)   SpO2: 97%     Body mass index is 31.44 kg/m².   Physical Exam:  Constitutional: Alert, no distress.  Skin: Warm, dry, good turgor, no rashes in visible areas, small inflamed closed comedonal lesions in the axilla bilaterally, healing acneiform lesion over the mons pubis and right groin  Eye: Equal, round and reactive, conjunctiva clear, lids normal.  Psych: Alert and oriented x3, somewhat flattened affect and depressed mood, denies suicidal ideation  MSK: Positive Tinel and Phalen on right, full active range of motion of right wrists, normal  strength bilaterally      Assessment and Plan:   The following treatment plan was discussed    1. Other acne  Discussed topical therapy, trial of BenzaClin gel in affected areas, avoiding shaving, waxing, other hair removal methods.  She plans to pursue dermatology appointment on her own.  We discussed that the BenzaClin will be out-of-pocket.  - clindamycin-benzoyl peroxide (BENZACLIN) gel; Apply thin layer to areas of acne twice daily as needed  Dispense: 50 g; Refill: 0    2. Carpal tunnel syndrome of right wrist  Symptoms are consistent with carpal tunnel syndrome.  We will start with nocturnal wrist brace  -Right wrist brace given today,  follow-up in 6 weeks    3. Moderate episode of recurrent major depressive disorder (HCC)  Uncontrolled.  She would like to restart the Wellbutrin and she will stop breast-feeding.  - buPROPion (WELLBUTRIN SR) 200 MG SR tablet; Take 1 Tab by mouth 2 times a day.  Dispense: 60 Tab; Refill: 3  -f/u 6 weeks    4. Need for vaccination  - Influenza Vaccine Quad Injection (PF)        Followup: Return in about 6 weeks (around 1/8/2020), or if symptoms worsen or fail to improve, for depression.

## 2019-11-28 NOTE — ASSESSMENT & PLAN NOTE
She reports continuing to struggle with depression.  In the past, she has been on Wellbutrin 200 mg twice daily and done well on this dose.  However, we stopped it when she became pregnant.  When she was breast-feeding we started her on Zoloft 50 mg daily but she has not noticed much improvement on this regimen.  States she is planning to stop breast-feeding in the next week or so and would like to get back on the Wellbutrin.

## 2019-11-28 NOTE — ASSESSMENT & PLAN NOTE
Reports that for the past several months she has had pain with numbness and tingling in her right wrist and hand.  It is worse at night and when she wakes up in the morning.  It also worsens when she does a lot of repetitive hand motions including painting, driving, and writing.  She is using a soft wrist brace during the day but is not wearing anything at night.

## 2019-11-28 NOTE — ASSESSMENT & PLAN NOTE
Has recently noticed acne outbreaks in the axillary and groin regions as well as between her breasts.  States this started back when she was pregnant.  States that the areas in her armpits become tender and red and she is having some hyperpigmentation after they resolve.  She would like to see a dermatologist regarding this.  We did discuss that there are no dermatologists in the [facility] who accept Medicaid and that usual route is to go to [place] to see dermatology.  She plans to call around for several offices to see if she can cash pay for a visit.

## 2020-01-15 ENCOUNTER — OFFICE VISIT (OUTPATIENT)
Dept: MEDICAL GROUP | Facility: MEDICAL CENTER | Age: 25
End: 2020-01-15
Attending: INTERNAL MEDICINE
Payer: MEDICAID

## 2020-01-15 VITALS
DIASTOLIC BLOOD PRESSURE: 80 MMHG | RESPIRATION RATE: 16 BRPM | OXYGEN SATURATION: 97 % | TEMPERATURE: 98.1 F | BODY MASS INDEX: 32.2 KG/M2 | WEIGHT: 164 LBS | HEIGHT: 60 IN | SYSTOLIC BLOOD PRESSURE: 118 MMHG | HEART RATE: 70 BPM

## 2020-01-15 DIAGNOSIS — Z30.9 ENCOUNTER FOR CONTRACEPTIVE MANAGEMENT, UNSPECIFIED TYPE: ICD-10-CM

## 2020-01-15 DIAGNOSIS — R07.9 CHEST PAIN, UNSPECIFIED TYPE: ICD-10-CM

## 2020-01-15 DIAGNOSIS — F33.1 MODERATE EPISODE OF RECURRENT MAJOR DEPRESSIVE DISORDER (HCC): ICD-10-CM

## 2020-01-15 DIAGNOSIS — L02.92 RECURRENT BOILS: ICD-10-CM

## 2020-01-15 DIAGNOSIS — F41.9 ANXIETY: ICD-10-CM

## 2020-01-15 DIAGNOSIS — G56.01 CARPAL TUNNEL SYNDROME OF RIGHT WRIST: ICD-10-CM

## 2020-01-15 PROBLEM — Z30.09 COUNSELING FOR BIRTH CONTROL, ORAL CONTRACEPTIVES: Status: RESOLVED | Noted: 2019-10-17 | Resolved: 2020-01-15

## 2020-01-15 LAB
INT CON NEG: NEGATIVE
INT CON POS: POSITIVE
POC URINE PREGNANCY TEST: NEGATIVE

## 2020-01-15 PROCEDURE — 81025 URINE PREGNANCY TEST: CPT | Performed by: INTERNAL MEDICINE

## 2020-01-15 PROCEDURE — 99214 OFFICE O/P EST MOD 30 MIN: CPT | Performed by: INTERNAL MEDICINE

## 2020-01-15 PROCEDURE — 99213 OFFICE O/P EST LOW 20 MIN: CPT | Performed by: INTERNAL MEDICINE

## 2020-01-15 RX ORDER — ETONOGESTREL AND ETHINYL ESTRADIOL VAGINAL RING .015; .12 MG/D; MG/D
RING VAGINAL
Qty: 1 EACH | Refills: 3 | Status: SHIPPED | OUTPATIENT
Start: 2020-01-15 | End: 2020-05-21

## 2020-01-15 RX ORDER — HYDROXYZINE HYDROCHLORIDE 25 MG/1
25 TABLET, FILM COATED ORAL 3 TIMES DAILY PRN
Qty: 30 TAB | Refills: 0 | Status: SHIPPED | OUTPATIENT
Start: 2020-01-15 | End: 2020-05-11

## 2020-01-15 RX ORDER — TRIAMCINOLONE ACETONIDE 1 MG/G
OINTMENT TOPICAL
Qty: 45 G | Refills: 3 | Status: SHIPPED | OUTPATIENT
Start: 2020-01-15 | End: 2023-08-20

## 2020-01-15 RX ORDER — BUPROPION HYDROCHLORIDE 200 MG/1
200 TABLET, EXTENDED RELEASE ORAL 2 TIMES DAILY
Qty: 60 TAB | Refills: 5 | Status: SHIPPED | OUTPATIENT
Start: 2020-01-15 | End: 2021-11-24

## 2020-01-15 ASSESSMENT — PATIENT HEALTH QUESTIONNAIRE - PHQ9
2. FEELING DOWN, DEPRESSED, IRRITABLE, OR HOPELESS: NOT AT ALL
1. LITTLE INTEREST OR PLEASURE IN DOING THINGS: NOT AT ALL
8. MOVING OR SPEAKING SO SLOWLY THAT OTHER PEOPLE COULD HAVE NOTICED. OR THE OPPOSITE, BEING SO FIGETY OR RESTLESS THAT YOU HAVE BEEN MOVING AROUND A LOT MORE THAN USUAL: NOT AT ALL
6. FEELING BAD ABOUT YOURSELF - OR THAT YOU ARE A FAILURE OR HAVE LET YOURSELF OR YOUR FAMILY DOWN: NOT AL ALL
3. TROUBLE FALLING OR STAYING ASLEEP OR SLEEPING TOO MUCH: NOT AT ALL
9. THOUGHTS THAT YOU WOULD BE BETTER OFF DEAD, OR OF HURTING YOURSELF: NOT AT ALL
SUM OF ALL RESPONSES TO PHQ QUESTIONS 1-9: 0
SUM OF ALL RESPONSES TO PHQ9 QUESTIONS 1 AND 2: 0
4. FEELING TIRED OR HAVING LITTLE ENERGY: NOT AT ALL
5. POOR APPETITE OR OVEREATING: NOT AT ALL
7. TROUBLE CONCENTRATING ON THINGS, SUCH AS READING THE NEWSPAPER OR WATCHING TELEVISION: NOT AT ALL

## 2020-01-15 ASSESSMENT — PAIN SCALES - GENERAL: PAINLEVEL: 3=SLIGHT PAIN

## 2020-01-15 NOTE — ASSESSMENT & PLAN NOTE
States she has had difficulty remembering to take the OCPs.  We had initially put her on Micronor because she was breast-feeding but she stopped doing this about a week ago.  She has not had a return of her menses yet.  She would ultimately like to get the IUD which she has previously had and done well with.  In the meantime she like to start back on the NuvaRing.

## 2020-01-15 NOTE — PROGRESS NOTES
Subjective:   Kristal Cesar is a 24 y.o. female here today for chest discomfort, change birth control, wrist pain, other medical problems discussed below    Recurrent boils  She reports episodes of recurrent boils in her genital region.  States that in the inguinal folds and around the genitals she has been getting painful sores that eventually rupture.  She has been using antibacterial soap and doing her best to keep the areas clean and dry but continues to have issues.  We did discuss a trial of topical BenzaClin however she was not able to get this covered by her insurance.  Otherwise, she has not tried any treatments however she is interested in seeing dermatology for further assistance with care.  She does not shave in the genital area but she does report sometimes the boils are associated with hair follicles.  She does not see them in her axilla or elsewhere on her body.     Chest pain  She is complaining of sharp substernal chest pain the last for several minutes.  It occurs when she is anxious or very angry.  She does not get it at any other time.  It is not associated with exertion.  It generally resolves spontaneously when she is able to calm herself down.  She has no history of cardiac disease or family history of early cardiac death or MI.  She is not a cigarette smoker.  She reports some chest tightness but denies associated difficulty breathing.      Encounter for contraceptive management  States she has had difficulty remembering to take the OCPs.  We had initially put her on Micronor because she was breast-feeding but she stopped doing this about a week ago.  She has not had a return of her menses yet.  She would ultimately like to get the IUD which she has previously had and done well with.  In the meantime she like to start back on the NuvaRing.    Carpal tunnel syndrome of right wrist  She continues to report some numbness and tingling in her right hand and some pain in her right  wrist.  Again, it is worse at night and when she wakes up in the morning.  At her last visit, we gave her a wrist brace but she has not been wearing this regularly.    Anxiety  She is currently taking Wellbutrin for her anxiety and depression.  She still reports episodes where she is getting very anxious or angry and unable to control her symptoms.  She has not seen a therapist but she is interested in doing so.  She has not tried any medications to help with these as needed bursts of anxiety.         Current medicines (including changes today)  Current Outpatient Medications   Medication Sig Dispense Refill   • ethinyl estradiol-etonogestrel (NUVARING) 0.12-0.015 MG/24HR vaginal ring Insert 1 ring intravaginally x 3 weeks then remove for 1 week 1 Each 3   • triamcinolone acetonide (KENALOG) 0.1 % Ointment Apply thin layer to rash on hands, legs, elbows twice daily for 7-10 days until resolves 45 g 3   • hydrOXYzine HCl (ATARAX) 25 MG Tab Take 1 Tab by mouth 3 times a day as needed for Itching. 30 Tab 0   • buPROPion (WELLBUTRIN SR) 200 MG SR tablet Take 1 Tab by mouth 2 times a day. 60 Tab 5   • promethazine (PHENERGAN) 25 MG Tab Take 1 Tab by mouth every 6 hours as needed for Nausea/Vomiting. 15 Tab 0     No current facility-administered medications for this visit.      She  has a past medical history of Allergy, Anxiety, Depression, and Obesity (BMI 30-39.9).    ROS   Denies chest pain, shortness of breath  As above in HPI     Objective:     Vitals:    01/15/20 1107   BP: 118/80   Pulse: 70   Resp: 16   Temp: 36.7 °C (98.1 °F)   SpO2: 97%     Body mass index is 32.03 kg/m².   Physical Exam:  Constitutional: Alert, no distress.  Skin: Warm, dry, good turgor, no rashes in visible areas.  Eye: Equal, round and reactive, conjunctiva clear, lids normal.  ENMT: Lips without lesions, good dentition, oropharynx clear, TM's clear bilaterally  Neck: Trachea midline, no masses, no thyromegaly. No cervical or supraclavicular  lymphadenopathy  Respiratory: Unlabored respiratory effort, lungs clear to auscultation, no wheezes, no ronchi.  Cardiovascular: Regular rate and rhythm, no murmurs appreciated, no lower extremity edema  Abdomen: Soft, non-tender, no masses, no hepatosplenomegaly.  Psych: Alert and oriented x3, normal affect and mood.  MSK: Positive Tinel and Phalen on right    Results and Imaging:   POC pregnancy test in clinic today is negative    Assessment and Plan:   The following treatment plan was discussed    1. Recurrent boils  At this point, she wishes to see dermatology regarding the recurrent boils in the groin area.  I have placed a referral today.  She is aware that this may be routed to [place].  - REFERRAL TO DERMATOLOGY    2. Chest pain, unspecified type  Atypical, very low risk for cardiac chest pain given her age and symptoms.  I think she is experiencing panic attacks when she is having the chest pain.  We discussed better control of her anxiety we will set her up with a therapist to work on some behavioral techniques.  If she still quite symptomatic after this could consider further work-up.    3. Moderate episode of recurrent major depressive disorder (HCC)  - REFERRAL TO PSYCHOLOGY  - buPROPion (WELLBUTRIN SR) 200 MG SR tablet; Take 1 Tab by mouth 2 times a day.  Dispense: 60 Tab; Refill: 5    4. Anxiety  - REFERRAL TO PSYCHOLOGY  - hydrOXYzine HCl (ATARAX) 25 MG Tab; Take 1 Tab by mouth 3 times a day as needed for Itching.  Dispense: 30 Tab; Refill: 0    5. Encounter for contraceptive management, unspecified type  - ethinyl estradiol-etonogestrel (NUVARING) 0.12-0.015 MG/24HR vaginal ring; Insert 1 ring intravaginally x 3 weeks then remove for 1 week  Dispense: 1 Each; Refill: 3  - POCT Pregnancy    6. Carpal tunnel syndrome of right wrist  Encouraged her to start wearing the wrist brace she was given at last visit        Followup: Return if symptoms worsen or fail to improve.

## 2020-01-15 NOTE — ASSESSMENT & PLAN NOTE
She is currently taking Wellbutrin for her anxiety and depression.  She still reports episodes where she is getting very anxious or angry and unable to control her symptoms.  She has not seen a therapist but she is interested in doing so.  She has not tried any medications to help with these as needed bursts of anxiety.

## 2020-01-15 NOTE — ASSESSMENT & PLAN NOTE
She is complaining of sharp substernal chest pain the last for several minutes.  It occurs when she is anxious or very angry.  She does not get it at any other time.  It is not associated with exertion.  It generally resolves spontaneously when she is able to calm herself down.  She has no history of cardiac disease or family history of early cardiac death or MI.  She is not a cigarette smoker.  She reports some chest tightness but denies associated difficulty breathing.

## 2020-01-15 NOTE — ASSESSMENT & PLAN NOTE
She reports episodes of recurrent boils in her genital region.  States that in the inguinal folds and around the genitals she has been getting painful sores that eventually rupture.  She has been using antibacterial soap and doing her best to keep the areas clean and dry but continues to have issues.  We did discuss a trial of topical BenzaClin however she was not able to get this covered by her insurance.  Otherwise, she has not tried any treatments however she is interested in seeing dermatology for further assistance with care.  She does not shave in the genital area but she does report sometimes the boils are associated with hair follicles.  She does not see them in her axilla or elsewhere on her body.

## 2020-01-15 NOTE — ASSESSMENT & PLAN NOTE
She continues to report some numbness and tingling in her right hand and some pain in her right wrist.  Again, it is worse at night and when she wakes up in the morning.  At her last visit, we gave her a wrist brace but she has not been wearing this regularly.

## 2020-01-21 ENCOUNTER — TELEPHONE (OUTPATIENT)
Dept: PEDIATRICS | Facility: CLINIC | Age: 25
End: 2020-01-21

## 2020-01-21 DIAGNOSIS — Z13.31 POSITIVE DEPRESSION SCREENING: ICD-10-CM

## 2020-04-01 ENCOUNTER — PATIENT MESSAGE (OUTPATIENT)
Dept: MEDICAL GROUP | Facility: MEDICAL CENTER | Age: 25
End: 2020-04-01

## 2020-04-01 DIAGNOSIS — B37.31 VAGINAL CANDIDA: ICD-10-CM

## 2020-04-01 DIAGNOSIS — G47.00 INSOMNIA, UNSPECIFIED TYPE: ICD-10-CM

## 2020-04-01 RX ORDER — FLUCONAZOLE 150 MG/1
150 TABLET ORAL DAILY
Qty: 1 TAB | Refills: 0 | Status: SHIPPED | OUTPATIENT
Start: 2020-04-01 | End: 2020-07-02

## 2020-04-01 RX ORDER — TRAZODONE HYDROCHLORIDE 100 MG/1
100 TABLET ORAL NIGHTLY PRN
Qty: 30 TAB | Refills: 3 | Status: SHIPPED | OUTPATIENT
Start: 2020-04-01 | End: 2020-10-20

## 2020-05-10 DIAGNOSIS — B37.31 VAGINAL CANDIDA: ICD-10-CM

## 2020-05-10 DIAGNOSIS — F41.9 ANXIETY: ICD-10-CM

## 2020-05-12 RX ORDER — HYDROXYZINE HYDROCHLORIDE 25 MG/1
25 TABLET, FILM COATED ORAL 3 TIMES DAILY PRN
Qty: 30 TAB | Refills: 0 | Status: SHIPPED | OUTPATIENT
Start: 2020-05-12 | End: 2020-07-02

## 2020-05-12 RX ORDER — FLUCONAZOLE 150 MG/1
TABLET ORAL
Qty: 1 TAB | Refills: 0 | OUTPATIENT
Start: 2020-05-12

## 2020-06-10 ENCOUNTER — HOSPITAL ENCOUNTER (OUTPATIENT)
Dept: LAB | Facility: MEDICAL CENTER | Age: 25
End: 2020-06-10
Attending: SPECIALIST
Payer: MEDICAID

## 2020-06-10 PROCEDURE — 87591 N.GONORRHOEAE DNA AMP PROB: CPT

## 2020-06-10 PROCEDURE — 87624 HPV HI-RISK TYP POOLED RSLT: CPT

## 2020-06-10 PROCEDURE — 87491 CHLMYD TRACH DNA AMP PROBE: CPT

## 2020-06-10 PROCEDURE — 88175 CYTOPATH C/V AUTO FLUID REDO: CPT

## 2020-06-12 LAB
C TRACH DNA GENITAL QL NAA+PROBE: NEGATIVE
CYTOLOGY REG CYTOL: NORMAL
HPV HR 12 DNA CVX QL NAA+PROBE: NEGATIVE
HPV16 DNA SPEC QL NAA+PROBE: NEGATIVE
HPV18 DNA SPEC QL NAA+PROBE: NEGATIVE
N GONORRHOEA DNA GENITAL QL NAA+PROBE: NEGATIVE
SPECIMEN SOURCE: NORMAL
SPECIMEN SOURCE: NORMAL

## 2020-07-02 DIAGNOSIS — F41.9 ANXIETY: ICD-10-CM

## 2020-07-02 DIAGNOSIS — B37.31 VAGINAL CANDIDA: ICD-10-CM

## 2020-07-02 RX ORDER — HYDROXYZINE HYDROCHLORIDE 25 MG/1
25 TABLET, FILM COATED ORAL 3 TIMES DAILY PRN
Qty: 30 TAB | Refills: 0 | Status: SHIPPED | OUTPATIENT
Start: 2020-07-02 | End: 2021-11-24

## 2020-07-02 RX ORDER — FLUCONAZOLE 150 MG/1
TABLET ORAL
Qty: 1 TAB | Refills: 0 | Status: SHIPPED | OUTPATIENT
Start: 2020-07-02 | End: 2021-11-24

## 2020-10-20 DIAGNOSIS — G47.00 INSOMNIA, UNSPECIFIED TYPE: ICD-10-CM

## 2020-10-20 RX ORDER — TRAZODONE HYDROCHLORIDE 100 MG/1
TABLET ORAL
Qty: 30 TAB | Refills: 3 | Status: SHIPPED | OUTPATIENT
Start: 2020-10-20 | End: 2021-02-10

## 2020-10-20 NOTE — TELEPHONE ENCOUNTER
Received request via: Pharmacy    Was the patient seen in the last year in this department? Yes    Does the patient have an active prescription (recently filled or refills available) for medication(s) requested? No

## 2021-01-26 DIAGNOSIS — Z30.9 ENCOUNTER FOR CONTRACEPTIVE MANAGEMENT, UNSPECIFIED TYPE: ICD-10-CM

## 2021-01-26 RX ORDER — ETONOGESTREL AND ETHINYL ESTRADIOL VAGINAL RING .015; .12 MG/D; MG/D
RING VAGINAL
Qty: 1 EACH | Refills: 11 | Status: SHIPPED | OUTPATIENT
Start: 2021-01-26 | End: 2021-11-24

## 2021-02-10 ENCOUNTER — OFFICE VISIT (OUTPATIENT)
Dept: MEDICAL GROUP | Facility: MEDICAL CENTER | Age: 26
End: 2021-02-10
Attending: INTERNAL MEDICINE
Payer: MEDICAID

## 2021-02-10 VITALS
BODY MASS INDEX: 28.66 KG/M2 | HEART RATE: 96 BPM | OXYGEN SATURATION: 98 % | SYSTOLIC BLOOD PRESSURE: 122 MMHG | WEIGHT: 146 LBS | DIASTOLIC BLOOD PRESSURE: 82 MMHG | RESPIRATION RATE: 16 BRPM | TEMPERATURE: 97.1 F | HEIGHT: 60 IN

## 2021-02-10 DIAGNOSIS — B37.89 CANDIDA RASH OF GROIN: ICD-10-CM

## 2021-02-10 DIAGNOSIS — F41.9 ANXIETY: ICD-10-CM

## 2021-02-10 DIAGNOSIS — F33.1 MODERATE EPISODE OF RECURRENT MAJOR DEPRESSIVE DISORDER (HCC): ICD-10-CM

## 2021-02-10 DIAGNOSIS — F51.01 PRIMARY INSOMNIA: ICD-10-CM

## 2021-02-10 DIAGNOSIS — G56.01 CARPAL TUNNEL SYNDROME OF RIGHT WRIST: ICD-10-CM

## 2021-02-10 PROCEDURE — 99213 OFFICE O/P EST LOW 20 MIN: CPT | Performed by: INTERNAL MEDICINE

## 2021-02-10 PROCEDURE — 99214 OFFICE O/P EST MOD 30 MIN: CPT | Performed by: INTERNAL MEDICINE

## 2021-02-10 RX ORDER — NYSTATIN 100000 U/G
OINTMENT TOPICAL
Qty: 15 G | Refills: 1 | Status: SHIPPED | OUTPATIENT
Start: 2021-02-10 | End: 2021-11-24

## 2021-02-10 ASSESSMENT — FIBROSIS 4 INDEX: FIB4 SCORE: 0.32

## 2021-02-11 PROBLEM — L70.9 ACNE: Status: RESOLVED | Noted: 2019-11-27 | Resolved: 2021-02-11

## 2021-02-11 PROBLEM — R07.9 CHEST PAIN: Status: RESOLVED | Noted: 2020-01-15 | Resolved: 2021-02-11

## 2021-02-11 PROBLEM — B37.89 CANDIDA RASH OF GROIN: Status: ACTIVE | Noted: 2021-02-11

## 2021-02-11 NOTE — ASSESSMENT & PLAN NOTE
She reports a lot of difficulty sleeping at night.  She usually falls asleep around 10 and sleeps till 1 a.m.  She wakes up and is up for about an hour and then sleeps from 2 AM to 5 AM.  She wakes up with her kids around 6 and then goes to bed as soon as they are in school, sleeping from 8 to 10 AM.  She will get up in the middle of the day to do something but typically takes another long nap from 4 to 7 PM.  In the past, she had used trazodone but states that her sleep on this is so deep that she cannot hear her 2-year-old if he wakes up so she prefers not to take it.

## 2021-02-11 NOTE — ASSESSMENT & PLAN NOTE
She has noticed hyperpigmentation and itchiness in her groin and axillary regions for the past several years.

## 2021-02-11 NOTE — ASSESSMENT & PLAN NOTE
She continues to complain of right wrist pain especially with flexion and extension of the wrist.  She also reports numbness and tingling in the hand.  We had tried her on nocturnal wrist bracing but she does not wear this consistently.  She is interested in doing some physical therapy to help with the pain.  She is right-handed.

## 2021-02-11 NOTE — PROGRESS NOTES
Subjective:   Kristal Cesar is a 25 y.o. female here today for worsening anxiety, depression, rash, wrist pain    Moderate episode of recurrent major depressive disorder (HCC)  She reports that her depression has been worsening lately.  She complains of difficulty with sleep, trouble concentrating, mood swings, easy tearfulness, and lack of motivation.  She is taking her Wellbutrin 200 mg twice daily which had been helpful for her in the past but is not currently helping much.  Denies SI.    Primary insomnia  She reports a lot of difficulty sleeping at night.  She usually falls asleep around 10 and sleeps till 1 a.m.  She wakes up and is up for about an hour and then sleeps from 2 AM to 5 AM.  She wakes up with her kids around 6 and then goes to bed as soon as they are in school, sleeping from 8 to 10 AM.  She will get up in the middle of the day to do something but typically takes another long nap from 4 to 7 PM.  In the past, she had used trazodone but states that her sleep on this is so deep that she cannot hear her 2-year-old if he wakes up so she prefers not to take it.      Candida rash of groin  She has noticed hyperpigmentation and itchiness in her groin and axillary regions for the past several years.    Carpal tunnel syndrome of right wrist  She continues to complain of right wrist pain especially with flexion and extension of the wrist.  She also reports numbness and tingling in the hand.  We had tried her on nocturnal wrist bracing but she does not wear this consistently.  She is interested in doing some physical therapy to help with the pain.  She is right-handed.       Current medicines (including changes today)  Current Outpatient Medications   Medication Sig Dispense Refill   • nystatin (MYCOSTATIN) 133108 UNIT/GM Ointment Apply twice daily to itchy/darkend patches of skin in groin and armpits 15 g 1   • ethinyl estradiol-etonogestrel (NUVARING) 0.12-0.015 MG/24HR vaginal ring INSERT 1  RING INTRAVAGINALLY X 3 WEEKS THEN REMOVE FOR 1 WEEK 1 Each 11   • hydrOXYzine HCl (ATARAX) 25 MG Tab TAKE 1 TAB BY MOUTH 3 TIMES A DAY AS NEEDED FOR ITCHING. 30 Tab 0   • fluconazole (DIFLUCAN) 150 MG tablet TAKE 1 TABLET BY MOUTH EVERY DAY 1 Tab 0   • triamcinolone acetonide (KENALOG) 0.1 % Ointment Apply thin layer to rash on hands, legs, elbows twice daily for 7-10 days until resolves 45 g 3   • buPROPion (WELLBUTRIN SR) 200 MG SR tablet Take 1 Tab by mouth 2 times a day. 60 Tab 5   • promethazine (PHENERGAN) 25 MG Tab Take 1 Tab by mouth every 6 hours as needed for Nausea/Vomiting. 15 Tab 0     No current facility-administered medications for this visit.     She  has a past medical history of Allergy, Anxiety, Depression, and Obesity (BMI 30-39.9).    ROS   Denies chest pain, shortness of breath  As above in HPI     Objective:     Vitals:    02/10/21 1616   BP: 122/82   Pulse: 96   Resp: 16   Temp: 36.2 °C (97.1 °F)   SpO2: 98%     Body mass index is 28.51 kg/m².   Physical Exam:  Constitutional: Alert, no distress.  Skin: Warm, dry, good turgor, hyperpigmentation of bilateral axillary and groin regions consistent with Candida rash  Eye: Equal, round and reactive, conjunctiva clear, lids normal.  Psych: Alert and oriented x3, depressed mood, tearful throughout visit, denies SI        Assessment and Plan:   The following treatment plan was discussed    1. Moderate episode of recurrent major depressive disorder (HCC)  Uncontrolled and worsening despite current medications.  We have tried numerous therapies including multiple SSRIs as well as Wellbutrin and hydroxyzine.  At this point, she is interested in seeing a psychiatrist as well as a therapist for definitive diagnosis and care and I have placed this referral today.  Until she is able to establish she will continue her current meds.  - REFERRAL TO PSYCHOLOGY  - REFERRAL TO PSYCHIATRY    2. Primary insomnia  She does not want to use trazodone as it makes her  too sleepy and she does not think she will be able to hear her children if they wake up.  She would like to see a psychiatrist for further assistance.  - REFERRAL TO PSYCHOLOGY  - REFERRAL TO PSYCHIATRY    3. Anxiety  With minimal benefit from hydroxyzine and numerous SSRIs as well as Wellbutrin  - REFERRAL TO PSYCHOLOGY  - REFERRAL TO PSYCHIATRY    4. Carpal tunnel syndrome of right wrist  She desires to do physical therapy.  We have given her a wrist brace and we discussed regular nocturnal use to improve her symptoms as well  - REFERRAL TO PHYSICAL THERAPY    5. Candida rash of groin  - nystatin (MYCOSTATIN) 697849 UNIT/GM Ointment; Apply twice daily to itchy/darkend patches of skin in groin and armpits  Dispense: 15 g; Refill: 1        Followup: Return if symptoms worsen or fail to improve.

## 2021-02-11 NOTE — ASSESSMENT & PLAN NOTE
She reports that her depression has been worsening lately.  She complains of difficulty with sleep, trouble concentrating, mood swings, easy tearfulness, and lack of motivation.  She is taking her Wellbutrin 200 mg twice daily which had been helpful for her in the past but is not currently helping much.  Denies SI.

## 2021-03-09 ENCOUNTER — APPOINTMENT (OUTPATIENT)
Dept: PHYSICAL THERAPY | Facility: REHABILITATION | Age: 26
End: 2021-03-09
Attending: INTERNAL MEDICINE
Payer: MEDICAID

## 2021-03-30 ENCOUNTER — APPOINTMENT (OUTPATIENT)
Dept: PHYSICAL THERAPY | Facility: REHABILITATION | Age: 26
End: 2021-03-30
Payer: MEDICAID

## 2021-04-06 ENCOUNTER — APPOINTMENT (OUTPATIENT)
Dept: PHYSICAL THERAPY | Facility: REHABILITATION | Age: 26
End: 2021-04-06
Attending: INTERNAL MEDICINE
Payer: MEDICAID

## 2021-04-13 ENCOUNTER — APPOINTMENT (OUTPATIENT)
Dept: PHYSICAL THERAPY | Facility: REHABILITATION | Age: 26
End: 2021-04-13
Payer: MEDICAID

## 2021-04-20 ENCOUNTER — APPOINTMENT (OUTPATIENT)
Dept: PHYSICAL THERAPY | Facility: REHABILITATION | Age: 26
End: 2021-04-20
Payer: MEDICAID

## 2021-11-24 ENCOUNTER — OFFICE VISIT (OUTPATIENT)
Dept: MEDICAL GROUP | Facility: MEDICAL CENTER | Age: 26
End: 2021-11-24
Attending: INTERNAL MEDICINE
Payer: MEDICAID

## 2021-11-24 ENCOUNTER — HOSPITAL ENCOUNTER (OUTPATIENT)
Facility: MEDICAL CENTER | Age: 26
End: 2021-11-24
Attending: INTERNAL MEDICINE
Payer: MEDICAID

## 2021-11-24 VITALS
RESPIRATION RATE: 16 BRPM | HEART RATE: 78 BPM | DIASTOLIC BLOOD PRESSURE: 68 MMHG | WEIGHT: 137.1 LBS | TEMPERATURE: 98.5 F | HEIGHT: 60 IN | BODY MASS INDEX: 26.92 KG/M2 | OXYGEN SATURATION: 97 % | SYSTOLIC BLOOD PRESSURE: 98 MMHG

## 2021-11-24 DIAGNOSIS — N92.6 MISSED PERIOD: ICD-10-CM

## 2021-11-24 DIAGNOSIS — Z11.3 SCREEN FOR STD (SEXUALLY TRANSMITTED DISEASE): ICD-10-CM

## 2021-11-24 DIAGNOSIS — F41.9 ANXIETY: ICD-10-CM

## 2021-11-24 DIAGNOSIS — Z14.8: ICD-10-CM

## 2021-11-24 DIAGNOSIS — N90.89 PERINEAL CYST IN FEMALE: ICD-10-CM

## 2021-11-24 DIAGNOSIS — Z3A.01 LESS THAN 8 WEEKS GESTATION OF PREGNANCY: ICD-10-CM

## 2021-11-24 DIAGNOSIS — Z23 NEED FOR VACCINATION: ICD-10-CM

## 2021-11-24 PROBLEM — Z30.9 ENCOUNTER FOR CONTRACEPTIVE MANAGEMENT: Status: RESOLVED | Noted: 2019-10-17 | Resolved: 2021-11-24

## 2021-11-24 PROBLEM — B37.89 CANDIDA RASH OF GROIN: Status: RESOLVED | Noted: 2021-02-11 | Resolved: 2021-11-24

## 2021-11-24 PROCEDURE — 87660 TRICHOMONAS VAGIN DIR PROBE: CPT

## 2021-11-24 PROCEDURE — 87591 N.GONORRHOEAE DNA AMP PROB: CPT

## 2021-11-24 PROCEDURE — 99214 OFFICE O/P EST MOD 30 MIN: CPT | Performed by: INTERNAL MEDICINE

## 2021-11-24 PROCEDURE — 87510 GARDNER VAG DNA DIR PROBE: CPT

## 2021-11-24 PROCEDURE — 87480 CANDIDA DNA DIR PROBE: CPT

## 2021-11-24 PROCEDURE — 99213 OFFICE O/P EST LOW 20 MIN: CPT | Performed by: INTERNAL MEDICINE

## 2021-11-24 PROCEDURE — 87491 CHLMYD TRACH DNA AMP PROBE: CPT

## 2021-11-24 PROCEDURE — 90686 IIV4 VACC NO PRSV 0.5 ML IM: CPT

## 2021-11-24 RX ORDER — PRENATAL VIT/IRON FUM/FOLIC AC 27MG-0.8MG
1 TABLET ORAL DAILY
Qty: 30 TABLET | Refills: 11 | Status: SHIPPED | OUTPATIENT
Start: 2021-11-24 | End: 2022-06-15

## 2021-11-24 NOTE — ASSESSMENT & PLAN NOTE
She notes there is a raised bump in the perineal area on the left.  It is nontender but she does think it has gotten larger over the past few months to year.  It is not tender but it bothers her because it rubs on her underwear and becomes irritated.

## 2021-11-24 NOTE — ASSESSMENT & PLAN NOTE
Currently sexually active with her boyfriend.  She is asymptomatic but she is requesting STI testing for any vaginal pathogens.  She declines HIV and syphilis testing.

## 2021-11-24 NOTE — PROGRESS NOTES
"Subjective:   Kristal Cesar is a 26 y.o. female here today for pregnancy and STI testing, genetic testing review    Less than 8 weeks gestation of pregnancy  She was using NuvaRing but believes that in October she was late to get it started.  She noticed some light bleeding in early October but it was not a regular full period.  Has been using her NuvaRing since.  Complains of nausea and vomiting in the mornings as well as fatigue.  Pregnancy test in clinic today is positive.    Anxiety  She reports increased stress as she has now in school.  In addition, she found out she is pregnant today.  She would like to reconnect with a therapist that she states that this was helpful for her in the past.    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  States that she has not been given the results of her genetic testing in writing but she will be getting that soon.  She is not sure if she was BRCA positive or has something else.    Screen for STD (sexually transmitted disease)  Currently sexually active with her boyfriend.  She is asymptomatic but she is requesting STI testing for any vaginal pathogens.  She declines HIV and syphilis testing.    Perineal cyst in female  She notes there is a raised bump in the perineal area on the left.  It is nontender but she does think it has gotten larger over the past few months to year.  It is not tender but it bothers her because it rubs on her underwear and becomes irritated.         Current medicines (including changes today)  Current Outpatient Medications   Medication Sig Dispense Refill   • Prenatal Vit-Fe Fumarate-FA (PRENATAL VITAMIN WITH FE/FA) 27-0.8 MG Tab Take 1 Tablet by mouth every day. 30 Tablet 11   • triamcinolone acetonide (KENALOG) 0.1 % Ointment Apply thin layer to rash on hands, legs, elbows twice daily for 7-10 days until resolves 45 g " 3     No current facility-administered medications for this visit.     She  has a past medical history of Allergy, Anxiety, Depression, and Obesity (BMI 30-39.9).    ROS   Denies chest pain, shortness of breath  As above in HPI     Objective:     Vitals:    11/24/21 1105   BP: (!) 98/68   Pulse: 78   Resp: 16   Temp: 36.9 °C (98.5 °F)   SpO2: 97%     Body mass index is 26.78 kg/m².   Physical Exam:  Constitutional: Alert, no distress.  Skin: Warm, dry, good turgor, no rashes in visible areas.  Eye: Equal, round and reactive, conjunctiva clear, lids normal.  : approx 8 mm firm round raised papule over left perineum without induration or tenderness  Psych: Alert and oriented x3, normal affect and mood.      Results and Imaging:   POC pregnancy test in clinic today was positive    Assessment and Plan:   The following treatment plan was discussed    1. Screen for STD (sexually transmitted disease)  - CHLAMYDIA/GC PCR URINE OR SWAB; Future  - VAGINAL PATHOGENS DNA PANEL; Future    2. Missed period  - POCT Pregnancy    3. Less than 8 weeks gestation of pregnancy  Unclear gestational age.  She is requesting referral to new OB provider.  She does not wish to be seen through [facility] or Dr. Saini.  -OTC B6/unisom for nausea  - Referral to OB/Gyn  - Prenatal Vit-Fe Fumarate-FA (PRENATAL VITAMIN WITH FE/FA) 27-0.8 MG Tab; Take 1 Tablet by mouth every day.  Dispense: 30 Tablet; Refill: 11    4. Anxiety  Has worsened with increase of stress.  She stopped her Wellbutrin.  She would like to reconnect with a counselor.  - Referral to Psychology    5. 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 Need for vaccination  - INFLUENZA VACCINE QUAD INJ (PF)     7. Perineal cyst  No evidence of infection.  Discussed that she would like to have it removed eventually.  She will address this with her OB provider.  Could consider gen surg  referral once no longer pregnant.      Followup: Return if symptoms worsen or fail to improve.

## 2021-11-24 NOTE — ASSESSMENT & PLAN NOTE
She reports increased stress as she has now in school.  In addition, she found out she is pregnant today.  She would like to reconnect with a therapist that she states that this was helpful for her in the past.

## 2021-11-24 NOTE — ASSESSMENT & PLAN NOTE
She was using NuvaRing but believes that in October she was late to get it started.  She noticed some light bleeding in early October but it was not a regular full period.  Has been using her NuvaRing since.  Complains of nausea and vomiting in the mornings as well as fatigue.  Pregnancy test in clinic today is positive.

## 2021-11-24 NOTE — ASSESSMENT & PLAN NOTE
"She states that she was able to do some genetic testing and she \"has something that makes me more likely to develop breast cancer\".  States that she has not been given the results of her genetic testing in writing but she will be getting that soon.  She is not sure if she was BRCA positive or has something else.  "

## 2021-11-25 LAB
C TRACH DNA SPEC QL NAA+PROBE: NEGATIVE
CANDIDA DNA VAG QL PROBE+SIG AMP: POSITIVE
G VAGINALIS DNA VAG QL PROBE+SIG AMP: NEGATIVE
N GONORRHOEA DNA SPEC QL NAA+PROBE: NEGATIVE
SPECIMEN SOURCE: NORMAL
T VAGINALIS DNA VAG QL PROBE+SIG AMP: NEGATIVE

## 2021-11-29 DIAGNOSIS — B37.9 YEAST INFECTION: ICD-10-CM

## 2021-12-17 ENCOUNTER — APPOINTMENT (OUTPATIENT)
Dept: RADIOLOGY | Facility: MEDICAL CENTER | Age: 26
End: 2021-12-17
Attending: EMERGENCY MEDICINE
Payer: MEDICAID

## 2021-12-17 ENCOUNTER — HOSPITAL ENCOUNTER (EMERGENCY)
Facility: MEDICAL CENTER | Age: 26
End: 2021-12-17
Attending: EMERGENCY MEDICINE
Payer: MEDICAID

## 2021-12-17 VITALS
RESPIRATION RATE: 16 BRPM | HEIGHT: 60 IN | SYSTOLIC BLOOD PRESSURE: 122 MMHG | OXYGEN SATURATION: 98 % | TEMPERATURE: 97 F | DIASTOLIC BLOOD PRESSURE: 72 MMHG | BODY MASS INDEX: 25.58 KG/M2 | WEIGHT: 130.29 LBS | HEART RATE: 72 BPM

## 2021-12-17 DIAGNOSIS — R11.2 NON-INTRACTABLE VOMITING WITH NAUSEA, UNSPECIFIED VOMITING TYPE: ICD-10-CM

## 2021-12-17 DIAGNOSIS — R73.9 HYPERGLYCEMIA: ICD-10-CM

## 2021-12-17 DIAGNOSIS — Z3A.11 11 WEEKS GESTATION OF PREGNANCY: ICD-10-CM

## 2021-12-17 LAB
ALBUMIN SERPL BCP-MCNC: 4.8 G/DL (ref 3.2–4.9)
ALBUMIN/GLOB SERPL: 1.6 G/DL
ALP SERPL-CCNC: 80 U/L (ref 30–99)
ALT SERPL-CCNC: 18 U/L (ref 2–50)
ANION GAP SERPL CALC-SCNC: 19 MMOL/L (ref 7–16)
APPEARANCE UR: CLEAR
AST SERPL-CCNC: 19 U/L (ref 12–45)
B-HCG SERPL-ACNC: ABNORMAL MIU/ML (ref 0–5)
BACTERIA #/AREA URNS HPF: NEGATIVE /HPF
BASOPHILS # BLD AUTO: 0.1 % (ref 0–1.8)
BASOPHILS # BLD: 0.02 K/UL (ref 0–0.12)
BILIRUB SERPL-MCNC: 0.9 MG/DL (ref 0.1–1.5)
BILIRUB UR QL STRIP.AUTO: ABNORMAL
BUN SERPL-MCNC: 8 MG/DL (ref 8–22)
CALCIUM SERPL-MCNC: 9.5 MG/DL (ref 8.5–10.5)
CHLORIDE SERPL-SCNC: 105 MMOL/L (ref 96–112)
CO2 SERPL-SCNC: 21 MMOL/L (ref 20–33)
COLOR UR: ABNORMAL
CREAT SERPL-MCNC: 0.43 MG/DL (ref 0.5–1.4)
EOSINOPHIL # BLD AUTO: 0 K/UL (ref 0–0.51)
EOSINOPHIL NFR BLD: 0 % (ref 0–6.9)
EPI CELLS #/AREA URNS HPF: ABNORMAL /HPF
ERYTHROCYTE [DISTWIDTH] IN BLOOD BY AUTOMATED COUNT: 36.3 FL (ref 35.9–50)
GLOBULIN SER CALC-MCNC: 3 G/DL (ref 1.9–3.5)
GLUCOSE SERPL-MCNC: 162 MG/DL (ref 65–99)
GLUCOSE UR STRIP.AUTO-MCNC: NEGATIVE MG/DL
HCT VFR BLD AUTO: 43.6 % (ref 37–47)
HGB BLD-MCNC: 15.2 G/DL (ref 12–16)
HYALINE CASTS #/AREA URNS LPF: ABNORMAL /LPF
IMM GRANULOCYTES # BLD AUTO: 0.07 K/UL (ref 0–0.11)
IMM GRANULOCYTES NFR BLD AUTO: 0.4 % (ref 0–0.9)
KETONES UR STRIP.AUTO-MCNC: >=160 MG/DL
LEUKOCYTE ESTERASE UR QL STRIP.AUTO: ABNORMAL
LIPASE SERPL-CCNC: 11 U/L (ref 11–82)
LYMPHOCYTES # BLD AUTO: 0.94 K/UL (ref 1–4.8)
LYMPHOCYTES NFR BLD: 6 % (ref 22–41)
MCH RBC QN AUTO: 28.6 PG (ref 27–33)
MCHC RBC AUTO-ENTMCNC: 34.9 G/DL (ref 33.6–35)
MCV RBC AUTO: 82 FL (ref 81.4–97.8)
MICRO URNS: ABNORMAL
MONOCYTES # BLD AUTO: 0.16 K/UL (ref 0–0.85)
MONOCYTES NFR BLD AUTO: 1 % (ref 0–13.4)
NEUTROPHILS # BLD AUTO: 14.39 K/UL (ref 2–7.15)
NEUTROPHILS NFR BLD: 92.5 % (ref 44–72)
NITRITE UR QL STRIP.AUTO: NEGATIVE
NRBC # BLD AUTO: 0 K/UL
NRBC BLD-RTO: 0 /100 WBC
PH UR STRIP.AUTO: 6 [PH] (ref 5–8)
PLATELET # BLD AUTO: 315 K/UL (ref 164–446)
PMV BLD AUTO: 11.8 FL (ref 9–12.9)
POTASSIUM SERPL-SCNC: 3.4 MMOL/L (ref 3.6–5.5)
PROT SERPL-MCNC: 7.8 G/DL (ref 6–8.2)
PROT UR QL STRIP: 100 MG/DL
RBC # BLD AUTO: 5.32 M/UL (ref 4.2–5.4)
RBC # URNS HPF: ABNORMAL /HPF
RBC UR QL AUTO: ABNORMAL
RENAL EPI CELLS #/AREA URNS HPF: ABNORMAL /HPF
SODIUM SERPL-SCNC: 145 MMOL/L (ref 135–145)
SP GR UR STRIP.AUTO: 1.03
UROBILINOGEN UR STRIP.AUTO-MCNC: 0.2 MG/DL
WBC # BLD AUTO: 15.6 K/UL (ref 4.8–10.8)
WBC #/AREA URNS HPF: ABNORMAL /HPF

## 2021-12-17 PROCEDURE — 700105 HCHG RX REV CODE 258: Performed by: EMERGENCY MEDICINE

## 2021-12-17 PROCEDURE — 81001 URINALYSIS AUTO W/SCOPE: CPT

## 2021-12-17 PROCEDURE — 80053 COMPREHEN METABOLIC PANEL: CPT

## 2021-12-17 PROCEDURE — 84702 CHORIONIC GONADOTROPIN TEST: CPT

## 2021-12-17 PROCEDURE — 700111 HCHG RX REV CODE 636 W/ 250 OVERRIDE (IP): Performed by: EMERGENCY MEDICINE

## 2021-12-17 PROCEDURE — 96374 THER/PROPH/DIAG INJ IV PUSH: CPT

## 2021-12-17 PROCEDURE — 99285 EMERGENCY DEPT VISIT HI MDM: CPT

## 2021-12-17 PROCEDURE — 85025 COMPLETE CBC W/AUTO DIFF WBC: CPT

## 2021-12-17 PROCEDURE — 76801 OB US < 14 WKS SINGLE FETUS: CPT

## 2021-12-17 PROCEDURE — 83690 ASSAY OF LIPASE: CPT

## 2021-12-17 RX ORDER — ONDANSETRON 2 MG/ML
4 INJECTION INTRAMUSCULAR; INTRAVENOUS ONCE
Status: COMPLETED | OUTPATIENT
Start: 2021-12-17 | End: 2021-12-17

## 2021-12-17 RX ORDER — SODIUM CHLORIDE 9 MG/ML
1000 INJECTION, SOLUTION INTRAVENOUS ONCE
Status: COMPLETED | OUTPATIENT
Start: 2021-12-17 | End: 2021-12-17

## 2021-12-17 RX ORDER — ONDANSETRON 4 MG/1
4 TABLET, ORALLY DISINTEGRATING ORAL EVERY 8 HOURS PRN
Qty: 10 TABLET | Refills: 0 | Status: SHIPPED | OUTPATIENT
Start: 2021-12-17 | End: 2021-12-20

## 2021-12-17 RX ADMIN — ONDANSETRON 4 MG: 2 INJECTION INTRAMUSCULAR; INTRAVENOUS at 14:47

## 2021-12-17 RX ADMIN — SODIUM CHLORIDE 1000 ML: 9 INJECTION, SOLUTION INTRAVENOUS at 14:47

## 2021-12-17 ASSESSMENT — LIFESTYLE VARIABLES: DO YOU DRINK ALCOHOL: NO

## 2021-12-17 NOTE — DISCHARGE INSTRUCTIONS
Take the medication as directed. Drink plenty of fluids and rest. Any bleeding, abdominal pain, fever or concerns return.

## 2021-12-17 NOTE — ED PROVIDER NOTES
ED Provider Note    Scribed for Kitty Sung M.D. by Keyanna Hope. 2021, 1:34 PM.    Primary care provider: Margareth Villegas M.D.  Means of arrival: Wheel chair  History obtained from: Patient  History limited by: None    CHIEF COMPLAINT  Chief Complaint   Patient presents with   • Nausea/Vomiting/Diarrhea     n/v and vomiting, worse to day with diarrhea when vomiting.     • Pregnancy     ; unsure of LMP.    • Abdominal Cramping       HPI  Kristal Cesar is a 26 y.o. female who presents to the Emergency Department for nausea, vomiting, and diarrhea with an onset of 2 weeks ago, but worsening since this morning. She describes her vomit is yellow in color. She states her diarrhea episodes occur while she is actively vomiting. Patient denies eating any new or unusual foods. Her LMP was approximately 2 months ago. She reports associated generalized abdominal cramping, weight loss, vaginal odor, and chills. She denies any associated bloody emesis, vaginal bleeding, or fever. No alleviating or exacerbating factors were identified. Obstetrics history is . She denies any medication allergies.     REVIEW OF SYSTEMS  Pertinent positives include nausea, vomiting, diarrhea, generalized abdominal cramping, weight loss, vaginal odor, and chills. Pertinent negatives include no hematemesis, vaginal bleeding, or fever. All other systems reviewed and negative.     PAST MEDICAL HISTORY   has a past medical history of Allergy, Anxiety, Depression, and Obesity (BMI 30-39.9).    SURGICAL HISTORY   has a past surgical history that includes ariana by laparoscopy (3/7/2016) and other.    SOCIAL HISTORY  Social History     Tobacco Use   • Smoking status: Never Smoker   • Smokeless tobacco: Never Used   Substance Use Topics   • Alcohol use: No   • Drug use: No      Social History     Substance and Sexual Activity   Drug Use No       FAMILY HISTORY  Family History   Problem Relation Age of Onset   • No Known  "Problems Mother    • Diabetes Father    • Hypertension Father    • Hyperlipidemia Father    • No Known Problems Sister    • Cancer Neg Hx        CURRENT MEDICATIONS  Home Medications     Reviewed by Omayra Rowland R.N. (Registered Nurse) on 12/17/21 at 1301  Med List Status: Partial   Medication Last Dose Status   Prenatal Vit-Fe Fumarate-FA (PRENATAL VITAMIN WITH FE/FA) 27-0.8 MG Tab  Active   triamcinolone acetonide (KENALOG) 0.1 % Ointment  Active                ALLERGIES  Allergies   Allergen Reactions   • Other Environmental Rash     \"dogs\" - \"my skin turns red\"       PHYSICAL EXAM  VITAL SIGNS: /73   Pulse 70   Temp 36.3 °C (97.4 °F) (Oral)   Resp 20   Ht 1.524 m (5')   Wt 59.1 kg (130 lb 4.7 oz)   SpO2 100% Comment: RA  BMI 25.45 kg/m²   Constitutional: Well developed, Mild distress, Appears uncomfortable, Non-toxic appearance.   HENT: Normocephalic, Atraumatic, Bilateral external ears normal, Oropharynx dry, Nose normal.   Eyes: PERRL, EOMI, Conjunctiva normal.   Neck: Normal range of motion, No tenderness, Supple.     Cardiovascular: Normal heart rate, Normal rhythm.   Thorax & Lungs: Normal breath sounds, No respiratory distress.    Abdomen: Benign abdominal exam, no tenderness, no distention, no guarding, no rebound. No focal right lower quadrant tenderness.   Skin: Warm, Dry, No erythema, No rash.   Back: No tenderness, No CVA tenderness.   Extremities: Intact distal pulses, No edema, No tenderness   Neurologic: Alert & oriented x 3, Normal motor function, Normal sensory function, No focal deficits noted.  Psychiatric: Appropriate                                                     DIAGNOSTIC STUDIES / PROCEDURES\    LABS  Results for orders placed or performed during the hospital encounter of 12/17/21   CBC WITH DIFFERENTIAL   Result Value Ref Range    WBC 15.6 (H) 4.8 - 10.8 K/uL    RBC 5.32 4.20 - 5.40 M/uL    Hemoglobin 15.2 12.0 - 16.0 g/dL    Hematocrit 43.6 37.0 - 47.0 %    MCV " 82.0 81.4 - 97.8 fL    MCH 28.6 27.0 - 33.0 pg    MCHC 34.9 33.6 - 35.0 g/dL    RDW 36.3 35.9 - 50.0 fL    Platelet Count 315 164 - 446 K/uL    MPV 11.8 9.0 - 12.9 fL    Neutrophils-Polys 92.50 (H) 44.00 - 72.00 %    Lymphocytes 6.00 (L) 22.00 - 41.00 %    Monocytes 1.00 0.00 - 13.40 %    Eosinophils 0.00 0.00 - 6.90 %    Basophils 0.10 0.00 - 1.80 %    Immature Granulocytes 0.40 0.00 - 0.90 %    Nucleated RBC 0.00 /100 WBC    Neutrophils (Absolute) 14.39 (H) 2.00 - 7.15 K/uL    Lymphs (Absolute) 0.94 (L) 1.00 - 4.80 K/uL    Monos (Absolute) 0.16 0.00 - 0.85 K/uL    Eos (Absolute) 0.00 0.00 - 0.51 K/uL    Baso (Absolute) 0.02 0.00 - 0.12 K/uL    Immature Granulocytes (abs) 0.07 0.00 - 0.11 K/uL    NRBC (Absolute) 0.00 K/uL   COMP METABOLIC PANEL   Result Value Ref Range    Sodium 145 135 - 145 mmol/L    Potassium 3.4 (L) 3.6 - 5.5 mmol/L    Chloride 105 96 - 112 mmol/L    Co2 21 20 - 33 mmol/L    Anion Gap 19.0 (H) 7.0 - 16.0    Glucose 162 (H) 65 - 99 mg/dL    Bun 8 8 - 22 mg/dL    Creatinine 0.43 (L) 0.50 - 1.40 mg/dL    Calcium 9.5 8.5 - 10.5 mg/dL    AST(SGOT) 19 12 - 45 U/L    ALT(SGPT) 18 2 - 50 U/L    Alkaline Phosphatase 80 30 - 99 U/L    Total Bilirubin 0.9 0.1 - 1.5 mg/dL    Albumin 4.8 3.2 - 4.9 g/dL    Total Protein 7.8 6.0 - 8.2 g/dL    Globulin 3.0 1.9 - 3.5 g/dL    A-G Ratio 1.6 g/dL   LIPASE   Result Value Ref Range    Lipase 11 11 - 82 U/L   HCG QUANTITATIVE   Result Value Ref Range    Bhcg 63826.0 (H) 0.0 - 5.0 mIU/mL   URINALYSIS,CULTURE IF INDICATED    Specimen: Urine   Result Value Ref Range    Micro Urine Req Microscopic    ESTIMATED GFR   Result Value Ref Range    GFR If African American >60 >60 mL/min/1.73 m 2    GFR If Non African American >60 >60 mL/min/1.73 m 2     All labs reviewed by me.    RADIOLOGY  US-OB 1ST TRIMESTER WITH TRANSVAGINAL (COMBO)   Final Result      Viable single intrauterine gestation of an estimated gestational age of 11 weeks 6 days and estimated due date 7/2/2022.         The radiologist's interpretation of all radiological studies have been reviewed by me.    COURSE & MEDICAL DECISION MAKING  Nursing notes, VS, PMSFHx reviewed in chart.     Patient presents to the emergency department with nausea vomiting and diarrhea.  Patient is currently pregnant.  She is having some minimal abdominal cramping.  She has no focal right lower quadrant tenderness and I do not suspect appendicitis.  She is not had any fevers.  Her main issue is persistent vomiting with pregnancy.  The diarrhea has been less of an issue.  No blood in her stools.  Patient does not have any vaginal bleeding or vaginal discharge.  Ultrasound was ordered in triage per protocol.  I did not order an Rh as she is not having any vaginal bleeding and I do not suspect a miscarriage is causing her nausea vomiting and diarrhea.  I suspect she likely is having some mild hyperemesis gravidarum causing her symptoms.  We will give fluids Zofran and check laboratory studies.  We discussed the risk benefits of Zofran in pregnancy    1:34 PM Patient seen and examined at bedside. The patient presents with nausea, vomiting, diarrhea. Discussed plan of care with patient. I will obtain lab and ultrasound studies to evaluate her symptoms. She is understanding and agreeable to plan. Ordered for UA, HCG Quantitative, Lipase, CMP, CBC with diff, Estimated GFR, and US-OB Transvagainal to evaluate.     2:02 PM - Patient will be treated with NS infusion 1000 ml for vomiting and diarrhea, and Zofran 4 mg injection for nausea.     Patient does have a slightly elevated white count of 15.6.  There is no evidence of a bandemia.  Patient has an elevated glucose of 162.  She has a normal CO2.  A mildly elevated anion gap.  Normal lipase.  Ultrasound shows a single IUP at 11 weeks 6 days.  Patient is feeling better after fluids.  She is tolerating p.o.  I believe she stable for outpatient management.  She will be referred to OB for further care.   Dehydration and hyperemesis  instructions were given.  Advised to return if she develops a fever is tenderness that goes to her right lower quadrant.  3:34 PM- Patient was reevaluated at bedside. She is feeling improved and is resting comfortably in bed. Discussed lab and radiology results with the patient and informed them that they were reassuring. She will be prescribed Zofran for her symptoms. The patient will return for new or worsening symptoms and is stable at the time of discharge. Patient verbalizes understanding and agreement to this plan of care.      HYDRATION: Based on the patient's presentation of Acute Diarrhea, Acute Vomiting and Dehydration the patient was given IV fluids. IV Hydration was used because oral hydration was not adequate alone. Upon recheck following hydration, the patient was improved.    DISPOSITION:  Patient will be discharged home in stable condition.    FOLLOW UP:  Arablela Lara M.D.  [address] [place] [id number] [phone]    Call   If symptoms worsen, return to the er.      OUTPATIENT MEDICATIONS:  New Prescriptions    ONDANSETRON (ZOFRAN ODT) 4 MG TABLET DISPERSIBLE    Take 1 Tablet by mouth every 8 hours as needed for Nausea for up to 3 days.       FINAL IMPRESSION  1. Non-intractable vomiting with nausea, unspecified vomiting type    2. 11 weeks gestation of pregnancy    3. Hyperglycemia          Keyanna CALERO (Scribe), am scribing for, and in the presence of, Kitty Sung M.D..    Electronically signed by: Keyanna Ortiz), 2021    Kitty CALERO M.D. personally performed the services described in this documentation, as scribed by Keyanna Hope in my presence, and it is both accurate and complete.    C    The note accurately reflects work and decisions made by me.  Kitty Sung M.D.  2021  7:08 PM

## 2021-12-17 NOTE — ED NOTES
Discharge instructions reviewed and signed with patient. Prescriptions given to patient. Patient ambulated out of ED with a steady gait.

## 2021-12-17 NOTE — ED TRIAGE NOTES
Pt to triage with   Chief Complaint   Patient presents with   • Nausea/Vomiting/Diarrhea     n/v and vomiting, worse to day with diarrhea when vomiting.     • Pregnancy     ; unsure of LMP.    • Abdominal Cramping     Pt reports that she thinks she is dehydrated, pt reports blood in vomit this morning.   Pt Informed regarding triage process and verbalized understanding to inform triage tech or RN for any changes in condition. Placed in lobby.

## 2021-12-24 ENCOUNTER — HOSPITAL ENCOUNTER (EMERGENCY)
Facility: MEDICAL CENTER | Age: 26
End: 2021-12-24
Attending: EMERGENCY MEDICINE
Payer: MEDICAID

## 2021-12-24 ENCOUNTER — APPOINTMENT (OUTPATIENT)
Dept: RADIOLOGY | Facility: MEDICAL CENTER | Age: 26
End: 2021-12-24
Attending: EMERGENCY MEDICINE
Payer: MEDICAID

## 2021-12-24 VITALS
RESPIRATION RATE: 16 BRPM | TEMPERATURE: 98.9 F | HEART RATE: 84 BPM | HEIGHT: 60 IN | SYSTOLIC BLOOD PRESSURE: 106 MMHG | BODY MASS INDEX: 26.45 KG/M2 | OXYGEN SATURATION: 97 % | WEIGHT: 134.7 LBS | DIASTOLIC BLOOD PRESSURE: 55 MMHG

## 2021-12-24 DIAGNOSIS — N39.0 ACUTE UTI: ICD-10-CM

## 2021-12-24 DIAGNOSIS — R10.9 ACUTE ABDOMINAL PAIN: ICD-10-CM

## 2021-12-24 DIAGNOSIS — O21.0 HYPEREMESIS GRAVIDARUM: ICD-10-CM

## 2021-12-24 LAB
ALBUMIN SERPL BCP-MCNC: 4.5 G/DL (ref 3.2–4.9)
ALBUMIN/GLOB SERPL: 1.5 G/DL
ALP SERPL-CCNC: 74 U/L (ref 30–99)
ALT SERPL-CCNC: 13 U/L (ref 2–50)
ANION GAP SERPL CALC-SCNC: 17 MMOL/L (ref 7–16)
APPEARANCE UR: ABNORMAL
AST SERPL-CCNC: 13 U/L (ref 12–45)
B-HCG SERPL-ACNC: ABNORMAL MIU/ML (ref 0–5)
BACTERIA #/AREA URNS HPF: ABNORMAL /HPF
BASOPHILS # BLD AUTO: 0.2 % (ref 0–1.8)
BASOPHILS # BLD: 0.03 K/UL (ref 0–0.12)
BILIRUB SERPL-MCNC: 1.2 MG/DL (ref 0.1–1.5)
BILIRUB UR QL STRIP.AUTO: NEGATIVE
BUN SERPL-MCNC: 8 MG/DL (ref 8–22)
CALCIUM SERPL-MCNC: 9.5 MG/DL (ref 8.5–10.5)
CHLORIDE SERPL-SCNC: 104 MMOL/L (ref 96–112)
CO2 SERPL-SCNC: 17 MMOL/L (ref 20–33)
COLOR UR: YELLOW
CREAT SERPL-MCNC: 0.37 MG/DL (ref 0.5–1.4)
EKG IMPRESSION: NORMAL
EOSINOPHIL # BLD AUTO: 0.02 K/UL (ref 0–0.51)
EOSINOPHIL NFR BLD: 0.2 % (ref 0–6.9)
EPI CELLS #/AREA URNS HPF: ABNORMAL /HPF
ERYTHROCYTE [DISTWIDTH] IN BLOOD BY AUTOMATED COUNT: 38.2 FL (ref 35.9–50)
GLOBULIN SER CALC-MCNC: 3.1 G/DL (ref 1.9–3.5)
GLUCOSE SERPL-MCNC: 112 MG/DL (ref 65–99)
GLUCOSE UR STRIP.AUTO-MCNC: NEGATIVE MG/DL
HCT VFR BLD AUTO: 44.8 % (ref 37–47)
HGB BLD-MCNC: 15.4 G/DL (ref 12–16)
HYALINE CASTS #/AREA URNS LPF: ABNORMAL /LPF
IMM GRANULOCYTES # BLD AUTO: 0.05 K/UL (ref 0–0.11)
IMM GRANULOCYTES NFR BLD AUTO: 0.4 % (ref 0–0.9)
KETONES UR STRIP.AUTO-MCNC: 15 MG/DL
LEUKOCYTE ESTERASE UR QL STRIP.AUTO: NEGATIVE
LIPASE SERPL-CCNC: 16 U/L (ref 11–82)
LYMPHOCYTES # BLD AUTO: 1.19 K/UL (ref 1–4.8)
LYMPHOCYTES NFR BLD: 9.2 % (ref 22–41)
MCH RBC QN AUTO: 28.3 PG (ref 27–33)
MCHC RBC AUTO-ENTMCNC: 34.4 G/DL (ref 33.6–35)
MCV RBC AUTO: 82.2 FL (ref 81.4–97.8)
MICRO URNS: ABNORMAL
MONOCYTES # BLD AUTO: 0.65 K/UL (ref 0–0.85)
MONOCYTES NFR BLD AUTO: 5 % (ref 0–13.4)
NEUTROPHILS # BLD AUTO: 11.02 K/UL (ref 2–7.15)
NEUTROPHILS NFR BLD: 85 % (ref 44–72)
NITRITE UR QL STRIP.AUTO: POSITIVE
NRBC # BLD AUTO: 0 K/UL
NRBC BLD-RTO: 0 /100 WBC
PH UR STRIP.AUTO: 6 [PH] (ref 5–8)
PLATELET # BLD AUTO: 249 K/UL (ref 164–446)
PMV BLD AUTO: 11.5 FL (ref 9–12.9)
POTASSIUM SERPL-SCNC: 3.4 MMOL/L (ref 3.6–5.5)
PROT SERPL-MCNC: 7.6 G/DL (ref 6–8.2)
PROT UR QL STRIP: NEGATIVE MG/DL
RBC # BLD AUTO: 5.45 M/UL (ref 4.2–5.4)
RBC # URNS HPF: ABNORMAL /HPF
RBC UR QL AUTO: NEGATIVE
SODIUM SERPL-SCNC: 138 MMOL/L (ref 135–145)
SP GR UR STRIP.AUTO: >=1.03
UROBILINOGEN UR STRIP.AUTO-MCNC: 0.2 MG/DL
WBC # BLD AUTO: 13 K/UL (ref 4.8–10.8)
WBC #/AREA URNS HPF: ABNORMAL /HPF

## 2021-12-24 PROCEDURE — 83690 ASSAY OF LIPASE: CPT

## 2021-12-24 PROCEDURE — 85025 COMPLETE CBC W/AUTO DIFF WBC: CPT

## 2021-12-24 PROCEDURE — 93005 ELECTROCARDIOGRAM TRACING: CPT

## 2021-12-24 PROCEDURE — 700105 HCHG RX REV CODE 258: Performed by: EMERGENCY MEDICINE

## 2021-12-24 PROCEDURE — 99285 EMERGENCY DEPT VISIT HI MDM: CPT | Mod: EDC

## 2021-12-24 PROCEDURE — 96374 THER/PROPH/DIAG INJ IV PUSH: CPT | Mod: EDC

## 2021-12-24 PROCEDURE — 93005 ELECTROCARDIOGRAM TRACING: CPT | Performed by: EMERGENCY MEDICINE

## 2021-12-24 PROCEDURE — 76801 OB US < 14 WKS SINGLE FETUS: CPT

## 2021-12-24 PROCEDURE — 700111 HCHG RX REV CODE 636 W/ 250 OVERRIDE (IP): Performed by: EMERGENCY MEDICINE

## 2021-12-24 PROCEDURE — 84702 CHORIONIC GONADOTROPIN TEST: CPT

## 2021-12-24 PROCEDURE — 96375 TX/PRO/DX INJ NEW DRUG ADDON: CPT | Mod: EDC

## 2021-12-24 PROCEDURE — 80053 COMPREHEN METABOLIC PANEL: CPT

## 2021-12-24 PROCEDURE — 81001 URINALYSIS AUTO W/SCOPE: CPT

## 2021-12-24 RX ORDER — CEFTRIAXONE 2 G/1
2 INJECTION, POWDER, FOR SOLUTION INTRAMUSCULAR; INTRAVENOUS ONCE
Status: COMPLETED | OUTPATIENT
Start: 2021-12-24 | End: 2021-12-24

## 2021-12-24 RX ORDER — METOCLOPRAMIDE HYDROCHLORIDE 5 MG/ML
10 INJECTION INTRAMUSCULAR; INTRAVENOUS ONCE
Status: COMPLETED | OUTPATIENT
Start: 2021-12-24 | End: 2021-12-24

## 2021-12-24 RX ORDER — ONDANSETRON 4 MG/1
4 TABLET, ORALLY DISINTEGRATING ORAL ONCE
Status: DISCONTINUED | OUTPATIENT
Start: 2021-12-24 | End: 2021-12-25 | Stop reason: HOSPADM

## 2021-12-24 RX ORDER — SODIUM CHLORIDE 9 MG/ML
1000 INJECTION, SOLUTION INTRAVENOUS ONCE
Status: COMPLETED | OUTPATIENT
Start: 2021-12-24 | End: 2021-12-24

## 2021-12-24 RX ORDER — METOCLOPRAMIDE 5 MG/1
5 TABLET ORAL 4 TIMES DAILY
Qty: 30 TABLET | Refills: 0 | Status: SHIPPED | OUTPATIENT
Start: 2021-12-24 | End: 2022-06-15

## 2021-12-24 RX ORDER — CEPHALEXIN 500 MG/1
500 CAPSULE ORAL 2 TIMES DAILY
Qty: 10 CAPSULE | Refills: 0 | Status: SHIPPED | OUTPATIENT
Start: 2021-12-24 | End: 2021-12-29

## 2021-12-24 RX ADMIN — METOCLOPRAMIDE 10 MG: 5 INJECTION, SOLUTION INTRAMUSCULAR; INTRAVENOUS at 21:09

## 2021-12-24 RX ADMIN — CEFTRIAXONE SODIUM 2 G: 2 INJECTION, POWDER, FOR SOLUTION INTRAMUSCULAR; INTRAVENOUS at 21:12

## 2021-12-24 RX ADMIN — SODIUM CHLORIDE 1000 ML: 9 INJECTION, SOLUTION INTRAVENOUS at 21:09

## 2021-12-24 ASSESSMENT — FIBROSIS 4 INDEX: FIB4 SCORE: 0.36

## 2021-12-25 NOTE — DISCHARGE INSTRUCTIONS
Please discuss the following findings with your regular doctor:  US-OB 1ST TRIMESTER WITH TRANSVAGINAL (COMBO)   Final Result      Viable single intrauterine gestation of an estimated gestational age of 12 weeks 6 days.

## 2021-12-25 NOTE — ED NOTES
"Pt stated she began feeling chest pain \"just a bit ago\". Pt denied it radiating anywhere else and that it was isolated to her chest. Pt was informed that she would be taken back to have an EKG done for her new onset chest pain by an ER Tech. Pt was assisted onto a wheelchair and taken to a triage room for an EKG.  "

## 2021-12-25 NOTE — ED TRIAGE NOTES
Chief Complaint   Patient presents with   • Abdominal Pain     Pt complains of lower abdominal pain since 0500 today. Pt complains of chills, nausea, vomiting, diarrhea, headache. Pt states she thinks she's 13 weeks pregnant.     Pt educated upon triage process and told to inform  staff of any changes in condition so that Pt may be reassessed. No further questions at this time. Pt sitting out in lobby.

## 2021-12-25 NOTE — ED TRIAGE NOTES
"Patient vital signs rechecked and documented per [facility]. Patient denies any new needs at this time.  Patient updated on wait times, thanked for patience. Pt informed to alert triage tech or triage RN with any needs and/or changes in condition; patient verbalized understanding. Patient stated \"I have been vomiting in the bathroom. Its yellow and i've vomited about 6 times.\"    Urine sample walked down to lab via tech  "

## 2021-12-25 NOTE — ED NOTES
Pt brought back to [facility]  Gown provided and asked to change  Call light introduced, no needs/concerns at this time

## 2021-12-25 NOTE — ED NOTES
Discharge instructions including the importance of hydration, the use of OTC medications, information on 1. Acute abdominal pain  2. Hyperemesis gravidarum  3. Acute UTI   and the proper follow up recommendations have been provided. Verbalizes understanding.  Confirms all questions have been answered.  A copy of the discharge instructions have been provided.  A signed copy is in the chart.  All pertinent medications reviewed.   Child out of department; pt in NAD, awake, alert, interactive and age appropriate

## 2021-12-25 NOTE — ED PROVIDER NOTES
ED Provider Note    Scribed for Cecil Schneider M.D. by Chandana Kennedy. 12/24/2021  8:15 PM    Primary care provider: Margareth Villegas M.D.  Means of arrival: Walk-in  History obtained from: Patient  History limited by: None    CHIEF COMPLAINT  Chief Complaint   Patient presents with   • Abdominal Pain     Pt complains of lower abdominal pain since 0500 today. Pt complains of chills, nausea, vomiting, diarrhea, headache. Pt states she thinks she's 13 weeks pregnant.       HPI  Kristal Cesar is a 26 y.o. female who presents to the Emergency Department for evaluation of acute vomiting and diarrhea onset around 5 am. She was seen here last week for similar symptoms and was prescribed Zofran which has not alleviated her symptoms today. She has vomited since arriving here today. Currently she is 13 weeks pregnant and is also having lower abdominal pain, chills, and a headache. No cough, fever, dysuria, hematuria, flank pain, vaginal bleeding, vaginal discharge, or blood in stool. Dr. Hurtado is her OB/GYN but she has not yet been able to be seen by her. Surgical history includes a ariana by laparoscopy. The patient is partially vaccinated against COVID-19.    REVIEW OF SYSTEMS  Pertinent positives include: vomiting, diarrhea, abdominal pain, chills, and headache. Pertinent negatives include: no cough, fever, dysuria, hematuria, flank pain, vaginal bleeding, vaginal discharge, or blood in stool. See history of present illness. All other systems are negative.     PAST MEDICAL HISTORY   has a past medical history of Allergy, Anxiety, Depression, and Obesity (BMI 30-39.9).    SURGICAL HISTORY   has a past surgical history that includes ariana by laparoscopy (3/7/2016) and other.    SOCIAL HISTORY  Social History     Tobacco Use   • Smoking status: Never Smoker   • Smokeless tobacco: Never Used   Substance Use Topics   • Alcohol use: No   • Drug use: No      Social History     Substance and Sexual Activity  "  Drug Use No       FAMILY HISTORY  Family History   Problem Relation Age of Onset   • No Known Problems Mother    • Diabetes Father    • Hypertension Father    • Hyperlipidemia Father    • No Known Problems Sister    • Cancer Neg Hx        CURRENT MEDICATIONS  Current Outpatient Medications   Medication Instructions   • Prenatal Vit-Fe Fumarate-FA (PRENATAL VITAMIN WITH FE/FA) 27-0.8 MG Tab 1 Tablet, Oral, DAILY   • triamcinolone acetonide (KENALOG) 0.1 % Ointment Apply thin layer to rash on hands, legs, elbows twice daily for 7-10 days until resolves       ALLERGIES  Allergies   Allergen Reactions   • Other Environmental Rash     \"dogs\" - \"my skin turns red\"       PHYSICAL EXAM  VITAL SIGNS: /67   Pulse 96   Temp 36.9 °C (98.5 °F) (Temporal)   Resp 14   Ht 1.524 m (5')   Wt 61.1 kg (134 lb 11.2 oz)   SpO2 99%   BMI 26.31 kg/m²     Constitutional: Alert in no apparent distress.  HENT: No signs of trauma, Bilateral external ears normal, Nose normal. Uvula midline. Dry mucous membranes  Eyes: Pupils are equal and reactive, Conjunctiva normal, Non-icteric.   Neck: Normal range of motion, No tenderness, Supple, No stridor.   Lymphatic: No lymphadenopathy noted.   Cardiovascular: Regular rate and rhythm, no murmurs.   Thorax & Lungs: Normal breath sounds, No respiratory distress, No wheezing, No chest tenderness.   Abdomen:  Gravid abdomen, Soft, No tenderness, No peritoneal signs, No masses, No pulsatile masses.   Skin: Warm, Dry, No erythema, No rash.   Back: No bony tenderness, No CVA tenderness.   Extremities: Intact distal pulses, No edema, No tenderness, No cyanosis.  Musculoskeletal: Good range of motion in all major joints. No tenderness to palpation or major deformities noted.   Neurologic: Alert , Normal motor function, Normal sensory function, No focal deficits noted.   Psychiatric: Affect normal, Judgment normal, Mood normal.     DIAGNOSTIC STUDIES / PROCEDURES    LABS  Labs Reviewed   CBC WITH " DIFFERENTIAL - Abnormal; Notable for the following components:       Result Value    WBC 13.0 (*)     RBC 5.45 (*)     Neutrophils-Polys 85.00 (*)     Lymphocytes 9.20 (*)     Neutrophils (Absolute) 11.02 (*)     All other components within normal limits    Narrative:     Collected By:   COMP METABOLIC PANEL - Abnormal; Notable for the following components:    Potassium 3.4 (*)     Co2 17 (*)     Anion Gap 17.0 (*)     Glucose 112 (*)     Creatinine 0.37 (*)     All other components within normal limits    Narrative:     Collected By:   HCG QUANTITATIVE - Abnormal; Notable for the following components:    Bhcg 37688.0 (*)     All other components within normal limits    Narrative:     Collected By:   URINALYSIS,CULTURE IF INDICATED - Abnormal; Notable for the following components:    Character Hazy (*)     Ketones 15 (*)     Nitrite Positive (*)     All other components within normal limits    Narrative:     Collected By:  Indication for culture:->Pregnant women: fever and/or  asymptomatic screening   URINE MICROSCOPIC (W/UA) - Abnormal; Notable for the following components:    Bacteria Few (*)     All other components within normal limits    Narrative:     Collected By:  Indication for culture:->Pregnant women: fever and/or  asymptomatic screening   LIPASE    Narrative:     Collected By:   ESTIMATED GFR    Narrative:     Collected By:      All labs reviewed by me.    EKG     Report   Date Value Ref Range Status   2021       [facility] Emergency Dept.    Test Date:  2021  Pt Name:    JEFERSON WASSERMAN        Department: ER  MRN:        [id number]                      Room:  Gender:     Female                       Technician: [id number]  :        1995                   Requested By:ER TRIAGE PROTOCOL  Order #:    [id number]                    Reading MD: Cecil Schneider MD    Measurements  Intervals                                Axis  Rate:       97                           P:           81  NE:         115                          QRS:        51  QRSD:       87                           T:          33  QT:         366  QTc:        465    Interpretive Statements  Sinus rhythm  Borderline short NE interval  Compared to ECG 09/01/2017 01:40:32  No significant changes  Electronically Signed On 12- 20:26:57 PST by Cecil Schneider MD               RADIOLOGY  US-OB 1ST TRIMESTER WITH TRANSVAGINAL (COMBO)   Final Result      Viable single intrauterine gestation of an estimated gestational age of 12 weeks 6 days.        The radiologist's interpretation of all radiological studies have been reviewed by me.    COURSE & MEDICAL DECISION MAKING  Nursing notes, VS, PMSFHx reviewed in chart.    26 y.o. female p/w chief complaint of abdominal pain.    8:15 PM Patient seen and examined at bedside.      I verified that the patient was wearing a mask and I was wearing appropriate PPE every time I entered the room. The patient's mask was on the patient at all times during my encounter except for a brief view of the oropharynx.     The differential diagnoses include but are not limited to:     US-OB obtained given abdominal pain  Pt w/ UTI  Pt treated with Rocephin, Reglan, and IV fluids  Pt given rx for antibx and plan for f/u w/ PCP if sx not completely resolved or if return.  Pt instructed that if sx worsen or if any further concerns to return immediately to the ED.  No flank pain to suggest Pyelonephritis  No rash present on exam  Pt denies new or different vaginal d/c to suggest alternative etiology  No hematuria    Intravenous fluids administered for dehydration.  Patient not appropriate for oral rehydration, as surgical process needs to be ruled out before trial of oral rehydration.   On repeat evaluation, improved.  Pt w/ positive fluid response.    9:31 PM - Patient was reevaluated at bedside. Discussed lab and radiology results with the patient and informed them that her ultrasound was reassuring.  She is feeling improved after medication administration. Discussed discharge instructions and return precautions with the patient and they were cleared for discharge. Patient was given the opportunity to ask any further questions. She is comfortable with discharge once her infusions are finished.      The patient will return for new or worsening symptoms and is stable at the time of discharge.    The patient is referred to a primary physician for blood pressure management, diabetic screening, and for all other preventative health concerns    DISPOSITION:  Patient will be discharged home in stable condition.    FOLLOW UP:  Margareth Villegas M.D.  [address]  [address]  [facility] [address]  [phone]          [facility], Emergency Dept  [address] [facility] [id number] [phone]          OUTPATIENT MEDICATIONS:  Discharge Medication List as of 12/24/2021 11:16 PM      START taking these medications    Details   metoclopramide (REGLAN) 5 MG tablet Take 1 Tablet by mouth 4 times a day., Disp-30 Tablet, R-0, Normal      cephALEXin (KEFLEX) 500 MG Cap Take 1 Capsule by mouth 2 times a day for 5 days., Disp-10 Capsule, R-0, Normal             FINAL IMPRESSION  1. Acute abdominal pain    2. Hyperemesis gravidarum    3. Acute UTI        Chandana CALERO (Errol), am scribing for, and in the presence of, Cecil Schneider M.D..    Electronically signed by: Chandana Kennedy (Errol), 12/24/2021    ICecil M.D. personally performed the services described in this documentation, as scribed by Chandana Kennedy in my presence, and it is both accurate and complete.    The note accurately reflects work and decisions made by me.  Cecil Schneider M.D.  12/25/2021  3:05 AM

## 2022-02-01 ENCOUNTER — HOSPITAL ENCOUNTER (INPATIENT)
Facility: MEDICAL CENTER | Age: 27
LOS: 1 days | End: 2022-02-01
Attending: OBSTETRICS & GYNECOLOGY | Admitting: OBSTETRICS & GYNECOLOGY
Payer: MEDICAID

## 2022-02-01 ENCOUNTER — ANESTHESIA (OUTPATIENT)
Dept: OBGYN | Facility: MEDICAL CENTER | Age: 27
End: 2022-02-01
Payer: MEDICAID

## 2022-02-01 ENCOUNTER — APPOINTMENT (OUTPATIENT)
Dept: OBGYN | Facility: MEDICAL CENTER | Age: 27
End: 2022-02-01
Attending: OBSTETRICS & GYNECOLOGY
Payer: MEDICAID

## 2022-02-01 ENCOUNTER — ANESTHESIA EVENT (OUTPATIENT)
Dept: OBGYN | Facility: MEDICAL CENTER | Age: 27
End: 2022-02-01
Payer: MEDICAID

## 2022-02-01 VITALS
DIASTOLIC BLOOD PRESSURE: 66 MMHG | BODY MASS INDEX: 26.5 KG/M2 | TEMPERATURE: 97.6 F | WEIGHT: 135 LBS | SYSTOLIC BLOOD PRESSURE: 112 MMHG | RESPIRATION RATE: 18 BRPM | HEIGHT: 60 IN | HEART RATE: 82 BPM

## 2022-02-01 DIAGNOSIS — G89.18 POST-OPERATIVE PAIN: ICD-10-CM

## 2022-02-01 PROBLEM — O02.1 FETAL DEMISE BEFORE 20 WEEKS WITH RETENTION OF DEAD FETUS: Status: ACTIVE | Noted: 2022-02-01

## 2022-02-01 PROBLEM — N92.6 MISSED PERIOD: Status: RESOLVED | Noted: 2018-03-27 | Resolved: 2022-02-01

## 2022-02-01 LAB
ABO GROUP BLD: NORMAL
BASOPHILS # BLD AUTO: 0.2 % (ref 0–1.8)
BASOPHILS # BLD: 0.02 K/UL (ref 0–0.12)
BLD GP AB SCN SERPL QL: NORMAL
EOSINOPHIL # BLD AUTO: 0.13 K/UL (ref 0–0.51)
EOSINOPHIL NFR BLD: 1.3 % (ref 0–6.9)
ERYTHROCYTE [DISTWIDTH] IN BLOOD BY AUTOMATED COUNT: 40.3 FL (ref 35.9–50)
HBV SURFACE AG SER QL: ABNORMAL
HCT VFR BLD AUTO: 40.2 % (ref 37–47)
HCV AB SER QL: ABNORMAL
HGB BLD-MCNC: 13.2 G/DL (ref 12–16)
HIV 1+2 AB+HIV1 P24 AG SERPL QL IA: NORMAL
IMM GRANULOCYTES # BLD AUTO: 0.04 K/UL (ref 0–0.11)
IMM GRANULOCYTES NFR BLD AUTO: 0.4 % (ref 0–0.9)
LYMPHOCYTES # BLD AUTO: 2.82 K/UL (ref 1–4.8)
LYMPHOCYTES NFR BLD: 28 % (ref 22–41)
MCH RBC QN AUTO: 28.4 PG (ref 27–33)
MCHC RBC AUTO-ENTMCNC: 32.8 G/DL (ref 33.6–35)
MCV RBC AUTO: 86.5 FL (ref 81.4–97.8)
MONOCYTES # BLD AUTO: 0.5 K/UL (ref 0–0.85)
MONOCYTES NFR BLD AUTO: 5 % (ref 0–13.4)
NEUTROPHILS # BLD AUTO: 6.56 K/UL (ref 2–7.15)
NEUTROPHILS NFR BLD: 65.1 % (ref 44–72)
NRBC # BLD AUTO: 0 K/UL
NRBC BLD-RTO: 0 /100 WBC
PLATELET # BLD AUTO: 206 K/UL (ref 164–446)
PMV BLD AUTO: 11.9 FL (ref 9–12.9)
RBC # BLD AUTO: 4.65 M/UL (ref 4.2–5.4)
RH BLD: NORMAL
RUBV AB SER QL: 92.1 IU/ML
SARS-COV+SARS-COV-2 AG RESP QL IA.RAPID: NOTDETECTED
SPECIMEN SOURCE: NORMAL
TREPONEMA PALLIDUM IGG+IGM AB [PRESENCE] IN SERUM OR PLASMA BY IMMUNOASSAY: ABNORMAL
WBC # BLD AUTO: 10.1 K/UL (ref 4.8–10.8)

## 2022-02-01 PROCEDURE — A9270 NON-COVERED ITEM OR SERVICE: HCPCS | Performed by: OBSTETRICS & GYNECOLOGY

## 2022-02-01 PROCEDURE — 770002 HCHG ROOM/CARE - OB PRIVATE (112)

## 2022-02-01 PROCEDURE — 87389 HIV-1 AG W/HIV-1&-2 AB AG IA: CPT

## 2022-02-01 PROCEDURE — 86780 TREPONEMA PALLIDUM: CPT

## 2022-02-01 PROCEDURE — 59409 OBSTETRICAL CARE: CPT

## 2022-02-01 PROCEDURE — 85025 COMPLETE CBC W/AUTO DIFF WBC: CPT

## 2022-02-01 PROCEDURE — 160035 HCHG PACU - 1ST 60 MINS PHASE I: Performed by: OBSTETRICS & GYNECOLOGY

## 2022-02-01 PROCEDURE — 160029 HCHG SURGERY MINUTES - 1ST 30 MINS LEVEL 4: Performed by: OBSTETRICS & GYNECOLOGY

## 2022-02-01 PROCEDURE — 86901 BLOOD TYPING SEROLOGIC RH(D): CPT

## 2022-02-01 PROCEDURE — 86900 BLOOD TYPING SEROLOGIC ABO: CPT

## 2022-02-01 PROCEDURE — 10D17Z9 MANUAL EXTRACTION OF PRODUCTS OF CONCEPTION, RETAINED, VIA NATURAL OR ARTIFICIAL OPENING: ICD-10-PCS | Performed by: OBSTETRICS & GYNECOLOGY

## 2022-02-01 PROCEDURE — 3E0P7VZ INTRODUCTION OF HORMONE INTO FEMALE REPRODUCTIVE, VIA NATURAL OR ARTIFICIAL OPENING: ICD-10-PCS | Performed by: OBSTETRICS & GYNECOLOGY

## 2022-02-01 PROCEDURE — 160048 HCHG OR STATISTICAL LEVEL 1-5: Performed by: OBSTETRICS & GYNECOLOGY

## 2022-02-01 PROCEDURE — 700111 HCHG RX REV CODE 636 W/ 250 OVERRIDE (IP)

## 2022-02-01 PROCEDURE — 86850 RBC ANTIBODY SCREEN: CPT

## 2022-02-01 PROCEDURE — 87426 SARSCOV CORONAVIRUS AG IA: CPT

## 2022-02-01 PROCEDURE — 160041 HCHG SURGERY MINUTES - EA ADDL 1 MIN LEVEL 4: Performed by: OBSTETRICS & GYNECOLOGY

## 2022-02-01 PROCEDURE — 160002 HCHG RECOVERY MINUTES (STAT): Performed by: OBSTETRICS & GYNECOLOGY

## 2022-02-01 PROCEDURE — 304965 HCHG RECOVERY SERVICES

## 2022-02-01 PROCEDURE — 700111 HCHG RX REV CODE 636 W/ 250 OVERRIDE (IP): Performed by: OBSTETRICS & GYNECOLOGY

## 2022-02-01 PROCEDURE — 88305 TISSUE EXAM BY PATHOLOGIST: CPT

## 2022-02-01 PROCEDURE — 86803 HEPATITIS C AB TEST: CPT

## 2022-02-01 PROCEDURE — 700105 HCHG RX REV CODE 258: Performed by: INTERNAL MEDICINE

## 2022-02-01 PROCEDURE — 700111 HCHG RX REV CODE 636 W/ 250 OVERRIDE (IP): Performed by: INTERNAL MEDICINE

## 2022-02-01 PROCEDURE — 700102 HCHG RX REV CODE 250 W/ 637 OVERRIDE(OP): Performed by: OBSTETRICS & GYNECOLOGY

## 2022-02-01 PROCEDURE — 160009 HCHG ANES TIME/MIN: Performed by: OBSTETRICS & GYNECOLOGY

## 2022-02-01 PROCEDURE — 86762 RUBELLA ANTIBODY: CPT

## 2022-02-01 PROCEDURE — 87340 HEPATITIS B SURFACE AG IA: CPT

## 2022-02-01 RX ORDER — DEXAMETHASONE SODIUM PHOSPHATE 4 MG/ML
INJECTION, SOLUTION INTRA-ARTICULAR; INTRALESIONAL; INTRAMUSCULAR; INTRAVENOUS; SOFT TISSUE PRN
Status: DISCONTINUED | OUTPATIENT
Start: 2022-02-01 | End: 2022-02-01 | Stop reason: SURG

## 2022-02-01 RX ORDER — HYDROMORPHONE HYDROCHLORIDE 1 MG/ML
0.2 INJECTION, SOLUTION INTRAMUSCULAR; INTRAVENOUS; SUBCUTANEOUS
Status: CANCELLED | OUTPATIENT
Start: 2022-02-01

## 2022-02-01 RX ORDER — HYDROMORPHONE HYDROCHLORIDE 1 MG/ML
0.1 INJECTION, SOLUTION INTRAMUSCULAR; INTRAVENOUS; SUBCUTANEOUS
Status: CANCELLED | OUTPATIENT
Start: 2022-02-01

## 2022-02-01 RX ORDER — LABETALOL HYDROCHLORIDE 5 MG/ML
5 INJECTION, SOLUTION INTRAVENOUS
Status: CANCELLED | OUTPATIENT
Start: 2022-02-01

## 2022-02-01 RX ORDER — ONDANSETRON 2 MG/ML
INJECTION INTRAMUSCULAR; INTRAVENOUS PRN
Status: DISCONTINUED | OUTPATIENT
Start: 2022-02-01 | End: 2022-02-01 | Stop reason: SURG

## 2022-02-01 RX ORDER — MISOPROSTOL 200 UG/1
400 TABLET ORAL ONCE
Status: COMPLETED | OUTPATIENT
Start: 2022-02-01 | End: 2022-02-01

## 2022-02-01 RX ORDER — ONDANSETRON 2 MG/ML
4 INJECTION INTRAMUSCULAR; INTRAVENOUS
Status: CANCELLED | OUTPATIENT
Start: 2022-02-01

## 2022-02-01 RX ORDER — DIPHENHYDRAMINE HYDROCHLORIDE 50 MG/ML
12.5 INJECTION INTRAMUSCULAR; INTRAVENOUS
Status: CANCELLED | OUTPATIENT
Start: 2022-02-01

## 2022-02-01 RX ORDER — MEPERIDINE HYDROCHLORIDE 25 MG/ML
12.5 INJECTION INTRAMUSCULAR; INTRAVENOUS; SUBCUTANEOUS
Status: CANCELLED | OUTPATIENT
Start: 2022-02-01

## 2022-02-01 RX ORDER — METHYLERGONOVINE MALEATE 0.2 MG/ML
INJECTION INTRAVENOUS
Status: COMPLETED
Start: 2022-02-01 | End: 2022-02-01

## 2022-02-01 RX ORDER — HYDROMORPHONE HYDROCHLORIDE 1 MG/ML
0.4 INJECTION, SOLUTION INTRAMUSCULAR; INTRAVENOUS; SUBCUTANEOUS
Status: CANCELLED | OUTPATIENT
Start: 2022-02-01

## 2022-02-01 RX ORDER — ONDANSETRON 2 MG/ML
4 INJECTION INTRAMUSCULAR; INTRAVENOUS EVERY 4 HOURS PRN
Status: DISCONTINUED | OUTPATIENT
Start: 2022-02-01 | End: 2022-02-02 | Stop reason: HOSPADM

## 2022-02-01 RX ORDER — KETOROLAC TROMETHAMINE 30 MG/ML
INJECTION, SOLUTION INTRAMUSCULAR; INTRAVENOUS PRN
Status: DISCONTINUED | OUTPATIENT
Start: 2022-02-01 | End: 2022-02-01 | Stop reason: SURG

## 2022-02-01 RX ORDER — SODIUM CHLORIDE, SODIUM GLUCONATE, SODIUM ACETATE, POTASSIUM CHLORIDE AND MAGNESIUM CHLORIDE 526; 502; 368; 37; 30 MG/100ML; MG/100ML; MG/100ML; MG/100ML; MG/100ML
INJECTION, SOLUTION INTRAVENOUS
Status: DISCONTINUED | OUTPATIENT
Start: 2022-02-01 | End: 2022-02-01 | Stop reason: SURG

## 2022-02-01 RX ORDER — HYDROCODONE BITARTRATE AND ACETAMINOPHEN 5; 325 MG/1; MG/1
1 TABLET ORAL EVERY 6 HOURS PRN
Status: DISCONTINUED | OUTPATIENT
Start: 2022-02-01 | End: 2022-02-02 | Stop reason: HOSPADM

## 2022-02-01 RX ORDER — METHYLERGONOVINE MALEATE 0.2 MG/ML
INJECTION INTRAVENOUS PRN
Status: DISCONTINUED | OUTPATIENT
Start: 2022-02-01 | End: 2022-02-01 | Stop reason: SURG

## 2022-02-01 RX ORDER — OXYCODONE HYDROCHLORIDE AND ACETAMINOPHEN 5; 325 MG/1; MG/1
1 TABLET ORAL EVERY 6 HOURS PRN
Qty: 28 TABLET | Refills: 0 | Status: SHIPPED | OUTPATIENT
Start: 2022-02-01 | End: 2022-02-08

## 2022-02-01 RX ORDER — OXYCODONE HCL 5 MG/5 ML
5 SOLUTION, ORAL ORAL
Status: DISCONTINUED | OUTPATIENT
Start: 2022-02-01 | End: 2022-02-02 | Stop reason: HOSPADM

## 2022-02-01 RX ORDER — IBUPROFEN 600 MG/1
600 TABLET ORAL EVERY 6 HOURS PRN
Status: DISCONTINUED | OUTPATIENT
Start: 2022-02-01 | End: 2022-02-02 | Stop reason: HOSPADM

## 2022-02-01 RX ORDER — OXYCODONE HCL 5 MG/5 ML
10 SOLUTION, ORAL ORAL
Status: DISCONTINUED | OUTPATIENT
Start: 2022-02-01 | End: 2022-02-02 | Stop reason: HOSPADM

## 2022-02-01 RX ORDER — HYDRALAZINE HYDROCHLORIDE 20 MG/ML
5 INJECTION INTRAMUSCULAR; INTRAVENOUS
Status: CANCELLED | OUTPATIENT
Start: 2022-02-01

## 2022-02-01 RX ORDER — IBUPROFEN 600 MG/1
600 TABLET ORAL EVERY 6 HOURS PRN
Qty: 30 TABLET | Refills: 3 | Status: SHIPPED | OUTPATIENT
Start: 2022-02-01 | End: 2022-07-26

## 2022-02-01 RX ORDER — HALOPERIDOL 5 MG/ML
1 INJECTION INTRAMUSCULAR
Status: CANCELLED | OUTPATIENT
Start: 2022-02-01

## 2022-02-01 RX ORDER — CEFAZOLIN SODIUM 1 G/3ML
INJECTION, POWDER, FOR SOLUTION INTRAMUSCULAR; INTRAVENOUS PRN
Status: DISCONTINUED | OUTPATIENT
Start: 2022-02-01 | End: 2022-02-01 | Stop reason: SURG

## 2022-02-01 RX ADMIN — IBUPROFEN 600 MG: 600 TABLET ORAL at 22:21

## 2022-02-01 RX ADMIN — FENTANYL CITRATE 100 MCG: 50 INJECTION, SOLUTION INTRAMUSCULAR; INTRAVENOUS at 17:00

## 2022-02-01 RX ADMIN — SODIUM CHLORIDE, SODIUM GLUCONATE, SODIUM ACETATE, POTASSIUM CHLORIDE AND MAGNESIUM CHLORIDE: 526; 502; 368; 37; 30 INJECTION, SOLUTION INTRAVENOUS at 17:28

## 2022-02-01 RX ADMIN — ONDANSETRON 4 MG: 2 INJECTION INTRAMUSCULAR; INTRAVENOUS at 15:45

## 2022-02-01 RX ADMIN — DEXAMETHASONE SODIUM PHOSPHATE 4 MG: 4 INJECTION, SOLUTION INTRA-ARTICULAR; INTRALESIONAL; INTRAMUSCULAR; INTRAVENOUS; SOFT TISSUE at 17:31

## 2022-02-01 RX ADMIN — CEFAZOLIN 2 G: 330 INJECTION, POWDER, FOR SOLUTION INTRAMUSCULAR; INTRAVENOUS at 17:33

## 2022-02-01 RX ADMIN — METHYLERGONOVINE MALEATE 200 MCG: 0.2 INJECTION, SOLUTION INTRAMUSCULAR; INTRAVENOUS at 17:38

## 2022-02-01 RX ADMIN — KETOROLAC TROMETHAMINE 30 MG: 30 INJECTION, SOLUTION INTRAMUSCULAR at 17:46

## 2022-02-01 RX ADMIN — MISOPROSTOL 400 MCG: 200 TABLET ORAL at 12:21

## 2022-02-01 RX ADMIN — FENTANYL CITRATE 50 MCG: 50 INJECTION, SOLUTION INTRAMUSCULAR; INTRAVENOUS at 17:31

## 2022-02-01 RX ADMIN — HYDROCODONE BITARTRATE AND ACETAMINOPHEN 1 TABLET: 5; 325 TABLET ORAL at 19:42

## 2022-02-01 RX ADMIN — MISOPROSTOL 400 MCG: 200 TABLET ORAL at 15:25

## 2022-02-01 RX ADMIN — PROPOFOL 150 MG: 10 INJECTION, EMULSION INTRAVENOUS at 17:31

## 2022-02-01 RX ADMIN — MIDAZOLAM 2 MG: 1 INJECTION INTRAMUSCULAR; INTRAVENOUS at 17:25

## 2022-02-01 RX ADMIN — FENTANYL CITRATE 50 MCG: 50 INJECTION, SOLUTION INTRAMUSCULAR; INTRAVENOUS at 17:40

## 2022-02-01 RX ADMIN — FENTANYL CITRATE 100 MCG: 50 INJECTION, SOLUTION INTRAMUSCULAR; INTRAVENOUS at 15:50

## 2022-02-01 RX ADMIN — ONDANSETRON 4 MG: 2 INJECTION INTRAMUSCULAR; INTRAVENOUS at 17:31

## 2022-02-01 RX ADMIN — Medication: at 18:28

## 2022-02-01 ASSESSMENT — PAIN DESCRIPTION - PAIN TYPE: TYPE: ACUTE PAIN

## 2022-02-01 ASSESSMENT — PATIENT HEALTH QUESTIONNAIRE - PHQ9
1. LITTLE INTEREST OR PLEASURE IN DOING THINGS: NOT AT ALL
SUM OF ALL RESPONSES TO PHQ9 QUESTIONS 1 AND 2: 0
2. FEELING DOWN, DEPRESSED, IRRITABLE, OR HOPELESS: NOT AT ALL

## 2022-02-01 ASSESSMENT — FIBROSIS 4 INDEX: FIB4 SCORE: 0.36

## 2022-02-01 ASSESSMENT — PAIN SCALES - GENERAL: PAIN_LEVEL: 0

## 2022-02-01 NOTE — PROGRESS NOTES
"1025- pt to room 226 pt is known fetal demise that was dx in office on Monday per pt. Pt denies uc,lof or bleeding. abd soft. Will call dr anel blas for orders    1055- 2 calls placed to dr anel blas phone 1 with katerinae and 1 to cell phone message left to call back so I can get admission orders. Pt aware     1105- spoke to dr anel blas and orders rec'd for cytotec 400 pv first dose then 3 hours later same dose po. Pt may have diet per md. Fob downstairs getting food for pt. Explained process at length with pt and will let her eat then will come do iv, covid and cytotec administration. Pt agrees with poc    1200- labs and covid sent to lab. Prenatal panel drawn as per orders    1325- pt states mild intermittent cramping and requesting blankets. Blankets provided    1515- entered room to answer call bell and pt states \"I think my water broke\" wet area noted to underwear. Pt feels mild intermittent cramping. Will monitor for any further lof. Pt oob to bathroom     1530- pt c/o nausea spoke to dr anel blas and jannette ordered    1550- pt requesting pain meds for cramping. Pt medicated for pain and nausea. toco repositioned     1643- pt called me to room stating more fluid was coming out on arrival pt has passed baby but placenta still inside.    1644- dr anel blas called and is on her way to the hospital    1701- dr anel blas at bedside    1705- pt to go to or for d$c. Charge aware. Scrub tech aware. Anesthesia aware    1715-anesthesia at bedside    1723- pt to or via bed    1800- pt back to room 226 for recovery after general anesthesia for d&c. Vss. Fob at bedside. Cont to monitor    1835- pt awake and talking. o2 removed and ra o2 94%    1900- report given to rosaura andrea   "

## 2022-02-02 LAB — PATHOLOGY CONSULT NOTE: NORMAL

## 2022-02-02 NOTE — ANESTHESIA POSTPROCEDURE EVALUATION
Patient: Kristal Cesar    Procedure Summary     Date: 02/01/22 Room / Location: [facility] OR 03 / SURGERY LABOR AND DELIVERY    Anesthesia Start: 1725 Anesthesia Stop: 1807    Procedure: DILATION AND CURETTAGE (Vagina ) Diagnosis: (dilation and curettage for retained products)    Surgeons: Arabella Lara M.D. Responsible Provider: Cruz Cristina M.D.    Anesthesia Type: general ASA Status: 2 - Emergent          Final Anesthesia Type: general  Last vitals  BP   Blood Pressure: 117/62    Temp   36.4 °C (97.6 °F)    Pulse   82   Resp   14    SpO2   95 %      Anesthesia Post Evaluation    Patient location during evaluation: PACU  Patient participation: complete - patient participated  Level of consciousness: awake and alert  Pain score: 0    Airway patency: patent  Anesthetic complications: no  Cardiovascular status: hemodynamically stable  Respiratory status: acceptable  Hydration status: euvolemic    PONV: none          No complications documented.

## 2022-02-02 NOTE — ANESTHESIA PREPROCEDURE EVALUATION
Case: [id number] Date/Time: 02/01/22 1730    Procedure: DILATION AND CURETTAGE (Vagina )    Pre-op diagnosis: DNC    Location: [facility] OR 03 / SURGERY LABOR AND DELIVERY    Surgeons: Arabella Lara M.D.        26yof with a hx of fetal demise at 18w now presents for Emergent D&C.     Relevant Problems      (positive) Fatty liver       Physical Exam    Airway   Mallampati: II  TM distance: >3 FB  Neck ROM: full       Cardiovascular - normal exam  Rhythm: regular  Rate: normal  (-) murmur     Dental - normal exam           Pulmonary - normal exam  Breath sounds clear to auscultation     Abdominal    Neurological - normal exam                 Anesthesia Plan    ASA 2- EMERGENT   ASA physical status emergent criteria: compromised vital organ, limb or tissue    Plan - general       Airway plan will be LMA          Induction: intravenous    Postoperative Plan: Postoperative administration of opioids is intended.    Pertinent diagnostic labs and testing reviewed    Informed Consent:    Anesthetic plan and risks discussed with patient.    Use of blood products discussed with: patient whom consented to blood products.

## 2022-02-02 NOTE — H&P
"Chief Complaint   Patient presents with   • Other     fetal demise       History of present illness:   26 y.o.  18 weeks fetal demise at 14 weeks presents for induction of labor.  No complaints   Review of systems:  Pertinent positives documented in HPI and all other systems reviewed & are negative    All PMH, PSH, allergies, social history and FH reviewed and updated today:  Past Medical History:   Diagnosis Date   • Allergy    • Anxiety    • Depression    • Obesity (BMI 30-39.9)        Past Surgical History:   Procedure Laterality Date   • REAGAN BY LAPAROSCOPY  3/7/2016    Procedure: REAGAN BY LAPAROSCOPY;  Surgeon: John H Ganser, M.D.;  Location: SURGERY [facility];  Service:    • OTHER      gallbladder removed       Allergies:   Allergies   Allergen Reactions   • Other Environmental Rash     \"dogs\" - \"my skin turns red\"       Social History     Socioeconomic History   • Marital status: Single     Spouse name: Not on file   • Number of children: Not on file   • Years of education: Not on file   • Highest education level: Not on file   Occupational History   • Not on file   Tobacco Use   • Smoking status: Never Smoker   • Smokeless tobacco: Never Used   Substance and Sexual Activity   • Alcohol use: No   • Drug use: No   • Sexual activity: Yes     Partners: Male   Other Topics Concern   • Not on file   Social History Narrative   • Not on file     Social Determinants of Health     Financial Resource Strain:    • Difficulty of Paying Living Expenses: Not on file   Food Insecurity:    • Worried About Running Out of Food in the Last Year: Not on file   • Ran Out of Food in the Last Year: Not on file   Transportation Needs:    • Lack of Transportation (Medical): Not on file   • Lack of Transportation (Non-Medical): Not on file   Physical Activity:    • Days of Exercise per Week: Not on file   • Minutes of Exercise per Session: Not on file   Stress:    • Feeling of Stress : Not on file   Social Connections:  "   • Frequency of Communication with Friends and Family: Not on file   • Frequency of Social Gatherings with Friends and Family: Not on file   • Attends Bahai Services: Not on file   • Active Member of Clubs or Organizations: Not on file   • Attends Club or Organization Meetings: Not on file   • Marital Status: Not on file   Intimate Partner Violence:    • Fear of Current or Ex-Partner: Not on file   • Emotionally Abused: Not on file   • Physically Abused: Not on file   • Sexually Abused: Not on file   Housing Stability:    • Unable to Pay for Housing in the Last Year: Not on file   • Number of Places Lived in the Last Year: Not on file   • Unstable Housing in the Last Year: Not on file       Family History   Problem Relation Age of Onset   • No Known Problems Mother    • Diabetes Father    • Hypertension Father    • Hyperlipidemia Father    • No Known Problems Sister    • Cancer Neg Hx        Physical exam:  /56   Pulse 64   Temp 36.5 °C (97.7 °F) (Temporal)   Resp 16   Ht 1.524 m (5')   Wt 61.2 kg (135 lb)     General:appears stated age, is in no apparent distress, is well developed and well nourished  Head: normocephalic, non-tender  Neck: neck is supple  CV : regular rate and rhythm, no peripheral edema  Lungs: Normal respiratory effort. Clear to auscultation bilaterally  Abdomen: Bowel sounds positive, nondistended, soft, nontender x4, no rebound or guarding. No organomegaly. No masses.  Female GYN: cervix closed  Skin: No rashes, or ulcers or lesions seen  Psychiatric: Patient shows appropriate affect, is alert and oriented x3, intact judgment and insight.      ASSESSMENT AND PLAN:  1. FETAL DEMISE AT 14 WEEKS  2. IOL VIA CYTOTEC 400 MCG PV THEN 400 MCG PO after 3 hours  3. Fentanyl for pain mgt  4. Anticipate passage of POC/fetus     Arabella Bell MD

## 2022-02-02 NOTE — OP REPORT
DATE OF SERVICE:  02/01/2022     PREOPERATIVE DIAGNOSIS:  Fetal demise 14 weeks with retained placenta.     POSTOPERATIVE DIAGNOSIS:  Fetal demise 14 weeks with retained placenta.       PROCEDURE:  Suction and removal of products of conception and removal of   retained placenta.     SURGEON:  Arabella Lara MD     ASSISTANT:  None.     ANESTHESIOLOGIST:  Cruz Cristina MD     TYPE OF ANESTHESIA:  General anesthesia.     SPECIMEN:  Placenta and products of conception.     ESTIMATED BLOOD LOSS:  100 mL     URINE OUTPUT:  Clear.     FINDINGS:  Cervix was opened with placental tissue up in the uterus.  Uterus   is 12-14 week size.  No adnexal masses noted on bimanual examination.       DESCRIPTION OF THE PROCEDURE: The patient and family were discussed about the   risks, indications, benefits, alternatives of the procedure and they were   agreeable to proceed and signed consent.     The patient was then taken to the operating room where she was placed in the   supine position.  The patient was then given general anesthesia without   difficulty.  Formal timeout was done and preoperative antibiotics given.     A bimanual examination was then done as noted above. Speculum was then placed   into the vagina with good visualization of the cervix.  Anterior lip of the   cervix was grasped with single tooth tenaculum and ring forceps was inserted   to retrieve placental tissue.  The placenta was noted to be attached and   adherent to the uterine cavity.  A sharp curettage was then performed gently.    A suction curette was then inserted, size 12 mm inserted just beyond the   internal cervical os and rotated gently. Suction was activated and rotated   emptying the uterus of all products of conception.  There was moderate amount   of bleeding; thus Methergine was given intramuscularly at this point. Sharp   curettage then followed gently until the uterine cavity felt gritty and empty   of products of  conception.  Procedure was then terminated.  All instruments   were removed.  Sponges and instruments were correct x2.  The patient tolerated   the procedure well.  There were no intraoperative complications noted and the   patient was sent to the PACU awake and in good condition.        ______________________________  MD ALONZO Hastings/HOLLY    DD:  02/01/2022 18:13  DT:  02/01/2022 18:31    Job#:  [id number]

## 2022-02-02 NOTE — DISCHARGE INSTRUCTIONS
Activity and Rest  It is important to take care of yourself physically so you can handle the emotional work of grief.  · Sleep patterns can be disrupted due to grieving.  Rest when you can.  Call your doctor if sleeplessness continues past 2-3 weeks.  · No heavy lifting or excessive exercise until your postpartum visit.    Nutrition and Diet  · Regular diet, small frequent meals may be easier to tolerate.  · Increase protein for tissue repair, increase fiber, fluids, fresh fruit and vegetables.  · Consume 8-10 glasses of water a day.  · Grief can disrupt a normal diet.  Try fruit smoothies, protein drinks, or milk shakes when your appetite is diminished.  · No dieting until your doctor approves it, usually at 6 week check up.    Birth Control  No sexual relations until your care provider gives approval; usually at 6 week check up.  Discuss with your doctor which form of birth control is right for you.  Your doctor can best help you determine when the right time is to think about another pregnancy.    Breast Care and Suppression of Breast Milk  Having your milk come in can be quite upsetting; a difficult reminder that you don't have a baby to nurse.  There are a number of things you can do to help minimize this problem:  · Wear a tight bra or snug sports bra at all times for 7-10 days, even while sleeping.  · Use cold compresses on breasts for comfort.  Do not massage or hand express the breast.  This will only lead to more milk production.    Cabbage leaves have been used in some cultures for hundreds of years in the treatment of sprains and infections.  It is a popular method as a localized treatment for breast engorgement and to decrease milk production.  Most women find it to be very soothing to breasts.    · Sources state that green, ordinary (not Sami, etc) cabbage is preferable.  · Do not use cabbage leaves if you have an allergy or sensitivity to cabbage (or broccoli, cauliflower or brussell sprouts for  "that matter).  If a rash appears, immediately discontinue using cabbage leaves and call your health care provider.  · Wash the leaves thoroughly and pat dry.  · The veins can be crushed with a rolling pin or removed to allow them to be more form fitting to the breast.  · They can be chilled in the refrigerator as some feel they are more soothing cold, but it is not necessary.  · Place in a bra, wrapped around the breast with the nipple exposed.  · Leave on for 2 hours or until wilted and change to fresh leaves if you need to.  · Check to see how your breasts are responding with each change and stop using the leaves once engorgement is reduced and breast milk is suppressed.  · Be warned - cabbage leaves may stain your bra and can have a strong odor.      When to Call Your Physician  · Fever greater than 100.4; or a fever with chills.  · Heavy bleeding (more than 1 pad in an hour), or passing large clots.  · Foul smelling discharge - not the \"period\" smell.  · Severe or persistent pain in specific areas of abdomen, low back or legs.  · Painful or difficulty urinating.  · Increased pain, redness, swelling, tenderness or heat at incision site, and/or drainage or open area of incision or episiotomy.  · Localized redness, red streaks or severe pain on breast with fever.  · Signs and symptoms of depression (not eating, sleeping, suicidal thoughts, inability to stop crying, etc.) that does not improve over time.      Vaginal Delivery, Care After  This sheet gives you information about how to care for yourself after your delivery. Your health care provider may also give you more specific instructions. If you have problems or questions, contact your health care provider.  What can I expect after the procedure?  After delivery, it is common to have:  · Soreness in your abdomen, your vagina, and the area between the opening of your vagina and your anus (perineum).  · Tiredness (fatigue).  · Cramps.  · Breast tenderness related " to engorgement.  · Some bleeding and discharge from your vagina. This may continue for about 6 weeks. The bleeding and discharge will start out red, then become pink, then yellow, and finally white.  · Tenderness in your vagina or perineum if you had an episiotomy or a vaginal tear. This may last several weeks.  · Emotions that change quickly. Common emotions include:  ? Sadness.  ? Anger.  ? Denial.  ? Guilt.  ? Depression.  Follow these instructions at home:  Vaginal and perineal care  · Keep your perineum clean and dry as told by your health care provider.  · Wipe from front to back when you use the toilet.  · If you have an episiotomy or a vaginal tear, check for signs of infection, such as:  ? Increasing redness, swelling, or pain in your perineal area.  ? Pus or bad-smelling discharge coming from your wound or vagina.  · To relieve pain at the wound area or pain caused by hemorrhoids, try taking a warm sitz bath 2-3 times a day.  · Do not use tampons or douche until your health care provider says it is okay.  Medicines  · Take over-the-counter and prescription medicines only as told by your health care provider.  · If you were prescribed an antibiotic medicine, take it as told by your health care provider. Do not stop taking the antibiotic even if you start to feel better.  Eating and drinking    · Drink enough fluids to keep your urine pale yellow.  · Eat high-fiber foods every day. These foods may help prevent or relieve constipation. High-fiber foods include:  ? Whole grain cereals and breads.  ? Brown rice.  ? Beans.  ? Fresh fruits and vegetables.  Activity  · If possible, have someone help you with your household activities for at least a few days after you leave the hospital.  · Return to your normal activities as told by your health care provider. Ask your health care provider what activities are safe for you.  · Rest as much as possible.  · Talk with your health care provider about when you can engage  in sexual activity. This may depend on your:  ? Risk of infection.  ? Rate of healing.  ? Comfort and desire to engage in sexual activity.  Emotional support  · Consider seeking support for your loss. Some forms of support that you might consider include your , friends, family, a professional counselor, or a bereavement support group.  General instructions  · Wear a supportive and well-fitting bra.  · If you pass a blood clot, save it and call your health care provider to discuss. Do not flush blood clots down the toilet before you get instructions from your health care provider.  · Keep all of your scheduled postpartum visits. At these visits, your health care provider will check to make sure that you are healing, both physically and emotionally.  Contact a health care provider if:  · You feel sad or depressed.  · You are having trouble eating or sleeping.  · You lose interest in activities you used to enjoy.  · You pass a blood clot from your vagina.  · You have pus or a bad-smelling discharge coming from your wound or vagina.  · You have increasing redness, swelling, or pain in your perineal area.  · You feel pain or burning when you urinate.  · You urinate more often than normal.  · You have a fever.  · Your breasts become hard, red, or painful.  · You are dizzy or light-headed.  · You have a rash.  · You feel nauseous or you vomit.  · You have not had a menstrual period by the 12th week after delivery.  Get help right away if:  · You have persistent pain that is not relieved by comfort measures or medicines.  · You have chest pain or trouble breathing.  · You have blurred vision or you see spots.  · You have a severe headache.  · You faint.  · You have sudden, severe leg pain.  · You bleed from your vagina so much that you fill more than one sanitary pad in one hour. Bleeding should not be heavier than your heaviest period.  · You have thoughts of hurting yourself.  If you ever feel like you may  hurt yourself or others, or have thoughts about taking your own life, get help right away. You can go to your nearest emergency department or call:  · Your local emergency services (911 in the [place]  · A suicide crisis helpline, such as the National Suicide Prevention Lifeline at 1-863.578.9634. This is open 24 hours a day.  Summary  · Take over-the-counter and prescription medicines only as told by your health care provider.  · Return to your normal activities as told by your health care provider. Ask your health care provider what activities are safe for you.  · Consider getting support for your loss. Sources of support include [employer] leaders, friends, family, professional counselors, and bereavement support groups.  · Keep all follow-up visits as told by your health care provider. This is important.  This information is not intended to replace advice given to you by your health care provider. Make sure you discuss any questions you have with your health care provider.  Document Released: 05/04/2015 Document Revised: 01/02/2019 Document Reviewed: 03/29/2018  Elsevier Patient Education © 2020 [employer]

## 2022-02-02 NOTE — L&D DELIVERY NOTE
DELIVERY NOTE      26 year old  18 weeks , fetal demise at 14 weeks admitted for induction of labor  She received total of 800 mcg cytotec.  Baby was delivered at 1642, cord was cut and placed in a blanket.   No fetal activity no cardiac no breathing noted  Examination of the cervix was 1 cm with placenta palpated high up in the uterine cavity.  (+) passage of blood clots noted. Vaginal manipulation was not tolerated by the patient despite fentanyl     Pt and  was discussed about Evacuation of Placenta and products of conception under anesthesia.  Agreed with the procedure.   All questions addressed.   See consents signed.    Arabella Bell MD

## 2022-02-02 NOTE — ANESTHESIA TIME REPORT
Anesthesia Start and Stop Event Times     Date Time Event    2/1/2022 1725 Anesthesia Start     1730 Ready for Procedure     1807 Anesthesia Stop        Responsible Staff  02/01/22    Name Role Begin End    Cruz Cristina M.D. Anesth 1725 1807        Preop Diagnosis (Free Text):  Pre-op Diagnosis     DNC        Preop Diagnosis (Codes):    Premium Reason  A. 3PM - 7AM    Comments:

## 2022-02-02 NOTE — PROGRESS NOTES
1900: repot received from [facility]. Pt requesting pain intervention. Verbal orders from MD for [place] 5-325 mg and ibuprofen 600 mg prior to discharge  2245: pt does not desire chromosomal or genetic testing. They do desire and private cremation and have chosen [facility] [place]. Handouts and contact info provided. Pt given Ibuprofen, see MAR. Bleeding within normal limits. Pt voided and ambulated without difficulty. Following uncomplicated recovery pt wheeled off unit via wheelchair/RN and assisted into vehicle with significant other

## 2022-02-02 NOTE — DISCHARGE SUMMARY
Discharge Summary:      Kristal Cesar    Admit Date:   2022  Discharge Date:  2022     Admitting diagnosis:  Indication for care in labor or delivery [O75.9]  Discharge Diagnosis: Status post vaginal, spontaneous.  Fetal demise at 14 weeks     History:  Past Medical History:   Diagnosis Date   • Allergy    • Anxiety    • Depression    • Obesity (BMI 30-39.9)      OB History    Para Term  AB Living   4 2 2   1 2   SAB IAB Ectopic Molar Multiple Live Births   1       0 2      # Outcome Date GA Lbr [place]/2nd Weight Sex Delivery Anes PTL Lv   4 Current            3 Term 19 41w0d 06:14 / 00:10 3.36 kg (7 lb 6.5 oz) M Vag-Spont EPI N STEFAN   2 Term 01/15/16 41w1d  3.314 kg (7 lb 4.9 oz) M Vag-Spont EPI N STEFAN   1 SAB                 Other environmental  Patient Active Problem List    Diagnosis Date Noted   • Indication for care in labor or delivery 2022   • Fetal demise before 20 weeks with retention of dead fetus 2022   • Less than 8 weeks gestation of pregnancy 2021   • Screen for STD (sexually transmitted disease) 2021   • 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 , now para 2, was admitted with the above mentioned diagnosis, underwent IOL with cytotec.   Fetus spontaneously delivered but the placenta was retained     Vitals:    22 1021 22 1554 22 1637   BP: 108/60  117/56   Pulse: 70  64   Resp: 16     Temp: 36.4 °C (97.6 °F) 36.5 °C (97.7 °F)    TempSrc: Temporal Temporal    Weight: 61.2 kg (135 lb)      Height: 1.524 m (5')         Current Facility-Administered Medications   Medication Dose   • fentaNYL (SUBLIMAZE) injection 100 mcg  100 mcg   • ondansetron (ZOFRAN) syringe/vial injection 4 mg  4 mg   • oxytocin (PITOCIN) 20 UNITS/1000ML LR       Facility-Administered Medications Ordered in Other Encounters   Medication Dose   • midazolam (VERSED) injection     • fentaNYL (SUBLIMAZE) injection     • propofol (DIPRIVAN) injection     • ondansetron (ZOFRAN) syringe/vial injection     • dexamethasone (DECADRON) injection     • methylergonovine (METHERGINE) injection     • ceFAZolin (ANCEF) injection     • ketorolac (TORADOL) injection         Exam:  Abdomen: Abdomen soft, non-tender. BS normal. No masses,  No organomegaly  Fundus Non Tender: yes  Perineum: perineum intact  Extremity: extremities, peripheral pulses and reflexes normal, no edema, redness or tenderness in the calves or thighs     Labs:  Recent Labs     02/01/22  1155   WBC 10.1   RBC 4.65   HEMOGLOBIN 13.2   HEMATOCRIT 40.2   MCV 86.5   MCH 28.4   MCHC 32.8*   RDW 40.3   PLATELETCT 206   MPV 11.9        Activity:   Discharge to home  Pelvic Rest x 6 weeks    Assessment:  Stable      Follow up: .ff-up with Omar Bell MD 4-6 weeks PP      Discharge Meds:   Current Outpatient Medications   Medication Sig Dispense Refill   • oxyCODONE-acetaminophen (PERCOCET) 5-325 MG Tab Take 1 Tablet by mouth every 6 hours as needed for up to 7 days. 28 Tablet 0   • ibuprofen (MOTRIN) 600 MG Tab Take 1 Tablet by mouth every 6 hours as needed. 30 Tablet 3       Arabella Lara M.D.

## 2022-02-02 NOTE — ANESTHESIA PROCEDURE NOTES
Airway    Date/Time: 2/1/2022 5:32 PM  Performed by: Cruz Cristina M.D.  Authorized by: Cruz Cristina M.D.     Location:  OR  Urgency:  Elective  Indications for Airway Management:  Anesthesia      Spontaneous Ventilation: absent    Sedation Level:  Deep  Preoxygenated: Yes    Final Airway Type:  Supraglottic airway  Final Supraglottic Airway:  Standard LMA    SGA Size:  4  Number of Attempts at Approach:  1

## 2022-06-14 RX ORDER — ALBUTEROL SULFATE 90 UG/1
AEROSOL, METERED RESPIRATORY (INHALATION)
COMMUNITY
Start: 2022-03-22 | End: 2023-08-20

## 2022-06-14 RX ORDER — ESCITALOPRAM OXALATE 10 MG/1
10 TABLET ORAL
COMMUNITY
Start: 2022-04-14 | End: 2022-06-15

## 2022-06-14 RX ORDER — IBUPROFEN 400 MG/1
TABLET ORAL
COMMUNITY
Start: 2022-03-22 | End: 2022-06-15

## 2022-06-14 RX ORDER — IBUPROFEN 800 MG/1
TABLET ORAL
COMMUNITY
Start: 2022-05-10 | End: 2022-06-15

## 2022-06-15 ENCOUNTER — OFFICE VISIT (OUTPATIENT)
Dept: MEDICAL GROUP | Facility: MEDICAL CENTER | Age: 27
End: 2022-06-15
Attending: INTERNAL MEDICINE
Payer: MEDICAID

## 2022-06-15 VITALS
HEIGHT: 60 IN | BODY MASS INDEX: 25.32 KG/M2 | WEIGHT: 129 LBS | TEMPERATURE: 98 F | OXYGEN SATURATION: 95 % | DIASTOLIC BLOOD PRESSURE: 68 MMHG | SYSTOLIC BLOOD PRESSURE: 100 MMHG | HEART RATE: 86 BPM | RESPIRATION RATE: 16 BRPM

## 2022-06-15 DIAGNOSIS — F33.1 MODERATE EPISODE OF RECURRENT MAJOR DEPRESSIVE DISORDER (HCC): ICD-10-CM

## 2022-06-15 DIAGNOSIS — L02.92 RECURRENT BOILS: ICD-10-CM

## 2022-06-15 PROBLEM — Z3A.01 LESS THAN 8 WEEKS GESTATION OF PREGNANCY: Status: RESOLVED | Noted: 2021-11-24 | Resolved: 2022-06-15

## 2022-06-15 PROBLEM — Z11.3 SCREEN FOR STD (SEXUALLY TRANSMITTED DISEASE): Status: RESOLVED | Noted: 2021-11-24 | Resolved: 2022-06-15

## 2022-06-15 PROBLEM — O02.1 FETAL DEMISE BEFORE 20 WEEKS WITH RETENTION OF DEAD FETUS: Status: RESOLVED | Noted: 2022-02-01 | Resolved: 2022-06-15

## 2022-06-15 PROCEDURE — 99214 OFFICE O/P EST MOD 30 MIN: CPT | Performed by: INTERNAL MEDICINE

## 2022-06-15 PROCEDURE — 99213 OFFICE O/P EST LOW 20 MIN: CPT | Performed by: INTERNAL MEDICINE

## 2022-06-15 RX ORDER — BUPROPION HYDROCHLORIDE 150 MG/1
150 TABLET ORAL EVERY MORNING
Qty: 30 TABLET | Refills: 3 | Status: SHIPPED | OUTPATIENT
Start: 2022-06-15 | End: 2023-08-20

## 2022-06-15 ASSESSMENT — PATIENT HEALTH QUESTIONNAIRE - PHQ9
4. FEELING TIRED OR HAVING LITTLE ENERGY: NEARLY EVERY DAY
6. FEELING BAD ABOUT YOURSELF - OR THAT YOU ARE A FAILURE OR HAVE LET YOURSELF OR YOUR FAMILY DOWN: NEARLY EVERY DAY
3. TROUBLE FALLING OR STAYING ASLEEP OR SLEEPING TOO MUCH: MORE THAN HALF THE DAYS
2. FEELING DOWN, DEPRESSED, IRRITABLE, OR HOPELESS: NEARLY EVERY DAY
7. TROUBLE CONCENTRATING ON THINGS, SUCH AS READING THE NEWSPAPER OR WATCHING TELEVISION: NEARLY EVERY DAY
9. THOUGHTS THAT YOU WOULD BE BETTER OFF DEAD, OR OF HURTING YOURSELF: NOT AT ALL
SUM OF ALL RESPONSES TO PHQ9 QUESTIONS 1 AND 2: 6
5. POOR APPETITE OR OVEREATING: MORE THAN HALF THE DAYS
1. LITTLE INTEREST OR PLEASURE IN DOING THINGS: NEARLY EVERY DAY
SUM OF ALL RESPONSES TO PHQ QUESTIONS 1-9: 22
8. MOVING OR SPEAKING SO SLOWLY THAT OTHER PEOPLE COULD HAVE NOTICED. OR THE OPPOSITE, BEING SO FIGETY OR RESTLESS THAT YOU HAVE BEEN MOVING AROUND A LOT MORE THAN USUAL: NEARLY EVERY DAY

## 2022-06-15 ASSESSMENT — FIBROSIS 4 INDEX: FIB4 SCORE: 0.44

## 2022-06-16 PROBLEM — L73.9 FOLLICULITIS: Status: ACTIVE | Noted: 2022-06-16

## 2022-06-16 PROBLEM — L73.9 FOLLICULITIS: Status: RESOLVED | Noted: 2022-06-16 | Resolved: 2022-06-16

## 2022-06-16 PROBLEM — N90.89 PERINEAL CYST IN FEMALE: Status: RESOLVED | Noted: 2021-11-24 | Resolved: 2022-06-16

## 2022-06-16 NOTE — ASSESSMENT & PLAN NOTE
She reports continued and persistent depression.  She states that in February she had a miscarriage at 14 weeks and this really made things worse although she was feeling depressed before that.  Reports trouble getting out of bed, completing daily household tasks, lack of motivation, difficulty concentrating, overall depressed mood, and easy tearfulness.  She denies suicidal ideation.  In the past, she has taken Wellbutrin with variable benefits.  She has also used Zoloft which she does not think was helpful.  She is interested in restarting medication to help with depression.  She also would like to reestablish with a psychiatrist and a therapist.

## 2022-06-17 NOTE — PROGRESS NOTES
Subjective:   Kristal Cesar is a 26 y.o. female here today for depression, skin concerns    Moderate episode of recurrent major depressive disorder (HCC)  She reports continued and persistent depression.  She states that in February she had a miscarriage at 14 weeks and this really made things worse although she was feeling depressed before that.  Reports trouble getting out of bed, completing daily household tasks, lack of motivation, difficulty concentrating, overall depressed mood, and easy tearfulness.  She denies suicidal ideation.  In the past, she has taken Wellbutrin with variable benefits.  She has also used Zoloft which she does not think was helpful.  She is interested in restarting medication to help with depression.  She also would like to reestablish with a psychiatrist and a therapist.      Recurrent boils  She has noted recurrent boils in the suprapubic region around the hair follicles.  In the past she is also had this in her armpits.  States that most of them are in process of resolving and none are extremely painful but she is requesting a refill of the antibacterial ointment that we have previously used that has been helpful for her.       Current medicines (including changes today)  Current Outpatient Medications   Medication Sig Dispense Refill   • buPROPion (WELLBUTRIN XL) 150 MG XL tablet Take 1 Tablet by mouth every morning. 30 Tablet 3   • mupirocin (BACTROBAN) 2 % Ointment Apply 1 Application topically 2 times a day. 22 g 0   • albuterol 108 (90 Base) MCG/ACT Aero Soln inhalation aerosol INHALE 1 TO 2 PUFFS BY MOUTH 4 TIMES A DAY FOR 7 DAYS     • ibuprofen (MOTRIN) 600 MG Tab Take 1 Tablet by mouth every 6 hours as needed. 30 Tablet 3   • triamcinolone acetonide (KENALOG) 0.1 % Ointment Apply thin layer to rash on hands, legs, elbows twice daily for 7-10 days until resolves 45 g 3     No current facility-administered medications for this visit.     She  has a past medical  history of Allergy, Anxiety, Depression, and Obesity (BMI 30-39.9).         Objective:     Vitals:    06/15/22 1702   BP: 100/68   Pulse: 86   Resp: 16   Temp: 36.7 °C (98 °F)   SpO2: 95%     Body mass index is 25.19 kg/m².   Physical Exam:  Constitutional: Alert, no distress.  Skin: Warm, dry, good turgor, scarring and some small abscesses in various stages of healing in the suprapubic region, none of which are fluctuant or indurated or tender  Eye: Equal, round and reactive, conjunctiva clear, lids normal.  Psych: Alert and oriented x3, pressed mood and tearful      Assessment and Plan:   The following treatment plan was discussed    1. Moderate episode of recurrent major depressive disorder (HCC)  Given some benefit with Wellbutrin in the past, we will restart this for her.  I have placed new referrals to psychology and psychiatry.  If she is unable to establish care with her psychiatrist in the next 6 weeks she will follow-up with me regarding Wellbutrin efficacy.  Otherwise she will follow-up with psychiatry.  - Referral to Psychiatry  - Referral to Psychology  - buPROPion (WELLBUTRIN XL) 150 MG XL tablet; Take 1 Tablet by mouth every morning.  Dispense: 30 Tablet; Refill: 3    2. Recurrent boils  No evidence of active infection requiring antibiotics however prescription given for Bactroban that she can apply as needed when she starts to develop an outbreak.  We discussed using a warm compress and she is aware of the signs and symptoms of a worsening infection and knows that she should present to the clinic if she develops these.  - mupirocin (BACTROBAN) 2 % Ointment; Apply 1 Application topically 2 times a day.  Dispense: 22 g; Refill: 0        Followup: Return in about 6 weeks (around 7/27/2022) for depression.

## 2022-06-17 NOTE — ASSESSMENT & PLAN NOTE
She has noted recurrent boils in the suprapubic region around the hair follicles.  In the past she is also had this in her armpits.  States that most of them are in process of resolving and none are extremely painful but she is requesting a refill of the antibacterial ointment that we have previously used that has been helpful for her.

## 2022-07-26 ENCOUNTER — OFFICE VISIT (OUTPATIENT)
Dept: MEDICAL GROUP | Facility: MEDICAL CENTER | Age: 27
End: 2022-07-26
Attending: INTERNAL MEDICINE
Payer: MEDICAID

## 2022-07-26 ENCOUNTER — TELEPHONE (OUTPATIENT)
Dept: MEDICAL GROUP | Facility: MEDICAL CENTER | Age: 27
End: 2022-07-26

## 2022-07-26 VITALS
HEIGHT: 60 IN | BODY MASS INDEX: 24.36 KG/M2 | RESPIRATION RATE: 16 BRPM | HEART RATE: 81 BPM | DIASTOLIC BLOOD PRESSURE: 74 MMHG | TEMPERATURE: 98.4 F | OXYGEN SATURATION: 96 % | WEIGHT: 124.1 LBS | SYSTOLIC BLOOD PRESSURE: 114 MMHG

## 2022-07-26 DIAGNOSIS — Z34.90 PREGNANCY, UNSPECIFIED GESTATIONAL AGE: ICD-10-CM

## 2022-07-26 LAB
INT CON NEG: NEGATIVE
INT CON POS: POSITIVE
POC URINE PREGNANCY TEST: POSITIVE

## 2022-07-26 PROCEDURE — 81025 URINE PREGNANCY TEST: CPT | Performed by: FAMILY MEDICINE

## 2022-07-26 PROCEDURE — 99214 OFFICE O/P EST MOD 30 MIN: CPT | Performed by: FAMILY MEDICINE

## 2022-07-26 PROCEDURE — 99213 OFFICE O/P EST LOW 20 MIN: CPT | Performed by: INTERNAL MEDICINE

## 2022-07-26 RX ORDER — ESCITALOPRAM OXALATE 10 MG/1
10 TABLET ORAL
COMMUNITY
Start: 2022-06-29 | End: 2023-08-20

## 2022-07-26 RX ORDER — PNV NO.95/FERROUS FUM/FOLIC AC 28MG-0.8MG
1 TABLET ORAL DAILY
Qty: 90 TABLET | Refills: 3 | Status: SHIPPED | OUTPATIENT
Start: 2022-07-26 | End: 2023-08-20

## 2022-07-26 ASSESSMENT — FIBROSIS 4 INDEX: FIB4 SCORE: 0.46

## 2022-07-27 DIAGNOSIS — Z34.90 PREGNANCY, UNSPECIFIED GESTATIONAL AGE: ICD-10-CM

## 2022-07-27 NOTE — ASSESSMENT & PLAN NOTE
Loss of pregnancy in 02/22.  She did have a normal period 3/1/22, 4/1/22.  On May 30 and June 18 she had some bleeding when using the restroom but promptly stopped afterwards. She is unsure if those were periods.   She didn't take a pregnancy test in may or June. She had a positive pregnancy test 2 weeks ago.   She is nauseous and having food aversions.   She is having low back pain  (+) headaches, no vision changes.

## 2022-07-27 NOTE — TELEPHONE ENCOUNTER
Phone Number Called: [phone] (home)       Call outcome: Did not leave a detailed message. Requested patient to call back.    Message: Attempted calling patient to let her know POCT pregnancy test came back positive. VMB was full, could not leave message

## 2022-07-27 NOTE — PROGRESS NOTES
Subjective:     CC:   Chief Complaint   Patient presents with   • Body Aches   • Pregnancy         HPI:     Pregnancy  Loss of pregnancy in 02/22.  She did have a normal period 3/1/22, 4/1/22.  On May 30 and June 18 she had some bleeding when using the restroom but promptly stopped afterwards. She is unsure if those were periods.   She didn't take a pregnancy test in may or June. She had a positive pregnancy test 2 weeks ago.   She is nauseous and having food aversions.   She is having low back pain  (+) headaches, no vision changes.         Past Medical History:   Diagnosis Date   • Allergy    • Anxiety    • Depression    • Obesity (BMI 30-39.9)        Social History     Tobacco Use   • Smoking status: Never Smoker   • Smokeless tobacco: Never Used   Substance Use Topics   • Alcohol use: No   • Drug use: No       Current Outpatient Medications Ordered in Epic   Medication Sig Dispense Refill   • Prenatal Vit-Fe Fumarate-FA (PRENATAL VITAMINS) 28-0.8 MG Tab Take 1 Tablet by mouth every day. 90 Tablet 3   • escitalopram (LEXAPRO) 10 MG Tab Take 10 mg by mouth every day.     • buPROPion (WELLBUTRIN XL) 150 MG XL tablet Take 1 Tablet by mouth every morning. 30 Tablet 3   • mupirocin (BACTROBAN) 2 % Ointment Apply 1 Application topically 2 times a day. 22 g 0   • albuterol 108 (90 Base) MCG/ACT Aero Soln inhalation aerosol INHALE 1 TO 2 PUFFS BY MOUTH 4 TIMES A DAY FOR 7 DAYS     • triamcinolone acetonide (KENALOG) 0.1 % Ointment Apply thin layer to rash on hands, legs, elbows twice daily for 7-10 days until resolves 45 g 3     No current [facility]-ordered facility-administered medications on file.       Allergies:  Other environmental        Objective:       Exam:  /74 (BP Location: Left arm, Patient Position: Sitting, BP Cuff Size: Adult)   Pulse 81   Temp 36.9 °C (98.4 °F) (Temporal)   Resp 16   Ht 1.524 m (5')   Wt 56.3 kg (124 lb 1.6 oz)   SpO2 96%   BMI 24.24 kg/m²  Body mass index is 24.24  kg/m².    Constitutional: Alert, no distress, well-groomed.  Respiratory: Unlabored respiratory effort, no cough.  MSK: moves all extremities.  Psych: Alert and oriented x3, normal affect and mood.         Labs:   Positive pregnancy test in office today    Assessment & Plan:     27 y.o. female with the following -     1. Pregnancy, unspecified gestational age  - POCT Pregnancy  - US-OB 1ST TRIMESTER SINGLE GEST; Future  - Prenatal Vit-Fe Fumarate-FA (PRENATAL VITAMINS) 28-0.8 MG Tab; Take 1 Tablet by mouth every day.  Dispense: 90 Tablet; Refill: 3  STAT ob us ordered - uncertain what her LMP is, but her last normal period was in April, which would make her around 16 weeks. Us to make sure pregnancy is intrauterine and for dating purposes.   Prenatal labs also ordered  She has appointment in 1 month with OB.    Return if symptoms worsen or fail to improve.    Please note that this dictation was created using voice recognition software. I have made every reasonable attempt to correct obvious errors, but I expect that there are errors of grammar and possibly content that I did not discover before finalizing the note.

## 2022-07-29 ENCOUNTER — HOSPITAL ENCOUNTER (OUTPATIENT)
Dept: RADIOLOGY | Facility: MEDICAL CENTER | Age: 27
End: 2022-07-29
Attending: FAMILY MEDICINE
Payer: MEDICAID

## 2022-07-29 DIAGNOSIS — Z34.90 PREGNANCY, UNSPECIFIED GESTATIONAL AGE: ICD-10-CM

## 2022-07-29 PROCEDURE — 76801 OB US < 14 WKS SINGLE FETUS: CPT

## 2022-08-22 ENCOUNTER — APPOINTMENT (OUTPATIENT)
Dept: RADIOLOGY | Facility: MEDICAL CENTER | Age: 27
End: 2022-08-22
Attending: STUDENT IN AN ORGANIZED HEALTH CARE EDUCATION/TRAINING PROGRAM
Payer: MEDICAID

## 2022-08-22 ENCOUNTER — HOSPITAL ENCOUNTER (EMERGENCY)
Facility: MEDICAL CENTER | Age: 27
End: 2022-08-22
Attending: STUDENT IN AN ORGANIZED HEALTH CARE EDUCATION/TRAINING PROGRAM
Payer: MEDICAID

## 2022-08-22 VITALS
WEIGHT: 125 LBS | BODY MASS INDEX: 24.41 KG/M2 | DIASTOLIC BLOOD PRESSURE: 57 MMHG | TEMPERATURE: 97 F | RESPIRATION RATE: 16 BRPM | HEART RATE: 69 BPM | OXYGEN SATURATION: 97 % | SYSTOLIC BLOOD PRESSURE: 96 MMHG
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
## 2022-08-22 DIAGNOSIS — R10.32 LEFT LOWER QUADRANT ABDOMINAL PAIN: ICD-10-CM

## 2022-08-22 DIAGNOSIS — Z3A.13 13 WEEKS GESTATION OF PREGNANCY: ICD-10-CM

## 2022-08-22 LAB
ALBUMIN SERPL BCP-MCNC: 3.9 G/DL (ref 3.2–4.9)
ALBUMIN/GLOB SERPL: 1.6 G/DL
ALP SERPL-CCNC: 59 U/L (ref 30–99)
ALT SERPL-CCNC: 7 U/L (ref 2–50)
ANION GAP SERPL CALC-SCNC: 12 MMOL/L (ref 7–16)
APPEARANCE UR: CLEAR
AST SERPL-CCNC: 11 U/L (ref 12–45)
B-HCG SERPL-ACNC: ABNORMAL MIU/ML (ref 0–5)
BACTERIA #/AREA URNS HPF: ABNORMAL /HPF
BASOPHILS # BLD AUTO: 0.3 % (ref 0–1.8)
BASOPHILS # BLD: 0.03 K/UL (ref 0–0.12)
BILIRUB SERPL-MCNC: 0.5 MG/DL (ref 0.1–1.5)
BILIRUB UR QL STRIP.AUTO: NEGATIVE
BUN SERPL-MCNC: 6 MG/DL (ref 8–22)
CALCIUM SERPL-MCNC: 8.9 MG/DL (ref 8.5–10.5)
CHLORIDE SERPL-SCNC: 103 MMOL/L (ref 96–112)
CO2 SERPL-SCNC: 21 MMOL/L (ref 20–33)
COLOR UR: ABNORMAL
CREAT SERPL-MCNC: 0.37 MG/DL (ref 0.5–1.4)
EOSINOPHIL # BLD AUTO: 0.08 K/UL (ref 0–0.51)
EOSINOPHIL NFR BLD: 0.9 % (ref 0–6.9)
EPI CELLS #/AREA URNS HPF: ABNORMAL /HPF
ERYTHROCYTE [DISTWIDTH] IN BLOOD BY AUTOMATED COUNT: 39.2 FL (ref 35.9–50)
GFR SERPLBLD CREATININE-BSD FMLA CKD-EPI: 142 ML/MIN/1.73 M 2
GLOBULIN SER CALC-MCNC: 2.5 G/DL (ref 1.9–3.5)
GLUCOSE SERPL-MCNC: 82 MG/DL (ref 65–99)
GLUCOSE UR STRIP.AUTO-MCNC: NEGATIVE MG/DL
HCT VFR BLD AUTO: 34.5 % (ref 37–47)
HGB BLD-MCNC: 12 G/DL (ref 12–16)
HYALINE CASTS #/AREA URNS LPF: ABNORMAL /LPF
IMM GRANULOCYTES # BLD AUTO: 0.02 K/UL (ref 0–0.11)
IMM GRANULOCYTES NFR BLD AUTO: 0.2 % (ref 0–0.9)
KETONES UR STRIP.AUTO-MCNC: ABNORMAL MG/DL
LEUKOCYTE ESTERASE UR QL STRIP.AUTO: ABNORMAL
LYMPHOCYTES # BLD AUTO: 2.43 K/UL (ref 1–4.8)
LYMPHOCYTES NFR BLD: 26.9 % (ref 22–41)
MCH RBC QN AUTO: 29 PG (ref 27–33)
MCHC RBC AUTO-ENTMCNC: 34.8 G/DL (ref 33.6–35)
MCV RBC AUTO: 83.3 FL (ref 81.4–97.8)
MICRO URNS: ABNORMAL
MONOCYTES # BLD AUTO: 0.6 K/UL (ref 0–0.85)
MONOCYTES NFR BLD AUTO: 6.6 % (ref 0–13.4)
NEUTROPHILS # BLD AUTO: 5.88 K/UL (ref 2–7.15)
NEUTROPHILS NFR BLD: 65.1 % (ref 44–72)
NITRITE UR QL STRIP.AUTO: NEGATIVE
NRBC # BLD AUTO: 0 K/UL
NRBC BLD-RTO: 0 /100 WBC
PH UR STRIP.AUTO: 6 [PH] (ref 5–8)
PLATELET # BLD AUTO: 208 K/UL (ref 164–446)
PMV BLD AUTO: 11.7 FL (ref 9–12.9)
POTASSIUM SERPL-SCNC: 3.4 MMOL/L (ref 3.6–5.5)
PROT SERPL-MCNC: 6.4 G/DL (ref 6–8.2)
PROT UR QL STRIP: NEGATIVE MG/DL
RBC # BLD AUTO: 4.14 M/UL (ref 4.2–5.4)
RBC # URNS HPF: ABNORMAL /HPF
RBC UR QL AUTO: NEGATIVE
SODIUM SERPL-SCNC: 136 MMOL/L (ref 135–145)
SP GR UR STRIP.AUTO: 1.03
UROBILINOGEN UR STRIP.AUTO-MCNC: 0.2 MG/DL
WBC # BLD AUTO: 9 K/UL (ref 4.8–10.8)
WBC #/AREA URNS HPF: ABNORMAL /HPF

## 2022-08-22 PROCEDURE — 99284 EMERGENCY DEPT VISIT MOD MDM: CPT

## 2022-08-22 PROCEDURE — 81001 URINALYSIS AUTO W/SCOPE: CPT

## 2022-08-22 PROCEDURE — 80053 COMPREHEN METABOLIC PANEL: CPT

## 2022-08-22 PROCEDURE — 84702 CHORIONIC GONADOTROPIN TEST: CPT

## 2022-08-22 PROCEDURE — 85025 COMPLETE CBC W/AUTO DIFF WBC: CPT

## 2022-08-22 PROCEDURE — 36415 COLL VENOUS BLD VENIPUNCTURE: CPT

## 2022-08-22 PROCEDURE — 76801 OB US < 14 WKS SINGLE FETUS: CPT

## 2022-08-22 ASSESSMENT — FIBROSIS 4 INDEX: FIB4 SCORE: 0.46

## 2022-08-22 NOTE — ED TRIAGE NOTES
"Chief Complaint   Patient presents with    Pregnancy     \"13 or 14 weeks\"    T-5000 GLF     Pt reports slip while cleaning the bathroom last night. Sent by OBGYN. Reports that she is \"high risk\" does not know LMP.  Denies vaginal bleeding. Reports \"clear\" vaginal discharge.    Reports pain to left abdomen. Reports that hit her head against the side of bath tub.  -LOC.  Also reports hitting abdomen on the side of the bathtub.   BP (!) 99/56   Pulse 88   Temp 36.3 °C (97.3 °F) (Temporal)   Resp 19   Wt 56.7 kg (125 lb)   SpO2 97%   Pt informed of wait times. Educated on triage process.  Asked to return to triage RN for any new or worsening of symptoms. Thanked for patience.      "

## 2022-08-22 NOTE — ED NOTES
Patient ambulatory up to purple pod with a steady gait. She is providing urine sample in restroom and was instructed to change into gown afterwards. Warm blanket provided for comfort

## 2022-08-23 NOTE — DISCHARGE INSTRUCTIONS
Follow-up with your OB gynecologist if you develop any severe abdominal pain inability to eat or drink fevers, severe vaginal bleeding return right away for recheck.  Tylenol for pain.  Drink lots of fluids and get rest.

## 2022-10-13 ENCOUNTER — EH NON-PROVIDER (OUTPATIENT)
Dept: OCCUPATIONAL MEDICINE | Facility: CLINIC | Age: 27
End: 2022-10-13

## 2022-10-13 ENCOUNTER — HOSPITAL ENCOUNTER (OUTPATIENT)
Facility: MEDICAL CENTER | Age: 27
End: 2022-10-13
Attending: NURSE PRACTITIONER
Payer: COMMERCIAL

## 2022-10-13 ENCOUNTER — EMPLOYEE HEALTH (OUTPATIENT)
Dept: OCCUPATIONAL MEDICINE | Facility: CLINIC | Age: 27
End: 2022-10-13

## 2022-10-13 DIAGNOSIS — Z02.1 PRE-EMPLOYMENT HEALTH SCREENING EXAMINATION: ICD-10-CM

## 2022-10-13 DIAGNOSIS — Z02.89 VISIT FOR OCCUPATIONAL HEALTH EXAMINATION: ICD-10-CM

## 2022-10-13 DIAGNOSIS — Z02.89 VISIT FOR OCCUPATIONAL HEALTH EXAMINATION: Primary | ICD-10-CM

## 2022-10-13 LAB
AMP AMPHETAMINE: NORMAL
BAR BARBITURATES: NORMAL
BZO BENZODIAZEPINES: NORMAL
COC COCAINE: NORMAL
INT CON NEG: NORMAL
INT CON POS: NORMAL
MDMA ECSTASY: NORMAL
MET METHAMPHETAMINES: NORMAL
MTD METHADONE: NORMAL
OPI OPIATES: NORMAL
OXY OXYCODONE: NORMAL
PCP PHENCYCLIDINE: NORMAL
POC URINE DRUG SCREEN OCDRS: NORMAL
THC: NORMAL

## 2022-10-13 PROCEDURE — 86480 TB TEST CELL IMMUN MEASURE: CPT | Performed by: NURSE PRACTITIONER

## 2022-10-13 PROCEDURE — 90686 IIV4 VACC NO PRSV 0.5 ML IM: CPT | Performed by: NURSE PRACTITIONER

## 2022-10-13 PROCEDURE — 8915 PR COMPREHENSIVE PHYSICAL: Performed by: NURSE PRACTITIONER

## 2022-10-13 PROCEDURE — 80305 DRUG TEST PRSMV DIR OPT OBS: CPT | Performed by: NURSE PRACTITIONER

## 2022-10-14 LAB
GAMMA INTERFERON BACKGROUND BLD IA-ACNC: 0.05 IU/ML
M TB IFN-G BLD-IMP: NEGATIVE
M TB IFN-G CD4+ BCKGRND COR BLD-ACNC: 0.03 IU/ML
MITOGEN IGNF BCKGRD COR BLD-ACNC: >10 IU/ML
QFT TB2 - NIL TBQ2: 0.06 IU/ML

## 2022-10-14 NOTE — PROGRESS NOTES
Patient stated that she would bring in her COVID docs & she was directed to [address] for the second one. She stated that she was going in yesterday for her second dose at [facility] instead of going to [address].

## 2022-10-26 ENCOUNTER — PHARMACY VISIT (OUTPATIENT)
Dept: PHARMACY | Facility: MEDICAL CENTER | Age: 27
End: 2022-10-26
Payer: COMMERCIAL

## 2022-10-26 PROCEDURE — RXMED WILLOW AMBULATORY MEDICATION CHARGE: Performed by: INTERNAL MEDICINE

## 2022-10-31 ENCOUNTER — EH NON-PROVIDER (OUTPATIENT)
Dept: OCCUPATIONAL MEDICINE | Facility: CLINIC | Age: 27
End: 2022-10-31

## 2022-11-23 ENCOUNTER — HOSPITAL ENCOUNTER (EMERGENCY)
Facility: MEDICAL CENTER | Age: 27
End: 2022-11-23
Attending: OBSTETRICS & GYNECOLOGY | Admitting: OBSTETRICS & GYNECOLOGY
Payer: MEDICAID

## 2022-11-23 VITALS
BODY MASS INDEX: 26.5 KG/M2 | OXYGEN SATURATION: 96 % | SYSTOLIC BLOOD PRESSURE: 106 MMHG | TEMPERATURE: 97.4 F | WEIGHT: 135 LBS | RESPIRATION RATE: 16 BRPM | HEIGHT: 60 IN | DIASTOLIC BLOOD PRESSURE: 60 MMHG | HEART RATE: 91 BPM

## 2022-11-23 LAB
FLUAV RNA SPEC QL NAA+PROBE: NEGATIVE
FLUBV RNA SPEC QL NAA+PROBE: NEGATIVE
RSV RNA SPEC QL NAA+PROBE: NEGATIVE
SARS-COV-2 RNA RESP QL NAA+PROBE: NOTDETECTED
SPECIMEN SOURCE: NORMAL

## 2022-11-23 PROCEDURE — 59025 FETAL NON-STRESS TEST: CPT

## 2022-11-23 PROCEDURE — 700105 HCHG RX REV CODE 258: Performed by: OBSTETRICS & GYNECOLOGY

## 2022-11-23 PROCEDURE — C9803 HOPD COVID-19 SPEC COLLECT: HCPCS | Performed by: OBSTETRICS & GYNECOLOGY

## 2022-11-23 PROCEDURE — 302449 STATCHG TRIAGE ONLY (STATISTIC)

## 2022-11-23 PROCEDURE — 0241U HCHG SARS-COV-2 COVID-19 NFCT DS RESP RNA 4 TRGT MIC: CPT

## 2022-11-23 RX ORDER — SODIUM CHLORIDE, SODIUM LACTATE, POTASSIUM CHLORIDE, AND CALCIUM CHLORIDE .6; .31; .03; .02 G/100ML; G/100ML; G/100ML; G/100ML
1000 INJECTION, SOLUTION INTRAVENOUS ONCE
Status: COMPLETED | OUTPATIENT
Start: 2022-11-23 | End: 2022-11-23

## 2022-11-23 RX ORDER — ONDANSETRON 2 MG/ML
4 INJECTION INTRAMUSCULAR; INTRAVENOUS EVERY 4 HOURS PRN
Status: DISCONTINUED | OUTPATIENT
Start: 2022-11-23 | End: 2022-11-23

## 2022-11-23 RX ADMIN — SODIUM CHLORIDE, POTASSIUM CHLORIDE, SODIUM LACTATE AND CALCIUM CHLORIDE 1000 ML: 600; 310; 30; 20 INJECTION, SOLUTION INTRAVENOUS at 20:16

## 2022-11-23 ASSESSMENT — FIBROSIS 4 INDEX: FIB4 SCORE: 0.52

## 2022-11-24 NOTE — PROGRESS NOTES
Pt presents to triage with c/o nausea, vomiting and diarrhea since last night. Pt also reports a sore throat and previous headaches which were relieved with Tylenol. Pt denies vaginal bleeding or leaking of fluids. Pt reports +fetal movement at this time.     - Report called to Dr. Omar Bell      - Dr Omar Bell called with lab results. Orders to discharge pt home.     - Pt given discharge instructions including  labor precautions with instructions to return with worsening symptoms. Paperwork signed with RN. Pt escorted off unit in wheelchair.

## 2022-11-28 ENCOUNTER — HOSPITAL ENCOUNTER (OUTPATIENT)
Dept: LAB | Facility: MEDICAL CENTER | Age: 27
End: 2022-11-28
Attending: OBSTETRICS & GYNECOLOGY
Payer: MEDICAID

## 2022-11-28 LAB
BASOPHILS # BLD AUTO: 0.5 % (ref 0–1.8)
BASOPHILS # BLD: 0.05 K/UL (ref 0–0.12)
EOSINOPHIL # BLD AUTO: 0.07 K/UL (ref 0–0.51)
EOSINOPHIL NFR BLD: 0.7 % (ref 0–6.9)
ERYTHROCYTE [DISTWIDTH] IN BLOOD BY AUTOMATED COUNT: 43.7 FL (ref 35.9–50)
GLUCOSE 1H P 50 G GLC PO SERPL-MCNC: 80 MG/DL (ref 70–139)
HCT VFR BLD AUTO: 36.7 % (ref 37–47)
HGB BLD-MCNC: 11.8 G/DL (ref 12–16)
IMM GRANULOCYTES # BLD AUTO: 0.08 K/UL (ref 0–0.11)
IMM GRANULOCYTES NFR BLD AUTO: 0.8 % (ref 0–0.9)
LYMPHOCYTES # BLD AUTO: 1.8 K/UL (ref 1–4.8)
LYMPHOCYTES NFR BLD: 17.7 % (ref 22–41)
MCH RBC QN AUTO: 29.4 PG (ref 27–33)
MCHC RBC AUTO-ENTMCNC: 32.2 G/DL (ref 33.6–35)
MCV RBC AUTO: 91.5 FL (ref 81.4–97.8)
MONOCYTES # BLD AUTO: 0.58 K/UL (ref 0–0.85)
MONOCYTES NFR BLD AUTO: 5.7 % (ref 0–13.4)
NEUTROPHILS # BLD AUTO: 7.61 K/UL (ref 2–7.15)
NEUTROPHILS NFR BLD: 74.6 % (ref 44–72)
NRBC # BLD AUTO: 0 K/UL
NRBC BLD-RTO: 0 /100 WBC
PLATELET # BLD AUTO: 195 K/UL (ref 164–446)
PMV BLD AUTO: 12.6 FL (ref 9–12.9)
RBC # BLD AUTO: 4.01 M/UL (ref 4.2–5.4)
T PALLIDUM AB SER QL IA: NORMAL
WBC # BLD AUTO: 10.2 K/UL (ref 4.8–10.8)

## 2022-11-28 PROCEDURE — 86780 TREPONEMA PALLIDUM: CPT

## 2022-11-28 PROCEDURE — 85025 COMPLETE CBC W/AUTO DIFF WBC: CPT

## 2022-11-28 PROCEDURE — 36415 COLL VENOUS BLD VENIPUNCTURE: CPT

## 2022-11-28 PROCEDURE — 82950 GLUCOSE TEST: CPT

## 2022-12-04 ENCOUNTER — HOSPITAL ENCOUNTER (EMERGENCY)
Facility: MEDICAL CENTER | Age: 27
End: 2022-12-04
Attending: OBSTETRICS & GYNECOLOGY | Admitting: OBSTETRICS & GYNECOLOGY
Payer: MEDICAID

## 2022-12-04 VITALS
TEMPERATURE: 98 F | HEIGHT: 60 IN | DIASTOLIC BLOOD PRESSURE: 53 MMHG | RESPIRATION RATE: 15 BRPM | BODY MASS INDEX: 26.5 KG/M2 | WEIGHT: 135 LBS | SYSTOLIC BLOOD PRESSURE: 96 MMHG | HEART RATE: 74 BPM

## 2022-12-04 LAB
A1 MICROGLOB PLACENTAL VAG QL: NEGATIVE
FLUAV RNA SPEC QL NAA+PROBE: NEGATIVE
FLUBV RNA SPEC QL NAA+PROBE: NEGATIVE
RSV RNA SPEC QL NAA+PROBE: NEGATIVE
SARS-COV-2 RNA RESP QL NAA+PROBE: NOTDETECTED
SPECIMEN SOURCE: NORMAL

## 2022-12-04 PROCEDURE — 84112 EVAL AMNIOTIC FLUID PROTEIN: CPT

## 2022-12-04 PROCEDURE — 302449 STATCHG TRIAGE ONLY (STATISTIC)

## 2022-12-04 PROCEDURE — 59025 FETAL NON-STRESS TEST: CPT

## 2022-12-04 PROCEDURE — C9803 HOPD COVID-19 SPEC COLLECT: HCPCS | Performed by: OBSTETRICS & GYNECOLOGY

## 2022-12-04 PROCEDURE — 0241U HCHG SARS-COV-2 COVID-19 NFCT DS RESP RNA 4 TRGT MIC: CPT

## 2022-12-04 ASSESSMENT — FIBROSIS 4 INDEX: FIB4 SCORE: 0.55

## 2022-12-04 NOTE — PROGRESS NOTES
1251 - Patient of Dr. Chadwick presents with c/o cold symptoms and urinating with cough. Chang Gestation today at 28 weeks    Reports occasional contractions/cramping over the past month, denies increase today. Denies problems with Pregnancy, patient reports she has had body aches and a cough for the past 3 days. Reports her children have been sick as well, states they recently tested negative for viral swab.   Patient reports she is concerned that she is peeing too much, states that everytime she coughs or sneezes urine comes out.   Denies bleeding. Denies change to vision/edema/HA, Reports FM. FM/TOCO use discussed and placed, POC discussed.     Respiratory viral swab, amnisure collected    Dry erase board updated with RN contact information; reviewed.   Patient encouraged to call RN with all questions concerns needs prn.  Water/ice available/encouraged at the BS.    1304 - FOB and patient sitting up eating food. Patient is pleasant/chatty.    1415 - Report to Dr. Chadwick regarding patient arrival/complaint/status. Order received for discharge. Patient to f/u on Monday at her scheduled appointment unless her condition changes as discussed.  Patient discharged home with specific instruction to return to L&D/Physician ie.. Bleeding/ROM/decreased FM/labor/concerns for self or baby.

## 2023-02-23 ENCOUNTER — ANESTHESIA (OUTPATIENT)
Dept: ANESTHESIOLOGY | Facility: MEDICAL CENTER | Age: 28
End: 2023-02-23
Payer: MEDICAID

## 2023-02-23 ENCOUNTER — ANESTHESIA EVENT (OUTPATIENT)
Dept: ANESTHESIOLOGY | Facility: MEDICAL CENTER | Age: 28
End: 2023-02-23
Payer: MEDICAID

## 2023-02-23 ENCOUNTER — HOSPITAL ENCOUNTER (INPATIENT)
Facility: MEDICAL CENTER | Age: 28
LOS: 2 days | End: 2023-02-25
Attending: OBSTETRICS & GYNECOLOGY | Admitting: OBSTETRICS & GYNECOLOGY
Payer: MEDICAID

## 2023-02-23 DIAGNOSIS — F41.9 ANXIETY: ICD-10-CM

## 2023-02-23 LAB
BASOPHILS # BLD AUTO: 0.2 % (ref 0–1.8)
BASOPHILS # BLD: 0.03 K/UL (ref 0–0.12)
EOSINOPHIL # BLD AUTO: 0.11 K/UL (ref 0–0.51)
EOSINOPHIL NFR BLD: 0.8 % (ref 0–6.9)
ERYTHROCYTE [DISTWIDTH] IN BLOOD BY AUTOMATED COUNT: 44.2 FL (ref 35.9–50)
HCT VFR BLD AUTO: 42.1 % (ref 37–47)
HGB BLD-MCNC: 14 G/DL (ref 12–16)
HOLDING TUBE BB 8507: NORMAL
IMM GRANULOCYTES # BLD AUTO: 0.07 K/UL (ref 0–0.11)
IMM GRANULOCYTES NFR BLD AUTO: 0.5 % (ref 0–0.9)
LYMPHOCYTES # BLD AUTO: 3.35 K/UL (ref 1–4.8)
LYMPHOCYTES NFR BLD: 24.7 % (ref 22–41)
MCH RBC QN AUTO: 28.9 PG (ref 27–33)
MCHC RBC AUTO-ENTMCNC: 33.3 G/DL (ref 33.6–35)
MCV RBC AUTO: 86.8 FL (ref 81.4–97.8)
MONOCYTES # BLD AUTO: 0.89 K/UL (ref 0–0.85)
MONOCYTES NFR BLD AUTO: 6.5 % (ref 0–13.4)
NEUTROPHILS # BLD AUTO: 9.14 K/UL (ref 2–7.15)
NEUTROPHILS NFR BLD: 67.3 % (ref 44–72)
NRBC # BLD AUTO: 0 K/UL
NRBC BLD-RTO: 0 /100 WBC
PLATELET # BLD AUTO: 178 K/UL (ref 164–446)
PMV BLD AUTO: 13.1 FL (ref 9–12.9)
RBC # BLD AUTO: 4.85 M/UL (ref 4.2–5.4)
T PALLIDUM AB SER QL IA: NORMAL
WBC # BLD AUTO: 13.6 K/UL (ref 4.8–10.8)

## 2023-02-23 PROCEDURE — 700111 HCHG RX REV CODE 636 W/ 250 OVERRIDE (IP): Performed by: OBSTETRICS & GYNECOLOGY

## 2023-02-23 PROCEDURE — 303615 HCHG EPIDURAL/SPINAL ANESTHESIA FOR LABOR

## 2023-02-23 PROCEDURE — 700105 HCHG RX REV CODE 258: Performed by: OBSTETRICS & GYNECOLOGY

## 2023-02-23 PROCEDURE — A9270 NON-COVERED ITEM OR SERVICE: HCPCS | Performed by: OBSTETRICS & GYNECOLOGY

## 2023-02-23 PROCEDURE — 59409 OBSTETRICAL CARE: CPT

## 2023-02-23 PROCEDURE — 86780 TREPONEMA PALLIDUM: CPT

## 2023-02-23 PROCEDURE — 700105 HCHG RX REV CODE 258: Performed by: ANESTHESIOLOGY

## 2023-02-23 PROCEDURE — 36415 COLL VENOUS BLD VENIPUNCTURE: CPT

## 2023-02-23 PROCEDURE — 700111 HCHG RX REV CODE 636 W/ 250 OVERRIDE (IP)

## 2023-02-23 PROCEDURE — 700102 HCHG RX REV CODE 250 W/ 637 OVERRIDE(OP): Performed by: OBSTETRICS & GYNECOLOGY

## 2023-02-23 PROCEDURE — 700111 HCHG RX REV CODE 636 W/ 250 OVERRIDE (IP): Performed by: ANESTHESIOLOGY

## 2023-02-23 PROCEDURE — 01967 NEURAXL LBR ANES VAG DLVR: CPT | Performed by: ANESTHESIOLOGY

## 2023-02-23 PROCEDURE — 304965 HCHG RECOVERY SERVICES

## 2023-02-23 PROCEDURE — 10907ZC DRAINAGE OF AMNIOTIC FLUID, THERAPEUTIC FROM PRODUCTS OF CONCEPTION, VIA NATURAL OR ARTIFICIAL OPENING: ICD-10-PCS | Performed by: OBSTETRICS & GYNECOLOGY

## 2023-02-23 PROCEDURE — 770002 HCHG ROOM/CARE - OB PRIVATE (112)

## 2023-02-23 PROCEDURE — 85025 COMPLETE CBC W/AUTO DIFF WBC: CPT

## 2023-02-23 RX ORDER — DOCUSATE SODIUM 100 MG/1
100 CAPSULE, LIQUID FILLED ORAL 2 TIMES DAILY PRN
Status: DISCONTINUED | OUTPATIENT
Start: 2023-02-23 | End: 2023-02-25 | Stop reason: HOSPADM

## 2023-02-23 RX ORDER — ROPIVACAINE HYDROCHLORIDE 2 MG/ML
INJECTION, SOLUTION EPIDURAL; INFILTRATION; PERINEURAL
Status: ACTIVE
Start: 2023-02-23 | End: 2023-02-23

## 2023-02-23 RX ORDER — SODIUM CHLORIDE, SODIUM LACTATE, POTASSIUM CHLORIDE, AND CALCIUM CHLORIDE .6; .31; .03; .02 G/100ML; G/100ML; G/100ML; G/100ML
1000 INJECTION, SOLUTION INTRAVENOUS
Status: COMPLETED | OUTPATIENT
Start: 2023-02-23 | End: 2023-02-23

## 2023-02-23 RX ORDER — BUPROPION HYDROCHLORIDE 150 MG/1
150 TABLET, EXTENDED RELEASE ORAL DAILY
Status: DISCONTINUED | OUTPATIENT
Start: 2023-02-23 | End: 2023-02-25 | Stop reason: HOSPADM

## 2023-02-23 RX ORDER — OXYTOCIN 10 [USP'U]/ML
10 INJECTION, SOLUTION INTRAMUSCULAR; INTRAVENOUS
Status: DISCONTINUED | OUTPATIENT
Start: 2023-02-23 | End: 2023-02-23 | Stop reason: HOSPADM

## 2023-02-23 RX ORDER — ACETAMINOPHEN 500 MG
1000 TABLET ORAL
Status: DISCONTINUED | OUTPATIENT
Start: 2023-02-23 | End: 2023-02-23 | Stop reason: HOSPADM

## 2023-02-23 RX ORDER — ESCITALOPRAM OXALATE 10 MG/1
10 TABLET ORAL DAILY
Status: DISCONTINUED | OUTPATIENT
Start: 2023-02-23 | End: 2023-02-23

## 2023-02-23 RX ORDER — ROPIVACAINE HYDROCHLORIDE 2 MG/ML
INJECTION, SOLUTION EPIDURAL; INFILTRATION; PERINEURAL CONTINUOUS
Status: DISCONTINUED | OUTPATIENT
Start: 2023-02-23 | End: 2023-02-25 | Stop reason: HOSPADM

## 2023-02-23 RX ORDER — TERBUTALINE SULFATE 1 MG/ML
0.25 INJECTION, SOLUTION SUBCUTANEOUS
Status: DISCONTINUED | OUTPATIENT
Start: 2023-02-23 | End: 2023-02-23 | Stop reason: HOSPADM

## 2023-02-23 RX ORDER — OXYCODONE HYDROCHLORIDE 5 MG/1
5 TABLET ORAL EVERY 4 HOURS PRN
Status: DISCONTINUED | OUTPATIENT
Start: 2023-02-23 | End: 2023-02-25 | Stop reason: HOSPADM

## 2023-02-23 RX ORDER — SODIUM CHLORIDE, SODIUM LACTATE, POTASSIUM CHLORIDE, CALCIUM CHLORIDE 600; 310; 30; 20 MG/100ML; MG/100ML; MG/100ML; MG/100ML
INJECTION, SOLUTION INTRAVENOUS CONTINUOUS
Status: DISCONTINUED | OUTPATIENT
Start: 2023-02-23 | End: 2023-02-25 | Stop reason: HOSPADM

## 2023-02-23 RX ORDER — LIDOCAINE HYDROCHLORIDE 10 MG/ML
20 INJECTION, SOLUTION INFILTRATION; PERINEURAL
Status: DISCONTINUED | OUTPATIENT
Start: 2023-02-23 | End: 2023-02-23 | Stop reason: HOSPADM

## 2023-02-23 RX ORDER — IBUPROFEN 800 MG/1
800 TABLET ORAL EVERY 8 HOURS PRN
Status: DISCONTINUED | OUTPATIENT
Start: 2023-02-23 | End: 2023-02-25 | Stop reason: HOSPADM

## 2023-02-23 RX ORDER — IBUPROFEN 800 MG/1
800 TABLET ORAL
Status: COMPLETED | OUTPATIENT
Start: 2023-02-23 | End: 2023-02-23

## 2023-02-23 RX ORDER — ACETAMINOPHEN 500 MG
1000 TABLET ORAL EVERY 6 HOURS PRN
Status: DISCONTINUED | OUTPATIENT
Start: 2023-02-23 | End: 2023-02-25 | Stop reason: HOSPADM

## 2023-02-23 RX ORDER — ESCITALOPRAM OXALATE 10 MG/1
10 TABLET ORAL
Status: DISCONTINUED | OUTPATIENT
Start: 2023-02-23 | End: 2023-02-25 | Stop reason: HOSPADM

## 2023-02-23 RX ORDER — MISOPROSTOL 200 UG/1
800 TABLET ORAL
Status: DISCONTINUED | OUTPATIENT
Start: 2023-02-23 | End: 2023-02-25 | Stop reason: HOSPADM

## 2023-02-23 RX ORDER — EPHEDRINE SULFATE 50 MG/ML
5 INJECTION, SOLUTION INTRAVENOUS
Status: DISCONTINUED | OUTPATIENT
Start: 2023-02-23 | End: 2023-02-23 | Stop reason: HOSPADM

## 2023-02-23 RX ORDER — SODIUM CHLORIDE, SODIUM LACTATE, POTASSIUM CHLORIDE, CALCIUM CHLORIDE 600; 310; 30; 20 MG/100ML; MG/100ML; MG/100ML; MG/100ML
INJECTION, SOLUTION INTRAVENOUS PRN
Status: DISCONTINUED | OUTPATIENT
Start: 2023-02-23 | End: 2023-02-25 | Stop reason: HOSPADM

## 2023-02-23 RX ORDER — SODIUM CHLORIDE, SODIUM LACTATE, POTASSIUM CHLORIDE, AND CALCIUM CHLORIDE .6; .31; .03; .02 G/100ML; G/100ML; G/100ML; G/100ML
250 INJECTION, SOLUTION INTRAVENOUS PRN
Status: DISCONTINUED | OUTPATIENT
Start: 2023-02-23 | End: 2023-02-23 | Stop reason: HOSPADM

## 2023-02-23 RX ADMIN — FENTANYL CITRATE 100 MCG: 50 INJECTION, SOLUTION INTRAMUSCULAR; INTRAVENOUS at 04:33

## 2023-02-23 RX ADMIN — OXYTOCIN 125 ML/HR: 10 INJECTION, SOLUTION INTRAMUSCULAR; INTRAVENOUS at 08:00

## 2023-02-23 RX ADMIN — IBUPROFEN 800 MG: 800 TABLET, FILM COATED ORAL at 17:45

## 2023-02-23 RX ADMIN — ACETAMINOPHEN 1000 MG: 500 TABLET, FILM COATED ORAL at 11:41

## 2023-02-23 RX ADMIN — FENTANYL CITRATE 100 MCG: 50 INJECTION, SOLUTION INTRAMUSCULAR; INTRAVENOUS at 02:58

## 2023-02-23 RX ADMIN — ACETAMINOPHEN 1000 MG: 500 TABLET, FILM COATED ORAL at 21:48

## 2023-02-23 RX ADMIN — SODIUM CHLORIDE, POTASSIUM CHLORIDE, SODIUM LACTATE AND CALCIUM CHLORIDE: 600; 310; 30; 20 INJECTION, SOLUTION INTRAVENOUS at 04:00

## 2023-02-23 RX ADMIN — ROPIVACAINE HYDROCHLORIDE: 2 INJECTION, SOLUTION EPIDURAL; INFILTRATION at 03:47

## 2023-02-23 RX ADMIN — ESCITALOPRAM OXALATE 10 MG: 10 TABLET ORAL at 19:24

## 2023-02-23 RX ADMIN — BUPROPION HYDROCHLORIDE 150 MG: 150 TABLET, EXTENDED RELEASE ORAL at 17:45

## 2023-02-23 RX ADMIN — IBUPROFEN 800 MG: 800 TABLET, FILM COATED ORAL at 07:42

## 2023-02-23 RX ADMIN — OXYTOCIN 20 UNITS: 10 INJECTION, SOLUTION INTRAMUSCULAR; INTRAVENOUS at 06:05

## 2023-02-23 RX ADMIN — DOCUSATE SODIUM 100 MG: 100 CAPSULE, LIQUID FILLED ORAL at 17:45

## 2023-02-23 RX ADMIN — SODIUM CHLORIDE, POTASSIUM CHLORIDE, SODIUM LACTATE AND CALCIUM CHLORIDE 1000 ML: 600; 310; 30; 20 INJECTION, SOLUTION INTRAVENOUS at 02:50

## 2023-02-23 ASSESSMENT — PATIENT HEALTH QUESTIONNAIRE - PHQ9
SUM OF ALL RESPONSES TO PHQ9 QUESTIONS 1 AND 2: 0
1. LITTLE INTEREST OR PLEASURE IN DOING THINGS: NOT AT ALL
2. FEELING DOWN, DEPRESSED, IRRITABLE, OR HOPELESS: NOT AT ALL

## 2023-02-23 ASSESSMENT — EDINBURGH POSTNATAL DEPRESSION SCALE (EPDS)
I HAVE BEEN ABLE TO LAUGH AND SEE THE FUNNY SIDE OF THINGS: NOT QUITE SO MUCH NOW
I HAVE LOOKED FORWARD WITH ENJOYMENT TO THINGS: RATHER LESS THAN I USED TO
I HAVE BEEN ANXIOUS OR WORRIED FOR NO GOOD REASON: YES, SOMETIMES
I HAVE BEEN SO UNHAPPY THAT I HAVE HAD DIFFICULTY SLEEPING: NOT VERY OFTEN
THINGS HAVE BEEN GETTING ON TOP OF ME: YES, SOMETIMES I HAVEN'T BEEN COPING AS WELL AS USUAL
I HAVE BEEN SO UNHAPPY THAT I HAVE BEEN CRYING: ONLY OCCASIONALLY
THE THOUGHT OF HARMING MYSELF HAS OCCURRED TO ME: NEVER
I HAVE FELT SCARED OR PANICKY FOR NO GOOD REASON: YES, SOMETIMES
I HAVE FELT SAD OR MISERABLE: YES, QUITE OFTEN
I HAVE BLAMED MYSELF UNNECESSARILY WHEN THINGS WENT WRONG: YES, MOST OF THE TIME

## 2023-02-23 ASSESSMENT — PAIN DESCRIPTION - PAIN TYPE
TYPE: ACUTE PAIN
TYPE: ACUTE PAIN

## 2023-02-23 ASSESSMENT — LIFESTYLE VARIABLES: EVER_SMOKED: NEVER

## 2023-02-23 ASSESSMENT — FIBROSIS 4 INDEX: FIB4 SCORE: 0.55

## 2023-02-23 ASSESSMENT — PAIN SCALES - GENERAL: PAIN_LEVEL: 2

## 2023-02-23 NOTE — PROGRESS NOTES
Late entry  0700- Report received from ELKE Lawton. Pt resting comfortably in room with FOB in room and infant at warmer. VSS.  0742- Ibuprofen given per pt request (see MAR). PIV dressing changed and cleaned. IV is still patent.   0815- Epidural cath d/c'd with tip intact, Pt assisted up to BR and voided successfully.   0830- Pt transferred to PPU via wheelchair with infant in arms and FOB at side. Report given to ELKE Christian.

## 2023-02-23 NOTE — PROGRESS NOTES
0250: Received report from Odessa BEDOYA    0255: Pt requesting epidural    0330: TAD Rowan MD called for placement of epidural    0444: Omar Bell at bedside to AROM and deliver baby    0459:  of viable baby girl    0700: Gave report to Anjelica BEDOYA. POC discussed.

## 2023-02-23 NOTE — H&P
History and Physical      Kristal Cesar is a 27 y.o. female  at 40w3d who presents for active labor    Subjective:   positive  For CTXS.   positive Feels pain   negative for LOF  negative for vaginal bleeding.   negative for fetal movement    ROS: A comprehensive review of systems was negative.    Past Medical History:   Diagnosis Date    Allergy     Anxiety     Depression     Obesity (BMI 30-39.9)      Past Surgical History:   Procedure Laterality Date    DILATION AND CURETTAGE N/A 2022    Procedure: DILATION AND CURETTAGE;  Surgeon: Arabella Lara M.D.;  Location: SURGERY LABOR AND DELIVERY;  Service: Labor and Delivery    REAGAN BY LAPAROSCOPY  3/7/2016    Procedure: REAGAN BY LAPAROSCOPY;  Surgeon: John H Ganser, M.D.;  Location: SURGERY [facility];  Service:     OTHER      gallbladder removed     Family History   Problem Relation Age of Onset    No Known Problems Mother     Diabetes Father     Hypertension Father     Hyperlipidemia Father     No Known Problems Sister     Cancer Neg Hx      OB History    Para Term  AB Living   5 3 2   1 2   SAB IAB Ectopic Molar Multiple Live Births   1       0 2      # Outcome Date GA Lbr [place]/2nd Weight Sex Delivery Anes PTL Lv   5 Current            4 Para 22 18w2d    Vag-Spont None  FD   3 Term 19 41w0d 06:14 / 00:10 3.36 kg (7 lb 6.5 oz) M Vag-Spont EPI N STEFAN   2 Term 01/15/16 41w1d  3.314 kg (7 lb 4.9 oz) M Vag-Spont EPI N STEFAN   1 SAB              Social History     Tobacco Use    Smoking status: Never    Smokeless tobacco: Never   Substance Use Topics    Alcohol use: No    Drug use: No     Allergies: Other environmental    Current Facility-Administered Medications:     LR infusion, , Intravenous, Continuous, Arabella Lara M.D.    lidocaine (XYLOCAINE) 1%  injection, 20 mL, Subcutaneous, Once PRN, Arabella Lara M.D.    terbutaline (BRETHINE) injection 0.25 mg, 0.25 mg,  Subcutaneous, Once PRN, Arabella Lara M.D.    oxytocin (PITOCIN) infusion (for post delivery), 1,000 mL, Intravenous, Once **FOLLOWED BY** oxytocin (PITOCIN) infusion (for post delivery), 125 mL/hr, Intravenous, Continuous, Arabella Lara M.D.    oxytocin (PITOCIN) injection 10 Units, 10 Units, Intramuscular, Once PRN, Arabella Lara M.D.    ibuprofen (MOTRIN) tablet 800 mg, 800 mg, Oral, Once PRN, Arabella Lara M.D.    acetaminophen (TYLENOL) tablet 1,000 mg, 1,000 mg, Oral, Once PRN, Arabella Lara M.D.    oxytocin (Pitocin) 0.02 Units/mL in LR infusion, , , ,     oxytocin (Pitocin) 0.02 Units/mL in LR infusion, , , ,     fentaNYL (SUBLIMAZE) injection 100 mcg, 100 mcg, Intravenous, Q HOUR PRN, Arabella Lara M.D., 100 mcg at 02/23/23 0433    lactated ringers (BOLUS) infusion, 1,000 mL, Intravenous, Once PRN, Francisco Javier Rowan M.D.    ropivacaine 0.2 % (NAROPIN) injection, , Epidural, Continuous, Francisco Javier Rowan M.D., New Bag at 02/23/23 0347    ePHEDrine injection 5 mg, 5 mg, Intravenous, Q5 MIN PRN **AND** Notify Anesthesiologist if ephedrine given and for sustained hypotension (more than 2 minutes), , , Once **AND** For Hypotension: Place patient in left lateral tilt to achieve uterine displacement and elevate legs., , , PRN **AND** Hypotension: Oxygen Continuous, , , CONTINUOUS **AND** lactated ringers infusion (BOLUS), 250 mL, Intravenous, PRN, Francisco Javier Rowan M.D.    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/27/2018    Primary insomnia 12/28/2017    Eczema 12/28/2017    Fatty liver 07/19/2017     Moderate episode of recurrent major depressive disorder (HCC) 07/19/2017    Obesity (BMI 30-39.9) 07/19/2017    Pregnancy 01/15/2016       Objective:      /66   Pulse 90   Temp 36.7 °C (98 °F) (Temporal)   Resp 18   Ht 1.524 m (5')   Wt 63.5 kg (140 lb)     General:   no acute distress, alert, cooperative   Skin:   normal   HEENT:  Sclera clear, anicteric   Lungs:   CTA bilateral   Heart:   S1, S2 normal, no murmur, click, rub or gallop, regular rate and rhythm   Abdomen:   gravid, NT   EFW:  3000   Pelvis:  adequate with gynecoid pelvis   FHT:  150 BPM   Uterine Size: S=D   Presentations: Cephalic   Cervix:     Dilation: 9    Effacement: 100%    Station:  +1    Consistency: Soft    Position: Anterior     Lab Review  Recent Results (from the past 5880 hour(s))   POCT Pregnancy    Collection Time: 07/26/22  5:37 PM   Result Value Ref Range    POC Urine Pregnancy Test Positive Negative    Internal Control Positive Positive     Internal Control Negative Negative    URINALYSIS    Collection Time: 08/22/22  3:58 PM    Specimen: Blood   Result Value Ref Range    Color DK Yellow     Character Clear     Specific Gravity 1.026 <1.035    Ph 6.0 5.0 - 8.0    Glucose Negative Negative mg/dL    Ketones Trace (A) Negative mg/dL    Protein Negative Negative mg/dL    Bilirubin Negative Negative    Urobilinogen, Urine 0.2 Negative    Nitrite Negative Negative    Leukocyte Esterase Trace (A) Negative    Occult Blood Negative Negative    Micro Urine Req Microscopic    URINE MICROSCOPIC (W/UA)    Collection Time: 08/22/22  3:58 PM   Result Value Ref Range    WBC 2-5 /hpf    RBC 0-2 /hpf    Bacteria Few (A) None /hpf    Epithelial Cells Few /hpf    Hyaline Cast 3-5 (A) /lpf   CBC WITH DIFFERENTIAL    Collection Time: 08/22/22  4:11 PM   Result Value Ref Range    WBC 9.0 4.8 - 10.8 K/uL    RBC 4.14 (L) 4.20 - 5.40 M/uL    Hemoglobin 12.0 12.0 - 16.0 g/dL    Hematocrit 34.5 (L) 37.0 - 47.0 %    MCV 83.3 81.4 - 97.8 fL    MCH  29.0 27.0 - 33.0 pg    MCHC 34.8 33.6 - 35.0 g/dL    RDW 39.2 35.9 - 50.0 fL    Platelet Count 208 164 - 446 K/uL    MPV 11.7 9.0 - 12.9 fL    Neutrophils-Polys 65.10 44.00 - 72.00 %    Lymphocytes 26.90 22.00 - 41.00 %    Monocytes 6.60 0.00 - 13.40 %    Eosinophils 0.90 0.00 - 6.90 %    Basophils 0.30 0.00 - 1.80 %    Immature Granulocytes 0.20 0.00 - 0.90 %    Nucleated RBC 0.00 /100 WBC    Neutrophils (Absolute) 5.88 2.00 - 7.15 K/uL    Lymphs (Absolute) 2.43 1.00 - 4.80 K/uL    Monos (Absolute) 0.60 0.00 - 0.85 K/uL    Eos (Absolute) 0.08 0.00 - 0.51 K/uL    Baso (Absolute) 0.03 0.00 - 0.12 K/uL    Immature Granulocytes (abs) 0.02 0.00 - 0.11 K/uL    NRBC (Absolute) 0.00 K/uL   COMP METABOLIC PANEL    Collection Time: 08/22/22  4:11 PM   Result Value Ref Range    Sodium 136 135 - 145 mmol/L    Potassium 3.4 (L) 3.6 - 5.5 mmol/L    Chloride 103 96 - 112 mmol/L    Co2 21 20 - 33 mmol/L    Anion Gap 12.0 7.0 - 16.0    Glucose 82 65 - 99 mg/dL    Bun 6 (L) 8 - 22 mg/dL    Creatinine 0.37 (L) 0.50 - 1.40 mg/dL    Calcium 8.9 8.5 - 10.5 mg/dL    AST(SGOT) 11 (L) 12 - 45 U/L    ALT(SGPT) 7 2 - 50 U/L    Alkaline Phosphatase 59 30 - 99 U/L    Total Bilirubin 0.5 0.1 - 1.5 mg/dL    Albumin 3.9 3.2 - 4.9 g/dL    Total Protein 6.4 6.0 - 8.2 g/dL    Globulin 2.5 1.9 - 3.5 g/dL    A-G Ratio 1.6 g/dL   HCG QUANTITATIVE SERUM    Collection Time: 08/22/22  4:11 PM   Result Value Ref Range    Bhcg 39902.0 (H) 0.0 - 5.0 mIU/mL   ESTIMATED GFR    Collection Time: 08/22/22  4:11 PM   Result Value Ref Range    GFR (CKD-EPI) 142 >60 mL/min/1.73 m 2   POCT 11 Panel Urine Drug Screen    Collection Time: 10/13/22  2:02 PM   Result Value Ref Range    AMPHETAMINE      COCAINE      POC THC      METHAMPHETAMINES      OPIATES      PHENCYCLIDINE      BENZODIAZIPINES      BARBITURATES      METHADONE      MDMA Ecstasy      OXYCODONE      Urine Drug Screen NEG     Internal Control Positive Valid     Internal Control Negative Valid     Quantiferon Gold Plus TB (4 tube)    Collection Time: 10/13/22  2:19 PM   Result Value Ref Range    QFT NIL 0.05 IU/mL    QFT TB1 - NIL 0.03 <=0.34 IU/mL    QFT TB2 - NIL 0.06 <=0.34 IU/mL    QFT Mitogen - NIL >10.00 IU/mL    QFT Gold Plus Negative Negative   COV-2, FLU A/B, AND RSV BY PCR (2-4 HOURS CEPHEID): Collect NP swab in VTM    Collection Time: 11/23/22  7:55 PM    Specimen: Nasopharyngeal; Respirate   Result Value Ref Range    Influenza virus A RNA Negative Negative    Influenza virus B, PCR Negative Negative    RSV, PCR Negative Negative    SARS-CoV-2 by PCR NotDetected     SARS-CoV-2 Source NP Swab    T.PALLIDUM AB ROSE (SCREENING)    Collection Time: 11/28/22 10:03 AM   Result Value Ref Range    Syphilis, Treponemal Qual Non-Reactive Non-Reactive   CBC WITH DIFFERENTIAL    Collection Time: 11/28/22 10:03 AM   Result Value Ref Range    WBC 10.2 4.8 - 10.8 K/uL    RBC 4.01 (L) 4.20 - 5.40 M/uL    Hemoglobin 11.8 (L) 12.0 - 16.0 g/dL    Hematocrit 36.7 (L) 37.0 - 47.0 %    MCV 91.5 81.4 - 97.8 fL    MCH 29.4 27.0 - 33.0 pg    MCHC 32.2 (L) 33.6 - 35.0 g/dL    RDW 43.7 35.9 - 50.0 fL    Platelet Count 195 164 - 446 K/uL    MPV 12.6 9.0 - 12.9 fL    Neutrophils-Polys 74.60 (H) 44.00 - 72.00 %    Lymphocytes 17.70 (L) 22.00 - 41.00 %    Monocytes 5.70 0.00 - 13.40 %    Eosinophils 0.70 0.00 - 6.90 %    Basophils 0.50 0.00 - 1.80 %    Immature Granulocytes 0.80 0.00 - 0.90 %    Nucleated RBC 0.00 /100 WBC    Neutrophils (Absolute) 7.61 (H) 2.00 - 7.15 K/uL    Lymphs (Absolute) 1.80 1.00 - 4.80 K/uL    Monos (Absolute) 0.58 0.00 - 0.85 K/uL    Eos (Absolute) 0.07 0.00 - 0.51 K/uL    Baso (Absolute) 0.05 0.00 - 0.12 K/uL    Immature Granulocytes (abs) 0.08 0.00 - 0.11 K/uL    NRBC (Absolute) 0.00 K/uL   GLUCOSE 1HR GESTATIONAL    Collection Time: 11/28/22 10:03 AM   Result Value Ref Range    Glucose, Post Dose 80 70 - 139 mg/dL   AMNISURE ROM ASSAY    Collection Time: 12/04/22 12:55 PM   Result Value Ref Range     AmniSure ROM Negative Negative   COV-2, FLU A/B, AND RSV BY PCR (2-4 HOURS CEPHEID): Collect NP swab in VTM    Collection Time: 12/04/22 12:55 PM    Specimen: Nasopharyngeal; Respirate   Result Value Ref Range    Influenza virus A RNA Negative Negative    Influenza virus B, PCR Negative Negative    RSV, PCR Negative Negative    SARS-CoV-2 by PCR NotDetected     SARS-CoV-2 Source NP Swab    Hold Blood Bank Specimen (Not Tested)    Collection Time: 02/23/23  2:50 AM   Result Value Ref Range    Holding Tube - Bb DONE    CBC with differential    Collection Time: 02/23/23  2:50 AM   Result Value Ref Range    WBC 13.6 (H) 4.8 - 10.8 K/uL    RBC 4.85 4.20 - 5.40 M/uL    Hemoglobin 14.0 12.0 - 16.0 g/dL    Hematocrit 42.1 37.0 - 47.0 %    MCV 86.8 81.4 - 97.8 fL    MCH 28.9 27.0 - 33.0 pg    MCHC 33.3 (L) 33.6 - 35.0 g/dL    RDW 44.2 35.9 - 50.0 fL    Platelet Count 178 164 - 446 K/uL    MPV 13.1 (H) 9.0 - 12.9 fL    Neutrophils-Polys 67.30 44.00 - 72.00 %    Lymphocytes 24.70 22.00 - 41.00 %    Monocytes 6.50 0.00 - 13.40 %    Eosinophils 0.80 0.00 - 6.90 %    Basophils 0.20 0.00 - 1.80 %    Immature Granulocytes 0.50 0.00 - 0.90 %    Nucleated RBC 0.00 /100 WBC    Neutrophils (Absolute) 9.14 (H) 2.00 - 7.15 K/uL    Lymphs (Absolute) 3.35 1.00 - 4.80 K/uL    Monos (Absolute) 0.89 (H) 0.00 - 0.85 K/uL    Eos (Absolute) 0.11 0.00 - 0.51 K/uL    Baso (Absolute) 0.03 0.00 - 0.12 K/uL    Immature Granulocytes (abs) 0.07 0.00 - 0.11 K/uL    NRBC (Absolute) 0.00 K/uL   T.PALLIDUM AB ROSE (Syphilis)    Collection Time: 02/23/23  2:50 AM   Result Value Ref Range    Syphilis, Treponemal Qual Non-Reactive Non-Reactive        Assessment:   Kristal Cesar at 40w3d  Labor status: Active phase labor.  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      Plan:     Admit to L&D  GBS negative  Anticipate     Arabella Bell MD

## 2023-02-23 NOTE — PROGRESS NOTES
0835 Assume care from L&D. Oriented patient to room,call light, and emergancy light. Assessment completed,fundus firm,lochia light. Plan of care reviewed, verbilized understanding. Patient denies pain at this time, will call if intervention needed.   1400 Informed Dr. Omar Bell of patient EPDS score of 15. Patient has already been seen by  and has been cleared, patient was taking Wellbutrin ans Lexapro during pregnancy for depression and anxiety. Per Dr. Omar Bell, restart patient Wellbutrin and Lexapro again.

## 2023-02-23 NOTE — ANESTHESIA POSTPROCEDURE EVALUATION
Patient: Kristal Cesar    Procedure Summary     Date: 02/23/23 Room / Location:     Anesthesia Start: 0330 Anesthesia Stop: 0459    Procedure: Labor Epidural Diagnosis:     Scheduled Providers:  Responsible Provider: Francisco Javier Rowan M.D.    Anesthesia Type: epidural ASA Status: 2          Final Anesthesia Type: epidural  Last vitals  BP   Blood Pressure: (!) 147/96    Temp   36.7 °C (98 °F)    Pulse   (!) 121   Resp   18    SpO2          Anesthesia Post Evaluation    Patient location during evaluation: PACU  Patient participation: complete - patient participated  Level of consciousness: awake and alert  Pain score: 2    Airway patency: patent  Anesthetic complications: no  Cardiovascular status: hemodynamically stable  Respiratory status: acceptable  Hydration status: euvolemic    PONV: none          No notable events documented.     Nurse Pain Score: 4 (NPRS)

## 2023-02-23 NOTE — LACTATION NOTE
This note was copied from a baby's chart.  Met with mother for an initial lactation consultation. This is her fourth baby. She reports that she pumped and provided for her other children. She found pumping and providing to be easier for various reasons. She reports that this baby did latch immediately following delivery. This baby had a low blood sugar, and was supplemented in NBN via gavage tube. She is back to her mother's room now, and mom reports that she just fed on the right breast for about 5min. Baby currently asleep, swaddled in bassinet. She declines needing assistance with latch at this time.   Breast feeding assistance and education provided: Bilateral breast assessment WNL, nipples intact. Education provided regarding the milk making process and supply in demand. Frequent skin to skin encouraged. Encouraged MOB to offer the breast to baby any time she is showing hunger cues (cues reviewed). Encouraged MOB not to limit baby's time at the breast. Anticipatory guidance provided regarding typical  feeding behaviors in the first 24-48hrs, including cluster feeding. Proper positioning and latch technique verbalized. Education provided regarding the importance of achieving a deep latch with each feeding to ensure proper stimulation, milk transfer, and reduce the chance for nipple damage/pain. Watch for stools to become yellow/green by day 5. Encouraged MOB to reach out to RN/LC for latch assistance while inpatient.    Plan: Continue offering the breast to baby on cue, a minimum of 8 times every 24hrs. Skin to skin for each feeding. Hand expression and feed back colostrum (with RN assistance) if baby due to feed, but too sleepy or unable to latch. Do not limit baby's time at the breast. Reach out to RN/LC if experiencing pain with latch or if unable to latch baby independently.

## 2023-02-23 NOTE — ANESTHESIA PROCEDURE NOTES
Epidural Block    Date/Time: 2/23/2023 3:30 AM  Performed by: Francisco Javier Rowan M.D.  Authorized by: Francisco Javier Rowan M.D.     Patient Location:  OB  Start Time:  2/23/2023 3:30 AM  Reason for Block: labor analgesia    patient identified, IV checked, site marked, risks and benefits discussed, surgical consent, monitors and equipment checked, pre-op evaluation and timeout performed    Patient Position:  Sitting  Prep: ChloraPrep, patient draped and sterile technique    Monitoring:  Blood pressure, continuous pulse oximetry and heart rate  Approach:  Midline  Location:  L3-L4  Injection Technique:  BRADEN saline  Skin infiltration:  Lidocaine  Strength:  1%  Dose:  3ml  Needle Type:  Tuohy  Needle Gauge:  17 G  Needle Length:  3.5 in  Loss of resistance::  5  Catheter Size:  19 G  Catheter at Skin Depth:  10

## 2023-02-23 NOTE — DISCHARGE PLANNING
Discharge Planning Assessment Post Partum    Reason for Referral: History of anxiety and depression.  MOB scored a 15 on the EPDS screen  Address: [address] [place] [address] [place] [address]  Phone: [phone]  Type of Living Situation: living with FOB and sons  Mom Diagnosis: Pregnancy  Baby Diagnosis: -40.3 weeks  Primary Language: English    Name of Baby: Still deciding on a name for their daughter (: 23)  Father of the Baby: Levar Calhoun   Involved in baby’s care? Yes  Contact Information: [phone]    Prenatal Care: Yes-Dr. Foley  Mom's PCP: Dr. Margareth Villegas  PCP for new baby: Pediatrician list provided.  Pt has been using Dr. Ivanna Longoria for her boys but is looking for a new Pediatrician as Dr. Longoria will be moving to a new location    Support System: FOB   Coping/Bonding between mother & baby: Yes  Source of Feeding: low blood sugars-in NBN for monitoring and NG tube   Supplies for Infant: prepared for infant; denies any needs    Mom's Insurance: [place] Medicaid  Baby Covered on Insurance:Yes  Mother Employed/School: Not working currently  Other children in the home/names & ages: two boys; ages 7-Azael and 3-Jorje    Financial Hardship/Income: No, FOB is employed with [employer]   Mom's Mental status: alert and oriented  Services used prior to admit: Medicaid and WIC    CPS History: No  Psychiatric History: history of anxiety and depression.  MOB scored a 15 on the EPDS screen.  Discussed with mother who stated she mainly feels exhausted.  Discussed resources and provided a list of counseling and support group resources that specialize in post partum depression  Domestic Violence History: No  Drug/ETOH History: No    Resources Provided: pediatrician list, children and family resource list, and post partum support and counseling resources provided   Referrals Made: diaper bank referral provided and information to Baby[facility]       Clearance for Discharge: Infant is cleared to discharge home with parents once medically cleared

## 2023-02-23 NOTE — L&D DELIVERY NOTE
DELIVERY NOTE    Date of Delivery: 2023    Primary OB: Arabella Nelson. Arabella BEAVER     27 year old  40.3 weeks, active labor.   GBS negative.   She was AROM'ed at 0450 that was clear AF.   She progressed uneventfully in labor and delivered via  at 0459 over intact perineum.   to a live born baby girl in OA with BW 2620 gm (5lb 12.4oz) AS .  Baby was placed skin to skin, good suctioning of the nose and mouth was done, delayed cord clamping was done and cut by FOB and baby handed off to the RN in attendance of further care.   Clear  AF.  Placenta was delivered spontaneously at 0503 complete and intact with 3 vessel cord.   Estimated Blood loss- 150 cc  Uterus noted to be firm and contracted immediately postpartum.  No other cervical nor vaginal lacerations noted.  Patient tolerated the procedure well. No complications encountered.  Both patient and baby are in good condition.     Sponge count was correct x 2. Vaginal exam at end of the procedure revealed no sponges no foreign body in the vaginal vault.    Arabella Bell MD

## 2023-02-23 NOTE — ANESTHESIA TIME REPORT
Anesthesia Start and Stop Event Times     Date Time Event    2/23/2023 0330 Anesthesia Start     0459 Anesthesia Stop        Responsible Staff  02/23/23    Name Role Begin End    Francisco Javier Rowan M.D. Anesth 0330 0455        Overtime Reason:  no overtime (within assigned shift)    Comments:

## 2023-02-23 NOTE — ANESTHESIA PREPROCEDURE EVALUATION
Date: 02/23/23  Procedure: Labor Epidural         Relevant Problems      (positive) Fatty liver       Physical Exam    Airway   Mallampati: II  TM distance: >3 FB  Neck ROM: full       Cardiovascular - normal exam  Rhythm: regular  Rate: normal  (-) murmur     Dental - normal exam           Pulmonary - normal exam  Breath sounds clear to auscultation     Abdominal    Neurological - normal exam                 Anesthesia Plan    ASA 2       Plan - epidural   Neuraxial block will be labor analgesia                  Pertinent diagnostic labs and testing reviewed    Informed Consent:    Anesthetic plan and risks discussed with patient.

## 2023-02-24 LAB
ERYTHROCYTE [DISTWIDTH] IN BLOOD BY AUTOMATED COUNT: 44.6 FL (ref 35.9–50)
HCT VFR BLD AUTO: 33.5 % (ref 37–47)
HGB BLD-MCNC: 11.3 G/DL (ref 12–16)
MCH RBC QN AUTO: 29.4 PG (ref 27–33)
MCHC RBC AUTO-ENTMCNC: 33.7 G/DL (ref 33.6–35)
MCV RBC AUTO: 87.2 FL (ref 81.4–97.8)
PLATELET # BLD AUTO: 144 K/UL (ref 164–446)
PMV BLD AUTO: 12.4 FL (ref 9–12.9)
RBC # BLD AUTO: 3.84 M/UL (ref 4.2–5.4)
WBC # BLD AUTO: 11.9 K/UL (ref 4.8–10.8)

## 2023-02-24 PROCEDURE — A9270 NON-COVERED ITEM OR SERVICE: HCPCS | Performed by: OBSTETRICS & GYNECOLOGY

## 2023-02-24 PROCEDURE — 770002 HCHG ROOM/CARE - OB PRIVATE (112)

## 2023-02-24 PROCEDURE — 85027 COMPLETE CBC AUTOMATED: CPT

## 2023-02-24 PROCEDURE — 700102 HCHG RX REV CODE 250 W/ 637 OVERRIDE(OP): Performed by: OBSTETRICS & GYNECOLOGY

## 2023-02-24 PROCEDURE — 36415 COLL VENOUS BLD VENIPUNCTURE: CPT

## 2023-02-24 RX ADMIN — ACETAMINOPHEN 1000 MG: 500 TABLET, FILM COATED ORAL at 22:05

## 2023-02-24 RX ADMIN — ACETAMINOPHEN 1000 MG: 500 TABLET, FILM COATED ORAL at 14:12

## 2023-02-24 RX ADMIN — DOCUSATE SODIUM 100 MG: 100 CAPSULE, LIQUID FILLED ORAL at 08:31

## 2023-02-24 RX ADMIN — ESCITALOPRAM OXALATE 10 MG: 10 TABLET ORAL at 19:18

## 2023-02-24 RX ADMIN — DOCUSATE SODIUM 100 MG: 100 CAPSULE, LIQUID FILLED ORAL at 22:05

## 2023-02-24 RX ADMIN — ACETAMINOPHEN 1000 MG: 500 TABLET, FILM COATED ORAL at 06:06

## 2023-02-24 RX ADMIN — IBUPROFEN 800 MG: 800 TABLET, FILM COATED ORAL at 08:31

## 2023-02-24 RX ADMIN — BUPROPION HYDROCHLORIDE 150 MG: 150 TABLET, EXTENDED RELEASE ORAL at 06:06

## 2023-02-24 RX ADMIN — IBUPROFEN 800 MG: 800 TABLET, FILM COATED ORAL at 17:52

## 2023-02-24 ASSESSMENT — PAIN DESCRIPTION - PAIN TYPE
TYPE: ACUTE PAIN

## 2023-02-24 NOTE — PROGRESS NOTES
Post Partum Progress Note    Name:   Kristal Cesar   Date/Time:  2/24/2023 - 10:51 AM  Chief Admitting Dx:  Labor and delivery indication for care or intervention [O75.9]  Delivery Type:  vaginal, spontaneous  Post-Op/Post Partum Days #:  1    Subjective:  Abdominal pain: no  Ambulating:   yes  Tolerating liquids:  yes  Tolerating food:  yes common adult  Flatus:   yes  BM:    yes  Bleeding:   without any bleeding  Voiding:   yes  Dizziness:   no  Feeding:   breast    Vitals:    02/23/23 1337 02/23/23 1730 02/23/23 1739 02/24/23 0600   BP: 111/65 106/65 126/79 111/76   Pulse: 69 73 76 63   Resp: 18 18 18 18   Temp: 36.1 °C (97 °F) 36.3 °C (97.3 °F) 36.5 °C (97.7 °F) 36.8 °C (98.2 °F)   TempSrc: Temporal Temporal Temporal Temporal   SpO2: 97% 93% 97% 97%   Weight:       Height:           Exam:  Breast: Tenderness no  Abdomen: Abdomen soft, non-tender. BS normal. No masses,  No organomegaly  Fundal Tenderness:  no  Fundus Firm: yes  Incision: none  Below umbilicus: yes  Perineum: perineum intact  Lochia: mild  Extremities: Normal extremities, peripheral pulses and reflexes normal    Meds:  Current Facility-Administered Medications   Medication Dose    LR infusion      oxytocin (PITOCIN) infusion (for post delivery)  125 mL/hr    fentaNYL (SUBLIMAZE) injection 100 mcg  100 mcg    ropivacaine 0.2 % (NAROPIN) injection      lactated ringers infusion      docusate sodium (COLACE) capsule 100 mg  100 mg    ibuprofen (MOTRIN) tablet 800 mg  800 mg    acetaminophen (TYLENOL) tablet 1,000 mg  1,000 mg    miSOPROStol (CYTOTEC) tablet 800 mcg  800 mcg    PRN oxytocin (PITOCIN) (20 Units/1000 mL) PRN for excessive uterine bleeding - See Admin Instr  125-999 mL/hr    oxyCODONE immediate-release (ROXICODONE) tablet 5 mg  5 mg    buPROPion SR (WELLBUTRIN-SR) tablet 150 mg  150 mg    escitalopram (Lexapro) tablet 10 mg  10 mg       Labs:   Recent Labs     02/23/23  0250 02/24/23  0756   WBC 13.6* 11.9*   RBC 4.85  3.84*   HEMOGLOBIN 14.0 11.3*   HEMATOCRIT 42.1 33.5*   MCV 86.8 87.2   MCH 28.9 29.4   MCHC 33.3* 33.7   RDW 44.2 44.6   PLATELETCT 178 144*   MPV 13.1* 12.4       Assessment:  Chief Admitting Dx:  Labor and delivery indication for care or intervention [O75.9]  Delivery Type:  vaginal, spontaneous  Tubal Ligation:  no    Plan:  Continue routine post partum care.  Ambulate  D/c home tomorrow PPD 2    Arabella Lara M.D.

## 2023-02-24 NOTE — LACTATION NOTE
This note was copied from a baby's chart.  Follow-up visit, mother's nipples are beginning to feel sore. Assisted with breastfeeding, switched from cradle to cross cradle hold and reviewed tummy to tummy positioning, infant then able to easily achieve a deep and comfortable latch with audible consistent swallows. Reviewed frequency of feeds and signs of effective breastfeeding. Encouraged to request RN assistance with breastfeeding as needed, desires to stay overnight to work on feeds as she exclusively pumped with other children. Plan to continue cue based breastfeeding at least 8 or more times per 24 hours. Established with WIC for ongoing support after discharge home. Denies questions/concerns.

## 2023-02-24 NOTE — CARE PLAN
The patient is Stable - Low risk of patient condition declining or worsening    Shift Goals  Clinical Goals: rest, pain control, VSS    Progress made toward(s) clinical / shift goals:    Problem: Pain - Standard  Goal: Alleviation of pain or a reduction in pain to the patient’s comfort goal  Outcome: Progressing  Note: Pain control at this time. Will be medicated as needed.      Problem: Altered Physiologic Condition  Goal: Patient physiologically stable as evidenced by normal lochia, palpable uterine involution and vitals within normal limits  Outcome: Progressing  Note: Fundus firm at U, lochia rubra minimal. VSS       Patient is not progressing towards the following goals:

## 2023-02-24 NOTE — PROGRESS NOTES
0830 Assessment completed. Lochia light, fundus firm. Plan of care reviewed. Declines pain intervention at this time, will call if pain intervention needed.

## 2023-02-24 NOTE — PROGRESS NOTES
Bedside report done, patient in bed with FOB at bedside.  Denies pain at this time. Will continue to monitor.

## 2023-02-25 ENCOUNTER — PHARMACY VISIT (OUTPATIENT)
Dept: PHARMACY | Facility: MEDICAL CENTER | Age: 28
End: 2023-02-25
Payer: COMMERCIAL

## 2023-02-25 VITALS
TEMPERATURE: 97.6 F | WEIGHT: 140 LBS | RESPIRATION RATE: 17 BRPM | SYSTOLIC BLOOD PRESSURE: 103 MMHG | HEART RATE: 69 BPM | OXYGEN SATURATION: 97 % | DIASTOLIC BLOOD PRESSURE: 61 MMHG | HEIGHT: 60 IN | BODY MASS INDEX: 27.48 KG/M2

## 2023-02-25 PROBLEM — O23.03 ACUTE PYELONEPHRITIS IN THIRD TRIMESTER, ANTEPARTUM: Status: ACTIVE | Noted: 2023-02-25

## 2023-02-25 PROBLEM — J30.9 ALLERGIC RHINITIS: Status: RESOLVED | Noted: 2019-04-24 | Resolved: 2023-02-25

## 2023-02-25 PROBLEM — N10 ACUTE PYELONEPHRITIS IN THIRD TRIMESTER, ANTEPARTUM: Status: ACTIVE | Noted: 2023-02-25

## 2023-02-25 PROBLEM — N92.6 IRREGULAR MENSTRUAL CYCLE: Status: RESOLVED | Noted: 2018-08-23 | Resolved: 2023-02-25

## 2023-02-25 PROCEDURE — 700102 HCHG RX REV CODE 250 W/ 637 OVERRIDE(OP): Performed by: OBSTETRICS & GYNECOLOGY

## 2023-02-25 PROCEDURE — RXMED WILLOW AMBULATORY MEDICATION CHARGE: Performed by: OBSTETRICS & GYNECOLOGY

## 2023-02-25 PROCEDURE — A9270 NON-COVERED ITEM OR SERVICE: HCPCS | Performed by: OBSTETRICS & GYNECOLOGY

## 2023-02-25 RX ORDER — BUPROPION HYDROCHLORIDE 150 MG/1
150 TABLET, EXTENDED RELEASE ORAL 2 TIMES DAILY
Qty: 60 TABLET | Refills: 3 | Status: SHIPPED | OUTPATIENT
Start: 2023-02-25 | End: 2023-08-20

## 2023-02-25 RX ORDER — IBUPROFEN 600 MG/1
600 TABLET ORAL EVERY 6 HOURS PRN
Qty: 30 TABLET | Refills: 3 | Status: SHIPPED | OUTPATIENT
Start: 2023-02-25 | End: 2023-08-20

## 2023-02-25 RX ADMIN — IBUPROFEN 800 MG: 800 TABLET, FILM COATED ORAL at 02:08

## 2023-02-25 RX ADMIN — BUPROPION HYDROCHLORIDE 150 MG: 150 TABLET, EXTENDED RELEASE ORAL at 06:06

## 2023-02-25 RX ADMIN — ACETAMINOPHEN 1000 MG: 500 TABLET, FILM COATED ORAL at 12:00

## 2023-02-25 RX ADMIN — IBUPROFEN 800 MG: 800 TABLET, FILM COATED ORAL at 12:00

## 2023-02-25 RX ADMIN — ACETAMINOPHEN 1000 MG: 500 TABLET, FILM COATED ORAL at 04:09

## 2023-02-25 ASSESSMENT — PAIN DESCRIPTION - PAIN TYPE
TYPE: ACUTE PAIN

## 2023-02-25 NOTE — DISCHARGE SUMMARY
Discharge Summary:      Kristal Cesar    Admit Date:   2023  Discharge Date:  2023     Admitting diagnosis:  Labor and delivery indication for care or intervention [O75.9]  Discharge Diagnosis: Status post vaginal, spontaneous.  Pregnancy Complications: none  Tubal Ligation:  no        History:  Past Medical History:   Diagnosis Date    Allergy     Anxiety     Depression     Obesity (BMI 30-39.9)      OB History    Para Term  AB Living   5 4 3   1 3   SAB IAB Ectopic Molar Multiple Live Births   1       0 3      # Outcome Date GA Lbr [place]/2nd Weight Sex Delivery Anes PTL Lv   5 Term 23 40w3d / 00:09 2.62 kg (5 lb 12.4 oz) F Vag-Spont EPI N STEFAN   4 Para 22 18w2d    Vag-Spont None  FD   3 Term 19 41w0d 06:14 / 00:10 3.36 kg (7 lb 6.5 oz) M Vag-Spont EPI N STEFAN   2 Term 01/15/16 41w1d  3.314 kg (7 lb 4.9 oz) M Vag-Spont EPI N STEFAN   1 SAB                 Other environmental  Patient Active Problem List    Diagnosis Date Noted    Labor and delivery indication for care or intervention 2023    Recurrent boils 01/15/2020    Anxiety 01/15/2020    Carpal tunnel syndrome of right wrist 2019    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 , now para 3, was admitted with the above mentioned diagnosis, underwent Active Labor, vaginal, spontaneous. Patient postpartum course was unremarkable, with progressive advancement in diet , ambulation and toleration of oral analgesia. Patient without complaints today and desires discharge.      Vitals:    23 1739 23 0600 23 1800 23 0600   BP: 126/79 111/76 125/85 103/61   Pulse: 76 63 72 69   Resp: 18 18 16 17   Temp: 36.5 °C (97.7 °F) 36.8 °C (98.2 °F)  36.7 °C (98.1 °F) 36.4 °C (97.6 °F)   TempSrc: Temporal Temporal Temporal Temporal   SpO2: 97% 97% 96% 97%   Weight:       Height:           Current Facility-Administered Medications   Medication Dose    LR infusion      oxytocin (PITOCIN) infusion (for post delivery)  125 mL/hr    fentaNYL (SUBLIMAZE) injection 100 mcg  100 mcg    ropivacaine 0.2 % (NAROPIN) injection      lactated ringers infusion      docusate sodium (COLACE) capsule 100 mg  100 mg    ibuprofen (MOTRIN) tablet 800 mg  800 mg    acetaminophen (TYLENOL) tablet 1,000 mg  1,000 mg    miSOPROStol (CYTOTEC) tablet 800 mcg  800 mcg    PRN oxytocin (PITOCIN) (20 Units/1000 mL) PRN for excessive uterine bleeding - See Admin Instr  125-999 mL/hr    oxyCODONE immediate-release (ROXICODONE) tablet 5 mg  5 mg    buPROPion SR (WELLBUTRIN-SR) tablet 150 mg  150 mg    escitalopram (Lexapro) tablet 10 mg  10 mg       Exam:  Breast Exam: negative  Abdomen: Abdomen soft, non-tender. BS normal. No masses,  No organomegaly  Fundus Non Tender: yes  Incision: none  Perineum: perineum intact  Extremity: extremities, peripheral pulses and reflexes normal     Labs:  Recent Labs     02/23/23  0250 02/24/23  0756   WBC 13.6* 11.9*   RBC 4.85 3.84*   HEMOGLOBIN 14.0 11.3*   HEMATOCRIT 42.1 33.5*   MCV 86.8 87.2   MCH 28.9 29.4   MCHC 33.3* 33.7   RDW 44.2 44.6   PLATELETCT 178 144*   MPV 13.1* 12.4        Activity:   Discharge to home  Pelvic Rest x 6 weeks    Assessment:  normal postpartum course  Discharge Assessment: Taking adequate diet and fluids     Follow up: 6 week PP      Discharge Meds:   Current Outpatient Medications   Medication Sig Dispense Refill    ibuprofen (MOTRIN) 600 MG Tab Take 1 Tablet by mouth every 6 hours as needed for Moderate Pain. 30 Tablet 3    buPROPion SR (WELLBUTRIN-SR) 150 MG TABLET SR 12 HR sustained-release tablet Take 1 Tablet by mouth 2 times a day. 60 Tablet 3       Arabella Lara M.D.

## 2023-02-25 NOTE — PROGRESS NOTES
Received report from Marisela BEDOYA. Pt educated on POC and brought heat packs for back pain. All questions answered at this time time. Call light within reach.

## 2023-02-25 NOTE — PROGRESS NOTES
1845 Obtained report from day shift nurse.   1915 Pt assessment completed. All findings WDL. Pt was medicated with lexapro 10mg at this time as per MAR for treatment of anxiety. Pt was also provided with heat packs at this time to help alleviate the mild cramping due to breastfeeding.

## 2023-02-25 NOTE — LACTATION NOTE
This note was copied from a baby's chart.  DENY now feeling like she is becoming engorged. She had just fed her infant and her breasts were firm. Teach hand expression and she is also using a hand pump. Teach to only pump to comfort/soften. DENY voices understanding. She states she has WIC established and is aware of the lactation support. She states this is the only infant who latches without difficulty; she pumped and provided with her other two for 6 months and nine months. DENY voices understanding.

## 2023-02-25 NOTE — CARE PLAN
The patient is Stable - Low risk of patient condition declining or worsening    Shift Goals  Clinical Goals: VSS, pain  Patient Goals: breast feeding  Family Goals: bonding    Progress made toward(s) clinical / shift goals:    Problem: Pain - Standard  Goal: Alleviation of pain or a reduction in pain to the patient’s comfort goal  Outcome: Progressing     Problem: Psychosocial - Postpartum  Goal: Patient will verbalize and demonstrate effective bonding and parenting behavior  Outcome: Progressing     Problem: Altered Physiologic Condition  Goal: Patient physiologically stable as evidenced by normal lochia, palpable uterine involution and vitals within normal limits  Outcome: Progressing       Patient is not progressing towards the following goals:

## 2023-02-25 NOTE — PROGRESS NOTES
Educated on discharge paperwork for her and baby. Bands verified, cuddles removed. All questions answered at this time.

## 2023-02-25 NOTE — CARE PLAN
Problem: Skin Integrity  Goal: Skin integrity is maintained or improved  Outcome: Progressing     Problem: Infection - Postpartum  Goal: Postpartum patient will be free of signs and symptoms of infection  Outcome: Progressing   The patient is Stable - Low risk of patient condition declining or worsening    Shift Goals  Clinical Goals: Stable VS  Patient Goals: breastfeeding  Family Goals: rest, bonding    Progress made toward(s) clinical / shift goals:  Pt is not exhibiting signs of skin breakdown. Pt is able to turn self and is self ambulatory. Pt is also not exhibiting signs or symptoms of maternal infection.

## 2023-02-25 NOTE — DISCHARGE INSTRUCTIONS
PATIENT DISCHARGE EDUCATION INSTRUCTION SHEET    REASONS TO CALL YOUR OBSTETRICIAN  Persistent fever, shaking, chills (Temperature higher than 100.4) may indicate you have an infection  Heavy bleeding: soaking more than 1 pad per hour; Passing clots an egg-sized clot or bigger may mean you have an postpartum hemorrhage  Foul odor from vagina or bad smelling or discolored discharge or blood  Breast infection (Mastitis symptoms); breast pain, chills, fever, redness or red streaks, may feel flu like symptoms  Urinary pain, burning or frequency  Incision that is not healing, increased redness, swelling, tenderness or pain, or any pus from episiotomy or  site may mean you have an infection  Redness, swelling, warmth, or painful to touch in the calf area of your leg may mean you have a blood clot  Severe or intensified depression, thoughts or feelings of wanting to hurt yourself or someone else   Pain in chest, obstructed breathing or shortness of breath (trouble catching your breath) may mean you are having a postpartum complication. Call your provider immediately   Headache that does not get better, even after taking medicine, a bad headache with vision changes or pain in the upper right area of your belly may mean you have high blood pressure or post birth preeclampsia. Call your provider immediately    HAND WASHING  All family and friends should wash their hands:  Before and after holding the baby  Before feeding the baby  After using the restroom or changing the baby's diaper    WOUND CARE  Ask your physician for additional care instructions. In general:   Incision:  May shower and pat incision dry   Keep the incision clean and dry  There should not be any opening or pus from the incision  Continue to walk at home 3 times a day   Do NOT lift anything heavier than your baby (over 10 pounds)  Encourage family to help participate in care of the  to allow rest and mom time to  heal  Episiotomy/Laceration  May use james-spray bottle, witch hazel pads and dermaplast spray for comfort  Use james-spray bottle after urinating to cleanse perineal area  To prevent burning during urination spray james-water bottle on labial area   Pat perineal area dry until episiotomy/laceration is healed  Continue to use james-bottle until bleeding stops as needed  If have a 2nd degree laceration or greater, a Sitz bath can offer relief from soreness, burning, and inflammation   Sitz Bath   Sit in 6 inches of warm water and soak laceration as needed until the laceration heals    VAGINAL CARE AND BLEEDING  Nothing inside vagina for 6 weeks:   No sexual intercourse, tampons or douching  Bleeding may continue for 2-4 weeks. Amount and color may vary  Soaking 1 pad or more in an hour for several hours is considered heavy bleeding  Passing large egg sized blood clots can be concerning  If you feel like you have heavy bleeding or are having increasing amount of blood clots call your Obstetrician immediately  If you begin feeling faint upon standing, feeling sick to your stomach, have clammy skin, a really fast heartbeat, have chills, start feeling confused, dizzy, sleepy or weak, or feeling like you're going to faint call your Obstetrician immediately    HYPERTENSION   Preeclampsia or gestational hypertension are types of high blood pressure that only pregnant women can get. It is important for you to be aware of symptoms to seek early intervention and treatment. If you have any of these symptoms immediately call your Obstetrician    Vision changes or blurred vision   Severe headache or pain that is unrelieved with medication and will not go away  Persistent pain in upper abdomen or shoulder   Increased swelling of face, feet, or hands  Difficulty breathing or shortness of breath at rest  Urinating less than usual    URINATION AND BOWEL MOVEMENTS  Eating more fiber (bran cereal, fruits, and vegetables) and drinking  "plenty of fluids will help to avoid constipation  Urinary frequency and urgency after childbirth is normal  If you experience any urinary pain, burning or frequency call your provider    BIRTH CONTROL  It is possible to become pregnant at any time after delivery and while breastfeeding  Plan to discuss a method of birth control with your physician at your post delivery follow up visit    POSTPARTUM BLUES  During the first few days after birth, you may experience a sense of the \"blues\" which may include impatience, irritability or even crying. These feelings come and go quickly. However, as many as 1 in 10 women experience emotional symptoms known as postpartum depression.     POSTPARTUM DEPRESSION    May start as early as the second or third day after delivery or take several weeks or months to develop. Symptoms of \"blues\" are present, but are more intense: Crying spells; loss of appetite; feelings of hopelessness or loss of control; fear of touching the baby; over concern or no concern at all about the baby; little or no concern about your own appearance/caring for yourself; and/or inability to sleep or excessive sleeping. Contact your Obstetrician if you are experiencing any of these symptoms     PREVENTING SHAKEN BABY  If you are angry or stressed, PUT THE BABY IN THE CRIB, step away, take some deep breaths, and wait until you are calm to care for the baby. DO NOT SHAKE THE BABY. You are not alone, call a supporter for help.  [facility] 24/7 crisis call line (844-903-1837) or (1-759.662.9185)  You can also text them, text \"ANSWER\" (704190)  "

## 2023-08-20 ENCOUNTER — APPOINTMENT (OUTPATIENT)
Dept: RADIOLOGY | Facility: MEDICAL CENTER | Age: 28
DRG: 918 | End: 2023-08-20
Attending: STUDENT IN AN ORGANIZED HEALTH CARE EDUCATION/TRAINING PROGRAM
Payer: MEDICAID

## 2023-08-20 ENCOUNTER — HOSPITAL ENCOUNTER (INPATIENT)
Facility: MEDICAL CENTER | Age: 28
LOS: 4 days | DRG: 918 | End: 2023-08-24
Attending: EMERGENCY MEDICINE | Admitting: STUDENT IN AN ORGANIZED HEALTH CARE EDUCATION/TRAINING PROGRAM
Payer: MEDICAID

## 2023-08-20 DIAGNOSIS — R94.31 PROLONGED Q-T INTERVAL ON ECG: ICD-10-CM

## 2023-08-20 DIAGNOSIS — T43.292A BUPROPION OVERDOSE, INTENTIONAL SELF-HARM, INITIAL ENCOUNTER (HCC): ICD-10-CM

## 2023-08-20 PROBLEM — D72.829 LEUKOCYTOSIS: Status: ACTIVE | Noted: 2023-08-20

## 2023-08-20 PROBLEM — E87.6 HYPOKALEMIA: Status: ACTIVE | Noted: 2023-08-20

## 2023-08-20 PROBLEM — E83.42 HYPOMAGNESEMIA: Status: ACTIVE | Noted: 2023-08-20

## 2023-08-20 PROBLEM — R11.2 INTRACTABLE NAUSEA AND VOMITING: Status: ACTIVE | Noted: 2023-08-20

## 2023-08-20 PROBLEM — T14.91XA SUICIDE ATTEMPT (HCC): Status: ACTIVE | Noted: 2023-08-20

## 2023-08-20 PROBLEM — R73.9 HYPERGLYCEMIA: Status: ACTIVE | Noted: 2023-08-20

## 2023-08-20 PROBLEM — T50.902A INTENTIONAL DRUG OVERDOSE, INITIAL ENCOUNTER (HCC): Status: ACTIVE | Noted: 2023-08-20

## 2023-08-20 LAB
ALBUMIN SERPL BCP-MCNC: 4.2 G/DL (ref 3.2–4.9)
ALBUMIN SERPL BCP-MCNC: 4.8 G/DL (ref 3.2–4.9)
ALBUMIN/GLOB SERPL: 1.5 G/DL
ALBUMIN/GLOB SERPL: 1.7 G/DL
ALP SERPL-CCNC: 111 U/L (ref 30–99)
ALP SERPL-CCNC: 99 U/L (ref 30–99)
ALT SERPL-CCNC: 11 U/L (ref 2–50)
ALT SERPL-CCNC: 12 U/L (ref 2–50)
ANION GAP SERPL CALC-SCNC: 13 MMOL/L (ref 7–16)
ANION GAP SERPL CALC-SCNC: 17 MMOL/L (ref 7–16)
APAP SERPL-MCNC: <5 UG/ML (ref 10–30)
APAP SERPL-MCNC: <5 UG/ML (ref 10–30)
AST SERPL-CCNC: 16 U/L (ref 12–45)
AST SERPL-CCNC: 18 U/L (ref 12–45)
BASOPHILS # BLD AUTO: 0.2 % (ref 0–1.8)
BASOPHILS # BLD: 0.03 K/UL (ref 0–0.12)
BILIRUB SERPL-MCNC: 0.9 MG/DL (ref 0.1–1.5)
BILIRUB SERPL-MCNC: 0.9 MG/DL (ref 0.1–1.5)
BUN SERPL-MCNC: 10 MG/DL (ref 8–22)
BUN SERPL-MCNC: 9 MG/DL (ref 8–22)
CALCIUM ALBUM COR SERPL-MCNC: 8.5 MG/DL (ref 8.5–10.5)
CALCIUM ALBUM COR SERPL-MCNC: 9.1 MG/DL (ref 8.5–10.5)
CALCIUM SERPL-MCNC: 8.7 MG/DL (ref 8.5–10.5)
CALCIUM SERPL-MCNC: 9.7 MG/DL (ref 8.5–10.5)
CHLORIDE SERPL-SCNC: 103 MMOL/L (ref 96–112)
CHLORIDE SERPL-SCNC: 104 MMOL/L (ref 96–112)
CK SERPL-CCNC: 64 U/L (ref 0–154)
CO2 SERPL-SCNC: 21 MMOL/L (ref 20–33)
CO2 SERPL-SCNC: 24 MMOL/L (ref 20–33)
CREAT SERPL-MCNC: 0.57 MG/DL (ref 0.5–1.4)
CREAT SERPL-MCNC: 0.68 MG/DL (ref 0.5–1.4)
EKG IMPRESSION: NORMAL
EKG IMPRESSION: NORMAL
EOSINOPHIL # BLD AUTO: 0.07 K/UL (ref 0–0.51)
EOSINOPHIL NFR BLD: 0.5 % (ref 0–6.9)
ERYTHROCYTE [DISTWIDTH] IN BLOOD BY AUTOMATED COUNT: 38 FL (ref 35.9–50)
EST. AVERAGE GLUCOSE BLD GHB EST-MCNC: 100 MG/DL
ETHANOL BLD-MCNC: <10.1 MG/DL
GFR SERPLBLD CREATININE-BSD FMLA CKD-EPI: 122 ML/MIN/1.73 M 2
GFR SERPLBLD CREATININE-BSD FMLA CKD-EPI: 128 ML/MIN/1.73 M 2
GLOBULIN SER CALC-MCNC: 2.5 G/DL (ref 1.9–3.5)
GLOBULIN SER CALC-MCNC: 3.1 G/DL (ref 1.9–3.5)
GLUCOSE SERPL-MCNC: 161 MG/DL (ref 65–99)
GLUCOSE SERPL-MCNC: 199 MG/DL (ref 65–99)
HBA1C MFR BLD: 5.1 % (ref 4–5.6)
HCG SERPL QL: NEGATIVE
HCT VFR BLD AUTO: 39.9 % (ref 37–47)
HGB BLD-MCNC: 13.6 G/DL (ref 12–16)
IMM GRANULOCYTES # BLD AUTO: 0.04 K/UL (ref 0–0.11)
IMM GRANULOCYTES NFR BLD AUTO: 0.3 % (ref 0–0.9)
LACTATE SERPL-SCNC: 1.9 MMOL/L (ref 0.5–2)
LYMPHOCYTES # BLD AUTO: 3.68 K/UL (ref 1–4.8)
LYMPHOCYTES NFR BLD: 28.3 % (ref 22–41)
MAGNESIUM SERPL-MCNC: 1.7 MG/DL (ref 1.5–2.5)
MAGNESIUM SERPL-MCNC: 2.7 MG/DL (ref 1.5–2.5)
MCH RBC QN AUTO: 27.6 PG (ref 27–33)
MCHC RBC AUTO-ENTMCNC: 34.1 G/DL (ref 32.2–35.5)
MCV RBC AUTO: 81.1 FL (ref 81.4–97.8)
MONOCYTES # BLD AUTO: 0.77 K/UL (ref 0–0.85)
MONOCYTES NFR BLD AUTO: 5.9 % (ref 0–13.4)
NEUTROPHILS # BLD AUTO: 8.4 K/UL (ref 1.82–7.42)
NEUTROPHILS NFR BLD: 64.8 % (ref 44–72)
NRBC # BLD AUTO: 0 K/UL
NRBC BLD-RTO: 0 /100 WBC (ref 0–0.2)
PLATELET # BLD AUTO: 375 K/UL (ref 164–446)
PMV BLD AUTO: 11.2 FL (ref 9–12.9)
POC BREATHALIZER: 0 PERCENT (ref 0–0.01)
POTASSIUM SERPL-SCNC: 2.7 MMOL/L (ref 3.6–5.5)
POTASSIUM SERPL-SCNC: 2.9 MMOL/L (ref 3.6–5.5)
PROCALCITONIN SERPL-MCNC: <0.05 NG/ML
PROT SERPL-MCNC: 6.7 G/DL (ref 6–8.2)
PROT SERPL-MCNC: 7.9 G/DL (ref 6–8.2)
RBC # BLD AUTO: 4.92 M/UL (ref 4.2–5.4)
SALICYLATES SERPL-MCNC: <1 MG/DL (ref 15–25)
SODIUM SERPL-SCNC: 141 MMOL/L (ref 135–145)
SODIUM SERPL-SCNC: 141 MMOL/L (ref 135–145)
TROPONIN T SERPL-MCNC: <6 NG/L (ref 6–19)
WBC # BLD AUTO: 13 K/UL (ref 4.8–10.8)

## 2023-08-20 PROCEDURE — 80179 DRUG ASSAY SALICYLATE: CPT

## 2023-08-20 PROCEDURE — 82077 ASSAY SPEC XCP UR&BREATH IA: CPT

## 2023-08-20 PROCEDURE — 84703 CHORIONIC GONADOTROPIN ASSAY: CPT

## 2023-08-20 PROCEDURE — 770020 HCHG ROOM/CARE - TELE (206)

## 2023-08-20 PROCEDURE — 93005 ELECTROCARDIOGRAM TRACING: CPT | Performed by: STUDENT IN AN ORGANIZED HEALTH CARE EDUCATION/TRAINING PROGRAM

## 2023-08-20 PROCEDURE — A9270 NON-COVERED ITEM OR SERVICE: HCPCS | Performed by: STUDENT IN AN ORGANIZED HEALTH CARE EDUCATION/TRAINING PROGRAM

## 2023-08-20 PROCEDURE — 96365 THER/PROPH/DIAG IV INF INIT: CPT

## 2023-08-20 PROCEDURE — 83036 HEMOGLOBIN GLYCOSYLATED A1C: CPT

## 2023-08-20 PROCEDURE — 82550 ASSAY OF CK (CPK): CPT

## 2023-08-20 PROCEDURE — 700102 HCHG RX REV CODE 250 W/ 637 OVERRIDE(OP): Mod: UD | Performed by: EMERGENCY MEDICINE

## 2023-08-20 PROCEDURE — 700101 HCHG RX REV CODE 250: Performed by: STUDENT IN AN ORGANIZED HEALTH CARE EDUCATION/TRAINING PROGRAM

## 2023-08-20 PROCEDURE — 700105 HCHG RX REV CODE 258: Performed by: STUDENT IN AN ORGANIZED HEALTH CARE EDUCATION/TRAINING PROGRAM

## 2023-08-20 PROCEDURE — 96368 THER/DIAG CONCURRENT INF: CPT

## 2023-08-20 PROCEDURE — 700111 HCHG RX REV CODE 636 W/ 250 OVERRIDE (IP): Mod: JZ,UD | Performed by: EMERGENCY MEDICINE

## 2023-08-20 PROCEDURE — 96375 TX/PRO/DX INJ NEW DRUG ADDON: CPT

## 2023-08-20 PROCEDURE — 71045 X-RAY EXAM CHEST 1 VIEW: CPT

## 2023-08-20 PROCEDURE — C9113 INJ PANTOPRAZOLE SODIUM, VIA: HCPCS | Performed by: STUDENT IN AN ORGANIZED HEALTH CARE EDUCATION/TRAINING PROGRAM

## 2023-08-20 PROCEDURE — 93005 ELECTROCARDIOGRAM TRACING: CPT | Performed by: EMERGENCY MEDICINE

## 2023-08-20 PROCEDURE — 700111 HCHG RX REV CODE 636 W/ 250 OVERRIDE (IP): Performed by: STUDENT IN AN ORGANIZED HEALTH CARE EDUCATION/TRAINING PROGRAM

## 2023-08-20 PROCEDURE — 83605 ASSAY OF LACTIC ACID: CPT

## 2023-08-20 PROCEDURE — 85025 COMPLETE CBC W/AUTO DIFF WBC: CPT

## 2023-08-20 PROCEDURE — 83735 ASSAY OF MAGNESIUM: CPT

## 2023-08-20 PROCEDURE — 84484 ASSAY OF TROPONIN QUANT: CPT

## 2023-08-20 PROCEDURE — 96367 TX/PROPH/DG ADDL SEQ IV INF: CPT

## 2023-08-20 PROCEDURE — 99285 EMERGENCY DEPT VISIT HI MDM: CPT

## 2023-08-20 PROCEDURE — A9270 NON-COVERED ITEM OR SERVICE: HCPCS | Mod: UD | Performed by: EMERGENCY MEDICINE

## 2023-08-20 PROCEDURE — 84145 PROCALCITONIN (PCT): CPT

## 2023-08-20 PROCEDURE — 96376 TX/PRO/DX INJ SAME DRUG ADON: CPT

## 2023-08-20 PROCEDURE — 302970 POC BREATHALIZER: Performed by: EMERGENCY MEDICINE

## 2023-08-20 PROCEDURE — 80053 COMPREHEN METABOLIC PANEL: CPT

## 2023-08-20 PROCEDURE — 80143 DRUG ASSAY ACETAMINOPHEN: CPT

## 2023-08-20 PROCEDURE — 96366 THER/PROPH/DIAG IV INF ADDON: CPT

## 2023-08-20 PROCEDURE — 700102 HCHG RX REV CODE 250 W/ 637 OVERRIDE(OP): Performed by: STUDENT IN AN ORGANIZED HEALTH CARE EDUCATION/TRAINING PROGRAM

## 2023-08-20 PROCEDURE — 36415 COLL VENOUS BLD VENIPUNCTURE: CPT

## 2023-08-20 RX ORDER — LORAZEPAM 1 MG/1
1 TABLET ORAL EVERY 4 HOURS PRN
Status: DISCONTINUED | OUTPATIENT
Start: 2023-08-20 | End: 2023-08-23

## 2023-08-20 RX ORDER — ONDANSETRON 4 MG/1
4 TABLET, ORALLY DISINTEGRATING ORAL EVERY 4 HOURS PRN
Status: DISCONTINUED | OUTPATIENT
Start: 2023-08-20 | End: 2023-08-20

## 2023-08-20 RX ORDER — ACETAMINOPHEN 325 MG/1
650 TABLET ORAL EVERY 6 HOURS PRN
Status: DISCONTINUED | OUTPATIENT
Start: 2023-08-20 | End: 2023-08-24 | Stop reason: HOSPADM

## 2023-08-20 RX ORDER — IPRATROPIUM BROMIDE AND ALBUTEROL SULFATE 2.5; .5 MG/3ML; MG/3ML
3 SOLUTION RESPIRATORY (INHALATION)
Status: DISCONTINUED | OUTPATIENT
Start: 2023-08-20 | End: 2023-08-24 | Stop reason: HOSPADM

## 2023-08-20 RX ORDER — IBUPROFEN 200 MG
600 TABLET ORAL EVERY 6 HOURS PRN
Status: ON HOLD | COMMUNITY
End: 2023-08-23

## 2023-08-20 RX ORDER — LORAZEPAM 2 MG/ML
2 INJECTION INTRAMUSCULAR
Status: DISCONTINUED | OUTPATIENT
Start: 2023-08-20 | End: 2023-08-23

## 2023-08-20 RX ORDER — PROMETHAZINE HYDROCHLORIDE 25 MG/1
12.5-25 TABLET ORAL EVERY 4 HOURS PRN
Status: DISCONTINUED | OUTPATIENT
Start: 2023-08-20 | End: 2023-08-24 | Stop reason: HOSPADM

## 2023-08-20 RX ORDER — PANTOPRAZOLE SODIUM 40 MG/10ML
40 INJECTION, POWDER, LYOPHILIZED, FOR SOLUTION INTRAVENOUS 2 TIMES DAILY
Status: COMPLETED | OUTPATIENT
Start: 2023-08-20 | End: 2023-08-23

## 2023-08-20 RX ORDER — LORAZEPAM 2 MG/ML
1 INJECTION INTRAMUSCULAR
Status: DISCONTINUED | OUTPATIENT
Start: 2023-08-20 | End: 2023-08-23

## 2023-08-20 RX ORDER — BUPROPION HYDROCHLORIDE 150 MG/1
150 TABLET, EXTENDED RELEASE ORAL 2 TIMES DAILY
Status: ON HOLD | COMMUNITY
End: 2023-08-23

## 2023-08-20 RX ORDER — MAGNESIUM SULFATE 1 G/100ML
1 INJECTION INTRAVENOUS ONCE
Status: DISCONTINUED | OUTPATIENT
Start: 2023-08-20 | End: 2023-08-20

## 2023-08-20 RX ORDER — ESCITALOPRAM OXALATE 10 MG/1
10 TABLET ORAL DAILY
Status: ON HOLD | COMMUNITY
End: 2023-08-23

## 2023-08-20 RX ORDER — POTASSIUM CHLORIDE 20 MEQ/1
40 TABLET, EXTENDED RELEASE ORAL EVERY 4 HOURS
Status: DISPENSED | OUTPATIENT
Start: 2023-08-20 | End: 2023-08-21

## 2023-08-20 RX ORDER — DIAZEPAM 5 MG/1
5 TABLET ORAL EVERY 6 HOURS
Status: COMPLETED | OUTPATIENT
Start: 2023-08-21 | End: 2023-08-23

## 2023-08-20 RX ORDER — POTASSIUM CHLORIDE 7.45 MG/ML
10 INJECTION INTRAVENOUS
Status: DISPENSED | OUTPATIENT
Start: 2023-08-20 | End: 2023-08-21

## 2023-08-20 RX ORDER — ONDANSETRON 2 MG/ML
4 INJECTION INTRAMUSCULAR; INTRAVENOUS EVERY 4 HOURS PRN
Status: DISCONTINUED | OUTPATIENT
Start: 2023-08-20 | End: 2023-08-20

## 2023-08-20 RX ORDER — SODIUM CHLORIDE, SODIUM LACTATE, POTASSIUM CHLORIDE, AND CALCIUM CHLORIDE .6; .31; .03; .02 G/100ML; G/100ML; G/100ML; G/100ML
500 INJECTION, SOLUTION INTRAVENOUS
Status: DISCONTINUED | OUTPATIENT
Start: 2023-08-20 | End: 2023-08-24 | Stop reason: HOSPADM

## 2023-08-20 RX ORDER — DIAZEPAM 5 MG/1
10 TABLET ORAL EVERY 6 HOURS
Status: COMPLETED | OUTPATIENT
Start: 2023-08-20 | End: 2023-08-21

## 2023-08-20 RX ORDER — LORAZEPAM 2 MG/ML
0.5 INJECTION INTRAMUSCULAR EVERY 4 HOURS PRN
Status: DISCONTINUED | OUTPATIENT
Start: 2023-08-20 | End: 2023-08-23

## 2023-08-20 RX ORDER — LORAZEPAM 2 MG/ML
1.5 INJECTION INTRAMUSCULAR
Status: DISCONTINUED | OUTPATIENT
Start: 2023-08-20 | End: 2023-08-23

## 2023-08-20 RX ORDER — SODIUM CHLORIDE AND POTASSIUM CHLORIDE 300; 900 MG/100ML; MG/100ML
INJECTION, SOLUTION INTRAVENOUS CONTINUOUS
Status: DISCONTINUED | OUTPATIENT
Start: 2023-08-20 | End: 2023-08-22

## 2023-08-20 RX ORDER — LORAZEPAM 2 MG/ML
0.5 INJECTION INTRAMUSCULAR ONCE
Status: COMPLETED | OUTPATIENT
Start: 2023-08-20 | End: 2023-08-20

## 2023-08-20 RX ORDER — PROMETHAZINE HYDROCHLORIDE 25 MG/1
12.5-25 SUPPOSITORY RECTAL EVERY 4 HOURS PRN
Status: DISCONTINUED | OUTPATIENT
Start: 2023-08-20 | End: 2023-08-24 | Stop reason: HOSPADM

## 2023-08-20 RX ORDER — LORAZEPAM 2 MG/ML
1 INJECTION INTRAMUSCULAR ONCE
Status: COMPLETED | OUTPATIENT
Start: 2023-08-20 | End: 2023-08-20

## 2023-08-20 RX ORDER — LORAZEPAM 2 MG/ML
2 INJECTION INTRAMUSCULAR EVERY 4 HOURS PRN
Status: DISCONTINUED | OUTPATIENT
Start: 2023-08-20 | End: 2023-08-23

## 2023-08-20 RX ORDER — LORAZEPAM 0.5 MG/1
0.5 TABLET ORAL EVERY 4 HOURS PRN
Status: DISCONTINUED | OUTPATIENT
Start: 2023-08-20 | End: 2023-08-23

## 2023-08-20 RX ORDER — LORAZEPAM 2 MG/1
2 TABLET ORAL
Status: DISCONTINUED | OUTPATIENT
Start: 2023-08-20 | End: 2023-08-23

## 2023-08-20 RX ORDER — MAGNESIUM SULFATE HEPTAHYDRATE 40 MG/ML
2 INJECTION, SOLUTION INTRAVENOUS ONCE
Status: COMPLETED | OUTPATIENT
Start: 2023-08-20 | End: 2023-08-20

## 2023-08-20 RX ORDER — FOLIC ACID 1 MG/1
1 TABLET ORAL DAILY
Status: DISCONTINUED | OUTPATIENT
Start: 2023-08-21 | End: 2023-08-24 | Stop reason: HOSPADM

## 2023-08-20 RX ORDER — LORAZEPAM 2 MG/1
4 TABLET ORAL
Status: DISCONTINUED | OUTPATIENT
Start: 2023-08-20 | End: 2023-08-23

## 2023-08-20 RX ORDER — PROCHLORPERAZINE EDISYLATE 5 MG/ML
5-10 INJECTION INTRAMUSCULAR; INTRAVENOUS EVERY 4 HOURS PRN
Status: DISCONTINUED | OUTPATIENT
Start: 2023-08-20 | End: 2023-08-24 | Stop reason: HOSPADM

## 2023-08-20 RX ORDER — GUAIFENESIN/DEXTROMETHORPHAN 100-10MG/5
10 SYRUP ORAL EVERY 6 HOURS PRN
Status: DISCONTINUED | OUTPATIENT
Start: 2023-08-20 | End: 2023-08-24 | Stop reason: HOSPADM

## 2023-08-20 RX ORDER — LORAZEPAM 2 MG/ML
0.5 INJECTION INTRAMUSCULAR EVERY 4 HOURS PRN
Status: DISCONTINUED | OUTPATIENT
Start: 2023-08-20 | End: 2023-08-24 | Stop reason: HOSPADM

## 2023-08-20 RX ORDER — BISACODYL 10 MG
10 SUPPOSITORY, RECTAL RECTAL
Status: DISCONTINUED | OUTPATIENT
Start: 2023-08-20 | End: 2023-08-24 | Stop reason: HOSPADM

## 2023-08-20 RX ORDER — ONDANSETRON 2 MG/ML
4 INJECTION INTRAMUSCULAR; INTRAVENOUS ONCE
Status: COMPLETED | OUTPATIENT
Start: 2023-08-20 | End: 2023-08-20

## 2023-08-20 RX ORDER — ALBUTEROL SULFATE 90 UG/1
2 AEROSOL, METERED RESPIRATORY (INHALATION) EVERY 4 HOURS PRN
Status: DISCONTINUED | OUTPATIENT
Start: 2023-08-20 | End: 2023-08-20

## 2023-08-20 RX ORDER — GAUZE BANDAGE 2" X 2"
100 BANDAGE TOPICAL DAILY
Status: DISCONTINUED | OUTPATIENT
Start: 2023-08-21 | End: 2023-08-24 | Stop reason: HOSPADM

## 2023-08-20 RX ORDER — AMOXICILLIN 250 MG
2 CAPSULE ORAL 2 TIMES DAILY
Status: DISCONTINUED | OUTPATIENT
Start: 2023-08-20 | End: 2023-08-24 | Stop reason: HOSPADM

## 2023-08-20 RX ORDER — LABETALOL HYDROCHLORIDE 5 MG/ML
10 INJECTION, SOLUTION INTRAVENOUS EVERY 4 HOURS PRN
Status: DISCONTINUED | OUTPATIENT
Start: 2023-08-20 | End: 2023-08-24 | Stop reason: HOSPADM

## 2023-08-20 RX ORDER — POLYETHYLENE GLYCOL 3350 17 G/17G
1 POWDER, FOR SOLUTION ORAL
Status: DISCONTINUED | OUTPATIENT
Start: 2023-08-20 | End: 2023-08-24 | Stop reason: HOSPADM

## 2023-08-20 RX ADMIN — ONDANSETRON 4 MG: 2 INJECTION INTRAMUSCULAR; INTRAVENOUS at 16:17

## 2023-08-20 RX ADMIN — DIAZEPAM 10 MG: 5 TABLET ORAL at 17:26

## 2023-08-20 RX ADMIN — POTASSIUM CHLORIDE AND SODIUM CHLORIDE: 900; 300 INJECTION, SOLUTION INTRAVENOUS at 17:44

## 2023-08-20 RX ADMIN — ACTIVATED CHARCOAL 25 G: 25 PELLET ORAL at 15:46

## 2023-08-20 RX ADMIN — LORAZEPAM 0.5 MG: 2 INJECTION INTRAMUSCULAR; INTRAVENOUS at 22:04

## 2023-08-20 RX ADMIN — POTASSIUM CHLORIDE 40 MEQ: 1500 TABLET, EXTENDED RELEASE ORAL at 17:25

## 2023-08-20 RX ADMIN — LORAZEPAM 1 MG: 2 INJECTION INTRAMUSCULAR; INTRAVENOUS at 16:17

## 2023-08-20 RX ADMIN — SENNOSIDES AND DOCUSATE SODIUM 2 TABLET: 50; 8.6 TABLET ORAL at 17:25

## 2023-08-20 RX ADMIN — PROCHLORPERAZINE EDISYLATE 5 MG: 5 INJECTION, SOLUTION INTRAMUSCULAR; INTRAVENOUS at 22:05

## 2023-08-20 RX ADMIN — MAGNESIUM SULFATE HEPTAHYDRATE 2 G: 2 INJECTION, SOLUTION INTRAVENOUS at 17:46

## 2023-08-20 RX ADMIN — PANTOPRAZOLE SODIUM 40 MG: 40 INJECTION, POWDER, FOR SOLUTION INTRAVENOUS at 17:45

## 2023-08-20 RX ADMIN — MAGNESIUM SULFATE HEPTAHYDRATE: 500 INJECTION, SOLUTION INTRAMUSCULAR; INTRAVENOUS at 17:45

## 2023-08-20 ASSESSMENT — ENCOUNTER SYMPTOMS
SHORTNESS OF BREATH: 0
HALLUCINATIONS: 1
DEPRESSION: 1
ABDOMINAL PAIN: 0
MEMORY LOSS: 0
INSOMNIA: 1
VOMITING: 1
FEVER: 0
CHILLS: 0
NAUSEA: 1
PALPITATIONS: 1
BLURRED VISION: 0
COUGH: 0
HEADACHES: 0
DIZZINESS: 0
HEMOPTYSIS: 0
DOUBLE VISION: 0
NERVOUS/ANXIOUS: 1
BRUISES/BLEEDS EASILY: 0
HEARTBURN: 1
MYALGIAS: 0

## 2023-08-20 ASSESSMENT — FIBROSIS 4 INDEX: FIB4 SCORE: 0.78

## 2023-08-20 ASSESSMENT — LIFESTYLE VARIABLES: SUBSTANCE_ABUSE: 0

## 2023-08-20 ASSESSMENT — PAIN DESCRIPTION - PAIN TYPE: TYPE: ACUTE PAIN

## 2023-08-20 NOTE — ED NOTES
PT has several episodes of charcoal colored emesis, PT continues to vomit. ED tech at bedside to assist PT as needed. Suction and oxygen available if needed.

## 2023-08-20 NOTE — H&P
"Hospital Medicine History & Physical Note    Date of Service  8/20/2023    Primary Care Physician  Margareth Villegas M.D.    Consultants  Psychiatry    Code Status  Full Code    Chief Complaint  Chief Complaint   Patient presents with    Suicidal Ideation     Pt took approx 15 of her 150mg tabs of buproprion 1 hour ago. Pt has pressured speech, is tangential & tearful. States she has had SI, \"I had my baby 5 months ago and I think I've got postpartum depression, my OB knows\". States she has also been having hallucinations.     Drug Ingestion     Pt nauseous, dry heaving. Denies any other drug ingestion or ETOH.       History of Presenting Illness  Kristal Cesar is a 28 y.o. female with history of postpartum depression who presented 8/20/2023 with evaluation for suicide attempt.  Patient stated she has 3 children, oldest child is 7 years old, youngest child was just born in February 2023.  She reported difficulty caring for children, not in good term with .  She stated her  \"packed and left her to live in another apartment\" earlier today. Her mother is currently caring for her children. Patient expressed severe anxiety and depression.  She admitted to auditory hallucination.  Patient stated she took several tablets of 150 mg Wellbutrin with intention to end her life.  Denies illicit drug use, alcohol abuse.  Poison control has been contacted, appreciate recommendations.    In ER, she was given Ativan 1 mg IV, Zofran 4 mg IV due to nausea and vomiting.  Attempted charcoal pellets reversal, which patient vomited.  Patient subsequently noted to have QT prolongation on EKG.  Psychiatry has been consulted, patient is under legal hold.  Admitted to medicine service for further evaluation and treatment.    I discussed the plan of care with patient, bedside RN, and pharmacy.    Review of Systems  Review of Systems   Constitutional:  Negative for chills and fever.   HENT:  Negative for hearing " "loss and tinnitus.    Eyes:  Negative for blurred vision and double vision.   Respiratory:  Negative for cough, hemoptysis and shortness of breath.    Cardiovascular:  Positive for palpitations. Negative for chest pain and leg swelling.   Gastrointestinal:  Positive for heartburn, nausea and vomiting. Negative for abdominal pain.   Genitourinary:  Negative for dysuria and hematuria.   Musculoskeletal:  Negative for joint pain and myalgias.   Skin:  Negative for itching and rash.   Neurological:  Negative for dizziness and headaches.   Endo/Heme/Allergies:  Negative for environmental allergies. Does not bruise/bleed easily.   Psychiatric/Behavioral:  Positive for depression, hallucinations and suicidal ideas. Negative for memory loss and substance abuse. The patient is nervous/anxious and has insomnia.    All other systems reviewed and are negative.      Past Medical History   has a past medical history of Allergy, Anxiety, Depression, and Obesity (BMI 30-39.9).    Surgical History   has a past surgical history that includes ariana by laparoscopy (3/7/2016); other; and dilation and curettage (N/A, 2/1/2022).     Family History  family history includes Diabetes in her father; Hyperlipidemia in her father; Hypertension in her father; No Known Problems in her mother and sister.   Family history reviewed with patient. There is family history that is pertinent to the chief complaint.     Social History   reports that she has never smoked. She has never used smokeless tobacco. She reports that she does not drink alcohol and does not use drugs.    Allergies  Allergies   Allergen Reactions    Other Environmental Rash     \"dogs\" - \"my skin turns red\"       Medications  Prior to Admission Medications   Prescriptions Last Dose Informant Patient Reported? Taking?   Prenatal Vit-Fe Fumarate-FA (PRENATAL VITAMINS) 28-0.8 MG Tab   No No   Sig: Take 1 Tablet by mouth every day.   albuterol 108 (90 Base) MCG/ACT Aero Soln inhalation " aerosol   Yes No   Sig: INHALE 1 TO 2 PUFFS BY MOUTH 4 TIMES A DAY FOR 7 DAYS   buPROPion (WELLBUTRIN XL) 150 MG XL tablet   No No   Sig: Take 1 Tablet by mouth every morning.   Patient taking differently: Take 150 mg by mouth every morning. Indications: Major Depressive Disorder   buPROPion SR (WELLBUTRIN-SR) 150 MG TABLET SR 12 HR sustained-release tablet   No No   Sig: Take 1 Tablet by mouth 2 times a day.   escitalopram (LEXAPRO) 10 MG Tab   Yes No   Sig: Take 10 mg by mouth every day. Indications: Generalized Anxiety Disorder   ibuprofen (MOTRIN) 600 MG Tab   No No   Sig: Take 1 Tablet by mouth every 6 hours as needed for Moderate Pain.   mupirocin (BACTROBAN) 2 % Ointment   No No   Sig: Apply 1 Application topically 2 times a day.   triamcinolone acetonide (KENALOG) 0.1 % Ointment   No No   Sig: Apply thin layer to rash on hands, legs, elbows twice daily for 7-10 days until resolves      Facility-Administered Medications: None       Physical Exam  Temp:  [36.1 °C (97 °F)] 36.1 °C (97 °F)  Pulse:  [100-109] 100  Resp:  [22] 22  BP: (110-131)/(69-93) 110/69  SpO2:  [97 %-100 %] 100 %  Blood Pressure: 110/69   Temperature: 36.1 °C (97 °F)   Pulse: 100   Respiration: (!) 22   Pulse Oximetry: 100 %       Physical Exam  Vitals and nursing note reviewed.   Constitutional:       General: She is not in acute distress.  HENT:      Head: Normocephalic and atraumatic.      Nose: Nose normal. No congestion.      Mouth/Throat:      Mouth: Mucous membranes are dry.      Pharynx: No oropharyngeal exudate.   Eyes:      General: No scleral icterus.     Extraocular Movements: Extraocular movements intact.   Cardiovascular:      Rate and Rhythm: Regular rhythm. Tachycardia present.      Pulses: Normal pulses.      Heart sounds:      No friction rub.   Pulmonary:      Effort: No respiratory distress.      Breath sounds: Rales present. No wheezing.   Chest:      Chest wall: No tenderness.   Abdominal:      General: There is  "distension.      Tenderness: There is no abdominal tenderness. There is no guarding or rebound.   Musculoskeletal:         General: No tenderness. Normal range of motion.      Cervical back: Neck supple. No tenderness.   Skin:     General: Skin is warm and dry.      Capillary Refill: Capillary refill takes less than 2 seconds.   Neurological:      General: No focal deficit present.      Mental Status: She is alert and oriented to person, place, and time.   Psychiatric:      Comments: Pressured speech  Anxious         Laboratory:  Recent Labs     08/20/23  1533   WBC 13.0*   RBC 4.92   HEMOGLOBIN 13.6   HEMATOCRIT 39.9   MCV 81.1*   MCH 27.6   MCHC 34.1   RDW 38.0   PLATELETCT 375   MPV 11.2     Recent Labs     08/20/23  1533   SODIUM 141   POTASSIUM 2.7*   CHLORIDE 103   CO2 21   GLUCOSE 161*   BUN 10   CREATININE 0.68   CALCIUM 9.7     Recent Labs     08/20/23  1533   ALTSGPT 11   ASTSGOT 16   ALKPHOSPHAT 111*   TBILIRUBIN 0.9   GLUCOSE 161*         No results for input(s): \"NTPROBNP\" in the last 72 hours.      Recent Labs     08/20/23  1533   TROPONINT <6       Imaging:  DX-CHEST-LIMITED (1 VIEW)    (Results Pending)       X-Ray:  I have personally reviewed the images and compared with prior images.  EKG:  I have personally reviewed the images and compared with prior images.    Assessment/Plan:  Justification for Admission Status  I anticipate this patient will require at least 2 midnights of hospitalization, therefore appropriate for inpatient status.      * Suicide attempt (HCC)  Assessment & Plan  Reported taking an excess of 15 tablets of bupropion 150 mg  Admitted to taking with intention to kill herself  Endorses SI/HI, auditory hallucination  Under legal hold  Psych eval    Intentional drug overdose, initial encounter ([facility])- (present on admission)  Assessment & Plan  Suicide attempt by Bupropion drug overdose  QT prolongation -- avoid agents with AE for QT prolongation  Check electrolytes levels, " Tylenol  IVF  Detox bag  Seizure precautions, aspiration precautions    QT prolongation  Assessment & Plan  Due to excess ingestion / drug overdose of bupropion  Avoid agents with AE known to cause QT prolongation    Postpartum depression  Assessment & Plan  Her third child delivered in 02/2023  Oldest child 8yo  Was on bupropion -- hold for now given drug overdose    Defer to psych    Intractable nausea and vomiting  Assessment & Plan  IVF  CLD, advance diet as tolerated  IV protonix  No zofran due to QT, PRN ativan    Fatty liver- (present on admission)  Assessment & Plan  Per history  outpt f/u    Leukocytosis  Assessment & Plan  Likely reactive  Check CXR, UA    Hyperglycemia  Assessment & Plan  Check HbA1c    Hypomagnesemia  Assessment & Plan  Replace    Hypokalemia  Assessment & Plan  K 2.7 -- replace PO + IV  Replace Mg        VTE prophylaxis: SCDs/TEDs

## 2023-08-20 NOTE — ED PROVIDER NOTES
"ED Provider Note    Primary care provider: Margareth Villegas M.D.    CHIEF COMPLAINT  Chief Complaint   Patient presents with    Suicidal Ideation     Pt took approx 15 of her 150mg tabs of buproprion 1 hour ago. Pt has pressured speech, is tangential & tearful. States she has had SI, \"I had my baby 5 months ago and I think I've got postpartum depression, my OB knows\". States she has also been having hallucinations.     Drug Ingestion     Pt nauseous, dry heaving. Denies any other drug ingestion or ETOH.     HPI  Kristal Cesar is a 28 y.o. female who presents to the Emergency Department for intentional overdose of bupropion.  The patient took about 15 tablets of 150 mg bupropion 90 minutes prior to arrival.  She states she does not know why she did it but felt a lot of anxiety and depression at the time.  She is asking for her stomach to be pumped.  She is tearful, anxious and difficult to gather any other meaningful history at this time.      External Record Review: Most recently was admitted for , uncomplicated  course and discharged in 2023.    REVIEW OF SYSTEMS  See HPI.     PAST MEDICAL HISTORY   has a past medical history of Allergy, Anxiety, Depression, and Obesity (BMI 30-39.9).    SURGICAL HISTORY   has a past surgical history that includes ariana by laparoscopy (3/7/2016); other; and dilation and curettage (N/A, 2022).    SOCIAL HISTORY  Social History     Tobacco Use    Smoking status: Never    Smokeless tobacco: Never   Substance Use Topics    Alcohol use: No    Drug use: No      Social History     Substance and Sexual Activity   Drug Use No       FAMILY HISTORY  Family History   Problem Relation Age of Onset    No Known Problems Mother     Diabetes Father     Hypertension Father     Hyperlipidemia Father     No Known Problems Sister     Cancer Neg Hx        CURRENT MEDICATIONS  Reviewed.  See Encounter Summary.     ALLERGIES  Allergies   Allergen Reactions    " "Other Environmental Rash     \"dogs\" - \"my skin turns red\"       PHYSICAL EXAM  VITAL SIGNS: /58   Pulse (!) 110   Temp 36.1 °C (97 °F) (Temporal)   Resp 16   Ht 1.6 m (5' 3\")   Wt 55 kg (121 lb 4.1 oz)   LMP  (LMP Unknown)   SpO2 97%   Breastfeeding No   BMI 21.48 kg/m²   Constitutional: Awake, alert, tearful, anxious  HENT: Normocephalic, Bilateral external ears normal. Nose normal.   Eyes: Conjunctiva normal, non-icteric, EOMI.    Thorax & Lungs: Easy unlabored respirations, Clear to ascultation bilaterally.  Cardiovascular: Tachycardic, No murmurs, rubs or gallops. Bilateral pulses symmetrical.   Abdomen:  Soft, nontender, nondistended, normal active bowel sounds.   :    Skin: Visualized skin is  Dry, No erythema, No rash.   Musculoskeletal:   No cyanosis, clubbing or edema. No leg asymmetry.   Neurologic: Alert, Grossly non-focal.   Psychiatric: Normal affect, Normal mood  Lymphatic:      EKG   12 lead Interpreted by me  Rhythm: Sinus tachycardia  Rate: 107  Axis: normal  Ectopy: none  Conduction: QTc 507  ST Segments: no acute change  T Waves: no acute change  Clinical Impression: Prolonged QTc new compared to prior, otherwise unremarkable EKG, sinus tach.      RADIOLOGY  I have independently interpreted the diagnostic imaging associated with this visit and am waiting the final reading from the radiologist.   My preliminary interpretation is as follows: No infiltrate    Radiologist interpretation:   DX-CHEST-LIMITED (1 VIEW)   Final Result      No acute cardiopulmonary abnormality.          COURSE & MEDICAL DECISION MAKING  Pertinent Labs & Imaging studies reviewed. (See chart for details)    COURSE & MEDICAL DECISION MAKING  Pertinent Labs & Imaging studies reviewed. (See chart for details)    Differential diagnoses include but are not limited to: Symptomatic bupropion overdose.    3:35 PM - Nursing notes reviewed, patient seen and examined at bedside.    4:29 PM: Patient with prolonged QTc, " will give 2 mg of IV magnesium.  Also ordered IV Ativan for anxiety and seizure prevention.  Will discuss with hospitalist versus intensivist.    Discussion of management with other medical personnel: Poison control, recommendations are in the chart, also discussed with Dr. Patel, hospitalist will evaluate the patient for admission.      Decision tools and prescription drugs considered including, but not limited to: Heart score 1    Decision Making:  This is a pleasant 28 y.o. year old female who presents with suicidal ideation and a potentially fatal overdose of bupropion.  The patient took over 2 g, she is tachycardic, hypertensive and also was developing QTc prolongation.  I initially plan to do activated charcoal as she is still within a 12-hour window for this.  She proceeded to vomit this back up, therefore further charcoal contraindicated as to prevent aspiration of materials.  I discussed the case with poison control, they would not recommend charcoal at this time either.  The patient received 2 g of magnesium here for QTc prolongation as well as Ativan 2 help with anxiety and prevent seizures.  The patient was placed on legal hold.  She will be admitted for close observation to the telemetry service, consider escalation to ICU if needed.    CRITICAL CARE  The very real possibilty of a deterioration of this patient's condition required the highest level of my preparedness for sudden, emergent intervention.  I provided critical care services, which included medication orders, frequent reevaluations of the patient's condition and response to treatment, ordering and reviewing test results, and discussing the case with various consultants.  The critical care time associated with the care of the patient was already minutes. Review chart for interventions. This time is exclusive of any other billable procedures.         FINAL IMPRESSION  1. Bupropion overdose, intentional self-harm, initial encounter ([facility])    2.  Prolonged Q-T interval on ECG

## 2023-08-20 NOTE — ED NOTES
Poison Control Contacted. Advised to observe for Qtc prolongation - if QT >500ms, give 1-3 grams magnesium. 12 hour observation. Charcoal contraindicated due to risk of somnolence/seizures. Also recommend 4 hour tylenol level in case of ingestion.  Watch for increasing tachycardia for seizures - usually self-limited. Tachycardia & HTN common with buproprion ingestion. Treat agitation & seizures with benzos.     Case # [id number].

## 2023-08-20 NOTE — ED TRIAGE NOTES
"Kristal Tigist Mansi Cesar.  28 y.o.  female.  Chief Complaint   Patient presents with    Suicidal Ideation     Pt took approx 15 of her 150mg tabs of buproprion 1 hour ago. Pt has pressured speech, is tangential & tearful. States she has had SI, \"I had my baby 5 months ago and I think I've got postpartum depression, my OB knows\". States she has also been having hallucinations.     Drug Ingestion     Pt nauseous, dry heaving. Denies any other drug ingestion or ETOH.       Pt placed on Legal Hold, brought to room 34 & changed into hospital clothing. Has 1:1 sitter - ED Tech. On cardiac monitor, EKG in progress. ERP to see.    "

## 2023-08-21 LAB
ALBUMIN SERPL BCP-MCNC: 4.2 G/DL (ref 3.2–4.9)
ALBUMIN SERPL BCP-MCNC: 4.4 G/DL (ref 3.2–4.9)
ALBUMIN/GLOB SERPL: 1.6 G/DL
ALBUMIN/GLOB SERPL: 1.6 G/DL
ALP SERPL-CCNC: 94 U/L (ref 30–99)
ALP SERPL-CCNC: 99 U/L (ref 30–99)
ALT SERPL-CCNC: 10 U/L (ref 2–50)
ALT SERPL-CCNC: 9 U/L (ref 2–50)
AMPHET UR QL SCN: NEGATIVE
ANION GAP SERPL CALC-SCNC: 13 MMOL/L (ref 7–16)
ANION GAP SERPL CALC-SCNC: 14 MMOL/L (ref 7–16)
APAP SERPL-MCNC: <5 UG/ML (ref 10–30)
AST SERPL-CCNC: 15 U/L (ref 12–45)
AST SERPL-CCNC: 17 U/L (ref 12–45)
BARBITURATES UR QL SCN: NEGATIVE
BENZODIAZ UR QL SCN: POSITIVE
BILIRUB SERPL-MCNC: 1 MG/DL (ref 0.1–1.5)
BILIRUB SERPL-MCNC: 1 MG/DL (ref 0.1–1.5)
BUN SERPL-MCNC: 8 MG/DL (ref 8–22)
BUN SERPL-MCNC: 8 MG/DL (ref 8–22)
BZE UR QL SCN: NEGATIVE
CALCIUM ALBUM COR SERPL-MCNC: 9 MG/DL (ref 8.5–10.5)
CALCIUM ALBUM COR SERPL-MCNC: 9 MG/DL (ref 8.5–10.5)
CALCIUM SERPL-MCNC: 9.2 MG/DL (ref 8.5–10.5)
CALCIUM SERPL-MCNC: 9.3 MG/DL (ref 8.5–10.5)
CANNABINOIDS UR QL SCN: POSITIVE
CHLORIDE SERPL-SCNC: 105 MMOL/L (ref 96–112)
CHLORIDE SERPL-SCNC: 106 MMOL/L (ref 96–112)
CO2 SERPL-SCNC: 24 MMOL/L (ref 20–33)
CO2 SERPL-SCNC: 24 MMOL/L (ref 20–33)
CREAT SERPL-MCNC: 0.48 MG/DL (ref 0.5–1.4)
CREAT SERPL-MCNC: 0.49 MG/DL (ref 0.5–1.4)
CREAT UR-MCNC: 132.73 MG/DL
FENTANYL UR QL: NEGATIVE
GFR SERPLBLD CREATININE-BSD FMLA CKD-EPI: 132 ML/MIN/1.73 M 2
GFR SERPLBLD CREATININE-BSD FMLA CKD-EPI: 133 ML/MIN/1.73 M 2
GLOBULIN SER CALC-MCNC: 2.6 G/DL (ref 1.9–3.5)
GLOBULIN SER CALC-MCNC: 2.8 G/DL (ref 1.9–3.5)
GLUCOSE SERPL-MCNC: 75 MG/DL (ref 65–99)
GLUCOSE SERPL-MCNC: 93 MG/DL (ref 65–99)
HCG UR QL: NEGATIVE
MAGNESIUM SERPL-MCNC: 2.6 MG/DL (ref 1.5–2.5)
METHADONE UR QL SCN: NEGATIVE
OPIATES UR QL SCN: NEGATIVE
OXYCODONE UR QL SCN: NEGATIVE
PCP UR QL SCN: NEGATIVE
POTASSIUM SERPL-SCNC: 3.3 MMOL/L (ref 3.6–5.5)
POTASSIUM SERPL-SCNC: 3.4 MMOL/L (ref 3.6–5.5)
PROPOXYPH UR QL SCN: NEGATIVE
PROT SERPL-MCNC: 6.8 G/DL (ref 6–8.2)
PROT SERPL-MCNC: 7.2 G/DL (ref 6–8.2)
SODIUM SERPL-SCNC: 143 MMOL/L (ref 135–145)
SODIUM SERPL-SCNC: 143 MMOL/L (ref 135–145)
SODIUM UR-SCNC: 161 MMOL/L

## 2023-08-21 PROCEDURE — 700102 HCHG RX REV CODE 250 W/ 637 OVERRIDE(OP): Performed by: STUDENT IN AN ORGANIZED HEALTH CARE EDUCATION/TRAINING PROGRAM

## 2023-08-21 PROCEDURE — 770020 HCHG ROOM/CARE - TELE (206)

## 2023-08-21 PROCEDURE — 83735 ASSAY OF MAGNESIUM: CPT

## 2023-08-21 PROCEDURE — 99233 SBSQ HOSP IP/OBS HIGH 50: CPT | Performed by: INTERNAL MEDICINE

## 2023-08-21 PROCEDURE — 80307 DRUG TEST PRSMV CHEM ANLYZR: CPT

## 2023-08-21 PROCEDURE — 80143 DRUG ASSAY ACETAMINOPHEN: CPT

## 2023-08-21 PROCEDURE — A9270 NON-COVERED ITEM OR SERVICE: HCPCS | Performed by: STUDENT IN AN ORGANIZED HEALTH CARE EDUCATION/TRAINING PROGRAM

## 2023-08-21 PROCEDURE — 96367 TX/PROPH/DG ADDL SEQ IV INF: CPT

## 2023-08-21 PROCEDURE — 700101 HCHG RX REV CODE 250: Performed by: STUDENT IN AN ORGANIZED HEALTH CARE EDUCATION/TRAINING PROGRAM

## 2023-08-21 PROCEDURE — 81025 URINE PREGNANCY TEST: CPT

## 2023-08-21 PROCEDURE — 84300 ASSAY OF URINE SODIUM: CPT

## 2023-08-21 PROCEDURE — 36415 COLL VENOUS BLD VENIPUNCTURE: CPT

## 2023-08-21 PROCEDURE — 80053 COMPREHEN METABOLIC PANEL: CPT

## 2023-08-21 PROCEDURE — 700111 HCHG RX REV CODE 636 W/ 250 OVERRIDE (IP): Performed by: STUDENT IN AN ORGANIZED HEALTH CARE EDUCATION/TRAINING PROGRAM

## 2023-08-21 PROCEDURE — 99255 IP/OBS CONSLTJ NEW/EST HI 80: CPT | Mod: GC | Performed by: PSYCHIATRY & NEUROLOGY

## 2023-08-21 PROCEDURE — 82570 ASSAY OF URINE CREATININE: CPT

## 2023-08-21 PROCEDURE — C9113 INJ PANTOPRAZOLE SODIUM, VIA: HCPCS | Performed by: STUDENT IN AN ORGANIZED HEALTH CARE EDUCATION/TRAINING PROGRAM

## 2023-08-21 PROCEDURE — 96366 THER/PROPH/DIAG IV INF ADDON: CPT

## 2023-08-21 RX ADMIN — POTASSIUM CHLORIDE AND SODIUM CHLORIDE: 900; 300 INJECTION, SOLUTION INTRAVENOUS at 01:36

## 2023-08-21 RX ADMIN — DIAZEPAM 5 MG: 5 TABLET ORAL at 18:03

## 2023-08-21 RX ADMIN — DIAZEPAM 10 MG: 5 TABLET ORAL at 11:40

## 2023-08-21 RX ADMIN — SENNOSIDES AND DOCUSATE SODIUM 2 TABLET: 50; 8.6 TABLET ORAL at 18:02

## 2023-08-21 RX ADMIN — DIAZEPAM 10 MG: 5 TABLET ORAL at 01:03

## 2023-08-21 RX ADMIN — PANTOPRAZOLE SODIUM 40 MG: 40 INJECTION, POWDER, FOR SOLUTION INTRAVENOUS at 05:58

## 2023-08-21 RX ADMIN — DIAZEPAM 5 MG: 5 TABLET ORAL at 22:55

## 2023-08-21 RX ADMIN — POTASSIUM CHLORIDE AND SODIUM CHLORIDE: 900; 300 INJECTION, SOLUTION INTRAVENOUS at 22:56

## 2023-08-21 RX ADMIN — DIAZEPAM 10 MG: 5 TABLET ORAL at 05:58

## 2023-08-21 RX ADMIN — LORAZEPAM 1.5 MG: 2 INJECTION INTRAMUSCULAR; INTRAVENOUS at 18:46

## 2023-08-21 RX ADMIN — POTASSIUM CHLORIDE 10 MEQ: 7.46 INJECTION, SOLUTION INTRAVENOUS at 00:13

## 2023-08-21 RX ADMIN — PANTOPRAZOLE SODIUM 40 MG: 40 INJECTION, POWDER, FOR SOLUTION INTRAVENOUS at 18:03

## 2023-08-21 RX ADMIN — ACETAMINOPHEN 650 MG: 325 TABLET, FILM COATED ORAL at 11:40

## 2023-08-21 RX ADMIN — ACETAMINOPHEN 650 MG: 325 TABLET, FILM COATED ORAL at 22:58

## 2023-08-21 RX ADMIN — PROCHLORPERAZINE EDISYLATE 5 MG: 5 INJECTION, SOLUTION INTRAMUSCULAR; INTRAVENOUS at 08:13

## 2023-08-21 RX ADMIN — LORAZEPAM 1.5 MG: 2 INJECTION INTRAMUSCULAR; INTRAVENOUS at 11:38

## 2023-08-21 ASSESSMENT — LIFESTYLE VARIABLES
HAVE PEOPLE ANNOYED YOU BY CRITICIZING YOUR DRINKING: NO
TOTAL SCORE: 16
NAUSEA AND VOMITING: *
SUBSTANCE_ABUSE: 0
ORIENTATION AND CLOUDING OF SENSORIUM: ORIENTED AND CAN DO SERIAL ADDITIONS
HAVE YOU EVER FELT YOU SHOULD CUT DOWN ON YOUR DRINKING: NO
HEADACHE, FULLNESS IN HEAD: MODERATE
VISUAL DISTURBANCES: NOT PRESENT
ANXIETY: MODERATELY ANXIOUS OR GUARDED, SO ANXIETY IS INFERRED
PAROXYSMAL SWEATS: BARELY PERCEPTIBLE SWEATING. PALMS MOIST
EVER HAD A DRINK FIRST THING IN THE MORNING TO STEADY YOUR NERVES TO GET RID OF A HANGOVER: NO
ORIENTATION AND CLOUDING OF SENSORIUM: ORIENTED AND CAN DO SERIAL ADDITIONS
VISUAL DISTURBANCES: VERY MILD SENSITIVITY
TREMOR: NO TREMOR
VISUAL DISTURBANCES: NOT PRESENT
AUDITORY DISTURBANCES: NOT PRESENT
DOES PATIENT WANT TO STOP DRINKING: NO
TOTAL SCORE: 11
ANXIETY: *
AUDITORY DISTURBANCES: NOT PRESENT
VISUAL DISTURBANCES: VERY MILD SENSITIVITY
AVERAGE NUMBER OF DAYS PER WEEK YOU HAVE A DRINK CONTAINING ALCOHOL: 0
TOTAL SCORE: 9
AUDITORY DISTURBANCES: NOT PRESENT
ORIENTATION AND CLOUDING OF SENSORIUM: ORIENTED AND CAN DO SERIAL ADDITIONS
AGITATION: *
NAUSEA AND VOMITING: *
HEADACHE, FULLNESS IN HEAD: MODERATE
ORIENTATION AND CLOUDING OF SENSORIUM: ORIENTED AND CAN DO SERIAL ADDITIONS
AVERAGE NUMBER OF DAYS PER WEEK YOU HAVE A DRINK CONTAINING ALCOHOL: 0
HEADACHE, FULLNESS IN HEAD: MODERATE
HOW MANY TIMES IN THE PAST YEAR HAVE YOU HAD 5 OR MORE DRINKS IN A DAY: 0
AGITATION: SOMEWHAT MORE THAN NORMAL ACTIVITY
TREMOR: NO TREMOR
TREMOR: NO TREMOR
ANXIETY: *
AGITATION: NORMAL ACTIVITY
NAUSEA AND VOMITING: *
AGITATION: MODERATELY FIDGETY AND RESTLESS
PAROXYSMAL SWEATS: BARELY PERCEPTIBLE SWEATING. PALMS MOIST
HEADACHE, FULLNESS IN HEAD: MODERATE
CONSUMPTION TOTAL: INCOMPLETE
ON A TYPICAL DAY WHEN YOU DRINK ALCOHOL HOW MANY DRINKS DO YOU HAVE: 0
TOTAL SCORE: VERY MILD ITCHING, PINS AND NEEDLES SENSATION, BURNING OR NUMBNESS
NAUSEA AND VOMITING: *
PAROXYSMAL SWEATS: BARELY PERCEPTIBLE SWEATING. PALMS MOIST
CONSUMPTION TOTAL: INCOMPLETE
AUDITORY DISTURBANCES: NOT PRESENT
ALCOHOL_USE: NO
TOTAL SCORE: 19
PAROXYSMAL SWEATS: BARELY PERCEPTIBLE SWEATING. PALMS MOIST
EVER FELT BAD OR GUILTY ABOUT YOUR DRINKING: NO
TREMOR: NO TREMOR
ANXIETY: *

## 2023-08-21 ASSESSMENT — PATIENT HEALTH QUESTIONNAIRE - PHQ9
9. THOUGHTS THAT YOU WOULD BE BETTER OFF DEAD, OR OF HURTING YOURSELF: NEARLY EVERY DAY
4. FEELING TIRED OR HAVING LITTLE ENERGY: NEARLY EVERY DAY
5. POOR APPETITE OR OVEREATING: NEARLY EVERY DAY
3. TROUBLE FALLING OR STAYING ASLEEP OR SLEEPING TOO MUCH: NEARLY EVERY DAY
4. FEELING TIRED OR HAVING LITTLE ENERGY: NEARLY EVERY DAY
6. FEELING BAD ABOUT YOURSELF - OR THAT YOU ARE A FAILURE OR HAVE LET YOURSELF OR YOUR FAMILY DOWN: NEARLY EVERY DAY
9. THOUGHTS THAT YOU WOULD BE BETTER OFF DEAD, OR OF HURTING YOURSELF: SEVERAL DAYS
1. LITTLE INTEREST OR PLEASURE IN DOING THINGS: SEVERAL DAYS
8. MOVING OR SPEAKING SO SLOWLY THAT OTHER PEOPLE COULD HAVE NOTICED. OR THE OPPOSITE, BEING SO FIGETY OR RESTLESS THAT YOU HAVE BEEN MOVING AROUND A LOT MORE THAN USUAL: NOT AT ALL
SUM OF ALL RESPONSES TO PHQ QUESTIONS 1-9: 20
1. LITTLE INTEREST OR PLEASURE IN DOING THINGS: SEVERAL DAYS
SUM OF ALL RESPONSES TO PHQ9 QUESTIONS 1 AND 2: 3
5. POOR APPETITE OR OVEREATING: NEARLY EVERY DAY
SUM OF ALL RESPONSES TO PHQ9 QUESTIONS 1 AND 2: 4
8. MOVING OR SPEAKING SO SLOWLY THAT OTHER PEOPLE COULD HAVE NOTICED. OR THE OPPOSITE, BEING SO FIGETY OR RESTLESS THAT YOU HAVE BEEN MOVING AROUND A LOT MORE THAN USUAL: NOT AT ALL
7. TROUBLE CONCENTRATING ON THINGS, SUCH AS READING THE NEWSPAPER OR WATCHING TELEVISION: NEARLY EVERY DAY
6. FEELING BAD ABOUT YOURSELF - OR THAT YOU ARE A FAILURE OR HAVE LET YOURSELF OR YOUR FAMILY DOWN: NEARLY EVERY DAY
SUM OF ALL RESPONSES TO PHQ9 QUESTIONS 1 AND 2: 1
2. FEELING DOWN, DEPRESSED, IRRITABLE, OR HOPELESS: NEARLY EVERY DAY
7. TROUBLE CONCENTRATING ON THINGS, SUCH AS READING THE NEWSPAPER OR WATCHING TELEVISION: NEARLY EVERY DAY
2. FEELING DOWN, DEPRESSED, IRRITABLE, OR HOPELESS: NEARLY EVERY DAY
3. TROUBLE FALLING OR STAYING ASLEEP OR SLEEPING TOO MUCH: NEARLY EVERY DAY

## 2023-08-21 ASSESSMENT — COGNITIVE AND FUNCTIONAL STATUS - GENERAL
WALKING IN HOSPITAL ROOM: A LITTLE
DAILY ACTIVITIY SCORE: 24
SUGGESTED CMS G CODE MODIFIER DAILY ACTIVITY: CH
CLIMB 3 TO 5 STEPS WITH RAILING: A LITTLE
MOBILITY SCORE: 22
SUGGESTED CMS G CODE MODIFIER MOBILITY: CJ

## 2023-08-21 ASSESSMENT — ENCOUNTER SYMPTOMS
NERVOUS/ANXIOUS: 1
CONSTIPATION: 0
PALPITATIONS: 1
COUGH: 0
DIARRHEA: 0
SHORTNESS OF BREATH: 0
DIZZINESS: 0
INSOMNIA: 1
MYALGIAS: 0
BLURRED VISION: 0
FEVER: 0
TINGLING: 0
HEMOPTYSIS: 0
DOUBLE VISION: 0
VOMITING: 1
ABDOMINAL PAIN: 0
DEPRESSION: 1
MEMORY LOSS: 0
NAUSEA: 1
CHILLS: 0
INSOMNIA: 0
HALLUCINATIONS: 1
HALLUCINATIONS: 0
HEADACHES: 0
BRUISES/BLEEDS EASILY: 0
HEARTBURN: 1
EYES NEGATIVE: 1

## 2023-08-21 ASSESSMENT — FIBROSIS 4 INDEX: FIB4 SCORE: 0.37

## 2023-08-21 ASSESSMENT — PAIN DESCRIPTION - PAIN TYPE
TYPE: ACUTE PAIN
TYPE: ACUTE PAIN

## 2023-08-21 NOTE — ASSESSMENT & PLAN NOTE
Due to excess ingestion / drug overdose of bupropion  Avoid agents with AE known to cause QT prolongation

## 2023-08-21 NOTE — ED NOTES
Patient is resting comfortably, asleep. Breathing steady and unlabored with 1:1 safety sitter outside of room for observation and safety.

## 2023-08-21 NOTE — ED NOTES
Break RN- PT medicated per MAR. Potassium infusion slowed down due to PT expression of discomfort.

## 2023-08-21 NOTE — DISCHARGE PLANNING
Alert Team:    Spoke with patient at bedside; tearful requesting to speak with family regarding scheduled appointments that need to be cancelled for her children. With patient permission, called patient's father (Ta Nazario [phone]) and allowed pt to speak with father over the phone after updating family of POC. IP Psychiatry consult ordered. Informed and educated pt on legal hold process.

## 2023-08-21 NOTE — ED NOTES
Pt urinated on self, was unable to get a urine sample. Changed linen and gown, provided pt with hospital underwear and pad due to post partum bleeding. During changing pt was unstable, fall precautions initiated.

## 2023-08-21 NOTE — ED NOTES
Med  rec updated and complete. Allergies reviewed. Confirmed name and date of birth.pt able to partipcate in an interview.    Home pharmacy  [facility]  [phone]

## 2023-08-21 NOTE — ASSESSMENT & PLAN NOTE
Suicide attempt with  Bupropion drug overdose  QT prolongation -- avoid agents with AE for QT prolongation  Cont on IV fluid, psychaitry has been consulted appreciate rec.  On legal hold

## 2023-08-21 NOTE — PROGRESS NOTES
"Hospital Medicine Daily Progress Note    Date of Service  8/21/2023    Chief Complaint  Kristal Cesar is a 28 y.o. female admitted 8/20/2023 with SI and drug ingestion     Hospital Course  This is a 29 y/o F with with history of postpartum depression who presented 8/20/2023 with evaluation for suicide attempt.  Patient stated she has 3 children, oldest child is 7 years old, youngest child was just born in February 2023.  She reported difficulty caring for children, not in good term with .  She stated her  \"packed and left her to live in another apartment\"  Patient stated she took several tablets of 150 mg Wellbutrin with intention to end her life.  Denies illicit drug use, alcohol abuse.  Poison control has been contacted, appreciate recommendations.  Patient placed on legal hold and psychiatry was consulted     Interval Problem Update  Patient seen and examined, feels anxious, having nausea and vomiting  Cont on IV fluid and antiemetics,  Psychiatry consulted appreciate rec   On legal hold  Hypokalemia: potassium of 3.3 repleteing       I have discussed this patient's plan of care and discharge plan at IDT rounds today with Case Management, Nursing, Nursing leadership, and other members of the IDT team.    Consultants/Specialty  psychiatry    Code Status  Full Code    Disposition  The patient is not medically cleared for discharge to home or a post-acute facility.      I have placed the appropriate orders for post-discharge needs.    Review of Systems  Review of Systems   Constitutional:  Negative for chills and fever.   HENT:  Negative for hearing loss and tinnitus.    Eyes:  Negative for blurred vision and double vision.   Respiratory:  Negative for cough, hemoptysis and shortness of breath.    Cardiovascular:  Positive for palpitations. Negative for chest pain and leg swelling.   Gastrointestinal:  Positive for heartburn, nausea and vomiting. Negative for abdominal pain. "   Genitourinary:  Negative for dysuria and hematuria.   Musculoskeletal:  Negative for joint pain and myalgias.   Skin:  Negative for itching and rash.   Neurological:  Negative for dizziness and headaches.   Endo/Heme/Allergies:  Negative for environmental allergies. Does not bruise/bleed easily.   Psychiatric/Behavioral:  Positive for depression, hallucinations and suicidal ideas. Negative for memory loss and substance abuse. The patient is nervous/anxious and has insomnia.    All other systems reviewed and are negative.       Physical Exam  Temp:  [36.1 °C (97 °F)-37 °C (98.6 °F)] 36.5 °C (97.7 °F)  Pulse:  [] 108  Resp:  [16-24] 18  BP: (109-131)/(58-93) 116/81  SpO2:  [92 %-100 %] 93 %    Physical Exam  Vitals and nursing note reviewed.   Constitutional:       General: She is not in acute distress.  HENT:      Head: Normocephalic and atraumatic.      Nose: Nose normal. No congestion.      Mouth/Throat:      Mouth: Mucous membranes are dry.      Pharynx: No oropharyngeal exudate.   Eyes:      General: No scleral icterus.     Extraocular Movements: Extraocular movements intact.   Cardiovascular:      Rate and Rhythm: Regular rhythm. Tachycardia present.      Pulses: Normal pulses.      Heart sounds:      No friction rub.   Pulmonary:      Effort: No respiratory distress.      Breath sounds: Rales present. No wheezing.   Chest:      Chest wall: No tenderness.   Abdominal:      General: There is distension.      Tenderness: There is no abdominal tenderness. There is no guarding or rebound.   Musculoskeletal:         General: No tenderness. Normal range of motion.      Cervical back: Neck supple. No tenderness.   Skin:     General: Skin is warm and dry.      Capillary Refill: Capillary refill takes less than 2 seconds.   Neurological:      General: No focal deficit present.      Mental Status: She is alert and oriented to person, place, and time.   Psychiatric:      Comments: Pressured speech  Anxious          Fluids    Intake/Output Summary (Last 24 hours) at 8/21/2023 1158  Last data filed at 8/21/2023 0600  Gross per 24 hour   Intake 360 ml   Output --   Net 360 ml       Laboratory  Recent Labs     08/20/23  1533   WBC 13.0*   RBC 4.92   HEMOGLOBIN 13.6   HEMATOCRIT 39.9   MCV 81.1*   MCH 27.6   MCHC 34.1   RDW 38.0   PLATELETCT 375   MPV 11.2     Recent Labs     08/20/23  2045 08/21/23  0102 08/21/23  0412   SODIUM 141 143 143   POTASSIUM 2.9* 3.4* 3.3*   CHLORIDE 104 105 106   CO2 24 24 24   GLUCOSE 199* 75 93   BUN 9 8 8   CREATININE 0.57 0.49* 0.48*   CALCIUM 8.7 9.3 9.2                   Imaging  DX-CHEST-LIMITED (1 VIEW)   Final Result      No acute cardiopulmonary abnormality.           Assessment/Plan  * Suicide attempt (HCC)  Assessment & Plan  Reported taking an excess of 15 tablets of bupropion 150 mg with intention of harming her self  On legal hold psychiatry consulted appreciate rec       QT prolongation  Assessment & Plan  Due to excess ingestion / drug overdose of bupropion  Avoid agents with AE known to cause QT prolongation    Leukocytosis  Assessment & Plan  Likely reactive  Check CXR, UA    Hyperglycemia  Assessment & Plan  Check HbA1c    Hypomagnesemia  Assessment & Plan  Replace    Hypokalemia  Assessment & Plan  Potassium of 3.3 replete       Postpartum depression  Assessment & Plan  Her third child delivered in 02/2023  Oldest child 8yo  Was on bupropion -- hold for now given drug overdose    Defer to psych    Intractable nausea and vomiting  Assessment & Plan  IVF  CLD, advance diet as tolerated  Cont on PPI and and antiemetics     Intentional drug overdose, initial encounter (HCC)- (present on admission)  Assessment & Plan  Suicide attempt with  Bupropion drug overdose  QT prolongation -- avoid agents with AE for QT prolongation  Cont on IV fluid, psychaitry has been consulted appreciate rec.  On legal hold     Fatty liver- (present on admission)  Assessment & Plan  Per history  Will  need to follow up as outpatient          VTE prophylaxis:   SCDs/TEDs    Greater than 51 minutes spent prepping to see patient (e.g. review of tests) obtaining and/or reviewing separately obtained history. Performing a medically appropriate examination and/ evaluation.  Counseling and educating the patient/family/caregiver.  Ordering medications, tests, or procedures.  Referring and communicating with other health care professionals.  Documenting clinical information in EPIC.  Independently interpreting results and communicating results to patient/family/caregiver.  Care coordination.      I have performed a physical exam and reviewed and updated ROS and Plan today (8/21/2023). In review of yesterday's note (8/20/2023), there are no changes except as documented above.

## 2023-08-21 NOTE — ASSESSMENT & PLAN NOTE
Her third child delivered in 02/2023  Oldest child 6yo  Was on bupropion -- hold for now given drug overdose    Defer to psych

## 2023-08-21 NOTE — CARE PLAN
The patient is Stable - Low risk of patient condition declining or worsening         Progress made toward(s) clinical / shift goals:        Problem: Knowledge Deficit - Standard  Goal: Patient and family/care givers will demonstrate understanding of plan of care, disease process/condition, diagnostic tests and medications  Outcome: Progressing     Problem: Seizure Precautions  Goal: Implementation of seizure precautions  Outcome: Progressing     Problem: Lifestyle Changes  Goal: Patient's ability to identify lifestyle changes and available resources to help reduce recurrence of condition will improve  Outcome: Progressing     Problem: Psychosocial  Goal: Patient's level of anxiety will decrease  Outcome: Progressing     Problem: Risk for Aspiration  Goal: Patient's risk for aspiration will be absent or decrease  Outcome: Progressing     Problem: Provide Safe Environment  Goal: Suicide environmental safety, protocols, policies, and practices will be implemented  Outcome: Progressing       Patient is not progressing towards the following goals:

## 2023-08-21 NOTE — ED NOTES
Patient is resting comfortably. Breathing steady and unlabored with 1:1 safety sitter outside of room for observation and safety.

## 2023-08-21 NOTE — ASSESSMENT & PLAN NOTE
Reported taking an excess of 15 tablets of bupropion 150 mg with intention of harming her self  On legal hold psychiatry consulted appreciate rec

## 2023-08-21 NOTE — LACTATION NOTE
"Lactation Consultation:    Met with Kristal following her admission to the hospital for Bupropion overdose. She reports that she has recently been pumping her breasts twice a day for her 5 month old baby; she has been providing formula as the primary source of nutrition for her infant. Kristal has a hospital grade pump at the bedside, and reports that she pumped approximately 10 minutes ago, getting \"drops\". She denies engorgement. She demonstrates hand expression of her left breast, which appears soft, with minimal milk expression. Kristal reports that she would like to make more milk for her baby. We reviewed that lactation is based on a supply and demand system; increasing frequency of breast stimulation will assist in increasing milk quantity.    Pump settings reviewed. Patient states that she is pumping at 80cpm x 15 minutes @ 60% suction. She denies any discomfort with these settings. Encouraged Kristal to reduce suction if she experiences any discomfort.   Reviewed that recommended settings are 80CPM x 2 minutes, then 60CPM x 13 minutes; suction to comfort.    Kristal is advised to discard milk until she has discussed her recent medication ingestion with her infant's pediatrician.    She is encouraged to call for additional lactation support, as needed, during hospital stay.  "

## 2023-08-21 NOTE — CARE PLAN
The patient is Watcher - Medium risk of patient condition declining or worsening    Shift Goals  Clinical Goals: Safety, vss, tele  Patient Goals: Feel better, talk to kids  Family Goals: Updates      Problem: Knowledge Deficit - Standard  Goal: Patient and family/care givers will demonstrate understanding of plan of care, disease process/condition, diagnostic tests and medications  Outcome: Progressing  Note: Patient oriented to unit and her legal hold status. She verbalizes understanding of these precautions.      Problem: Seizure Precautions  Goal: Implementation of seizure precautions  Outcome: Progressing  Note: Side rails padded. Fall precautions in place      Problem: Psychosocial  Goal: Patient's level of anxiety will decrease  Outcome: Progressing  Flowsheets  Taken 8/21/2023 1428  Decrease Anxiety Level:   [facility]   Implemented stimuli reduction, calming techniques   Pharmacologic management per MD order   Collaborated with patient to identify and develop coping strategies  Taken 8/21/2023 0830  Patient Behaviors:   Anxious   Crying   Depressed  Note: Psych on board. Pharm management is being provided as ordered by MD.

## 2023-08-21 NOTE — DIETARY
"Nutrition services: Day 1 of admit.  Kristal Cesar is a 28 y.o. female with admitting DX of suicide attempt.    Consult received for MST score of 3 for unsure wt loss, decreased appetite, and breastfeeding.    Assessment:  Height: 160 cm (5' 3\")  Weight: 52.5 kg (115 lb 11.9 oz) taken via stand up scale on 8/21  Body mass index is 20.5 kg/m². BMI classification: normal  Diet/Intake: Clear liquid / no PO recorded yet per ADLs    Evaluation:   Given pt's current anxiety and upset as communicated by RN via Voalte, determined would not be appropriate to speak w/ pt today.  +n/v, to advance diet past CLD as tolerated.  Per chart review, recent wt hx variable r/t pregnancies in 9443-1291 and [id number]. Pt weighed 125 lb 1 year ago, however was also 14 wks pregnant at the time. Notable steady decline in pt's baseline wt over the past 8 years, but no decrease that is notable per ASPEN criteria.  Labs: potassium 3.3, creatinine 0.48, magnesium 2.6  Meds: NaCl with KCl, protonix, compazine, thiamine, multivitamin, folic acid, detox IV (completed).  LBM:  PTA    Malnutrition Risk: Unable to fully assess at this time.    Recommendations/Plan:  RD to monitor PO intake trends before determining necessity for supplementation.  RD to visit pt for interview as appropriate.  Encourage intake of >50% of meals.  Document intake of all meals as % taken in ADL's to provide interdisciplinary communication across all shifts.   Monitor weight.  Nutrition rep will continue to see patient for ongoing meal and snack preferences.       RD following  "

## 2023-08-21 NOTE — PROGRESS NOTES
.4 Eyes Skin Assessment Completed by ELKE Guardado and RACHEL Mccray    Head WDL  Ears WDL  Nose WDL  Mouth WDL  Neck WDL  Breast/Chest WDL  Shoulder Blades WDL  Spine WDL  (R) Arm/Elbow/Hand WDL  (L) Arm/Elbow/Hand WDL  Abdomen WDL  Groin WDL  Scrotum/Coccyx/Buttocks WDL  (R) Leg Bruising  (L) Leg Scab and Bruising  (R) Heel/Foot/Toe WDL  (L) Heel/Foot/Toe WDL          Devices In Places Tele Box      Interventions In Place     Possible Skin Injury No    Pictures Uploaded Into Epic N/A  Wound Consult Placed N/A  RN Wound Prevention Protocol Ordered No

## 2023-08-21 NOTE — CONSULTS
No  consult done in ED. Patient to be admitted to hospital for suicide attempt. Patient on legal hold. Will be seen by psychiatry when medically cleared.

## 2023-08-21 NOTE — ED NOTES
Bedside report received from ELKE Forte. Pt resting comfortably in bed with steady and unlabored breathing on RA with O2 available. 1:1 safety sitter outside of room for observation and safet, L2K verified by this RN.
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
## 2023-08-21 NOTE — ED NOTES
Pharmacy Note: Poison control updated for buproprion overdose    Poison control case #:[id number]    Labs:  Recent Labs     08/20/23  1533   SODIUM 141   POTASSIUM 2.7*   CHLORIDE 103   CO2 21   BUN 10   CREATININE 0.68   GLUCOSE 161*   CALCIUM 9.7   MAGNESIUM 1.7   ASTSGOT 16   ALTSGPT 11   ALBUMIN 4.8   TBILIRUBIN 0.9            Levels:   Latest Reference Range & Units 08/20/23 15:33   Acetaminophen -Tylenol 10.0 - 30.0 ug/mL <5.0 (L)   Salicylates, Quant. 15.0 - 25.0 mg/dL <1.0 (L)   (L): Data is abnormally low    Recommended Plan:  1. Observe for at least 24 hours  2. Treat seizures with benzo therapy  3. Repeat EKG x 1 after electrolyte replacement  4. Intubation if necessary for somnolence    Casey Rangel, PharmD

## 2023-08-21 NOTE — PROGRESS NOTES
Bedside report received from night RN; assumed pt care. Pt assessment complete. Pt A&Ox4. Reviewed plan of care with pt. Pt on tele. 1;1 sitter in room, no belongings in room. All unnecessary equipment has been removed. All needs met at this time. Chart and labs reviewed. Bed in lowest position, and 2 side rails up.

## 2023-08-22 LAB
ANION GAP SERPL CALC-SCNC: 10 MMOL/L (ref 7–16)
BUN SERPL-MCNC: 5 MG/DL (ref 8–22)
CALCIUM SERPL-MCNC: 8.9 MG/DL (ref 8.5–10.5)
CHLORIDE SERPL-SCNC: 107 MMOL/L (ref 96–112)
CO2 SERPL-SCNC: 22 MMOL/L (ref 20–33)
CREAT SERPL-MCNC: 0.44 MG/DL (ref 0.5–1.4)
EKG IMPRESSION: NORMAL
ERYTHROCYTE [DISTWIDTH] IN BLOOD BY AUTOMATED COUNT: 41.1 FL (ref 35.9–50)
GFR SERPLBLD CREATININE-BSD FMLA CKD-EPI: 136 ML/MIN/1.73 M 2
GLUCOSE SERPL-MCNC: 86 MG/DL (ref 65–99)
HCT VFR BLD AUTO: 34.7 % (ref 37–47)
HGB BLD-MCNC: 11.3 G/DL (ref 12–16)
MCH RBC QN AUTO: 27.5 PG (ref 27–33)
MCHC RBC AUTO-ENTMCNC: 32.6 G/DL (ref 32.2–35.5)
MCV RBC AUTO: 84.4 FL (ref 81.4–97.8)
PLATELET # BLD AUTO: 243 K/UL (ref 164–446)
PMV BLD AUTO: 11 FL (ref 9–12.9)
POTASSIUM SERPL-SCNC: 4 MMOL/L (ref 3.6–5.5)
RBC # BLD AUTO: 4.11 M/UL (ref 4.2–5.4)
SODIUM SERPL-SCNC: 139 MMOL/L (ref 135–145)
TSH SERPL DL<=0.005 MIU/L-ACNC: 0.72 UIU/ML (ref 0.38–5.33)
WBC # BLD AUTO: 7.6 K/UL (ref 4.8–10.8)

## 2023-08-22 PROCEDURE — 700101 HCHG RX REV CODE 250: Performed by: STUDENT IN AN ORGANIZED HEALTH CARE EDUCATION/TRAINING PROGRAM

## 2023-08-22 PROCEDURE — 80048 BASIC METABOLIC PNL TOTAL CA: CPT

## 2023-08-22 PROCEDURE — 85027 COMPLETE CBC AUTOMATED: CPT

## 2023-08-22 PROCEDURE — 770020 HCHG ROOM/CARE - TELE (206)

## 2023-08-22 PROCEDURE — 36415 COLL VENOUS BLD VENIPUNCTURE: CPT

## 2023-08-22 PROCEDURE — A9270 NON-COVERED ITEM OR SERVICE: HCPCS | Performed by: STUDENT IN AN ORGANIZED HEALTH CARE EDUCATION/TRAINING PROGRAM

## 2023-08-22 PROCEDURE — 700111 HCHG RX REV CODE 636 W/ 250 OVERRIDE (IP): Performed by: STUDENT IN AN ORGANIZED HEALTH CARE EDUCATION/TRAINING PROGRAM

## 2023-08-22 PROCEDURE — 93010 ELECTROCARDIOGRAM REPORT: CPT | Performed by: INTERNAL MEDICINE

## 2023-08-22 PROCEDURE — 700102 HCHG RX REV CODE 250 W/ 637 OVERRIDE(OP): Performed by: STUDENT IN AN ORGANIZED HEALTH CARE EDUCATION/TRAINING PROGRAM

## 2023-08-22 PROCEDURE — 99232 SBSQ HOSP IP/OBS MODERATE 35: CPT | Mod: GC | Performed by: PSYCHIATRY & NEUROLOGY

## 2023-08-22 PROCEDURE — 84443 ASSAY THYROID STIM HORMONE: CPT

## 2023-08-22 PROCEDURE — 99233 SBSQ HOSP IP/OBS HIGH 50: CPT | Performed by: INTERNAL MEDICINE

## 2023-08-22 PROCEDURE — C9113 INJ PANTOPRAZOLE SODIUM, VIA: HCPCS | Performed by: STUDENT IN AN ORGANIZED HEALTH CARE EDUCATION/TRAINING PROGRAM

## 2023-08-22 PROCEDURE — 93005 ELECTROCARDIOGRAM TRACING: CPT | Performed by: INTERNAL MEDICINE

## 2023-08-22 RX ADMIN — PANTOPRAZOLE SODIUM 40 MG: 40 INJECTION, POWDER, FOR SOLUTION INTRAVENOUS at 05:13

## 2023-08-22 RX ADMIN — ACETAMINOPHEN 650 MG: 325 TABLET, FILM COATED ORAL at 16:51

## 2023-08-22 RX ADMIN — LORAZEPAM 1 MG: 1 TABLET ORAL at 01:22

## 2023-08-22 RX ADMIN — DIAZEPAM 5 MG: 5 TABLET ORAL at 16:52

## 2023-08-22 RX ADMIN — PANTOPRAZOLE SODIUM 40 MG: 40 INJECTION, POWDER, FOR SOLUTION INTRAVENOUS at 16:52

## 2023-08-22 RX ADMIN — DIAZEPAM 5 MG: 5 TABLET ORAL at 22:33

## 2023-08-22 RX ADMIN — GUAIFENESIN SYRUP AND DEXTROMETHORPHAN 10 ML: 100; 10 SYRUP ORAL at 05:13

## 2023-08-22 RX ADMIN — DIAZEPAM 5 MG: 5 TABLET ORAL at 05:16

## 2023-08-22 RX ADMIN — DIAZEPAM 5 MG: 5 TABLET ORAL at 11:28

## 2023-08-22 RX ADMIN — FOLIC ACID 1 MG: 1 TABLET ORAL at 05:16

## 2023-08-22 RX ADMIN — PROCHLORPERAZINE EDISYLATE 5 MG: 5 INJECTION, SOLUTION INTRAMUSCULAR; INTRAVENOUS at 09:57

## 2023-08-22 RX ADMIN — POTASSIUM CHLORIDE AND SODIUM CHLORIDE: 900; 300 INJECTION, SOLUTION INTRAVENOUS at 05:48

## 2023-08-22 RX ADMIN — Medication 100 MG: at 05:16

## 2023-08-22 RX ADMIN — PROCHLORPERAZINE EDISYLATE 5 MG: 5 INJECTION, SOLUTION INTRAMUSCULAR; INTRAVENOUS at 05:10

## 2023-08-22 RX ADMIN — THERA TABS 1 TABLET: TAB at 05:16

## 2023-08-22 ASSESSMENT — PATIENT HEALTH QUESTIONNAIRE - PHQ9
3. TROUBLE FALLING OR STAYING ASLEEP OR SLEEPING TOO MUCH: NEARLY EVERY DAY
4. FEELING TIRED OR HAVING LITTLE ENERGY: NEARLY EVERY DAY
6. FEELING BAD ABOUT YOURSELF - OR THAT YOU ARE A FAILURE OR HAVE LET YOURSELF OR YOUR FAMILY DOWN: NEARLY EVERY DAY
9. THOUGHTS THAT YOU WOULD BE BETTER OFF DEAD, OR OF HURTING YOURSELF: SEVERAL DAYS
7. TROUBLE CONCENTRATING ON THINGS, SUCH AS READING THE NEWSPAPER OR WATCHING TELEVISION: NEARLY EVERY DAY
2. FEELING DOWN, DEPRESSED, IRRITABLE, OR HOPELESS: NEARLY EVERY DAY
2. FEELING DOWN, DEPRESSED, IRRITABLE, OR HOPELESS: NEARLY EVERY DAY
7. TROUBLE CONCENTRATING ON THINGS, SUCH AS READING THE NEWSPAPER OR WATCHING TELEVISION: NEARLY EVERY DAY
1. LITTLE INTEREST OR PLEASURE IN DOING THINGS: SEVERAL DAYS
3. TROUBLE FALLING OR STAYING ASLEEP OR SLEEPING TOO MUCH: NEARLY EVERY DAY
SUM OF ALL RESPONSES TO PHQ9 QUESTIONS 1 AND 2: 4
4. FEELING TIRED OR HAVING LITTLE ENERGY: NEARLY EVERY DAY
5. POOR APPETITE OR OVEREATING: NEARLY EVERY DAY
SUM OF ALL RESPONSES TO PHQ QUESTIONS 1-9: 20
6. FEELING BAD ABOUT YOURSELF - OR THAT YOU ARE A FAILURE OR HAVE LET YOURSELF OR YOUR FAMILY DOWN: NEARLY EVERY DAY
1. LITTLE INTEREST OR PLEASURE IN DOING THINGS: SEVERAL DAYS
SUM OF ALL RESPONSES TO PHQ9 QUESTIONS 1 AND 2: 4
9. THOUGHTS THAT YOU WOULD BE BETTER OFF DEAD, OR OF HURTING YOURSELF: SEVERAL DAYS
8. MOVING OR SPEAKING SO SLOWLY THAT OTHER PEOPLE COULD HAVE NOTICED. OR THE OPPOSITE, BEING SO FIGETY OR RESTLESS THAT YOU HAVE BEEN MOVING AROUND A LOT MORE THAN USUAL: NOT AT ALL
8. MOVING OR SPEAKING SO SLOWLY THAT OTHER PEOPLE COULD HAVE NOTICED. OR THE OPPOSITE, BEING SO FIGETY OR RESTLESS THAT YOU HAVE BEEN MOVING AROUND A LOT MORE THAN USUAL: NOT AT ALL
5. POOR APPETITE OR OVEREATING: NEARLY EVERY DAY
SUM OF ALL RESPONSES TO PHQ QUESTIONS 1-9: 20

## 2023-08-22 ASSESSMENT — FIBROSIS 4 INDEX
FIB4 SCORE: .5270462766947298886
FIB4 SCORE: .5270462766947298886

## 2023-08-22 ASSESSMENT — LIFESTYLE VARIABLES
AUDITORY DISTURBANCES: NOT PRESENT
PAROXYSMAL SWEATS: NO SWEAT VISIBLE
PAROXYSMAL SWEATS: BARELY PERCEPTIBLE SWEATING. PALMS MOIST
HEADACHE, FULLNESS IN HEAD: NOT PRESENT
TREMOR: NO TREMOR
TREMOR: NO TREMOR
PAROXYSMAL SWEATS: NO SWEAT VISIBLE
TOTAL SCORE: 2
ANXIETY: MILDLY ANXIOUS
VISUAL DISTURBANCES: NOT PRESENT
HEADACHE, FULLNESS IN HEAD: VERY MILD
VISUAL DISTURBANCES: NOT PRESENT
HEADACHE, FULLNESS IN HEAD: MODERATE
AUDITORY DISTURBANCES: NOT PRESENT
TOTAL SCORE: 3
ANXIETY: *
TOTAL SCORE: 10
VISUAL DISTURBANCES: NOT PRESENT
TREMOR: NO TREMOR
ORIENTATION AND CLOUDING OF SENSORIUM: ORIENTED AND CAN DO SERIAL ADDITIONS
HEADACHE, FULLNESS IN HEAD: MODERATE
PAROXYSMAL SWEATS: BARELY PERCEPTIBLE SWEATING. PALMS MOIST
SUBSTANCE_ABUSE: 0
TREMOR: NO TREMOR
NAUSEA AND VOMITING: MILD NAUSEA WITH NO VOMITING
AGITATION: NORMAL ACTIVITY
TOTAL SCORE: 5
VISUAL DISTURBANCES: NOT PRESENT
TREMOR: NO TREMOR
AUDITORY DISTURBANCES: NOT PRESENT
NAUSEA AND VOMITING: *
HEADACHE, FULLNESS IN HEAD: MODERATE
VISUAL DISTURBANCES: NOT PRESENT
ORIENTATION AND CLOUDING OF SENSORIUM: ORIENTED AND CAN DO SERIAL ADDITIONS
NAUSEA AND VOMITING: *
AUDITORY DISTURBANCES: NOT PRESENT
ORIENTATION AND CLOUDING OF SENSORIUM: ORIENTED AND CAN DO SERIAL ADDITIONS
TOTAL SCORE: 9
NAUSEA AND VOMITING: MILD NAUSEA WITH NO VOMITING
AGITATION: NORMAL ACTIVITY
ANXIETY: MILDLY ANXIOUS
ANXIETY: *
AGITATION: NORMAL ACTIVITY
AGITATION: NORMAL ACTIVITY
ANXIETY: MILDLY ANXIOUS
NAUSEA AND VOMITING: MILD NAUSEA WITH NO VOMITING
AGITATION: NORMAL ACTIVITY
ORIENTATION AND CLOUDING OF SENSORIUM: ORIENTED AND CAN DO SERIAL ADDITIONS
ORIENTATION AND CLOUDING OF SENSORIUM: ORIENTED AND CAN DO SERIAL ADDITIONS
AUDITORY DISTURBANCES: NOT PRESENT
PAROXYSMAL SWEATS: NO SWEAT VISIBLE

## 2023-08-22 ASSESSMENT — ENCOUNTER SYMPTOMS
MEMORY LOSS: 0
VOMITING: 1
NERVOUS/ANXIOUS: 1
DEPRESSION: 1
EYES NEGATIVE: 1
DOUBLE VISION: 0
DEPRESSION: 0
FEVER: 0
MUSCULOSKELETAL NEGATIVE: 1
HEARTBURN: 1
MYALGIAS: 0
HALLUCINATIONS: 0
HEADACHES: 0
HALLUCINATIONS: 1
HEMOPTYSIS: 0
ABDOMINAL PAIN: 0
BRUISES/BLEEDS EASILY: 0
INSOMNIA: 0
SHORTNESS OF BREATH: 0
COUGH: 0
INSOMNIA: 1
CONSTITUTIONAL NEGATIVE: 1
PALPITATIONS: 1
BLURRED VISION: 0
CHILLS: 0
NERVOUS/ANXIOUS: 0
HEADACHES: 1
DIZZINESS: 0
CONSTIPATION: 0
NAUSEA: 1

## 2023-08-22 ASSESSMENT — COPD QUESTIONNAIRES
HAVE YOU SMOKED AT LEAST 100 CIGARETTES IN YOUR ENTIRE LIFE: YES
DO YOU EVER COUGH UP ANY MUCUS OR PHLEGM?: NO/ONLY WITH OCCASIONAL COLDS OR INFECTIONS
COPD SCREENING SCORE: 2
DURING THE PAST 4 WEEKS HOW MUCH DID YOU FEEL SHORT OF BREATH: NONE/LITTLE OF THE TIME

## 2023-08-22 NOTE — CARE PLAN
The patient is Stable - Low risk of patient condition declining or worsening    Shift Goals  Clinical Goals: Safety, vss, tele  Patient Goals: Feel better, talk to kids  Family Goals: Updates    Progress made toward(s) clinical / shift goals:        Problem: Knowledge Deficit - Standard  Goal: Patient and family/care givers will demonstrate understanding of plan of care, disease process/condition, diagnostic tests and medications  Outcome: Progressing     Problem: Seizure Precautions  Goal: Implementation of seizure precautions  Outcome: Progressing     Problem: Lifestyle Changes  Goal: Patient's ability to identify lifestyle changes and available resources to help reduce recurrence of condition will improve  Outcome: Progressing     Problem: Psychosocial  Goal: Patient's level of anxiety will decrease  Outcome: Progressing     Problem: Provide Safe Environment  Goal: Suicide environmental safety, protocols, policies, and practices will be implemented  Outcome: Progressing       Patient is not progressing towards the following goals:

## 2023-08-22 NOTE — PROGRESS NOTES
"Hospital Medicine Daily Progress Note    Date of Service  8/22/2023    Chief Complaint  Kristal Cesar is a 28 y.o. female admitted 8/20/2023 with SI and drug ingestion     Hospital Course  This is a 27 y/o F with with history of postpartum depression who presented 8/20/2023 with evaluation for suicide attempt.  Patient stated she has 3 children, oldest child is 7 years old, youngest child was just born in February 2023.  She reported difficulty caring for children, not in good term with .  She stated her  \"packed and left her to live in another apartment\"  Patient stated she took several tablets of 150 mg Wellbutrin with intention to end her life.  Denies illicit drug use, alcohol abuse.  Poison control has been contacted, appreciate recommendations.  Patient placed on legal hold and psychiatry was consulted     Interval Problem Update  Patient seen and examined, feels anxious, having nausea and vomiting  Cont on IV fluid and antiemetics,  Psychiatry consulted appreciate rec   On legal hold  Hypokalemia: potassium of 3.3 repleteing   8/22: Patient resting in bed still with nausea and vomiting, cont on antiemetics.  I will also check a EKG to see if QTC is prolonged  Regarding her SI, psychiatry following cont on legal hold appreciate rec.     I have discussed this patient's plan of care and discharge plan at IDT rounds today with Case Management, Nursing, Nursing leadership, and other members of the IDT team.    Consultants/Specialty  psychiatry    Code Status  Full Code    Disposition  The patient is not medically cleared for discharge to home or a post-acute facility.      I have placed the appropriate orders for post-discharge needs.    Review of Systems  Review of Systems   Constitutional:  Negative for chills and fever.   HENT:  Negative for hearing loss and tinnitus.    Eyes:  Negative for blurred vision and double vision.   Respiratory:  Negative for cough, hemoptysis and " shortness of breath.    Cardiovascular:  Positive for palpitations. Negative for chest pain and leg swelling.   Gastrointestinal:  Positive for heartburn, nausea and vomiting. Negative for abdominal pain.   Genitourinary:  Negative for dysuria and hematuria.   Musculoskeletal:  Negative for joint pain and myalgias.   Skin:  Negative for itching and rash.   Neurological:  Negative for dizziness and headaches.   Endo/Heme/Allergies:  Negative for environmental allergies. Does not bruise/bleed easily.   Psychiatric/Behavioral:  Positive for depression, hallucinations and suicidal ideas. Negative for memory loss and substance abuse. The patient is nervous/anxious and has insomnia.    All other systems reviewed and are negative.       Physical Exam  Temp:  [36.2 °C (97.2 °F)-37.2 °C (99 °F)] 37.2 °C (99 °F)  Pulse:  [] 79  Resp:  [16-20] 18  BP: (107-132)/(72-96) 126/85  SpO2:  [93 %-96 %] 94 %    Physical Exam  Vitals and nursing note reviewed.   Constitutional:       General: She is not in acute distress.  HENT:      Head: Normocephalic and atraumatic.      Nose: Nose normal. No congestion.      Mouth/Throat:      Mouth: Mucous membranes are dry.      Pharynx: No oropharyngeal exudate.   Eyes:      General: No scleral icterus.     Extraocular Movements: Extraocular movements intact.   Cardiovascular:      Rate and Rhythm: Regular rhythm. Tachycardia present.      Pulses: Normal pulses.      Heart sounds:      No friction rub.   Pulmonary:      Effort: No respiratory distress.      Breath sounds: Rales present. No wheezing.   Chest:      Chest wall: No tenderness.   Abdominal:      General: There is distension.      Tenderness: There is no abdominal tenderness. There is no guarding or rebound.   Musculoskeletal:         General: No tenderness. Normal range of motion.      Cervical back: Neck supple. No tenderness.   Skin:     General: Skin is warm and dry.      Capillary Refill: Capillary refill takes less than 2  seconds.   Neurological:      General: No focal deficit present.      Mental Status: She is alert and oriented to person, place, and time.   Psychiatric:      Comments: Pressured speech  Anxious         Fluids    Intake/Output Summary (Last 24 hours) at 8/22/2023 1132  Last data filed at 8/22/2023 0130  Gross per 24 hour   Intake 120 ml   Output 326 ml   Net -206 ml         Laboratory  Recent Labs     08/20/23  1533 08/22/23  0055   WBC 13.0* 7.6   RBC 4.92 4.11*   HEMOGLOBIN 13.6 11.3*   HEMATOCRIT 39.9 34.7*   MCV 81.1* 84.4   MCH 27.6 27.5   MCHC 34.1 32.6   RDW 38.0 41.1   PLATELETCT 375 243   MPV 11.2 11.0       Recent Labs     08/21/23  0102 08/21/23  0412 08/22/23  0055   SODIUM 143 143 139   POTASSIUM 3.4* 3.3* 4.0   CHLORIDE 105 106 107   CO2 24 24 22   GLUCOSE 75 93 86   BUN 8 8 5*   CREATININE 0.49* 0.48* 0.44*   CALCIUM 9.3 9.2 8.9                     Imaging  DX-CHEST-LIMITED (1 VIEW)   Final Result      No acute cardiopulmonary abnormality.             Assessment/Plan  * Suicide attempt (HCC)  Assessment & Plan  Reported taking an excess of 15 tablets of bupropion 150 mg with intention of harming her self  On legal hold psychiatry consulted appreciate rec       QT prolongation  Assessment & Plan  Due to excess ingestion / drug overdose of bupropion  Avoid agents with AE known to cause QT prolongation    Leukocytosis  Assessment & Plan  Likely reactive  Check CXR, UA    Hyperglycemia  Assessment & Plan  Check HbA1c    Hypomagnesemia  Assessment & Plan  Replace    Hypokalemia  Assessment & Plan  Potassium of 3.3 replete       Postpartum depression  Assessment & Plan  Her third child delivered in 02/2023  Oldest child 8yo  Was on bupropion -- hold for now given drug overdose    Defer to psych    Intractable nausea and vomiting  Assessment & Plan  IVF  CLD, advance diet as tolerated  Cont on PPI and and antiemetics     Intentional drug overdose, initial encounter (HCC)- (present on admission)  Assessment &  Plan  Suicide attempt with  Bupropion drug overdose  QT prolongation -- avoid agents with AE for QT prolongation  Cont on IV fluid, psychaitry has been consulted appreciate rec.  On legal hold     Fatty liver- (present on admission)  Assessment & Plan  Per history  Will need to follow up as outpatient        Greater than 51 minutes spent prepping to see patient (e.g. review of tests) obtaining and/or reviewing separately obtained history. Performing a medically appropriate examination and/ evaluation.  Counseling and educating the patient/family/caregiver.  Ordering medications, tests, or procedures.  Referring and communicating with other health care professionals.  Documenting clinical information in EPIC.  Independently interpreting results and communicating results to patient/family/caregiver.  Care coordination.      VTE prophylaxis:   SCDs/TEDs          I have performed a physical exam and reviewed and updated ROS and Plan today (8/22/2023). In review of yesterday's note (8/21/2023), there are no changes except as documented above.

## 2023-08-22 NOTE — CONSULTS
PSYCHIATRIC FOLLOW-UP:(established)    *Reason for admission: SA       *Legal Hold Status: extended           Chart reviewed.         HPI:  Today, patient is seen in her room resting. Her mood is good. She continues to be groggy but is awake for interview. Her father came to visit last night and will come again tonight. She had a phone call with her mom that ended quickly and reports a call to confirm her children were safe and had a good day at school. Pt denies SI/HI. She is still on antiseizure prophylaxis. She endorses mild headache that is responsive to Tylenol. She is eating, sleeping, and utilizing personal technology devices appropriately. She denies additional questions and concerns.           Medical ROS (as pertinent):     Review of Systems   Constitutional: Negative.    HENT: Negative.     Eyes: Negative.    Respiratory:  Negative for shortness of breath.    Cardiovascular:  Negative for chest pain.   Gastrointestinal:  Negative for abdominal pain and constipation.   Genitourinary:  Negative for dysuria.   Musculoskeletal: Negative.    Skin: Negative.    Neurological:  Positive for headaches. Negative for dizziness.   Endo/Heme/Allergies: Negative.    Psychiatric/Behavioral:  Negative for depression, hallucinations and suicidal ideas. The patient is not nervous/anxious and does not have insomnia.        Psychiatric Examination:  Vitals:   Vitals:    08/22/23 0407   BP: 128/87   Pulse: 95   Resp: 16   Temp: 37 °C (98.6 °F)   SpO2: 96%        General appearance: appears stated age, appropriate, casual, and disheveled grooming and hygiene.   Behavior: Pt is calm and cooperative with interview and somnolent .  No apparent distress.  Eye contact is appropriate.  Abnormal movements: Psychomotor agitation or retardation is noted.  Tics or tremors is not noted.  Gait/posture: Not observed  Speech: rate within normal limits, volume within normal limits, and hypotalkative  Thought Process: Logical  Thought  "Content: denies suicidal ideation, denies homicidal ideation. Within normal limits  Perception: denies auditory hallucinations, denies visual hallucinations. Not responding to internal stimuli. No delusions or paranoia noted on interview.   Judgment and Insight: Adequate / Adequate  Orientation: Alert and Fully Oriented  Recent and remote memory: No gross evidence of memory deficits  Attention span and concentration: intact  Language: English, fluent  Fund of Knowledge: appropriate for age and education  Mood: \"good\"  Affect: Constricted and Congruent with content  SI/HI: denies any active or passive SI/HI      EK/21 Qtc 525  Brain Imaging: none  EEG: none     Labs personally reviewed       Assessment: Kristal Cesar is a 29yo female with an extensive psychiatric history of post partum depression, non compliant with psychotropic medication, and multiple self harm and suicide attempts who was brought to the ED after an overdose. Upon evaluation, there is no hx of tommie or hypomania but patient does have cluster B traits. Pt reports stable mood and continues to deny SI. Given her most recent Qtc was elevated and she is currently on antiseizure prophylaxis, we recommend defering restarting any medications at this time. Once pt is medically stable, we will start SSRI for depression and impulse control. Patient should continue to pump and dump as she is taking benzodiazapines. We recommend getting a TSH. Given patient history of self harm, impulsiveness, and suicide attempts as well as ongoing psychosocial stressors and lack of consistent support system, legal hold will be continued.      Dx:  # post partum depression with MDD  # Cluster B traits  # Anxiety disorder, unspecified      Medical :  See medical note      Plan:  Legal hold: extended  Psychotropic medications  Defer starting psychotropic medications for mood and anxiety at this time; will continue to monitor Qtc  Patient to pump and dump breast " milk  Please transfer pt to inpatient psychiatric hospital when medically cleared and bed is available  Psychotherapy consult pending  Labs reviewed  ORDER TSH  EKG reviewed  Old records ordered/reviewed/summarized  Bear completed  Collateral to be obtained from mother, child's father  Discussed the case with: Dr. Underwood / Jim  Psychiatry will follow up     Thank you for the consult.       Sitter: 1:1  Phone: mom and dad ONLY while supervised; one call to father of child to get school update ONLY  Visitors: mom and dad ONLY while supervised  Personal belongings: call light ok    This note was created using voice recognition software (Dragon). The accuracy of the dictation is limited by the abilities of the software. I have reviewed the note prior to signing. However, error related to voice recognition software and /or scribes may still exist and should be interpreted within the appropriate context.

## 2023-08-22 NOTE — CARE PLAN
The patient is improving    Shift Goals  Clinical Goals: improved mood stability  Patient Goals: go home to kids  Family Goals: Updates    Progress made toward(s) clinical / shift goals:  yes    Patient is not progressing towards the following goals:

## 2023-08-22 NOTE — PROGRESS NOTES
Received bedside report from day shift RN and assumed patient care.  Patient has 1:1 sitter and is on legal hold.  All unnecessary items and belongs have been removed from room.  Patient is alert but sleepy with father at bedside.

## 2023-08-23 PROBLEM — R73.9 HYPERGLYCEMIA: Status: RESOLVED | Noted: 2023-08-20 | Resolved: 2023-08-23

## 2023-08-23 PROBLEM — E83.42 HYPOMAGNESEMIA: Status: RESOLVED | Noted: 2023-08-20 | Resolved: 2023-08-23

## 2023-08-23 PROBLEM — D72.829 LEUKOCYTOSIS: Status: RESOLVED | Noted: 2023-08-20 | Resolved: 2023-08-23

## 2023-08-23 PROBLEM — R11.2 INTRACTABLE NAUSEA AND VOMITING: Status: RESOLVED | Noted: 2023-08-20 | Resolved: 2023-08-23

## 2023-08-23 PROBLEM — E87.6 HYPOKALEMIA: Status: RESOLVED | Noted: 2023-08-20 | Resolved: 2023-08-23

## 2023-08-23 PROCEDURE — A9270 NON-COVERED ITEM OR SERVICE: HCPCS | Performed by: STUDENT IN AN ORGANIZED HEALTH CARE EDUCATION/TRAINING PROGRAM

## 2023-08-23 PROCEDURE — 700102 HCHG RX REV CODE 250 W/ 637 OVERRIDE(OP): Performed by: STUDENT IN AN ORGANIZED HEALTH CARE EDUCATION/TRAINING PROGRAM

## 2023-08-23 PROCEDURE — C9113 INJ PANTOPRAZOLE SODIUM, VIA: HCPCS | Performed by: STUDENT IN AN ORGANIZED HEALTH CARE EDUCATION/TRAINING PROGRAM

## 2023-08-23 PROCEDURE — 700102 HCHG RX REV CODE 250 W/ 637 OVERRIDE(OP)

## 2023-08-23 PROCEDURE — 700111 HCHG RX REV CODE 636 W/ 250 OVERRIDE (IP): Performed by: STUDENT IN AN ORGANIZED HEALTH CARE EDUCATION/TRAINING PROGRAM

## 2023-08-23 PROCEDURE — 99233 SBSQ HOSP IP/OBS HIGH 50: CPT | Performed by: INTERNAL MEDICINE

## 2023-08-23 PROCEDURE — 99232 SBSQ HOSP IP/OBS MODERATE 35: CPT | Mod: GC | Performed by: PSYCHIATRY & NEUROLOGY

## 2023-08-23 PROCEDURE — 770001 HCHG ROOM/CARE - MED/SURG/GYN PRIV*

## 2023-08-23 PROCEDURE — A9270 NON-COVERED ITEM OR SERVICE: HCPCS

## 2023-08-23 RX ORDER — CHOLECALCIFEROL (VITAMIN D3) 125 MCG
5 CAPSULE ORAL NIGHTLY PRN
Status: DISCONTINUED | OUTPATIENT
Start: 2023-08-23 | End: 2023-08-24 | Stop reason: HOSPADM

## 2023-08-23 RX ADMIN — Medication 5 MG: at 23:25

## 2023-08-23 RX ADMIN — FOLIC ACID 1 MG: 1 TABLET ORAL at 05:28

## 2023-08-23 RX ADMIN — PANTOPRAZOLE SODIUM 40 MG: 40 INJECTION, POWDER, FOR SOLUTION INTRAVENOUS at 05:28

## 2023-08-23 RX ADMIN — Medication 100 MG: at 05:28

## 2023-08-23 RX ADMIN — DIAZEPAM 5 MG: 5 TABLET ORAL at 11:20

## 2023-08-23 RX ADMIN — ACETAMINOPHEN 650 MG: 325 TABLET, FILM COATED ORAL at 13:25

## 2023-08-23 RX ADMIN — THERA TABS 1 TABLET: TAB at 05:28

## 2023-08-23 RX ADMIN — DIAZEPAM 5 MG: 5 TABLET ORAL at 05:28

## 2023-08-23 RX ADMIN — SENNOSIDES AND DOCUSATE SODIUM 2 TABLET: 50; 8.6 TABLET ORAL at 05:28

## 2023-08-23 ASSESSMENT — ENCOUNTER SYMPTOMS
INSOMNIA: 1
CONSTITUTIONAL NEGATIVE: 1
SHORTNESS OF BREATH: 0
HEMOPTYSIS: 0
DEPRESSION: 1
ABDOMINAL PAIN: 0
CHILLS: 0
EYES NEGATIVE: 1
NERVOUS/ANXIOUS: 1
FEVER: 0
VOMITING: 1
DIZZINESS: 0
HEARTBURN: 1
COUGH: 0
MYALGIAS: 0
DOUBLE VISION: 0
NAUSEA: 1
HEADACHES: 0
MEMORY LOSS: 0
MUSCULOSKELETAL NEGATIVE: 1
BRUISES/BLEEDS EASILY: 0
TINGLING: 0
DIARRHEA: 0
PALPITATIONS: 1
CONSTIPATION: 0
NAUSEA: 0
BLURRED VISION: 0
VOMITING: 0
PALPITATIONS: 0
HALLUCINATIONS: 1

## 2023-08-23 ASSESSMENT — LIFESTYLE VARIABLES
SUBSTANCE_ABUSE: 0
HEADACHE, FULLNESS IN HEAD: NOT PRESENT
ORIENTATION AND CLOUDING OF SENSORIUM: ORIENTED AND CAN DO SERIAL ADDITIONS
ANXIETY: MILDLY ANXIOUS
NAUSEA AND VOMITING: NO NAUSEA AND NO VOMITING
AUDITORY DISTURBANCES: NOT PRESENT
AGITATION: NORMAL ACTIVITY
TREMOR: NO TREMOR
VISUAL DISTURBANCES: NOT PRESENT
PAROXYSMAL SWEATS: NO SWEAT VISIBLE
TOTAL SCORE: 1

## 2023-08-23 ASSESSMENT — PATIENT HEALTH QUESTIONNAIRE - PHQ9
4. FEELING TIRED OR HAVING LITTLE ENERGY: NEARLY EVERY DAY
SUM OF ALL RESPONSES TO PHQ QUESTIONS 1-9: 20
6. FEELING BAD ABOUT YOURSELF - OR THAT YOU ARE A FAILURE OR HAVE LET YOURSELF OR YOUR FAMILY DOWN: NEARLY EVERY DAY
4. FEELING TIRED OR HAVING LITTLE ENERGY: NEARLY EVERY DAY
2. FEELING DOWN, DEPRESSED, IRRITABLE, OR HOPELESS: NEARLY EVERY DAY
3. TROUBLE FALLING OR STAYING ASLEEP OR SLEEPING TOO MUCH: NEARLY EVERY DAY
SUM OF ALL RESPONSES TO PHQ9 QUESTIONS 1 AND 2: 4
3. TROUBLE FALLING OR STAYING ASLEEP OR SLEEPING TOO MUCH: NEARLY EVERY DAY
SUM OF ALL RESPONSES TO PHQ QUESTIONS 1-9: 20
7. TROUBLE CONCENTRATING ON THINGS, SUCH AS READING THE NEWSPAPER OR WATCHING TELEVISION: NEARLY EVERY DAY
6. FEELING BAD ABOUT YOURSELF - OR THAT YOU ARE A FAILURE OR HAVE LET YOURSELF OR YOUR FAMILY DOWN: NEARLY EVERY DAY
1. LITTLE INTEREST OR PLEASURE IN DOING THINGS: SEVERAL DAYS
7. TROUBLE CONCENTRATING ON THINGS, SUCH AS READING THE NEWSPAPER OR WATCHING TELEVISION: NEARLY EVERY DAY
5. POOR APPETITE OR OVEREATING: NEARLY EVERY DAY
2. FEELING DOWN, DEPRESSED, IRRITABLE, OR HOPELESS: NEARLY EVERY DAY
9. THOUGHTS THAT YOU WOULD BE BETTER OFF DEAD, OR OF HURTING YOURSELF: SEVERAL DAYS
SUM OF ALL RESPONSES TO PHQ9 QUESTIONS 1 AND 2: 4
1. LITTLE INTEREST OR PLEASURE IN DOING THINGS: SEVERAL DAYS
8. MOVING OR SPEAKING SO SLOWLY THAT OTHER PEOPLE COULD HAVE NOTICED. OR THE OPPOSITE, BEING SO FIGETY OR RESTLESS THAT YOU HAVE BEEN MOVING AROUND A LOT MORE THAN USUAL: NOT AT ALL
9. THOUGHTS THAT YOU WOULD BE BETTER OFF DEAD, OR OF HURTING YOURSELF: SEVERAL DAYS
8. MOVING OR SPEAKING SO SLOWLY THAT OTHER PEOPLE COULD HAVE NOTICED. OR THE OPPOSITE, BEING SO FIGETY OR RESTLESS THAT YOU HAVE BEEN MOVING AROUND A LOT MORE THAN USUAL: NOT AT ALL
5. POOR APPETITE OR OVEREATING: NEARLY EVERY DAY

## 2023-08-23 ASSESSMENT — FIBROSIS 4 INDEX: FIB4 SCORE: .5270462766947298886

## 2023-08-23 NOTE — CONSULTS
"PSYCHIATRIC FOLLOW-UP:(established)  *Reason for admission:  Suicide attempt via overdose on Wellbutrin     *Legal Hold Status: extended           Chart reviewed.         *HPI: Patient reports feeling more alert today, has been sleeping well, and her appetite has been better and she is eating. Patient has talked to her mom and dad on the phone, mom has 2 of her children with her and they are doing well. She has also spoken to the father of her children who has her 3rd child, who is also doing well. Patient does report anxiety about wanting to be discharged so she can take care of her children. She states that \"no one takes as good of care of them as I do\". She notes that she likes the activating effects of her Wellbutrin, so we discussed other medication options. She agrees to starting Prozac because she is going to stop breastfeeding.     Patient reports today that before the birth of her most recent child, she lost a pregnancy at 6 months gestation. She reports thinking about this pregnancy loss frequently, has nightmares and flashbacks related to the loss, feels sad when thinking about it, avoids people or situations that remind her of this previous pregnancy.                      Medical ROS (as pertinent):     Review of Systems   Constitutional: Negative.    HENT: Negative.     Eyes: Negative.    Respiratory:  Negative for shortness of breath.    Cardiovascular:  Negative for palpitations.   Gastrointestinal:  Negative for constipation, diarrhea, nausea and vomiting.   Genitourinary: Negative.    Musculoskeletal: Negative.    Skin: Negative.    Neurological:  Negative for tingling.           *Psychiatric Examination:  Vitals:   Vitals:    08/23/23 1138   BP: (!) 131/92   Pulse: 90   Resp: 18   Temp: 36.9 °C (98.4 °F)   SpO2: 95%      General Appearance: sitting up in bed, wearing hospital clothes, appears stated age, tearful at times during interview  Abnormal Movements: No abnormal movements  Gait and Posture: " "Gait not assessed  Speech: Normal rate, rhythm, at times worried tone  Thought processes: Linear and organized  Associations: No loose associations  Abnormal or Psychotic Thoughts: Patient not observed to responding to internal stimuli, paranoia and delusions not present during interview  Judgement and Insight: Limited/limited  Orientation: Oriented to self, situation, place  Recent and Remote Memory: Grossly intact  Attention Span and Concentration: Grossly intact  Language: English, fluent  Fund of Knowledge: Not tested  Mood and Affect: \"I want to go home\", congruent  SI/HI: Denies suicidal ideation, intent, plan.  Denies HI        *EK23,   *Imaging: no head imaging   EEG:  none     *Labs personally reviewed  Recent Results (from the past 24 hour(s))   TSH WITH REFLEX TO FT4    Collection Time: 23  7:32 PM   Result Value Ref Range    TSH 0.720 0.380 - 5.330 uIU/mL       Current Facility-Administered Medications   Medication Dose Route Frequency Provider Last Rate Last Admin    senna-docusate (Pericolace Or Senokot S) 8.6-50 MG per tablet 2 Tablet  2 Tablet Oral BID Joe Patel M.D.   2 Tablet at 23 0528    And    polyethylene glycol/lytes (Miralax) PACKET 1 Packet  1 Packet Oral QDAY PRN Joe Patel M.D.        And    bisacodyl (Dulcolax) suppository 10 mg  10 mg Rectal QDAY PRN Joe Patel M.D.        Respiratory Therapy Consult   Nebulization Continuous RT Joe Patel M.D.        lactated ringers infusion (BOLUS)  500 mL Intravenous Once PRN Joe Patel M.D.        labetalol (Normodyne/Trandate) injection 10 mg  10 mg Intravenous Q4HRS PRN Joe Patel M.D.        acetaminophen (Tylenol) tablet 650 mg  650 mg Oral Q6HRS PRN Joe Patel M.D.   650 mg at 23 1325    guaiFENesin dextromethorphan (Robitussin DM) 100-10 MG/5ML syrup 10 mL  10 mL Oral Q6HRS PRN Joe Patel M.D.   10 mL at 23 0513    promethazine (Phenergan) tablet 12.5-25 mg  12.5-25 mg Oral Q4HRS " PRN Joe Patel M.D.        promethazine (Phenergan) suppository 12.5-25 mg  12.5-25 mg Rectal Q4HRS PRN Joe Patel M.D.        prochlorperazine (Compazine) injection 5-10 mg  5-10 mg Intravenous Q4HRS PRN Joe Patel M.D.   5 mg at 08/22/23 0957    ipratropium-albuterol (DUONEB) nebulizer solution  3 mL Nebulization Q4H PRN (RT) Joe Patel M.D.        thiamine (Vitamin B-1) tablet 100 mg  100 mg Oral DAILY Joe Patel M.D.   100 mg at 08/23/23 0528    And    multivitamin tablet 1 Tablet  1 Tablet Oral DAILY Joe Patel M.D.   1 Tablet at 08/23/23 0528    And    folic acid (Folvite) tablet 1 mg  1 mg Oral DAILY Joe Patel M.D.   1 mg at 08/23/23 0528    LORazepam (Ativan) injection 0.5 mg  0.5 mg Intravenous Q4HRS PRN Joe Patel M.D.             Assessment: During interview today patient reported recurrent thoughts, nightmares, avoidant behaviors and flashbacks related to a pregnancy loss at 6 months gestation.  The symptoms are consistent with PTSD and likely contributing to presentation at this time.  Patient agreed to start Prozac today for depressive symptoms.  Patient reported she would like to stop breastfeeding and understands the risks of this medication should she continue to breast-feed.  While patient was more engaged and alert for interview today, would like to continue to monitor patient for stability and legal hold is still extended at this time.  Patient has agreed to therapy, if patient is still at [facility] inpatient psychotherapy will start on Friday.      Dx:  #PTSD   #Post partum depression with MDD  #Cluster B traits  #Anxiety disorder, unspecified    Medical :  #QT prolongation  #Leukocytosis  #Hyperglycemia  #Hypomagnesemia  #Hypokalemia  #Intractable Nausea and vomiting - improved  #Fatty Liver      Plan:  - Legal hold: extended  - Psychotropic medications   - Recommend starting Prozac 10mg PO daily for depression. Restrict patient to a 14 day supply of medication upon  discharge given recent overdose.   - Consult placed for psychotherapy to begin on Friday   - Please transfer pt to inpatient psychiatric hospital when medically cleared and bed is available  - Safety plan completed, copy in chart  - Discussed the case with: Dr. Underwood and Voalte message sent to Dr. Fernandez  - Psychiatry will follow up    Thank you for the consult.     Sitter: 1:1  Phone: mom and dad ONLY while supervised  Visitors: mom, dad, children ONLY while supervised  Personal belongings: call light okay    This note was created using voice recognition software (Dragon). The accuracy of the dictation is limited by the abilities of the software. I have reviewed the note prior to signing. However, error related to voice recognition software and /or scribes may still exist and should be interpreted within the appropriate context.

## 2023-08-23 NOTE — CARE PLAN
The patient is Stable - Low risk of patient condition declining or worsening    Shift Goals  Clinical Goals: Improve mood stability  Patient Goals: Go home to kids  Family Goals: DEANGELO    Progress made toward(s) clinical / shift goals:  Pt calm and agreeable. Vitals within normal limits. Pt resting comfortably.    Patient is not progressing towards the following goals:

## 2023-08-23 NOTE — PROGRESS NOTES
"Hospital Medicine Daily Progress Note    Date of Service  8/23/2023    Chief Complaint  Kristal Cesar is a 28 y.o. female admitted 8/20/2023 with SI and drug ingestion     Hospital Course  This is a 29 y/o F with with history of postpartum depression who presented 8/20/2023 with evaluation for suicide attempt.  Patient stated she has 3 children, oldest child is 7 years old, youngest child was just born in February 2023.  She reported difficulty caring for children, not in good term with .  She stated her  \"packed and left her to live in another apartment\"  Patient stated she took several tablets of 150 mg Wellbutrin with intention to end her life.  Denies illicit drug use, alcohol abuse.  Poison control has been contacted, appreciate recommendations.  Patient placed on legal hold and psychiatry was consulted     Interval Problem Update  Patient seen and examined, feels anxious, having nausea and vomiting  Cont on IV fluid and antiemetics,  Psychiatry consulted appreciate rec   On legal hold  Hypokalemia: potassium of 3.3 repleteing   8/22: Patient resting in bed still with nausea and vomiting, cont on antiemetics.  I will also check a EKG to see if QTC is prolonged  Regarding her SI, psychiatry following cont on legal hold appreciate rec.   8/23: Patient seen and examined, still with some nausea, EKG reviewed .  Denies chest pain.  Psychiatry following regarding her SI cont on legal hold appreciate rec.   I have discussed this patient's plan of care and discharge plan at IDT rounds today with Case Management, Nursing, Nursing leadership, and other members of the IDT team.    Consultants/Specialty  psychiatry    Code Status  Full Code    Disposition  The patient is medically cleared for discharge to home or a post-acute facility.      I have placed the appropriate orders for post-discharge needs.    Review of Systems  Review of Systems   Constitutional:  Negative for chills and " fever.   HENT:  Negative for hearing loss and tinnitus.    Eyes:  Negative for blurred vision and double vision.   Respiratory:  Negative for cough, hemoptysis and shortness of breath.    Cardiovascular:  Positive for palpitations. Negative for chest pain and leg swelling.   Gastrointestinal:  Positive for heartburn, nausea and vomiting. Negative for abdominal pain.   Genitourinary:  Negative for dysuria and hematuria.   Musculoskeletal:  Negative for joint pain and myalgias.   Skin:  Negative for itching and rash.   Neurological:  Negative for dizziness and headaches.   Endo/Heme/Allergies:  Negative for environmental allergies. Does not bruise/bleed easily.   Psychiatric/Behavioral:  Positive for depression, hallucinations and suicidal ideas. Negative for memory loss and substance abuse. The patient is nervous/anxious and has insomnia.    All other systems reviewed and are negative.       Physical Exam  Temp:  [36.7 °C (98.1 °F)-37 °C (98.6 °F)] 36.9 °C (98.4 °F)  Pulse:  [] 90  Resp:  [16-18] 18  BP: (125-138)/(78-92) 131/92  SpO2:  [94 %-96 %] 95 %    Physical Exam  Vitals and nursing note reviewed.   Constitutional:       General: She is not in acute distress.  HENT:      Head: Normocephalic and atraumatic.      Nose: Nose normal. No congestion.      Mouth/Throat:      Mouth: Mucous membranes are dry.      Pharynx: No oropharyngeal exudate.   Eyes:      General: No scleral icterus.     Extraocular Movements: Extraocular movements intact.   Cardiovascular:      Rate and Rhythm: Regular rhythm. Tachycardia present.      Pulses: Normal pulses.      Heart sounds:      No friction rub.   Pulmonary:      Effort: No respiratory distress.      Breath sounds: Rales present. No wheezing.   Chest:      Chest wall: No tenderness.   Abdominal:      General: There is distension.      Tenderness: There is no abdominal tenderness. There is no guarding or rebound.   Musculoskeletal:         General: No tenderness. Normal  range of motion.      Cervical back: Neck supple. No tenderness.   Skin:     General: Skin is warm and dry.      Capillary Refill: Capillary refill takes less than 2 seconds.   Neurological:      General: No focal deficit present.      Mental Status: She is alert and oriented to person, place, and time.      Cranial Nerves: No cranial nerve deficit.      Motor: No weakness.      Coordination: Coordination normal.   Psychiatric:      Comments: Pressured speech  Anxious         Fluids    Intake/Output Summary (Last 24 hours) at 8/23/2023 1222  Last data filed at 8/23/2023 0800  Gross per 24 hour   Intake 800 ml   Output --   Net 800 ml       Laboratory  Recent Labs     08/20/23  1533 08/22/23  0055   WBC 13.0* 7.6   RBC 4.92 4.11*   HEMOGLOBIN 13.6 11.3*   HEMATOCRIT 39.9 34.7*   MCV 81.1* 84.4   MCH 27.6 27.5   MCHC 34.1 32.6   RDW 38.0 41.1   PLATELETCT 375 243   MPV 11.2 11.0     Recent Labs     08/21/23  0102 08/21/23  0412 08/22/23  0055   SODIUM 143 143 139   POTASSIUM 3.4* 3.3* 4.0   CHLORIDE 105 106 107   CO2 24 24 22   GLUCOSE 75 93 86   BUN 8 8 5*   CREATININE 0.49* 0.48* 0.44*   CALCIUM 9.3 9.2 8.9                   Imaging  DX-CHEST-LIMITED (1 VIEW)   Final Result      No acute cardiopulmonary abnormality.             Assessment/Plan  * Suicide attempt (HCC)  Assessment & Plan  Reported taking an excess of 15 tablets of bupropion 150 mg with intention of harming her self  On legal hold psychiatry consulted appreciate rec       QT prolongation  Assessment & Plan  Due to excess ingestion / drug overdose of bupropion  Avoid agents with AE known to cause QT prolongation    Leukocytosis  Assessment & Plan  Likely reactive  Check CXR, UA    Hyperglycemia  Assessment & Plan  Check HbA1c    Hypomagnesemia  Assessment & Plan  Replace    Hypokalemia  Assessment & Plan  Potassium of 3.3 replete       Postpartum depression  Assessment & Plan  Her third child delivered in 02/2023  Oldest child 6yo  Was on bupropion --  hold for now given drug overdose    Defer to psych    Intractable nausea and vomiting  Assessment & Plan  IVF  CLD, advance diet as tolerated  Cont on PPI and and antiemetics     Intentional drug overdose, initial encounter (HCC)- (present on admission)  Assessment & Plan  Suicide attempt with  Bupropion drug overdose  QT prolongation -- avoid agents with AE for QT prolongation  Cont on IV fluid, psychaitry has been consulted appreciate rec.  On legal hold     Fatty liver- (present on admission)  Assessment & Plan  Per history  Will need to follow up as outpatient       Greater than 51 minutes spent prepping to see patient (e.g. review of tests) obtaining and/or reviewing separately obtained history. Performing a medically appropriate examination and/ evaluation.  Counseling and educating the patient/family/caregiver.  Ordering medications, tests, or procedures.  Referring and communicating with other health care professionals.  Documenting clinical information in EPIC.  Independently interpreting results and communicating results to patient/family/caregiver.  Care coordination.    VTE prophylaxis:   SCDs/TEDs          I have performed a physical exam and reviewed and updated ROS and Plan today (8/23/2023). In review of yesterday's note (8/22/2023), there are no changes except as documented above.

## 2023-08-23 NOTE — DIETARY
Nutrition Update:    Day 3 of admit.  Kristal Cesar is a 28 y.o. female with admitting DX of Intentional drug overdose, initial encounter ([facility]) [T50.311F].  Patient being followed to optimize nutrition.    Current Diet: Regular    RD able to visit pt at bedside. Pt reports good appetite, no changes leading up to hospitalization. No intake % documented at this time. Pt reports UBW of 115 lbs. No significant wt loss noted. Per NFPE pt is well nourished.     Malnutrition risk: No risk identified at this time.     Problem: Nutritional:  Goal: Achieve adequate nutritional intake  Description: Patient will consume >50% of meals      RD following.

## 2023-08-23 NOTE — DISCHARGE PLANNING
Patient lives at home with her parents and her 3 children, she has a car and is able to drive to appointments for herself and her childen. She has a PCP listed on her face sheet, but the patient does not want to see her anymore and would like to be assigned a different PCP. She is requesting outpatient psych therapy as opposed to inpatient citing that she needs to be with her children. The attending has cleared her medically and she was informed that the psych MD will speak with her and determine the best post discharge plan of care for her. CM will remain available should any discharge needs arise.       Case Management Discharge Planning    Admission Date: 8/20/2023  GMLOS: 2.4  ALOS: 3    6-Clicks ADL Score: 24  6-Clicks Mobility Score: 22      Anticipated Discharge Dispo: Discharge Disposition: D/T to psych hosp or distinct part unit (65)    DME Needed: No    Action(s) Taken: Patient Conference and DC Assessment Complete (See below)    Escalations Completed: None    Medically Clear: Yes    Barriers to Discharge: Pending Placement    Care Transition Team Assessment    Information Source  Orientation Level: Oriented X4  Information Given By: Patient  Informant's Name: Kristal  Who is responsible for making decisions for patient? : Patient    Readmission Evaluation  Is this a readmission?: No    Elopement Risk  Legal Hold: Elopement Risk  Time of Legal Hold: 1531  Date of Legal Hold: 08/20/23  Elopement Risk: At Risk for Elopement  Wanderguard On: Unavailable  Personal Belongings: Hospital Clothing Only  Environmental Precautions: Sharp or Dangerous Items Removed, Dietary Notified for Plastic Utensils / Paperware Only    Interdisciplinary Discharge Planning  Does Admitting Nurse Feel This Could be a Complex Discharge?: No  Primary Care Physician: none  Lives with - Patient's Self Care Capacity: Child Less than 18 Years of Age  Patient or legal guardian wants to designate a caregiver: Yes  Caregiver name: Ta  Mansi  Caregiver contact info: [phone]  Support Systems: Family Member(s)  Housing / Facility: 1 Story Apartment / Condo  Able to Return to Previous ADL's: Yes  Mobility Issues: No  Prior Services: None  Patient Prefers to be Discharged to:: home  Assistance Needed: No  Durable Medical Equipment: Not Applicable    Discharge Preparedness  What is your plan after discharge?: Home with help  What are your discharge supports?: Parent  Prior Functional Level: Ambulatory, Drives Self, Independent with Activities of Daily Living, Independent with Medication Management    Functional Assesment  Prior Functional Level: Ambulatory, Drives Self, Independent with Activities of Daily Living, Independent with Medication Management    Finances  Financial Barriers to Discharge: No  Prescription Coverage: Yes    Vision / Hearing Impairment  Vision Impairment : No  Hearing Impairment : No    Values / Beliefs / Concerns  Values / Beliefs Concerns : No    Advance Directive  Advance Directive?: None    Domestic Abuse  Have you ever been the victim of abuse or violence?: No  Physical Abuse or Sexual Abuse: No  Verbal Abuse or Emotional Abuse: No  Possible Abuse/Neglect Reported to:: Not Applicable    Psychological Assessment  History of Substance Abuse: None  History of Psychiatric Problems: Yes  Non-compliant with Treatment: No  Newly Diagnosed Illness: Yes    Discharge Risks or Barriers  Discharge risks or barriers?: Mental health  Patient risk factors: Mental health    Anticipated Discharge Information  Discharge Disposition: D/T to psych hosp or distinct part unit (65)

## 2023-08-24 VITALS
SYSTOLIC BLOOD PRESSURE: 112 MMHG | BODY MASS INDEX: 19.84 KG/M2 | RESPIRATION RATE: 16 BRPM | HEART RATE: 69 BPM | WEIGHT: 111.99 LBS | TEMPERATURE: 97.9 F | HEIGHT: 63 IN | OXYGEN SATURATION: 93 % | DIASTOLIC BLOOD PRESSURE: 73 MMHG

## 2023-08-24 PROCEDURE — 99239 HOSP IP/OBS DSCHRG MGMT >30: CPT | Mod: FS

## 2023-08-24 NOTE — DISCHARGE PLANNING
Alert Team:    Pt accepted to [facility]    Accepting Physician: Jade    Requested Time: 0200 on 08/24/23    Informed bedside RN    WILLIAM PCS Form faxed to [facility] and uploaded in     Spoke with Juan from [facility] and authorized trip    Dropped off packet with original L2K to bedside nurse    Kirit Simon, RN, JIAN

## 2023-08-24 NOTE — DISCHARGE SUMMARY
"Discharge Summary    CHIEF COMPLAINT ON ADMISSION  Chief Complaint   Patient presents with    Suicidal Ideation     Pt took approx 15 of her 150mg tabs of buproprion 1 hour ago. Pt has pressured speech, is tangential & tearful. States she has had SI, \"I had my baby 5 months ago and I think I've got postpartum depression, my OB knows\". States she has also been having hallucinations.     Drug Ingestion     Pt nauseous, dry heaving. Denies any other drug ingestion or ETOH.       Reason for Admission  Suicide Attempt     Admission Date  8/20/2023     CODE STATUS  Full Code    HPI & HOSPITAL COURSE  This is a 28 y.o. female here with SI and drug ingestion.  With past medical history of postpartum depression.  Patient presented on 8/20/2023 for evaluation of a suicide attempt.  Patient has 3 children ranging in age from 7 years old to 7 months old.  She reports difficulty caring for her children and not being on good terms with her .  She took several tablets of 150 mg Wellbutrin with an intention to end her life.  Patient was placed on legal hold and psychiatry was consulted.    Patient has had issues with nausea and vomiting over the course of her hospitalization resulting in electrolyte abnormalities.  These have been corrected and nausea has subsided.  Patient is now medically cleared for discharge to inpatient psychiatric hospital.    Psychiatry recommends starting Prozac 10 mg p.o. daily for depression, impulsivity and PTSD.  She has been instructed to stop breast-feeding.  This has not yet been initiated prior to discharge.    Patient will be discharged to [facility] in good and stable condition.    Therefore, she is discharged in good and stable condition to a psychiatric hospital.    The patient met 2-midnight criteria for an inpatient stay at the time of discharge.      FOLLOW UP ITEMS POST DISCHARGE  Follow-up recommendations per inpatient psychiatry.    DISCHARGE DIAGNOSES  Principal " "Problem:    Suicide attempt (HCC) (POA: Unknown)  Active Problems:    Fatty liver (POA: Yes)    Intentional drug overdose, initial encounter (HCC) (POA: Yes)    Intractable nausea and vomiting (POA: Unknown)    Postpartum depression (POA: Unknown)    Hypokalemia (POA: Unknown)    Hypomagnesemia (POA: Unknown)    Hyperglycemia (POA: Unknown)    Leukocytosis (POA: Unknown)    QT prolongation (POA: Unknown)  Resolved Problems:    * No resolved hospital problems. *      FOLLOW UP  No future appointments.  No follow-up provider specified.    MEDICATIONS ON DISCHARGE     Medication List        ASK your doctor about these medications        Instructions   buPROPion  MG Tb12 sustained-release tablet  Commonly known as: Wellbutrin-SR  Ask about: Which instructions should I use?   Take 150 mg by mouth 2 times a day.  Dose: 150 mg     escitalopram 10 MG Tabs  Commonly known as: Lexapro  Ask about: Which instructions should I use?   Take 10 mg by mouth every day.  Dose: 10 mg     ibuprofen 200 MG Tabs  Commonly known as: Motrin  Ask about: Which instructions should I use?   Take 600 mg by mouth every 6 hours as needed for Mild Pain.  Dose: 600 mg              Allergies  Allergies   Allergen Reactions    Other Environmental Rash     \"dogs\" - \"my skin turns red\"       DIET  Orders Placed This Encounter   Procedures    Diet Order Diet: Regular; Tray Modifications (optional): No Sharps (Paperware)     Paperware only on meal tray.     Standing Status:   Standing     Number of Occurrences:   1     Order Specific Question:   Diet:     Answer:   Regular [1]     Order Specific Question:   Tray Modifications (optional)     Answer:   No Sharps (Paperware)       ACTIVITY  As tolerated.  Weight bearing as tolerated    LINES, DRAINS, AND WOUNDS  This is an automated list. Peripheral IVs will be removed prior to discharge.  Peripheral IV 08/20/23 20 G Left Antecubital (Active)   Site Assessment Clean;Dry;Intact 08/23/23 2000   Dressing " Type Transparent 08/23/23 2000   Line Status Blood return noted 08/23/23 2000   Dressing Status Clean;Dry;Intact 08/23/23 2000   Infiltration Grading ([facility], CV) 0 08/23/23 2000   Phlebitis Scale ([facility] Only) 0 08/23/23 2000       Wound 02/01/22 Incision Perineum (Active)       Wound 02/02/22 Incision Vagina (Active)       Peripheral IV 08/20/23 20 G Left Antecubital (Active)   Site Assessment Clean;Dry;Intact 08/23/23 2000   Dressing Type Transparent 08/23/23 2000   Line Status Blood return noted 08/23/23 2000   Dressing Status Clean;Dry;Intact 08/23/23 2000   Infiltration Grading ([facility], CV) 0 08/23/23 2000   Phlebitis Scale ([facility] Only) 0 08/23/23 2000               MENTAL STATUS ON TRANSFER             CONSULTATIONS  Psychiatry    PROCEDURES  None    LABORATORY  Lab Results   Component Value Date    SODIUM 139 08/22/2023    POTASSIUM 4.0 08/22/2023    CHLORIDE 107 08/22/2023    CO2 22 08/22/2023    GLUCOSE 86 08/22/2023    BUN 5 (L) 08/22/2023    CREATININE 0.44 (L) 08/22/2023        Lab Results   Component Value Date    WBC 7.6 08/22/2023    HEMOGLOBIN 11.3 (L) 08/22/2023    HEMATOCRIT 34.7 (L) 08/22/2023    PLATELETCT 243 08/22/2023        Total time of the discharge process exceeds 34 minutes.

## 2023-08-24 NOTE — PROGRESS NOTES
Patient discharged via  [facility] to [facility]. IV taken out. Vitals stable before transfer. Belongings and paperwork given to KARLA

## 2023-08-24 NOTE — PROGRESS NOTES
Hospitalist Milena made aware of family leaving baby pacifier/toy at bedside. Confirmed okay to leave at bedside for comfort. Pt and sitter educated on need to keep toy visible

## 2023-08-24 NOTE — DISCHARGE PLANNING
[facility] ALERT TEAM DISCHARGE PLANNING NOTE    Date:  8/23/23  Patient Name:  Kristal Cesar - 28 y.o. - Discharge Planning  MRN:  [id number]   YOB: 1995  ADMISSION DATE:  8/20/2023     Writer forwarded referral packet for inpatient psychiatric care to the following community providers:  [facility], [facility]    Items included in the referral packet:   __x___Face Sheet   __x___Pages 1 and 2 of completed legal hold   __x___Alert Team/Psych Assessment   __x___H&P   __x___UDS   __x___Blood Alcohol   ___x__Vital signs   ___x__Pregnancy Test (if applicable)   ___x__Medications List   _____Covid Screen

## 2023-08-24 NOTE — CARE PLAN
The patient is Stable - Low risk of patient condition declining or worsening    Shift Goals  Clinical Goals: Improve mood stability  Patient Goals: DC home to kids  Family Goals: DEANGELO    Progress made toward(s) clinical / shift goals:    Problem: Knowledge Deficit - Standard  Goal: Patient and family/care givers will demonstrate understanding of plan of care, disease process/condition, diagnostic tests and medications  Outcome: Progressing     Problem: Psychosocial  Goal: Patient's level of anxiety will decrease  Outcome: Progressing  Goal: Spiritual and cultural needs incorporated into hospitalization  Outcome: Progressing

## 2023-08-24 NOTE — PROGRESS NOTES
Assumed care of patient at 1900. Report received from day RN. Tele monitor in place. 1:1 sitter at bedside. All needs met at this time

## 2023-09-20 NOTE — PROGRESS NOTES
· Placed discharge outreach phone call to pt s/p ER discharge 8/7/17.  Left voicemail providing my contact information and instructions to call with any questions or concerns.   (1) Other Diagnosis

## 2023-11-15 ENCOUNTER — OFFICE VISIT (OUTPATIENT)
Dept: URGENT CARE | Facility: CLINIC | Age: 28
End: 2023-11-15
Payer: MEDICAID

## 2023-11-15 VITALS
TEMPERATURE: 97.3 F | HEART RATE: 71 BPM | OXYGEN SATURATION: 98 % | SYSTOLIC BLOOD PRESSURE: 110 MMHG | WEIGHT: 118 LBS | RESPIRATION RATE: 16 BRPM | HEIGHT: 60 IN | BODY MASS INDEX: 23.16 KG/M2 | DIASTOLIC BLOOD PRESSURE: 68 MMHG

## 2023-11-15 DIAGNOSIS — R21 RASH AND NONSPECIFIC SKIN ERUPTION: ICD-10-CM

## 2023-11-15 DIAGNOSIS — T78.40XA ALLERGIC REACTION, INITIAL ENCOUNTER: ICD-10-CM

## 2023-11-15 PROCEDURE — 3078F DIAST BP <80 MM HG: CPT | Performed by: PHYSICIAN ASSISTANT

## 2023-11-15 PROCEDURE — 99213 OFFICE O/P EST LOW 20 MIN: CPT | Performed by: PHYSICIAN ASSISTANT

## 2023-11-15 PROCEDURE — 3074F SYST BP LT 130 MM HG: CPT | Performed by: PHYSICIAN ASSISTANT

## 2023-11-15 RX ORDER — METHYLPREDNISOLONE 4 MG/1
TABLET ORAL
Qty: 21 TABLET | Refills: 0 | Status: SHIPPED | OUTPATIENT
Start: 2023-11-15 | End: 2023-11-30

## 2023-11-15 ASSESSMENT — ENCOUNTER SYMPTOMS
STRIDOR: 0
CHILLS: 0
COUGH: 0
NAUSEA: 0
SHORTNESS OF BREATH: 0
FEVER: 0
VOMITING: 0

## 2023-11-15 ASSESSMENT — FIBROSIS 4 INDEX: FIB4 SCORE: .5270462766947298886

## 2023-11-15 NOTE — PROGRESS NOTES
Subjective:     Kristal Cesar  is a 28 y.o. female who presents for Rash (X 1 face on rash, throat is swelling)      Rash  This is a new problem. The current episode started yesterday. Pertinent negatives include no cough, fever, shortness of breath or vomiting.   Patient states she woke this morning with some itchy red swelling to face.  She noted to have past medical history of allergic rhinitis and suspects similar process.  She notes in the morning her symptoms are worse.  She states today she had worse swelling redness and itching to face.  She denies abnormal breathing.  She denies coughing or wheezing.  She denies specific swelling to lips tongue or throat.  She denies a history of angioedema or anaphylaxis.  She did try treatment with OTC Claritin.  She denies new medications, products in the home, foods, bed sheets, detergents or any other possible new triggers.    Review of Systems   Constitutional:  Negative for chills and fever.   Respiratory:  Negative for cough, shortness of breath and stridor.    Gastrointestinal:  Negative for nausea and vomiting.   Skin:  Positive for itching and rash.       Medications:    This patient does not have an active medication from one of the medication groupers.    Allergies: Other environmental    Problem List: Kristal Cesar does not have any pertinent problems on file.    Surgical History:  Past Surgical History:   Procedure Laterality Date    DILATION AND CURETTAGE N/A 2/1/2022    Procedure: DILATION AND CURETTAGE;  Surgeon: Arabella Lara M.D.;  Location: [facility];  Service: Labor and Delivery    REAGAN BY LAPAROSCOPY  3/7/2016    Procedure: REAGAN BY LAPAROSCOPY;  Surgeon: John H Ganser, M.D.;  Location: SURGERY [facility];  Service:     OTHER      gallbladder removed       Past Social Hx: Kristal Cesar  reports that she has never smoked. She has never used smokeless tobacco. She  reports that she does not drink alcohol and does not use drugs.     Past Family Hx:  Kristal Cesar family history includes Diabetes in her father; Hyperlipidemia in her father; Hypertension in her father; No Known Problems in her mother and sister.     Problem list, medications, and allergies reviewed by myself today in Epic.     Objective:   /68 (BP Location: Right arm, Patient Position: Sitting, BP Cuff Size: Adult)   Pulse 71   Temp 36.3 °C (97.3 °F)   Resp 16   Ht 1.524 m (5')   Wt 53.5 kg (118 lb)   SpO2 98%   BMI 23.05 kg/m²     Physical Exam  Vitals and nursing note reviewed.   Constitutional:       General: She is not in acute distress.     Appearance: She is well-developed. She is not diaphoretic.   HENT:      Head: Normocephalic and atraumatic.      Comments: Mild appreciation of edema and erythema diffusely to face (patient has pictures from prior to taking antihistamine demonstrating worse)     Right Ear: Tympanic membrane, ear canal and external ear normal.      Left Ear: Tympanic membrane, ear canal and external ear normal.      Nose: Nose normal.      Mouth/Throat:      Mouth: Mucous membranes are moist.      Pharynx: Uvula midline. Posterior oropharyngeal erythema ( mild PND) present. No oropharyngeal exudate.      Tonsils: No tonsillar abscesses.   Eyes:      General: Lids are normal. No scleral icterus.        Right eye: No discharge.         Left eye: No discharge.      Conjunctiva/sclera: Conjunctivae normal.   Pulmonary:      Effort: Pulmonary effort is normal. No respiratory distress.      Breath sounds: Normal breath sounds. No stridor. No decreased breath sounds, wheezing, rhonchi or rales.   Musculoskeletal:         General: Normal range of motion.      Cervical back: Neck supple.   Skin:     General: Skin is warm and dry.      Coloration: Skin is not pale.      Findings: No erythema.   Neurological:      Mental Status: She is alert and oriented to person, place,  and time. She is not disoriented.   Psychiatric:         Speech: Speech normal.         Behavior: Behavior normal.         Assessment/Plan:   Assessment      1. Rash and nonspecific skin eruption  - methylPREDNISolone (MEDROL DOSEPAK) 4 MG Tablet Therapy Pack; Follow schedule on package instructions.  Dispense: 21 Tablet; Refill: 0  - Referral to Allergy    2. Allergic reaction, initial encounter  - methylPREDNISolone (MEDROL DOSEPAK) 4 MG Tablet Therapy Pack; Follow schedule on package instructions.  Dispense: 21 Tablet; Refill: 0  - Referral to Allergy  Supportive care is reviewed with patient/caregiver - recommend to push PO fluids and electrolytes, Cautioned regarding potential side effects of steroid, avoid nsaids while using  We reviewed treatment for allergic rhinitis.  Per patient request a referral for follow-up with allergist is placed.  Unclear allergic trigger but corticosteroid sent to pharmacy.  ER precautions with any worsening symptoms are reviewed with patient/caregiver and they do express understanding    I have worn an N95 mask, gloves and eye protection for the entire encounter with this patient.     Differential diagnosis, natural history, supportive care, and indications for immediate follow-up discussed.

## 2023-11-30 ENCOUNTER — OFFICE VISIT (OUTPATIENT)
Dept: URGENT CARE | Facility: CLINIC | Age: 28
End: 2023-11-30
Payer: MEDICAID

## 2023-11-30 ENCOUNTER — HOSPITAL ENCOUNTER (OUTPATIENT)
Facility: MEDICAL CENTER | Age: 28
End: 2023-11-30
Attending: FAMILY MEDICINE
Payer: MEDICAID

## 2023-11-30 VITALS
HEART RATE: 75 BPM | OXYGEN SATURATION: 96 % | DIASTOLIC BLOOD PRESSURE: 62 MMHG | BODY MASS INDEX: 23.75 KG/M2 | RESPIRATION RATE: 16 BRPM | WEIGHT: 121 LBS | SYSTOLIC BLOOD PRESSURE: 102 MMHG | HEIGHT: 60 IN | TEMPERATURE: 97.6 F

## 2023-11-30 DIAGNOSIS — B96.89 BV (BACTERIAL VAGINOSIS): ICD-10-CM

## 2023-11-30 DIAGNOSIS — N76.0 ACUTE VAGINITIS: ICD-10-CM

## 2023-11-30 DIAGNOSIS — N76.0 BV (BACTERIAL VAGINOSIS): ICD-10-CM

## 2023-11-30 DIAGNOSIS — R30.0 DYSURIA: ICD-10-CM

## 2023-11-30 LAB
APPEARANCE UR: CLEAR
BILIRUB UR STRIP-MCNC: NORMAL MG/DL
CANDIDA DNA VAG QL PROBE+SIG AMP: NEGATIVE
COLOR UR AUTO: YELLOW
G VAGINALIS DNA VAG QL PROBE+SIG AMP: POSITIVE
GLUCOSE UR STRIP.AUTO-MCNC: NORMAL MG/DL
KETONES UR STRIP.AUTO-MCNC: NORMAL MG/DL
LEUKOCYTE ESTERASE UR QL STRIP.AUTO: NORMAL
NITRITE UR QL STRIP.AUTO: NORMAL
PH UR STRIP.AUTO: 6 [PH] (ref 5–8)
POCT INT CON NEG: NEGATIVE
POCT INT CON POS: POSITIVE
POCT URINE PREGNANCY TEST: NEGATIVE
PROT UR QL STRIP: NORMAL MG/DL
RBC UR QL AUTO: NORMAL
SP GR UR STRIP.AUTO: 1.01
T VAGINALIS DNA VAG QL PROBE+SIG AMP: NEGATIVE
UROBILINOGEN UR STRIP-MCNC: 0.2 MG/DL

## 2023-11-30 PROCEDURE — 87510 GARDNER VAG DNA DIR PROBE: CPT

## 2023-11-30 PROCEDURE — 81002 URINALYSIS NONAUTO W/O SCOPE: CPT | Performed by: FAMILY MEDICINE

## 2023-11-30 PROCEDURE — 87660 TRICHOMONAS VAGIN DIR PROBE: CPT

## 2023-11-30 PROCEDURE — 81025 URINE PREGNANCY TEST: CPT | Performed by: FAMILY MEDICINE

## 2023-11-30 PROCEDURE — 99213 OFFICE O/P EST LOW 20 MIN: CPT | Performed by: FAMILY MEDICINE

## 2023-11-30 PROCEDURE — 87491 CHLMYD TRACH DNA AMP PROBE: CPT

## 2023-11-30 PROCEDURE — 87480 CANDIDA DNA DIR PROBE: CPT

## 2023-11-30 PROCEDURE — 3074F SYST BP LT 130 MM HG: CPT | Performed by: FAMILY MEDICINE

## 2023-11-30 PROCEDURE — 3078F DIAST BP <80 MM HG: CPT | Performed by: FAMILY MEDICINE

## 2023-11-30 PROCEDURE — 87591 N.GONORRHOEAE DNA AMP PROB: CPT

## 2023-11-30 ASSESSMENT — ENCOUNTER SYMPTOMS
VOMITING: 0
EYE REDNESS: 0
WEIGHT LOSS: 0
EYE DISCHARGE: 0
NAUSEA: 0
MYALGIAS: 0

## 2023-11-30 ASSESSMENT — FIBROSIS 4 INDEX: FIB4 SCORE: .5270462766947298886

## 2023-12-01 LAB
C TRACH DNA GENITAL QL NAA+PROBE: NEGATIVE
N GONORRHOEA DNA GENITAL QL NAA+PROBE: NEGATIVE
SPECIMEN SOURCE: NORMAL

## 2023-12-01 NOTE — PROGRESS NOTES
Subjective     Kristal Cesar is a 28 y.o. female who presents with UTI (X 1 week Itchy, burning of lapia,)            1 week vaginal discharge with odor change. Itching. Pain with urination. No rash. No fever. No flank pain. No other aggravating or alleviating factors.          Review of Systems   Constitutional:  Negative for malaise/fatigue and weight loss.   Eyes:  Negative for discharge and redness.   Gastrointestinal:  Negative for nausea and vomiting.   Musculoskeletal:  Negative for joint pain and myalgias.   Skin:  Negative for itching and rash.              Objective     /62 (BP Location: Left arm, Patient Position: Sitting, BP Cuff Size: Adult)   Pulse 75   Temp 36.4 °C (97.6 °F)   Resp 16   Ht 1.524 m (5')   Wt 54.9 kg (121 lb)   SpO2 96%   BMI 23.63 kg/m²      Physical Exam  Constitutional:       Appearance: Normal appearance.   Eyes:      Conjunctiva/sclera: Conjunctivae normal.   Abdominal:      Palpations: Abdomen is soft.      Tenderness: There is no abdominal tenderness. There is no right CVA tenderness or left CVA tenderness.   Skin:     General: Skin is warm and dry.   Neurological:      General: No focal deficit present.      Mental Status: She is alert and oriented to person, place, and time.                             Assessment & Plan      UA reviewed   Pregnancy negative    1. Dysuria  POCT Urinalysis    POCT PREGNANCY      2. Acute vaginitis  VAGINAL PATHOGENS DNA PANEL    CANCELED: Chlamydia/GC, PCR (Genital/Anal swab)        Differential diagnosis, natural history, supportive care, and indications for immediate follow-up were discussed.     F/u studies

## 2023-12-02 RX ORDER — METRONIDAZOLE 500 MG/1
500 TABLET ORAL EVERY 12 HOURS
Qty: 14 TABLET | Refills: 0 | Status: SHIPPED | OUTPATIENT
Start: 2023-12-02 | End: 2023-12-09

## 2023-12-10 ENCOUNTER — HOSPITAL ENCOUNTER (EMERGENCY)
Facility: MEDICAL CENTER | Age: 28
End: 2023-12-10
Attending: STUDENT IN AN ORGANIZED HEALTH CARE EDUCATION/TRAINING PROGRAM
Payer: MEDICAID

## 2023-12-10 VITALS
OXYGEN SATURATION: 97 % | SYSTOLIC BLOOD PRESSURE: 118 MMHG | DIASTOLIC BLOOD PRESSURE: 69 MMHG | RESPIRATION RATE: 16 BRPM | HEART RATE: 74 BPM | TEMPERATURE: 98 F | WEIGHT: 114.42 LBS | HEIGHT: 60 IN | BODY MASS INDEX: 22.46 KG/M2

## 2023-12-10 DIAGNOSIS — A08.4 VIRAL GASTROENTERITIS: ICD-10-CM

## 2023-12-10 DIAGNOSIS — R82.4 KETONURIA: ICD-10-CM

## 2023-12-10 DIAGNOSIS — E86.0 DEHYDRATION: ICD-10-CM

## 2023-12-10 LAB
ALBUMIN SERPL BCP-MCNC: 5 G/DL (ref 3.2–4.9)
ALBUMIN/GLOB SERPL: 1.5 G/DL
ALP SERPL-CCNC: 92 U/L (ref 30–99)
ALT SERPL-CCNC: 12 U/L (ref 2–50)
ANION GAP SERPL CALC-SCNC: 16 MMOL/L (ref 7–16)
APPEARANCE UR: CLEAR
AST SERPL-CCNC: 20 U/L (ref 12–45)
BACTERIA #/AREA URNS HPF: NEGATIVE /HPF
BASOPHILS # BLD AUTO: 0.2 % (ref 0–1.8)
BASOPHILS # BLD: 0.03 K/UL (ref 0–0.12)
BILIRUB SERPL-MCNC: 0.6 MG/DL (ref 0.1–1.5)
BILIRUB UR QL STRIP.AUTO: ABNORMAL
BUN SERPL-MCNC: 12 MG/DL (ref 8–22)
CALCIUM ALBUM COR SERPL-MCNC: 8.7 MG/DL (ref 8.5–10.5)
CALCIUM SERPL-MCNC: 9.5 MG/DL (ref 8.5–10.5)
CHLORIDE SERPL-SCNC: 100 MMOL/L (ref 96–112)
CO2 SERPL-SCNC: 22 MMOL/L (ref 20–33)
COLOR UR: YELLOW
CREAT SERPL-MCNC: 0.51 MG/DL (ref 0.5–1.4)
EOSINOPHIL # BLD AUTO: 0.03 K/UL (ref 0–0.51)
EOSINOPHIL NFR BLD: 0.2 % (ref 0–6.9)
EPI CELLS #/AREA URNS HPF: NORMAL /HPF
ERYTHROCYTE [DISTWIDTH] IN BLOOD BY AUTOMATED COUNT: 38.9 FL (ref 35.9–50)
GFR SERPLBLD CREATININE-BSD FMLA CKD-EPI: 131 ML/MIN/1.73 M 2
GLOBULIN SER CALC-MCNC: 3.3 G/DL (ref 1.9–3.5)
GLUCOSE SERPL-MCNC: 177 MG/DL (ref 65–99)
GLUCOSE UR STRIP.AUTO-MCNC: NEGATIVE MG/DL
HCG SERPL QL: NEGATIVE
HCT VFR BLD AUTO: 47.4 % (ref 37–47)
HGB BLD-MCNC: 15.5 G/DL (ref 12–16)
HYALINE CASTS #/AREA URNS LPF: NORMAL /LPF
IMM GRANULOCYTES # BLD AUTO: 0.06 K/UL (ref 0–0.11)
IMM GRANULOCYTES NFR BLD AUTO: 0.3 % (ref 0–0.9)
KETONES UR STRIP.AUTO-MCNC: ABNORMAL MG/DL
LEUKOCYTE ESTERASE UR QL STRIP.AUTO: NEGATIVE
LIPASE SERPL-CCNC: 21 U/L (ref 11–82)
LYMPHOCYTES # BLD AUTO: 1 K/UL (ref 1–4.8)
LYMPHOCYTES NFR BLD: 5.5 % (ref 22–41)
MCH RBC QN AUTO: 27.1 PG (ref 27–33)
MCHC RBC AUTO-ENTMCNC: 32.7 G/DL (ref 32.2–35.5)
MCV RBC AUTO: 82.7 FL (ref 81.4–97.8)
MICRO URNS: ABNORMAL
MONOCYTES # BLD AUTO: 0.22 K/UL (ref 0–0.85)
MONOCYTES NFR BLD AUTO: 1.2 % (ref 0–13.4)
NEUTROPHILS # BLD AUTO: 16.94 K/UL (ref 1.82–7.42)
NEUTROPHILS NFR BLD: 92.6 % (ref 44–72)
NITRITE UR QL STRIP.AUTO: NEGATIVE
NRBC # BLD AUTO: 0 K/UL
NRBC BLD-RTO: 0 /100 WBC (ref 0–0.2)
PH UR STRIP.AUTO: 6 [PH] (ref 5–8)
PLATELET # BLD AUTO: 306 K/UL (ref 164–446)
PMV BLD AUTO: 11 FL (ref 9–12.9)
POTASSIUM SERPL-SCNC: 3.5 MMOL/L (ref 3.6–5.5)
PROT SERPL-MCNC: 8.3 G/DL (ref 6–8.2)
PROT UR QL STRIP: 30 MG/DL
RBC # BLD AUTO: 5.73 M/UL (ref 4.2–5.4)
RBC # URNS HPF: NORMAL /HPF
RBC UR QL AUTO: NEGATIVE
SODIUM SERPL-SCNC: 138 MMOL/L (ref 135–145)
SP GR UR STRIP.AUTO: >=1.03
UROBILINOGEN UR STRIP.AUTO-MCNC: 0.2 MG/DL
WBC # BLD AUTO: 18.3 K/UL (ref 4.8–10.8)
WBC #/AREA URNS HPF: NORMAL /HPF

## 2023-12-10 PROCEDURE — 99284 EMERGENCY DEPT VISIT MOD MDM: CPT

## 2023-12-10 PROCEDURE — 700111 HCHG RX REV CODE 636 W/ 250 OVERRIDE (IP): Mod: JZ,UD | Performed by: STUDENT IN AN ORGANIZED HEALTH CARE EDUCATION/TRAINING PROGRAM

## 2023-12-10 PROCEDURE — 96374 THER/PROPH/DIAG INJ IV PUSH: CPT

## 2023-12-10 PROCEDURE — 83690 ASSAY OF LIPASE: CPT

## 2023-12-10 PROCEDURE — 700105 HCHG RX REV CODE 258: Mod: UD | Performed by: STUDENT IN AN ORGANIZED HEALTH CARE EDUCATION/TRAINING PROGRAM

## 2023-12-10 PROCEDURE — 36415 COLL VENOUS BLD VENIPUNCTURE: CPT

## 2023-12-10 PROCEDURE — 80053 COMPREHEN METABOLIC PANEL: CPT

## 2023-12-10 PROCEDURE — 85025 COMPLETE CBC W/AUTO DIFF WBC: CPT

## 2023-12-10 PROCEDURE — 81001 URINALYSIS AUTO W/SCOPE: CPT

## 2023-12-10 PROCEDURE — 84703 CHORIONIC GONADOTROPIN ASSAY: CPT

## 2023-12-10 RX ORDER — METOCLOPRAMIDE 10 MG/1
10 TABLET ORAL 4 TIMES DAILY PRN
Qty: 40 TABLET | Refills: 0 | Status: SHIPPED | OUTPATIENT
Start: 2023-12-10

## 2023-12-10 RX ORDER — SODIUM CHLORIDE, SODIUM LACTATE, POTASSIUM CHLORIDE, CALCIUM CHLORIDE 600; 310; 30; 20 MG/100ML; MG/100ML; MG/100ML; MG/100ML
1000 INJECTION, SOLUTION INTRAVENOUS ONCE
Status: COMPLETED | OUTPATIENT
Start: 2023-12-10 | End: 2023-12-10

## 2023-12-10 RX ORDER — ONDANSETRON 4 MG/1
4 TABLET, ORALLY DISINTEGRATING ORAL EVERY 6 HOURS PRN
Qty: 30 TABLET | Refills: 0 | Status: SHIPPED | OUTPATIENT
Start: 2023-12-10

## 2023-12-10 RX ORDER — METOCLOPRAMIDE HYDROCHLORIDE 5 MG/ML
10 INJECTION INTRAMUSCULAR; INTRAVENOUS ONCE
Status: COMPLETED | OUTPATIENT
Start: 2023-12-10 | End: 2023-12-10

## 2023-12-10 RX ADMIN — METOCLOPRAMIDE 10 MG: 5 INJECTION, SOLUTION INTRAMUSCULAR; INTRAVENOUS at 17:50

## 2023-12-10 RX ADMIN — SODIUM CHLORIDE, POTASSIUM CHLORIDE, SODIUM LACTATE AND CALCIUM CHLORIDE 1000 ML: 600; 310; 30; 20 INJECTION, SOLUTION INTRAVENOUS at 17:53

## 2023-12-10 ASSESSMENT — FIBROSIS 4 INDEX: FIB4 SCORE: .5270462766947298886

## 2023-12-11 NOTE — ED NOTES
Bedside report with ELKE Nunez.  Pt resting in [facility], connected to monitor. Fluid bolus on pressure bag. Pt up for discharge once fluids complete.

## 2023-12-11 NOTE — ED PROVIDER NOTES
"ED Provider Note    CHIEF COMPLAINT  Chief Complaint   Patient presents with    Nausea/Vomiting/Diarrhea     Ambulates to triage c/o n/v/d since yesterday. Reports multiple episode of vomiting and diarrhea since yesterday. Took liquid zofran prescription w/o relief.     Tingling     Reports generalized tingling sensation.       EXTERNAL RECORDS REVIEWED  Outpatient Notes 11/30/2023 urgent care visit diagnosed with UTI    HPI/ROS  LIMITATION TO HISTORY   Select: : None  OUTSIDE HISTORIAN(S):      Kristal Cesar is a 28 y.o. female who presents nausea, vomiting, diarrhea since yesterday.  Patient has been trying to manage her symptoms with liquid Zofran without improvement.  Denies blood in the stool, abdominal pain.  Does report some burning with urination and upper back pain.    PAST MEDICAL HISTORY   has a past medical history of Allergy, Anxiety, Depression, and Obesity (BMI 30-39.9).    SURGICAL HISTORY   has a past surgical history that includes ariana by laparoscopy (3/7/2016); other; and dilation and curettage (N/A, 2/1/2022).    FAMILY HISTORY  Family History   Problem Relation Age of Onset    No Known Problems Mother     Diabetes Father     Hypertension Father     Hyperlipidemia Father     No Known Problems Sister     Cancer Neg Hx        SOCIAL HISTORY  Social History     Tobacco Use    Smoking status: Never    Smokeless tobacco: Never   Vaping Use    Vaping Use: Never used   Substance and Sexual Activity    Alcohol use: No    Drug use: No    Sexual activity: Yes     Partners: Male       CURRENT MEDICATIONS  Home Medications       Reviewed by Lisa Flores R.N. (Registered Nurse) on 12/10/23 at 1622  Med List Status: Complete     Medication Last Dose Status        Patient Zia Taking any Medications                           ALLERGIES  Allergies   Allergen Reactions    Other Environmental Rash     \"dogs\" - \"my skin turns red\"       PHYSICAL EXAM  VITAL SIGNS: /69   Pulse 74   Temp " 36.7 °C (98 °F) (Temporal)   Resp 16   Ht 1.524 m (5')   Wt 51.9 kg (114 lb 6.7 oz)   LMP 11/10/2023   SpO2 97%   BMI 22.35 kg/m²    Physical Exam  Vitals and nursing note reviewed.   Constitutional:       Appearance: She is well-developed. She is not ill-appearing, toxic-appearing or diaphoretic.   HENT:      Head: Normocephalic.   Eyes:      Extraocular Movements: Extraocular movements intact.      Pupils: Pupils are equal, round, and reactive to light.   Cardiovascular:      Rate and Rhythm: Normal rate and regular rhythm.   Pulmonary:      Effort: Pulmonary effort is normal.      Breath sounds: Normal breath sounds.   Abdominal:      Palpations: Abdomen is soft.      Tenderness: There is no abdominal tenderness. There is no right CVA tenderness, left CVA tenderness or rebound.   Musculoskeletal:      Cervical back: Normal range of motion.   Neurological:      Mental Status: She is alert and oriented to person, place, and time.         DIAGNOSTIC STUDIES / PROCEDURES      LABS  Labs Reviewed   CBC WITH DIFFERENTIAL - Abnormal; Notable for the following components:       Result Value    WBC 18.3 (*)     RBC 5.73 (*)     Hematocrit 47.4 (*)     Neutrophils-Polys 92.60 (*)     Lymphocytes 5.50 (*)     Neutrophils (Absolute) 16.94 (*)     All other components within normal limits   COMP METABOLIC PANEL - Abnormal; Notable for the following components:    Potassium 3.5 (*)     Glucose 177 (*)     Albumin 5.0 (*)     Total Protein 8.3 (*)     All other components within normal limits   URINALYSIS,CULTURE IF INDICATED - Abnormal; Notable for the following components:    Ketones Trace (*)     Protein 30 (*)     Bilirubin Small (*)     All other components within normal limits    Narrative:     Indication for culture:->Patient WITHOUT an indwelling Sierra  catheter in place with new onset of Dysuria, Frequency,  Urgency, and/or Suprapubic pain   LIPASE   HCG QUAL SERUM   ESTIMATED GFR   URINE MICROSCOPIC (W/UA)     Narrative:     Indication for culture:->Patient WITHOUT an indwelling Sierra  catheter in place with new onset of Dysuria, Frequency,  Urgency, and/or Suprapubic pain         COURSE & MEDICAL DECISION MAKING    ED Observation Status? No; Patient does not meet criteria for ED Observation.     INITIAL ASSESSMENT, COURSE AND PLAN  Care Narrative: 28-year-old female who presents to the ED for nausea, vomiting, diarrhea.  Also noting some dysuria and back pain exam overall benign vitals within normal limits no CVA tenderness or abdominal tenderness.  Laboratory workup obtained to rule out RATNA, electrolyte abnormality, UTI and this was all very reassuring as the majority of her workup was normal.  Patient felt much better after IV fluids and antiemetics and requested discharge upon second reassessment.  Urinalysis without evidence of UTI.  Suspect gastroenteritis as etiology of symptoms discussed supportive care with patient discussed with patient return precautions for worsening  HYDRATION: Based on the patient's presentation of Acute Diarrhea and Acute Vomiting the patient was given IV fluids. IV Hydration was used because oral hydration was not adequate alone. Upon recheck following hydration, the patient was improved.      ADDITIONAL PROBLEM LIST  Past Medical History:   Diagnosis Date    Allergy     Anxiety     Depression     Obesity (BMI 30-39.9)        DISPOSITION AND DISCUSSIONS  I have discussed management of the patient with the following physicians and BETTY's:      Discussion of management with other \[facility] [place] [facility]\"" or appropriate source(s): None     Escalation of care considered, and ultimately not performed:diagnostic imaging and acute inpatient care management, however at this time, the patient is most appropriate for outpatient management    Barriers to care at this time, including but not limited to: Patient does not have established PCP.     Decision tools and prescription drugs considered including, but not limited  to: Antibiotics not likely beneficial in viral etiology of symptoms .    FINAL DIAGNOSIS  1. Viral gastroenteritis Acute   2. Dehydration Acute   3. Ketonuria Acute          Electronically signed by: Americo Conner M.D., 12/10/2023 5:36 PM

## 2023-12-11 NOTE — ED NOTES
Bedside report received from off going RN Marina, assumed care of patient. POC discussed with patient. Call light within reach, all needs addressed at this time.     Fall risk interventions in place: Move the patient closer to the nurse's station, Patient's personal possessions are with in their safe reach, Place fall risk sign on patient's door, and Keep floor surfaces clean and dry (all applicable per [facility] Fall risk assessment)   Continuous monitoring: Cardiac Leads, Pulse Ox, or Blood Pressure  IVF/IV medications: Infusion per MAR (List Med(s)) finishing LR bolus  Oxygen: Room Air  Bedside sitter: Not Applicable   Isolation: Not Applicable

## 2023-12-11 NOTE — ED NOTES
Pt had episode of emesis all over gurney sheets.  Gurney cleaned up and new sheets applied.  Pt provided with new emesis bag.

## 2023-12-11 NOTE — ED TRIAGE NOTES
Chief Complaint   Patient presents with    Nausea/Vomiting/Diarrhea     Ambulates to triage c/o n/v/d since yesterday. Reports multiple episode of vomiting and diarrhea since yesterday. Took liquid zofran prescription w/o relief.     Tingling     Reports generalized tingling sensation.     Educated on triage process. Instructed to notify staff for any worsening symptoms. Denies any recent travel. Denies exposure to known covid positive patients. Denies any respiratory symptoms. Appears to be in NAD. GCS of 15

## 2023-12-11 NOTE — ED NOTES
Patient discharged from [facility] ED to home with spouse. Discharge teaching completed at bedside and patient signature obtained. All questions and concerns addressed. PIV removed. All pt belongings with pt at time of discharge.

## 2024-03-10 ENCOUNTER — HOSPITAL ENCOUNTER (EMERGENCY)
Facility: MEDICAL CENTER | Age: 29
End: 2024-03-10
Attending: EMERGENCY MEDICINE
Payer: MEDICAID

## 2024-03-10 VITALS
SYSTOLIC BLOOD PRESSURE: 137 MMHG | TEMPERATURE: 97.9 F | RESPIRATION RATE: 20 BRPM | HEART RATE: 68 BPM | WEIGHT: 119.27 LBS | HEIGHT: 60 IN | BODY MASS INDEX: 23.42 KG/M2 | DIASTOLIC BLOOD PRESSURE: 87 MMHG | OXYGEN SATURATION: 99 %

## 2024-03-10 DIAGNOSIS — H10.89 OTHER CONJUNCTIVITIS OF LEFT EYE: ICD-10-CM

## 2024-03-10 DIAGNOSIS — K04.7 DENTAL INFECTION: ICD-10-CM

## 2024-03-10 PROCEDURE — 99282 EMERGENCY DEPT VISIT SF MDM: CPT | Mod: EDC

## 2024-03-10 RX ORDER — POLYMYXIN B SULFATE AND TRIMETHOPRIM 1; 10000 MG/ML; [USP'U]/ML
2 SOLUTION OPHTHALMIC EVERY 4 HOURS
Qty: 10 ML | Refills: 0 | Status: ACTIVE | OUTPATIENT
Start: 2024-03-10 | End: 2024-03-15

## 2024-03-10 RX ORDER — IBUPROFEN 800 MG/1
800 TABLET ORAL EVERY 8 HOURS PRN
Qty: 30 TABLET | Refills: 0 | Status: SHIPPED | OUTPATIENT
Start: 2024-03-10

## 2024-03-10 RX ORDER — AMOXICILLIN 500 MG/1
500 CAPSULE ORAL 3 TIMES DAILY
Qty: 21 CAPSULE | Refills: 0 | Status: ACTIVE | OUTPATIENT
Start: 2024-03-10 | End: 2024-03-17

## 2024-03-10 ASSESSMENT — FIBROSIS 4 INDEX: FIB4 SCORE: 0.51

## 2024-03-10 NOTE — ED TRIAGE NOTES
Chief Complaint   Patient presents with    Dental Pain     L lower dental pain since waking this morning.     Sore Throat     Since yesterday     Ear Pain     L ear pain since yesterday      Ambulatory to triage for above.   Asked if she has a dentist, states she is 28 now so she doesn't think there is anything they could do for her.     BP (!) 142/86   Pulse 71   Temp 36.8 °C (98.3 °F) (Temporal)   Resp 20   Ht 1.524 m (5')   Wt 54.1 kg (119 lb 4.3 oz)   SpO2 98%   BMI 23.29 kg/m²

## 2024-03-10 NOTE — ED NOTES
Kristal Nazario Cesar has been discharged from the Emergency Room.    Discharge instructions, which include signs and symptoms to monitor patient for, hydration and hand hygiene importance, as well as detailed information regarding dental infection, conjunctivitis of left eye provided. This RN also encouraged a follow-up appointment to be made with patient's PCP. All questions and concerns addressed at this time.      Prescription for AMOXIL, MOTRIN, POLYTRIM provided to patient to  at pharmacy. Patient instructed on importance of completing full course of medication, verbalized understanding.     Patient leaves ER in no apparent distress, is awake, alert, and interactive. Patient is aware of the need to return to the ER for any concerns or changes in current condition.    /87   Pulse 68   Temp 36.6 °C (97.9 °F) (Temporal)   Resp 20   Ht 1.524 m (5')   Wt 54.1 kg (119 lb 4.3 oz)   SpO2 99%   BMI 23.29 kg/m²

## 2024-03-10 NOTE — ED NOTES
Patient to Peds 47. Reviewed and agree with triage note. Primary assessment completed. Pt awake, alert, age appropriate. Pt tearful d/t dental pain, dental cavity noted to L molar. Reports redness to L eye + cough and congestion. Family w same sx. Denies any other sx. Call light within reach. No further questions or concerns. Chart up for ERP.

## 2024-03-10 NOTE — ED PROVIDER NOTES
"ED Provider Note    CHIEF COMPLAINT  Chief Complaint   Patient presents with    Dental Pain     L lower dental pain since waking this morning.     Sore Throat     Since yesterday     Ear Pain     L ear pain since yesterday        EXTERNAL RECORDS REVIEWED  Reviewed hospitalization records from August 2023    HPI/ROS  LIMITATION TO HISTORY   None  OUTSIDE HISTORIAN(S):  None    Kristal Cesar is a 28 y.o. female who presents for evaluation of left lower dental pain drainage and purulent discharge from the left eye.  Patient has no significant medical or active history.  She denies pregnancy.  She has had a \"bad tooth \"for several weeks.  She has attempted to get into a dentist but has been unable to yet.  No trouble breathing or high fever.  No muffled voice or stridor.  She also woke up with some purulent drainage in her left eye unaffected on the right side.  She does not wear contact lenses.  No visual loss or diplopia.  She reports dull aching pain at the area of what looks like her left second molar with an exposed cavity.    PAST MEDICAL HISTORY   has a past medical history of Allergy, Anxiety, Depression, and Obesity (BMI 30-39.9).    SURGICAL HISTORY   has a past surgical history that includes ariana by laparoscopy (3/7/2016); other; and dilation and curettage (N/A, 2/1/2022).    FAMILY HISTORY  Family History   Problem Relation Age of Onset    No Known Problems Mother     Diabetes Father     Hypertension Father     Hyperlipidemia Father     No Known Problems Sister     Cancer Neg Hx        SOCIAL HISTORY  Social History     Tobacco Use    Smoking status: Never    Smokeless tobacco: Never   Vaping Use    Vaping Use: Never used   Substance and Sexual Activity    Alcohol use: No    Drug use: No    Sexual activity: Yes     Partners: Male       CURRENT MEDICATIONS  Home Medications       Reviewed by Landry Perea R.N. (Registered Nurse) on 03/10/24 at 1030  Med List Status: Partial     Medication " "Last Dose Status   metoclopramide (REGLAN) 10 MG Tab  Active   ondansetron (ZOFRAN ODT) 4 MG TABLET DISPERSIBLE  Active                    ALLERGIES  Allergies   Allergen Reactions    Other Environmental Rash     \"dogs\" - \"my skin turns red\"       PHYSICAL EXAM  VITAL SIGNS: BP (!) 142/86   Pulse 71   Temp 36.8 °C (98.3 °F) (Temporal)   Resp 20   Ht 1.524 m (5')   Wt 54.1 kg (119 lb 4.3 oz)   SpO2 98%   BMI 23.29 kg/m²    Pulse ox interpretation: I interpret this pulse ox as normal.  Constitutional: Alert and oriented x 3, mild distress  HEENT: Atraumatic normocephalic, pupils are equal round reactive to light extraocular movements are intact.  Scleral erythema and yellow-green discharge noted on the left conjunctive a the nares is clear, external ears are normal, mouth shows moist mucous membranes normal dentition for age exposed cavity on the buccal surface of what appears to be the left lower second molar no large abscess no facial swelling no tracking down the neck  Neck: Supple, no JVD no tracheal deviation  Cardiovascular: Regular rate and rhythm no murmur rub or gallop 2+ pulses peripherally x4  Thorax & Lungs: No respiratory distress, no wheezes rales or rhonchi, No chest tenderness.   GI: Soft nontender nondistended positive bowel sounds, no peritoneal signs  Skin: Warm dry no acute rash or lesion  Musculoskeletal: Moving all extremities with full range and 5 of 5 strength no acute  deformity  Neurologic: Cranial nerves III through XII are grossly intact no sensory deficit no cerebellar dysfunction   Psychiatric: Anxious        DIAGNOSTIC STUDIES / PROCEDURES  EKG  I have independently interpreted this EKG      LABS  Sitter but not performed    RADIOLOGY  None indicated  COURSE & MEDICAL DECISION MAKING    ED Observation Status? No; Patient does not meet criteria for ED Observation.     INITIAL ASSESSMENT, COURSE AND PLAN  Care Narrative:       This is a very pleasant otherwise healthy 28-year-old " who presents here with a constellation of symptoms including dental pain as well as left eye irritation.  On exam she has what appears to be an early dental infection with large organized abscess and likely bacterial conjunctivitis.  I considered but did not feel clinically indicated to perform extensive imaging or laboratory studies.  We will start her on high-dose amoxicillin as well as NSAIDs and Polytrim drops as erythromycin is apparently in shortage.  We will refer her to the [facility] dental clinic.  I counseled her to take ibuprofen as well and to apply cold compresses.  She will need to be referred to a local dentist and return precautions have been utilized      ADDITIONAL PROBLEM LIST    DISPOSITION AND DISCUSSIONS  I have discussed management of the patient with the following physicians and BETTY's: None    Discussion of management with other QHP or appropriate source(s): None    Escalation of care considered, and ultimately not performed:IV fluids, blood analysis, and diagnostic imaging    Barriers to care at this time, including but not limited to: None.     Decision tools and prescription drugs considered including, but not limited to: Patient will be prescribed high-dose NSAIDs antibiotics and Polytrim    FINAL DIAGNOSIS  1. Dental infection  amoxicillin (AMOXIL) 500 MG Cap    ibuprofen (MOTRIN) 800 MG Tab      2. Other conjunctivitis of left eye  polymixin-trimethoprim (POLYTRIM) 15120-7.1 UNIT/ML-% Solution            Electronically signed by: Nico Mares M.D., 3/10/2024 11:05 AM

## 2024-04-05 ENCOUNTER — HOSPITAL ENCOUNTER (OUTPATIENT)
Dept: LAB | Facility: MEDICAL CENTER | Age: 29
End: 2024-04-05
Attending: PHYSICIAN ASSISTANT
Payer: MEDICAID

## 2024-04-05 LAB
ALBUMIN SERPL BCP-MCNC: 4.9 G/DL (ref 3.2–4.9)
ALBUMIN/GLOB SERPL: 1.4 G/DL
ALP SERPL-CCNC: 107 U/L (ref 30–99)
ALT SERPL-CCNC: 10 U/L (ref 2–50)
ANION GAP SERPL CALC-SCNC: 13 MMOL/L (ref 7–16)
AST SERPL-CCNC: 16 U/L (ref 12–45)
BILIRUB SERPL-MCNC: 1 MG/DL (ref 0.1–1.5)
BUN SERPL-MCNC: 12 MG/DL (ref 8–22)
CALCIUM ALBUM COR SERPL-MCNC: 9 MG/DL (ref 8.5–10.5)
CALCIUM SERPL-MCNC: 9.7 MG/DL (ref 8.5–10.5)
CHLORIDE SERPL-SCNC: 103 MMOL/L (ref 96–112)
CHOLEST SERPL-MCNC: 130 MG/DL (ref 100–199)
CO2 SERPL-SCNC: 23 MMOL/L (ref 20–33)
CREAT SERPL-MCNC: 0.51 MG/DL (ref 0.5–1.4)
EST. AVERAGE GLUCOSE BLD GHB EST-MCNC: 97 MG/DL
GFR SERPLBLD CREATININE-BSD FMLA CKD-EPI: 131 ML/MIN/1.73 M 2
GLOBULIN SER CALC-MCNC: 3.4 G/DL (ref 1.9–3.5)
GLUCOSE SERPL-MCNC: 75 MG/DL (ref 65–99)
HBA1C MFR BLD: 5 % (ref 4–5.6)
HDLC SERPL-MCNC: 48 MG/DL
LDLC SERPL CALC-MCNC: 65 MG/DL
POTASSIUM SERPL-SCNC: 4.2 MMOL/L (ref 3.6–5.5)
PROT SERPL-MCNC: 8.3 G/DL (ref 6–8.2)
SODIUM SERPL-SCNC: 139 MMOL/L (ref 135–145)
TRIGL SERPL-MCNC: 87 MG/DL (ref 0–149)
TSH SERPL DL<=0.005 MIU/L-ACNC: 1.01 UIU/ML (ref 0.38–5.33)

## 2024-04-05 PROCEDURE — 80053 COMPREHEN METABOLIC PANEL: CPT

## 2024-04-05 PROCEDURE — 80061 LIPID PANEL: CPT

## 2024-04-05 PROCEDURE — 84443 ASSAY THYROID STIM HORMONE: CPT

## 2024-04-05 PROCEDURE — 36415 COLL VENOUS BLD VENIPUNCTURE: CPT

## 2024-04-05 PROCEDURE — 83036 HEMOGLOBIN GLYCOSYLATED A1C: CPT

## 2024-04-29 ENCOUNTER — HOSPITAL ENCOUNTER (OUTPATIENT)
Facility: MEDICAL CENTER | Age: 29
End: 2024-04-29
Payer: MEDICAID

## 2024-04-29 ENCOUNTER — OFFICE VISIT (OUTPATIENT)
Dept: URGENT CARE | Facility: CLINIC | Age: 29
End: 2024-04-29
Payer: MEDICAID

## 2024-04-29 VITALS
HEIGHT: 60 IN | BODY MASS INDEX: 25.32 KG/M2 | OXYGEN SATURATION: 98 % | WEIGHT: 129 LBS | DIASTOLIC BLOOD PRESSURE: 70 MMHG | HEART RATE: 77 BPM | TEMPERATURE: 98.3 F | RESPIRATION RATE: 17 BRPM | SYSTOLIC BLOOD PRESSURE: 108 MMHG

## 2024-04-29 DIAGNOSIS — B96.89 BV (BACTERIAL VAGINOSIS): ICD-10-CM

## 2024-04-29 DIAGNOSIS — N76.0 ACUTE VAGINITIS: ICD-10-CM

## 2024-04-29 DIAGNOSIS — H10.9 CONJUNCTIVITIS OF BOTH EYES, UNSPECIFIED CONJUNCTIVITIS TYPE: ICD-10-CM

## 2024-04-29 DIAGNOSIS — R30.0 DYSURIA: ICD-10-CM

## 2024-04-29 DIAGNOSIS — N76.0 BV (BACTERIAL VAGINOSIS): ICD-10-CM

## 2024-04-29 LAB
APPEARANCE UR: CLEAR
BILIRUB UR STRIP-MCNC: NEGATIVE MG/DL
COLOR UR AUTO: YELLOW
GLUCOSE UR STRIP.AUTO-MCNC: NEGATIVE MG/DL
KETONES UR STRIP.AUTO-MCNC: NORMAL MG/DL
LEUKOCYTE ESTERASE UR QL STRIP.AUTO: NEGATIVE
NITRITE UR QL STRIP.AUTO: NEGATIVE
PH UR STRIP.AUTO: 6 [PH] (ref 5–8)
PROT UR QL STRIP: NEGATIVE MG/DL
RBC UR QL AUTO: NEGATIVE
SP GR UR STRIP.AUTO: 1.02
UROBILINOGEN UR STRIP-MCNC: 0.2 MG/DL

## 2024-04-29 PROCEDURE — 87660 TRICHOMONAS VAGIN DIR PROBE: CPT

## 2024-04-29 PROCEDURE — 87591 N.GONORRHOEAE DNA AMP PROB: CPT

## 2024-04-29 PROCEDURE — 87510 GARDNER VAG DNA DIR PROBE: CPT

## 2024-04-29 PROCEDURE — 99213 OFFICE O/P EST LOW 20 MIN: CPT

## 2024-04-29 PROCEDURE — 3078F DIAST BP <80 MM HG: CPT

## 2024-04-29 PROCEDURE — 3074F SYST BP LT 130 MM HG: CPT

## 2024-04-29 PROCEDURE — 87491 CHLMYD TRACH DNA AMP PROBE: CPT

## 2024-04-29 PROCEDURE — 81002 URINALYSIS NONAUTO W/O SCOPE: CPT

## 2024-04-29 PROCEDURE — 87480 CANDIDA DNA DIR PROBE: CPT

## 2024-04-29 RX ORDER — OLOPATADINE HYDROCHLORIDE 1 MG/ML
1 SOLUTION/ DROPS OPHTHALMIC 2 TIMES DAILY
Qty: 5 ML | Refills: 0 | Status: SHIPPED | OUTPATIENT
Start: 2024-04-29

## 2024-04-29 RX ORDER — TOBRAMYCIN 3 MG/ML
SOLUTION/ DROPS OPHTHALMIC
COMMUNITY
Start: 2024-04-25 | End: 2024-04-29

## 2024-04-29 RX ORDER — MOXIFLOXACIN 5 MG/ML
1 SOLUTION/ DROPS OPHTHALMIC 3 TIMES DAILY
Qty: 2 ML | Refills: 0 | Status: SHIPPED | OUTPATIENT
Start: 2024-04-29 | End: 2024-05-06

## 2024-04-29 RX ORDER — ESCITALOPRAM OXALATE 10 MG/1
TABLET ORAL
COMMUNITY
Start: 2024-04-03

## 2024-04-29 ASSESSMENT — FIBROSIS 4 INDEX: FIB4 SCORE: 0.45

## 2024-04-29 NOTE — PROGRESS NOTES
Subjective:   Kristal Cesar is a 28 y.o. female who presents for Conjunctivitis (Patient was seen for pink eye a few weeks ago at the [facility] states medication is not helping) and Sexually Transmitted Diseases (Patient states she would like STD testing )      HPI:        ROS As above in HPI    Medications:    Current Outpatient Medications on File Prior to Visit   Medication Sig Dispense Refill    escitalopram (LEXAPRO) 10 MG Tab TAKE 1 TABLET BY MOUTH EVERY DAY FOR 90 DAYS      tobramycin (TOBREX) 0.3 % Solution ophthalmic solution INSTILL 1 DROP INTO AFFECTED EYE EVERY 4 HOURS FOR 7 DAYS (Patient not taking: Reported on 4/29/2024)      ibuprofen (MOTRIN) 800 MG Tab Take 1 Tablet by mouth every 8 hours as needed for Mild Pain. (Patient not taking: Reported on 4/29/2024) 30 Tablet 0    metoclopramide (REGLAN) 10 MG Tab Take 1 Tablet by mouth 4 times a day as needed (vomiting/nausea). (Patient not taking: Reported on 4/29/2024) 40 Tablet 0    ondansetron (ZOFRAN ODT) 4 MG TABLET DISPERSIBLE Take 1 Tablet by mouth every 6 hours as needed for Nausea/Vomiting. (Patient not taking: Reported on 4/29/2024) 30 Tablet 0     No current facility-administered medications on file prior to visit.        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    Carpal tunnel syndrome of right wrist    Recurrent boils    Anxiety    Labor and delivery indication for care or intervention    Intentional drug overdose, initial encounter (HCC)    Suicide attempt ([facility])    Postpartum depression    QT prolongation        Surgical History:  Past Surgical History:   Procedure Laterality Date    DILATION AND CURETTAGE N/A 2/1/2022    Procedure: DILATION AND CURETTAGE;  Surgeon: Arabella Nelson  MAXX Lara;  Location: SURGERY LABOR AND DELIVERY;  Service: Labor and Delivery    REAGAN BY LAPAROSCOPY  3/7/2016    Procedure: REAGAN BY LAPAROSCOPY;  Surgeon: John H Ganser, M.D.;  Location: SURGERY [facility];  Service:     OTHER      gallbladder removed       Past Social Hx:   Social History     Tobacco Use    Smoking status: Never    Smokeless tobacco: Never   Vaping Use    Vaping Use: Never used   Substance Use Topics    Alcohol use: No    Drug use: No          Problem list, medications, and allergies reviewed by myself today in Epic.     Objective:     /70   Pulse 77   Temp 36.8 °C (98.3 °F) (Temporal)   Resp 17   Ht 1.524 m (5')   Wt 58.5 kg (129 lb)   SpO2 98%   BMI 25.19 kg/m²     Physical Exam    Assessment/Plan:       Results for orders placed or performed during the hospital encounter of 04/05/24   Comp Metabolic Panel   Result Value Ref Range    Sodium 139 135 - 145 mmol/L    Potassium 4.2 3.6 - 5.5 mmol/L    Chloride 103 96 - 112 mmol/L    Co2 23 20 - 33 mmol/L    Anion Gap 13.0 7.0 - 16.0    Glucose 75 65 - 99 mg/dL    Bun 12 8 - 22 mg/dL    Creatinine 0.51 0.50 - 1.40 mg/dL    Calcium 9.7 8.5 - 10.5 mg/dL    Correct Calcium 9.0 8.5 - 10.5 mg/dL    AST(SGOT) 16 12 - 45 U/L    ALT(SGPT) 10 2 - 50 U/L    Alkaline Phosphatase 107 (H) 30 - 99 U/L    Total Bilirubin 1.0 0.1 - 1.5 mg/dL    Albumin 4.9 3.2 - 4.9 g/dL    Total Protein 8.3 (H) 6.0 - 8.2 g/dL    Globulin 3.4 1.9 - 3.5 g/dL    A-G Ratio 1.4 g/dL   Lipid Profile   Result Value Ref Range    Cholesterol,Tot 130 100 - 199 mg/dL    Triglycerides 87 0 - 149 mg/dL    HDL 48 >=40 mg/dL    LDL 65 <100 mg/dL   HEMOGLOBIN A1C   Result Value Ref Range    Glycohemoglobin 5.0 4.0 - 5.6 %    Est Avg Glucose 97 mg/dL   TSH WITH REFLEX TO FT4   Result Value Ref Range    TSH 1.010 0.380 - 5.330 uIU/mL   ESTIMATED GFR   Result Value Ref Range    GFR (CKD-EPI) 131 >60 mL/min/1.73 m 2       Diagnosis and associated orders:   There are no  diagnoses linked to this encounter.     Comments/MDM:         ***       Return to clinic or go to ED if symptoms worsen or persist. Indications for ED discussed at length. Patient/Parent/Guardian voices understanding. Follow-up with your primary care provider in 3-5 days. Red flag symptoms discussed. All side effects of medication discussed including allergic response, GI upset, tendon injury, rash, sedation etc.    Please note that this dictation was created using voice recognition software. I have made a reasonable attempt to correct obvious errors, but I expect that there are errors of grammar and possibly content that I did not discover before finalizing the note.    This note was electronically signed by MAGO Marte     (bacterial vaginosis)  - metroNIDAZOLE (FLAGYL) 500 MG Tab; Take 1 Tablet by mouth 2 times a day for 7 days.  Dispense: 14 Tablet; Refill: 0    3. Dysuria  - POCT Urinalysis    4. Acute vaginitis  - Chlamydia/GC, PCR (Genital/Anal swab); Future  - VAGINAL PATHOGENS DNA PANEL; Future    Other orders  - escitalopram (LEXAPRO) 10 MG Tab; TAKE 1 TABLET BY MOUTH EVERY DAY FOR 90 DAYS        Comments/MDM:       No discharge appreciated today from the patient's eyes. Her conjunctiva are injected and she reports irritation and itching. She has pictures on her phone however from last night of purulent discharge from bilateral inner canthus. No signs of periorbital cellulitis appreciated. Will trial Moxifloxacin and olopatadine drops. Encouraged warm compresses, artificial tears. Instructed patient to avoid wearing contact lenses and makeup, and to dispose of older makeup. Return to  should symptoms fail to improve in 24-48 hours.    Vaginal pathogen panel, and chlamydia/gonorrhea tests ordered. Patient performed self swab. UA: -blood, -LE, -nitrite. Uti unlikely. Neg poc hcg.    Addendum: Vaginal pathogen panel is positive for Gardnerella. Patient is not breastfeeding/lactating. Results discussed with patient by phone. Oral metronidazole preferred by patient. Instructed patient to avoid alcohol while taking antibiotic and for an additional 48 hours after she completes the course of treatment. G/C swab is negative.  Follow up with PCP advised.       Return to clinic or go to ED if symptoms worsen or persist. Indications for ED discussed at length. Patient/Parent/Guardian voices understanding. Follow-up with your primary care provider in 3-5 days. Red flag symptoms discussed. All side effects of medication discussed including allergic response, GI upset, tendon injury, rash, sedation etc.    Please note that this dictation was created using voice recognition software. I have made a reasonable attempt to correct obvious  errors, but I expect that there are errors of grammar and possibly content that I did not discover before finalizing the note.    This note was electronically signed by MAGO Marte

## 2024-04-30 LAB
C TRACH DNA GENITAL QL NAA+PROBE: NEGATIVE
CANDIDA DNA VAG QL PROBE+SIG AMP: NEGATIVE
G VAGINALIS DNA VAG QL PROBE+SIG AMP: POSITIVE
N GONORRHOEA DNA GENITAL QL NAA+PROBE: NEGATIVE
SPECIMEN SOURCE: NORMAL
T VAGINALIS DNA VAG QL PROBE+SIG AMP: NEGATIVE

## 2024-04-30 RX ORDER — METRONIDAZOLE 500 MG/1
500 TABLET ORAL 2 TIMES DAILY
Qty: 14 TABLET | Refills: 0 | Status: SHIPPED | OUTPATIENT
Start: 2024-04-30 | End: 2024-05-07

## 2024-05-01 ASSESSMENT — VISUAL ACUITY: OU: 1

## 2024-05-24 ENCOUNTER — OFFICE VISIT (OUTPATIENT)
Dept: URGENT CARE | Facility: CLINIC | Age: 29
End: 2024-05-24
Payer: MEDICAID

## 2024-05-24 VITALS
HEIGHT: 60 IN | OXYGEN SATURATION: 99 % | DIASTOLIC BLOOD PRESSURE: 72 MMHG | TEMPERATURE: 97.1 F | RESPIRATION RATE: 16 BRPM | SYSTOLIC BLOOD PRESSURE: 108 MMHG | WEIGHT: 125 LBS | BODY MASS INDEX: 24.54 KG/M2 | HEART RATE: 74 BPM

## 2024-05-24 DIAGNOSIS — H10.409: ICD-10-CM

## 2024-05-24 DIAGNOSIS — H10.31 ACUTE BACTERIAL CONJUNCTIVITIS OF RIGHT EYE: ICD-10-CM

## 2024-05-24 PROCEDURE — 3078F DIAST BP <80 MM HG: CPT | Performed by: NURSE PRACTITIONER

## 2024-05-24 PROCEDURE — 99214 OFFICE O/P EST MOD 30 MIN: CPT | Performed by: NURSE PRACTITIONER

## 2024-05-24 PROCEDURE — 3074F SYST BP LT 130 MM HG: CPT | Performed by: NURSE PRACTITIONER

## 2024-05-24 RX ORDER — MOXIFLOXACIN 5 MG/ML
1 SOLUTION/ DROPS OPHTHALMIC 3 TIMES DAILY
Qty: 3 ML | Refills: 0 | Status: SHIPPED | OUTPATIENT
Start: 2024-05-24

## 2024-05-24 ASSESSMENT — ENCOUNTER SYMPTOMS
FEVER: 0
EYE REDNESS: 1
DOUBLE VISION: 0
COUGH: 0
PHOTOPHOBIA: 0
NAUSEA: 0
EYE DISCHARGE: 1
BLURRED VISION: 0
HEADACHES: 0
EYE PAIN: 1
SORE THROAT: 0
CHILLS: 0

## 2024-05-24 ASSESSMENT — FIBROSIS 4 INDEX: FIB4 SCORE: 0.45

## 2024-05-24 NOTE — PROGRESS NOTES
Subjective     Kristal Cesar is a 28 y.o. female who presents with [place] [facility] (Xonce a month, pt had been using eye drops, pt had questions regarding Hep B)            HPI  New problem.  Patient is a 28-year-old female presents with pinkeye that has been on occurring at least once a month per her report.  She reports that yesterday she started out with redness and discharge from her eye and that it was matted shut this morning.  She has some discomfort however no blurred vision, or double vision.  She denies any headache or nausea.  She has not taken anything for the symptoms.  She reports that her kids have not been affected by this.    Other environmental  Current Outpatient Medications on File Prior to Visit   Medication Sig Dispense Refill    escitalopram (LEXAPRO) 10 MG Tab TAKE 1 TABLET BY MOUTH EVERY DAY FOR 90 DAYS      olopatadine (PATANOL) 0.1 % ophthalmic solution Administer 1 Drop into both eyes 2 times a day. For allergic conjunctivitis 5 mL 0    ibuprofen (MOTRIN) 800 MG Tab Take 1 Tablet by mouth every 8 hours as needed for Mild Pain. (Patient not taking: Reported on 4/29/2024) 30 Tablet 0    metoclopramide (REGLAN) 10 MG Tab Take 1 Tablet by mouth 4 times a day as needed (vomiting/nausea). (Patient not taking: Reported on 4/29/2024) 40 Tablet 0    ondansetron (ZOFRAN ODT) 4 MG TABLET DISPERSIBLE Take 1 Tablet by mouth every 6 hours as needed for Nausea/Vomiting. (Patient not taking: Reported on 4/29/2024) 30 Tablet 0     No current facility-administered medications on file prior to visit.     Social History     Socioeconomic History    Marital status: Single     Spouse name: Not on file    Number of children: Not on file    Years of education: Not on file    Highest education level: Some college, no degree   Occupational History    Not on file   Tobacco Use    Smoking status: Never    Smokeless tobacco: Never   Vaping Use    Vaping status: Never Used   Substance and Sexual Activity     Alcohol use: No    Drug use: No    Sexual activity: Yes     Partners: Male   Other Topics Concern    Not on file   Social History Narrative    Not on file     Social Determinants of Health     Financial Resource Strain: Low Risk  (2/25/2023)    Overall Financial Resource Strain (CARDIA)     Difficulty of Paying Living Expenses: Not very hard   Food Insecurity: No Food Insecurity (2/25/2023)    Hunger Vital Sign     Worried About Running Out of Food in the Last Year: Never true     Ran Out of Food in the Last Year: Never true   Transportation Needs: Unmet Transportation Needs (2/25/2023)    PRAPARE - Transportation     Lack of Transportation (Medical): Yes     Lack of Transportation (Non-Medical): Yes   Physical Activity: Insufficiently Active (2/25/2023)    Exercise Vital Sign     Days of Exercise per Week: 1 day     Minutes of Exercise per Session: 20 min   Stress: Stress Concern Present (2/25/2023)    [employer] [place] [employer] - Occupational Stress Questionnaire     Feeling of Stress : To some extent   Social Connections: Moderately Isolated (2/25/2023)    Social Connection and Isolation Panel [NHANES]     Frequency of Communication with Friends and Family: More than three times a week     Frequency of Social Gatherings with Friends and Family: Never     Attends [employer] Services: 1 to 4 times per year     Active Member of Clubs or Organizations: No     Attends Club or Organization Meetings: Patient declined     Marital Status: Never    Intimate Partner Violence: Not on file   Housing Stability: High Risk (2/25/2023)    Housing Stability Vital Sign     Unable to Pay for Housing in the Last Year: No     Number of Places Lived in the Last Year: 1     Unstable Housing in the Last Year: Yes     Breast Cancer-related family history is not on file.      Review of Systems   Constitutional:  Negative for chills and fever.   HENT:  Negative for congestion and sore throat.    Eyes:  Positive for pain,  discharge and redness. Negative for blurred vision, double vision and photophobia.   Respiratory:  Negative for cough.    Gastrointestinal:  Negative for nausea.   Neurological:  Negative for headaches.              Objective     /72   Pulse 74   Temp 36.2 °C (97.1 °F) (Temporal)   Resp 16   Ht 1.524 m (5')   Wt 56.7 kg (125 lb)   SpO2 99%   BMI 24.41 kg/m²      Physical Exam  Vitals and nursing note reviewed.   Constitutional:       Appearance: Normal appearance.   HENT:      Head: Normocephalic and atraumatic.      Right Ear: Tympanic membrane normal.      Left Ear: Tympanic membrane normal.      Nose: No congestion or rhinorrhea.   Eyes:      General:         Right eye: Discharge present.      Comments: Injected right eye   Cardiovascular:      Rate and Rhythm: Normal rate and regular rhythm.      Heart sounds: No murmur heard.  Pulmonary:      Effort: Pulmonary effort is normal.      Breath sounds: Normal breath sounds.   Neurological:      Mental Status: She is alert.                             Assessment & Plan        1. Acute bacterial conjunctivitis of right eye  moxifloxacin (VIGAMOX) 0.5 % Solution      2. Chronic bacterial conjunctivitis, unspecified laterality  Referral to Optometry        Referral to eye doctor as this is a recurring issue.  Moxifloxacin  Differential diagnosis, natural history, supportive care, and indications for immediate follow-up were discussed.

## 2024-06-09 ENCOUNTER — HOSPITAL ENCOUNTER (EMERGENCY)
Facility: MEDICAL CENTER | Age: 29
End: 2024-06-09
Attending: EMERGENCY MEDICINE
Payer: MEDICAID

## 2024-06-09 VITALS
BODY MASS INDEX: 24.54 KG/M2 | TEMPERATURE: 98 F | HEIGHT: 60 IN | SYSTOLIC BLOOD PRESSURE: 110 MMHG | OXYGEN SATURATION: 98 % | DIASTOLIC BLOOD PRESSURE: 63 MMHG | WEIGHT: 125 LBS | RESPIRATION RATE: 16 BRPM | HEART RATE: 78 BPM

## 2024-06-09 DIAGNOSIS — R19.7 NAUSEA VOMITING AND DIARRHEA: ICD-10-CM

## 2024-06-09 DIAGNOSIS — R11.2 NAUSEA VOMITING AND DIARRHEA: ICD-10-CM

## 2024-06-09 DIAGNOSIS — A05.9 FOOD POISONING: ICD-10-CM

## 2024-06-09 LAB
ALBUMIN SERPL BCP-MCNC: 5.1 G/DL (ref 3.2–4.9)
ALBUMIN/GLOB SERPL: 1.7 G/DL
ALP SERPL-CCNC: 117 U/L (ref 30–99)
ALT SERPL-CCNC: 11 U/L (ref 2–50)
ANION GAP SERPL CALC-SCNC: 19 MMOL/L (ref 7–16)
APPEARANCE UR: CLEAR
AST SERPL-CCNC: 16 U/L (ref 12–45)
BACTERIA #/AREA URNS HPF: NEGATIVE /HPF
BASOPHILS # BLD AUTO: 0.2 % (ref 0–1.8)
BASOPHILS # BLD: 0.04 K/UL (ref 0–0.12)
BILIRUB SERPL-MCNC: 0.8 MG/DL (ref 0.1–1.5)
BILIRUB UR QL STRIP.AUTO: NEGATIVE
BUN SERPL-MCNC: 12 MG/DL (ref 8–22)
CALCIUM ALBUM COR SERPL-MCNC: 8.8 MG/DL (ref 8.5–10.5)
CALCIUM SERPL-MCNC: 9.7 MG/DL (ref 8.5–10.5)
CHLORIDE SERPL-SCNC: 107 MMOL/L (ref 96–112)
CO2 SERPL-SCNC: 20 MMOL/L (ref 20–33)
COLOR UR: YELLOW
CREAT SERPL-MCNC: 0.46 MG/DL (ref 0.5–1.4)
EOSINOPHIL # BLD AUTO: 0 K/UL (ref 0–0.51)
EOSINOPHIL NFR BLD: 0 % (ref 0–6.9)
EPI CELLS #/AREA URNS HPF: NEGATIVE /HPF
ERYTHROCYTE [DISTWIDTH] IN BLOOD BY AUTOMATED COUNT: 38 FL (ref 35.9–50)
GFR SERPLBLD CREATININE-BSD FMLA CKD-EPI: 134 ML/MIN/1.73 M 2
GLOBULIN SER CALC-MCNC: 3 G/DL (ref 1.9–3.5)
GLUCOSE SERPL-MCNC: 113 MG/DL (ref 65–99)
GLUCOSE UR STRIP.AUTO-MCNC: NEGATIVE MG/DL
HCG UR QL: NEGATIVE
HCT VFR BLD AUTO: 42.4 % (ref 37–47)
HGB BLD-MCNC: 14.3 G/DL (ref 12–16)
HYALINE CASTS #/AREA URNS LPF: NORMAL /LPF
IMM GRANULOCYTES # BLD AUTO: 0.07 K/UL (ref 0–0.11)
IMM GRANULOCYTES NFR BLD AUTO: 0.4 % (ref 0–0.9)
KETONES UR STRIP.AUTO-MCNC: 15 MG/DL
LEUKOCYTE ESTERASE UR QL STRIP.AUTO: NEGATIVE
LIPASE SERPL-CCNC: 16 U/L (ref 11–82)
LYMPHOCYTES # BLD AUTO: 1.39 K/UL (ref 1–4.8)
LYMPHOCYTES NFR BLD: 8.4 % (ref 22–41)
MCH RBC QN AUTO: 27.1 PG (ref 27–33)
MCHC RBC AUTO-ENTMCNC: 33.7 G/DL (ref 32.2–35.5)
MCV RBC AUTO: 80.3 FL (ref 81.4–97.8)
MICRO URNS: ABNORMAL
MONOCYTES # BLD AUTO: 0.32 K/UL (ref 0–0.85)
MONOCYTES NFR BLD AUTO: 1.9 % (ref 0–13.4)
NEUTROPHILS # BLD AUTO: 14.72 K/UL (ref 1.82–7.42)
NEUTROPHILS NFR BLD: 89.1 % (ref 44–72)
NITRITE UR QL STRIP.AUTO: NEGATIVE
NRBC # BLD AUTO: 0 K/UL
NRBC BLD-RTO: 0 /100 WBC (ref 0–0.2)
PH UR STRIP.AUTO: 7.5 [PH] (ref 5–8)
PLATELET # BLD AUTO: 348 K/UL (ref 164–446)
PMV BLD AUTO: 11 FL (ref 9–12.9)
POTASSIUM SERPL-SCNC: 3.5 MMOL/L (ref 3.6–5.5)
PROT SERPL-MCNC: 8.1 G/DL (ref 6–8.2)
PROT UR QL STRIP: 30 MG/DL
RBC # BLD AUTO: 5.28 M/UL (ref 4.2–5.4)
RBC # URNS HPF: NORMAL /HPF
RBC UR QL AUTO: NEGATIVE
SODIUM SERPL-SCNC: 146 MMOL/L (ref 135–145)
SP GR UR STRIP.AUTO: 1.02
UROBILINOGEN UR STRIP.AUTO-MCNC: 0.2 MG/DL
WBC # BLD AUTO: 16.5 K/UL (ref 4.8–10.8)
WBC #/AREA URNS HPF: NORMAL /HPF

## 2024-06-09 PROCEDURE — 36415 COLL VENOUS BLD VENIPUNCTURE: CPT

## 2024-06-09 PROCEDURE — 700105 HCHG RX REV CODE 258: Mod: UD | Performed by: EMERGENCY MEDICINE

## 2024-06-09 PROCEDURE — 700111 HCHG RX REV CODE 636 W/ 250 OVERRIDE (IP): Mod: JZ,UD | Performed by: EMERGENCY MEDICINE

## 2024-06-09 PROCEDURE — 80053 COMPREHEN METABOLIC PANEL: CPT

## 2024-06-09 PROCEDURE — 99284 EMERGENCY DEPT VISIT MOD MDM: CPT

## 2024-06-09 PROCEDURE — 85025 COMPLETE CBC W/AUTO DIFF WBC: CPT

## 2024-06-09 PROCEDURE — 83690 ASSAY OF LIPASE: CPT

## 2024-06-09 PROCEDURE — 81001 URINALYSIS AUTO W/SCOPE: CPT

## 2024-06-09 PROCEDURE — 700102 HCHG RX REV CODE 250 W/ 637 OVERRIDE(OP): Mod: UD | Performed by: EMERGENCY MEDICINE

## 2024-06-09 PROCEDURE — 96374 THER/PROPH/DIAG INJ IV PUSH: CPT

## 2024-06-09 PROCEDURE — 81025 URINE PREGNANCY TEST: CPT

## 2024-06-09 PROCEDURE — A9270 NON-COVERED ITEM OR SERVICE: HCPCS | Mod: UD | Performed by: EMERGENCY MEDICINE

## 2024-06-09 RX ORDER — SODIUM CHLORIDE, SODIUM LACTATE, POTASSIUM CHLORIDE, CALCIUM CHLORIDE 600; 310; 30; 20 MG/100ML; MG/100ML; MG/100ML; MG/100ML
1000 INJECTION, SOLUTION INTRAVENOUS ONCE
Status: COMPLETED | OUTPATIENT
Start: 2024-06-09 | End: 2024-06-09

## 2024-06-09 RX ORDER — ONDANSETRON 2 MG/ML
4 INJECTION INTRAMUSCULAR; INTRAVENOUS ONCE
Status: COMPLETED | OUTPATIENT
Start: 2024-06-09 | End: 2024-06-09

## 2024-06-09 RX ORDER — IBUPROFEN 600 MG/1
600 TABLET ORAL ONCE
Status: COMPLETED | OUTPATIENT
Start: 2024-06-09 | End: 2024-06-09

## 2024-06-09 RX ORDER — ONDANSETRON 4 MG/1
4 TABLET, ORALLY DISINTEGRATING ORAL EVERY 6 HOURS PRN
Qty: 10 TABLET | Refills: 0 | Status: SHIPPED | OUTPATIENT
Start: 2024-06-09

## 2024-06-09 RX ORDER — ACETAMINOPHEN 325 MG/1
650 TABLET ORAL ONCE
Status: COMPLETED | OUTPATIENT
Start: 2024-06-09 | End: 2024-06-09

## 2024-06-09 RX ADMIN — SODIUM CHLORIDE, POTASSIUM CHLORIDE, SODIUM LACTATE AND CALCIUM CHLORIDE 1000 ML: 600; 310; 30; 20 INJECTION, SOLUTION INTRAVENOUS at 12:54

## 2024-06-09 RX ADMIN — ONDANSETRON 4 MG: 2 INJECTION INTRAMUSCULAR; INTRAVENOUS at 12:52

## 2024-06-09 RX ADMIN — ACETAMINOPHEN 650 MG: 325 TABLET, FILM COATED ORAL at 16:23

## 2024-06-09 RX ADMIN — IBUPROFEN 600 MG: 600 TABLET, FILM COATED ORAL at 16:23

## 2024-06-09 RX ADMIN — SODIUM CHLORIDE, POTASSIUM CHLORIDE, SODIUM LACTATE AND CALCIUM CHLORIDE 1000 ML: 600; 310; 30; 20 INJECTION, SOLUTION INTRAVENOUS at 14:52

## 2024-06-09 ASSESSMENT — FIBROSIS 4 INDEX: FIB4 SCORE: 0.45

## 2024-06-09 NOTE — ED NOTES
The pt laying in bed with eyes closed. The pt arousable to voice and becomes oriented. The pt states that she feels better. The pt unable to urinate at this time  . Ice chips given to the pt

## 2024-06-09 NOTE — ED PROVIDER NOTES
"ED Provider Note    CHIEF COMPLAINT  Chief Complaint   Patient presents with    Nausea/Vomiting/Diarrhea     Since last night        EXTERNAL RECORDS REVIEWED      HPI/ROS  LIMITATION TO HISTORY     OUTSIDE HISTORIAN(S):      Kristal Cesar is a 28 y.o. female who presents to the emergency department with nausea vomiting diarrhea.  Patient began feeling ill late last night after eating a seafood meal at her mother's house yesterday evening.  She woke up this morning and was having worsening nausea vomiting episodes of vomit reports that she cannot tolerate any p.o. intake and is to the point where she is only involved vomiting bile.  She also reports just recently starting having diarrhea as well.  No blood in emesis no blood in stool no fevers no chills no pain in her chest or abdomen no chance of pregnancy at this time no other acute symptom change or concern.    PAST MEDICAL HISTORY   has a past medical history of Allergy, Anxiety, Depression, and Obesity (BMI 30-39.9).    SURGICAL HISTORY   has a past surgical history that includes ariana by laparoscopy (3/7/2016); other; and dilation and curettage (N/A, 2/1/2022).    FAMILY HISTORY  Family History   Problem Relation Age of Onset    No Known Problems Mother     Diabetes Father     Hypertension Father     Hyperlipidemia Father     No Known Problems Sister     Cancer Neg Hx        SOCIAL HISTORY  Social History     Tobacco Use    Smoking status: Never    Smokeless tobacco: Never   Vaping Use    Vaping status: Never Used   Substance and Sexual Activity    Alcohol use: No    Drug use: No    Sexual activity: Yes     Partners: Male       CURRENT MEDICATIONS  Home Medications    **Home medications have not yet been reviewed for this encounter**         ALLERGIES  Allergies   Allergen Reactions    Other Environmental Rash     \"dogs\" - \"my skin turns red\"       PHYSICAL EXAM  VITAL SIGNS: /82   Pulse 95   Temp 36.7 °C (98.1 °F) (Temporal)   Resp 16  "  Ht 1.524 m (5')   Wt 56.7 kg (125 lb)   SpO2 98%   BMI 24.41 kg/m²      Pulse ox interpretation: I interpret this pulse ox as normal.  Constitutional: Alert and oriented x 3, Distress  HEENT: Atraumatic normocephalic, pupils are equal round reactive to light extraocular movements are intact. The nares is clear, external ears are normal, mouth shows dry mucous membranes normal dentition for age  Neck: Supple, no JVD no tracheal deviation  Cardiovascular: Tachycardic no murmur rub or gallop 2+ pulses peripherally x4  Thorax & Lungs: No respiratory distress, no wheezes rales or rhonchi, No chest tenderness.   GI: Soft nontender nondistended positive bowel sounds, no peritoneal signs  Skin: Warm dry no acute rash or lesion  Musculoskeletal: Moving all extremities with full range and 5 of 5 strength no acute  deformity  Neurologic: Cranial nerves III through XII are grossly intact no sensory deficit no cerebellar dysfunction   Psychiatric: Appropriate affect for situation at this time      EKG/LABS  Results for orders placed or performed during the hospital encounter of 06/09/24   CBC WITH DIFFERENTIAL   Result Value Ref Range    WBC 16.5 (H) 4.8 - 10.8 K/uL    RBC 5.28 4.20 - 5.40 M/uL    Hemoglobin 14.3 12.0 - 16.0 g/dL    Hematocrit 42.4 37.0 - 47.0 %    MCV 80.3 (L) 81.4 - 97.8 fL    MCH 27.1 27.0 - 33.0 pg    MCHC 33.7 32.2 - 35.5 g/dL    RDW 38.0 35.9 - 50.0 fL    Platelet Count 348 164 - 446 K/uL    MPV 11.0 9.0 - 12.9 fL    Neutrophils-Polys 89.10 (H) 44.00 - 72.00 %    Lymphocytes 8.40 (L) 22.00 - 41.00 %    Monocytes 1.90 0.00 - 13.40 %    Eosinophils 0.00 0.00 - 6.90 %    Basophils 0.20 0.00 - 1.80 %    Immature Granulocytes 0.40 0.00 - 0.90 %    Nucleated RBC 0.00 0.00 - 0.20 /100 WBC    Neutrophils (Absolute) 14.72 (H) 1.82 - 7.42 K/uL    Lymphs (Absolute) 1.39 1.00 - 4.80 K/uL    Monos (Absolute) 0.32 0.00 - 0.85 K/uL    Eos (Absolute) 0.00 0.00 - 0.51 K/uL    Baso (Absolute) 0.04 0.00 - 0.12 K/uL     Immature Granulocytes (abs) 0.07 0.00 - 0.11 K/uL    NRBC (Absolute) 0.00 K/uL   COMP METABOLIC PANEL   Result Value Ref Range    Sodium 146 (H) 135 - 145 mmol/L    Potassium 3.5 (L) 3.6 - 5.5 mmol/L    Chloride 107 96 - 112 mmol/L    Co2 20 20 - 33 mmol/L    Anion Gap 19.0 (H) 7.0 - 16.0    Glucose 113 (H) 65 - 99 mg/dL    Bun 12 8 - 22 mg/dL    Creatinine 0.46 (L) 0.50 - 1.40 mg/dL    Calcium 9.7 8.5 - 10.5 mg/dL    Correct Calcium 8.8 8.5 - 10.5 mg/dL    AST(SGOT) 16 12 - 45 U/L    ALT(SGPT) 11 2 - 50 U/L    Alkaline Phosphatase 117 (H) 30 - 99 U/L    Total Bilirubin 0.8 0.1 - 1.5 mg/dL    Albumin 5.1 (H) 3.2 - 4.9 g/dL    Total Protein 8.1 6.0 - 8.2 g/dL    Globulin 3.0 1.9 - 3.5 g/dL    A-G Ratio 1.7 g/dL   LIPASE   Result Value Ref Range    Lipase 16 11 - 82 U/L   URINALYSIS,CULTURE IF INDICATED    Specimen: Urine, Clean Catch   Result Value Ref Range    Color Yellow     Character Clear     Specific Gravity 1.021 <1.035    Ph 7.5 5.0 - 8.0    Glucose Negative Negative mg/dL    Ketones 15 (A) Negative mg/dL    Protein 30 (A) Negative mg/dL    Bilirubin Negative Negative    Urobilinogen, Urine 0.2 Negative    Nitrite Negative Negative    Leukocyte Esterase Negative Negative    Occult Blood Negative Negative    Micro Urine Req Microscopic    HCG QUALITATIVE UR   Result Value Ref Range    Beta-Hcg Urine Negative Negative   ESTIMATED GFR   Result Value Ref Range    GFR (CKD-EPI) 134 >60 mL/min/1.73 m 2   URINE MICROSCOPIC (W/UA)   Result Value Ref Range    WBC 0-2 /hpf    RBC 0-2 /hpf    Bacteria Negative None /hpf    Epithelial Cells Negative /hpf    Hyaline Cast 0-2 /lpf       I have independently interpreted this EKG    COURSE & MEDICAL DECISION MAKING    ASSESSMENT, COURSE AND PLAN  Care Narrative: Patient is given IV Zofran she is given a total of 2 L of IV fluid she feels much better after interventions her urine is unremarkable for any acute infectious process.  Likely foodborne illness at this point.  She  is given instructions on oral hydration she is given a prescription for Zofran.  Given instructions return for worsening nausea vomiting inability to tolerate p.o. intake any blood in emesis blood in stool or any other acute symptom change or concern otherwise discharged in stable and improved condition.       ADDITIONAL PROBLEMS MANAGED    DISPOSITION AND DISCUSSIONS  I have discussed management of the patient with the following physicians and BETTY's:      Discussion of management with other QHP or appropriate source(s):      Escalation of care considered, and ultimately not performed:acute inpatient care management, however at this time, the patient is most appropriate for outpatient management    Barriers to care at this time, including but not limited to: .     Decision tools and prescription drugs considered including, but not limited to: .  /73   Pulse 69   Temp 36.7 °C (98.1 °F) (Temporal)   Resp 14   Ht 1.524 m (5')   Wt 56.7 kg (125 lb)   SpO2 95%   BMI 24.41 kg/m²     Margareth Villegas M.D.  [address] [id number] [phone]          [facility], Emergency Dept  [address] [facility] [address] [phone]    in 12-24 hours if symptoms persist, immediately If symptoms worsen, or if you develop any other symptoms or concerns      FINAL DIAGNOSIS  1. Food poisoning Active   2. Nausea vomiting and diarrhea Active          Electronically signed by: Gómez Nguyen M.D.

## 2024-06-09 NOTE — ED NOTES
The pt moved to room via wheelchair. The pt ambulated to bed with a steady gait. The pt was given blankets, bedside commode, vomit bag. The pt changed and RN hooked up the pt to the monitor

## 2024-06-09 NOTE — ED NOTES
The pt is laying in bed with eyes closed. Breathing is even and unlabored. No indicators of pain or vomiting. Vitals stable

## 2024-06-09 NOTE — ED TRIAGE NOTES
Kristal Cesar  28 y.o. female  Chief Complaint   Patient presents with    Nausea/Vomiting/Diarrhea     Since last night        Vitals:    06/09/24 1151   BP: 113/82   Pulse: 95   Resp: 16   Temp: 36.7 °C (98.1 °F)   SpO2: 98%       Patient educated on triage process and encouraged to alert staff of any changes in condition.      Pt to triage via wheelchair. Pt actively inducing vomiting with her fingers. This RN strongly recommended that she stops doing this, but pt persists.

## 2024-06-09 NOTE — ED NOTES
The pt is alert and oriented. No indicators of pain or vomiting. The pt ambulates with a steady gait to the room. Vitals stable on the monitor. The pt reports headache. ERP notified and stated will order med

## 2024-06-09 NOTE — ED NOTES
Pt discharged to home. Pt was given follow up instructions and prescriptions for zofran. Pt verbalized understanding of all instructions for discharge and is ambulatory out of ED with steady gait. AOx4

## 2024-07-23 ENCOUNTER — APPOINTMENT (OUTPATIENT)
Dept: URGENT CARE | Facility: CLINIC | Age: 29
End: 2024-07-23
Payer: MEDICAID

## 2024-07-23 ENCOUNTER — HOSPITAL ENCOUNTER (OUTPATIENT)
Facility: MEDICAL CENTER | Age: 29
End: 2024-07-23
Attending: NURSE PRACTITIONER
Payer: MEDICAID

## 2024-07-23 ENCOUNTER — OFFICE VISIT (OUTPATIENT)
Dept: URGENT CARE | Facility: CLINIC | Age: 29
End: 2024-07-23
Payer: MEDICAID

## 2024-07-23 VITALS
OXYGEN SATURATION: 98 % | RESPIRATION RATE: 16 BRPM | SYSTOLIC BLOOD PRESSURE: 112 MMHG | BODY MASS INDEX: 24.41 KG/M2 | HEART RATE: 68 BPM | HEIGHT: 60 IN | DIASTOLIC BLOOD PRESSURE: 62 MMHG | TEMPERATURE: 98 F

## 2024-07-23 DIAGNOSIS — B96.89 BACTERIAL VAGINOSIS: ICD-10-CM

## 2024-07-23 DIAGNOSIS — N89.8 VAGINAL DISCHARGE: ICD-10-CM

## 2024-07-23 DIAGNOSIS — R30.0 DYSURIA: ICD-10-CM

## 2024-07-23 DIAGNOSIS — N76.0 BACTERIAL VAGINOSIS: ICD-10-CM

## 2024-07-23 DIAGNOSIS — B37.9 CANDIDIASIS: ICD-10-CM

## 2024-07-23 LAB
APPEARANCE UR: CLEAR
BILIRUB UR STRIP-MCNC: NEGATIVE MG/DL
COLOR UR AUTO: YELLOW
GLUCOSE UR STRIP.AUTO-MCNC: NEGATIVE MG/DL
KETONES UR STRIP.AUTO-MCNC: NEGATIVE MG/DL
LEUKOCYTE ESTERASE UR QL STRIP.AUTO: NEGATIVE
NITRITE UR QL STRIP.AUTO: NEGATIVE
PH UR STRIP.AUTO: 6.5 [PH] (ref 5–8)
POCT INT CON NEG: NEGATIVE
POCT INT CON POS: POSITIVE
POCT URINE PREGNANCY TEST: NEGATIVE
PROT UR QL STRIP: NEGATIVE MG/DL
RBC UR QL AUTO: NEGATIVE
SP GR UR STRIP.AUTO: 1.01
UROBILINOGEN UR STRIP-MCNC: NORMAL MG/DL

## 2024-07-23 PROCEDURE — 81025 URINE PREGNANCY TEST: CPT | Performed by: NURSE PRACTITIONER

## 2024-07-23 PROCEDURE — 87591 N.GONORRHOEAE DNA AMP PROB: CPT

## 2024-07-23 PROCEDURE — 3078F DIAST BP <80 MM HG: CPT | Performed by: NURSE PRACTITIONER

## 2024-07-23 PROCEDURE — 87510 GARDNER VAG DNA DIR PROBE: CPT

## 2024-07-23 PROCEDURE — 87660 TRICHOMONAS VAGIN DIR PROBE: CPT

## 2024-07-23 PROCEDURE — 81002 URINALYSIS NONAUTO W/O SCOPE: CPT | Performed by: NURSE PRACTITIONER

## 2024-07-23 PROCEDURE — 87480 CANDIDA DNA DIR PROBE: CPT

## 2024-07-23 PROCEDURE — 87491 CHLMYD TRACH DNA AMP PROBE: CPT

## 2024-07-23 PROCEDURE — 99213 OFFICE O/P EST LOW 20 MIN: CPT | Performed by: NURSE PRACTITIONER

## 2024-07-23 PROCEDURE — 3074F SYST BP LT 130 MM HG: CPT | Performed by: NURSE PRACTITIONER

## 2024-07-23 RX ORDER — ACETAMINOPHEN 325 MG/1
650 TABLET ORAL EVERY 4 HOURS PRN
COMMUNITY

## 2024-07-23 RX ORDER — AZITHROMYCIN 500 MG/1
1000 TABLET, FILM COATED ORAL ONCE
Qty: 2 TABLET | Refills: 0 | Status: SHIPPED | OUTPATIENT
Start: 2024-07-23 | End: 2024-07-23

## 2024-07-23 RX ORDER — AZITHROMYCIN 500 MG/1
1000 TABLET, FILM COATED ORAL ONCE
Status: DISCONTINUED | OUTPATIENT
Start: 2024-07-23 | End: 2024-07-23

## 2024-07-23 ASSESSMENT — ENCOUNTER SYMPTOMS
FEVER: 0
SORE THROAT: 0
PSYCHIATRIC NEGATIVE: 1
DIARRHEA: 0
HEADACHES: 0
WHEEZING: 0
VOMITING: 0
SHORTNESS OF BREATH: 0
MYALGIAS: 0
COUGH: 0
CHILLS: 0
WEAKNESS: 0
DOUBLE VISION: 0

## 2024-07-24 RX ORDER — METRONIDAZOLE 500 MG/1
500 TABLET ORAL 2 TIMES DAILY
Qty: 14 TABLET | Refills: 0 | Status: SHIPPED | OUTPATIENT
Start: 2024-07-24 | End: 2024-07-31

## 2024-09-09 ENCOUNTER — OFFICE VISIT (OUTPATIENT)
Dept: URGENT CARE | Facility: CLINIC | Age: 29
End: 2024-09-09
Payer: MEDICAID

## 2024-09-09 ENCOUNTER — HOSPITAL ENCOUNTER (OUTPATIENT)
Facility: MEDICAL CENTER | Age: 29
End: 2024-09-09
Payer: MEDICAID

## 2024-09-09 VITALS
TEMPERATURE: 97.6 F | BODY MASS INDEX: 25.82 KG/M2 | WEIGHT: 131.5 LBS | OXYGEN SATURATION: 99 % | HEIGHT: 60 IN | HEART RATE: 63 BPM | DIASTOLIC BLOOD PRESSURE: 62 MMHG | SYSTOLIC BLOOD PRESSURE: 108 MMHG | RESPIRATION RATE: 16 BRPM

## 2024-09-09 DIAGNOSIS — N94.9 VAGINAL LUMP: ICD-10-CM

## 2024-09-09 DIAGNOSIS — H00.011 HORDEOLUM EXTERNUM OF RIGHT UPPER EYELID: ICD-10-CM

## 2024-09-09 DIAGNOSIS — H57.89 EYE SWELLING, RIGHT: ICD-10-CM

## 2024-09-09 DIAGNOSIS — N30.00 ACUTE CYSTITIS WITHOUT HEMATURIA: ICD-10-CM

## 2024-09-09 DIAGNOSIS — H10.9 BACTERIAL CONJUNCTIVITIS: ICD-10-CM

## 2024-09-09 LAB
APPEARANCE UR: CLEAR
BILIRUB UR STRIP-MCNC: NEGATIVE MG/DL
COLOR UR AUTO: YELLOW
GLUCOSE UR STRIP.AUTO-MCNC: NEGATIVE MG/DL
KETONES UR STRIP.AUTO-MCNC: NEGATIVE MG/DL
LEUKOCYTE ESTERASE UR QL STRIP.AUTO: NORMAL
NITRITE UR QL STRIP.AUTO: NEGATIVE
PH UR STRIP.AUTO: 7 [PH] (ref 5–8)
POCT INT CON NEG: NEGATIVE
POCT INT CON POS: POSITIVE
POCT URINE PREGNANCY TEST: NEGATIVE
PROT UR QL STRIP: NEGATIVE MG/DL
RBC UR QL AUTO: NEGATIVE
SP GR UR STRIP.AUTO: 1.02
UROBILINOGEN UR STRIP-MCNC: 0.2 MG/DL

## 2024-09-09 PROCEDURE — 87480 CANDIDA DNA DIR PROBE: CPT

## 2024-09-09 PROCEDURE — 87077 CULTURE AEROBIC IDENTIFY: CPT | Mod: 91

## 2024-09-09 PROCEDURE — 87086 URINE CULTURE/COLONY COUNT: CPT

## 2024-09-09 PROCEDURE — 3078F DIAST BP <80 MM HG: CPT

## 2024-09-09 PROCEDURE — 99214 OFFICE O/P EST MOD 30 MIN: CPT

## 2024-09-09 PROCEDURE — 3074F SYST BP LT 130 MM HG: CPT

## 2024-09-09 PROCEDURE — 87510 GARDNER VAG DNA DIR PROBE: CPT

## 2024-09-09 PROCEDURE — 87491 CHLMYD TRACH DNA AMP PROBE: CPT

## 2024-09-09 PROCEDURE — 87660 TRICHOMONAS VAGIN DIR PROBE: CPT

## 2024-09-09 PROCEDURE — 81002 URINALYSIS NONAUTO W/O SCOPE: CPT

## 2024-09-09 PROCEDURE — 87591 N.GONORRHOEAE DNA AMP PROB: CPT

## 2024-09-09 PROCEDURE — 81025 URINE PREGNANCY TEST: CPT

## 2024-09-09 RX ORDER — ERYTHROMYCIN 5 MG/G
1 OINTMENT OPHTHALMIC 4 TIMES DAILY
Qty: 3.5 G | Refills: 0 | Status: SHIPPED | OUTPATIENT
Start: 2024-09-09 | End: 2024-09-16

## 2024-09-09 RX ORDER — DEXAMETHASONE SODIUM PHOSPHATE 10 MG/ML
8 INJECTION INTRAMUSCULAR; INTRAVENOUS ONCE
Status: COMPLETED | OUTPATIENT
Start: 2024-09-09 | End: 2024-09-09

## 2024-09-09 RX ADMIN — DEXAMETHASONE SODIUM PHOSPHATE 8 MG: 10 INJECTION INTRAMUSCULAR; INTRAVENOUS at 11:42

## 2024-09-09 ASSESSMENT — FIBROSIS 4 INDEX: FIB4 SCORE: 0.39

## 2024-09-09 NOTE — PROGRESS NOTES
Subjective     Kristal Cesar is a 29 y.o. female who presents with right eye swelling x1 day and vaginal lump.     HPI:   Kristal is a 30yo female presenting today for evaluation of right eye swelling x1 day. Reports associated redness and drainage. Reports green discharge and crusting of eye. No headache or vomiting. Denies abdominal pain or diarrhea. No ear pain. Denies fever. No vision changes or eye pain. Denies headache. No pain with extraocular movements.   Patient also reports a vaginal lump to the left inner labia that spontaneously drained. Reports resolution of lesion. Patient is requesting STI screening. Denies abnormal vaginal discharge or itching. No dysuria. Denies hematuria. No rash.      Review of Systems   Constitutional:  Negative for chills, fever and malaise/fatigue.   HENT:  Negative for congestion, ear discharge, ear pain, sinus pain and sore throat.    Eyes:  Positive for discharge and redness. Negative for blurred vision, double vision, photophobia and pain.   Respiratory:  Negative for cough, sputum production, shortness of breath, wheezing and stridor.    Gastrointestinal:  Negative for abdominal pain, diarrhea, nausea and vomiting.   Genitourinary:  Negative for dysuria, flank pain, frequency, hematuria and urgency.   Musculoskeletal:  Negative for myalgias and neck pain.   Skin:  Negative for itching and rash.   Neurological:  Negative for dizziness, tingling, sensory change, weakness and headaches.     Past Medical History:   Diagnosis Date    Allergy     Anxiety     Depression     Obesity (BMI 30-39.9)      Past Surgical History:   Procedure Laterality Date    DILATION AND CURETTAGE N/A 2/1/2022    Procedure: DILATION AND CURETTAGE;  Surgeon: Arabella Lara M.D.;  Location: [facility];  Service: Labor and Delivery    REAGAN BY LAPAROSCOPY  3/7/2016    Procedure: REAGAN BY LAPAROSCOPY;  Surgeon: John H Ganser, M.D.;  Location: [facility];  Service:     OTHER      gallbladder removed     Allergies: Other environmental     Current Outpatient Medications:     acetaminophen (TYLENOL) 325 MG Tab, Take 650 mg by mouth every four hours as needed., Disp: , Rfl:     ondansetron (ZOFRAN ODT) 4 MG TABLET DISPERSIBLE, Take 1 Tablet by mouth every 6 hours as needed for Nausea/Vomiting., Disp: 10 Tablet, Rfl: 0    moxifloxacin (VIGAMOX) 0.5 % Solution, Administer 1 Drop into both eyes 3 times a day., Disp: 3 mL, Rfl: 0    escitalopram (LEXAPRO) 10 MG Tab, TAKE 1 TABLET BY MOUTH EVERY DAY FOR 90 DAYS, Disp: , Rfl:     olopatadine (PATANOL) 0.1 % ophthalmic solution, Administer 1 Drop into both eyes 2 times a day. For allergic conjunctivitis, Disp: 5 mL, Rfl: 0    ibuprofen (MOTRIN) 800 MG Tab, Take 1 Tablet by mouth every 8 hours as needed for Mild Pain., Disp: 30 Tablet, Rfl: 0    metoclopramide (REGLAN) 10 MG Tab, Take 1 Tablet by mouth 4 times a day as needed (vomiting/nausea)., Disp: 40 Tablet, Rfl: 0     Social History     Tobacco Use    Smoking status: Never    Smokeless tobacco: Never   Vaping Use    Vaping status: Never Used   Substance Use Topics    Alcohol use: No    Drug use: No      Family History   Problem Relation Age of Onset    No Known Problems Mother     Diabetes Father     Hypertension Father     Hyperlipidemia Father     No Known Problems Sister     Cancer Neg Hx      Medications, Allergies, and current problem list reviewed today in Epic.      Objective     /62   Pulse 63   Temp 36.4 °C (97.6 °F) (Temporal)   Resp 16   Ht 1.524 m (5')   Wt 59.6 kg (131 lb 8 oz)   SpO2 99%   BMI 25.68 kg/m²      Physical Exam  Vitals reviewed. Exam conducted with a chaperone present.   Constitutional:       General: She is not in acute distress.  HENT:      Head: No right periorbital erythema or left periorbital erythema.      Jaw: There is normal jaw occlusion. No tenderness, swelling, pain on movement or malocclusion.      Right Ear:  Tympanic membrane, ear canal and external ear normal.      Left Ear: Tympanic membrane, ear canal and external ear normal.      Nose: Nose normal.      Right Sinus: No maxillary sinus tenderness or frontal sinus tenderness.      Left Sinus: No maxillary sinus tenderness or frontal sinus tenderness.      Mouth/Throat:      Lips: No lesions.      Mouth: Mucous membranes are moist. No oral lesions or angioedema.      Tongue: No lesions. Tongue does not deviate from midline.      Palate: No mass and lesions.      Pharynx: Uvula midline. No oropharyngeal exudate, posterior oropharyngeal erythema or uvula swelling.   Eyes:      General: Vision grossly intact. Gaze aligned appropriately. No visual field deficit.        Right eye: Discharge and hordeolum present.      Extraocular Movements: Extraocular movements intact.      Right eye: No nystagmus.      Left eye: No nystagmus.      Conjunctiva/sclera:      Right eye: Right conjunctiva is injected. Exudate present.      Pupils: Pupils are equal, round, and reactive to light.      Right eye: Pupil is round, reactive and not sluggish.      Left eye: Pupil is round, reactive and not sluggish.      Funduscopic exam:     Right eye: Red reflex present.         Left eye: Red reflex present.  Neck:      Trachea: Trachea normal. No abnormal tracheal secretions or tracheal deviation.   Cardiovascular:      Rate and Rhythm: Normal rate and regular rhythm.      Pulses: Normal pulses.      Heart sounds: Normal heart sounds.   Pulmonary:      Effort: Pulmonary effort is normal. No tachypnea, accessory muscle usage, prolonged expiration, respiratory distress or retractions.      Breath sounds: Normal breath sounds. No stridor, decreased air movement or transmitted upper airway sounds. No wheezing, rhonchi or rales.   Abdominal:      Tenderness: There is no right CVA tenderness or left CVA tenderness.   Genitourinary:     General: Normal vulva.      Pubic Area: No rash.       Labia:          Right: No rash, tenderness, lesion or injury.         Left: No rash, tenderness, lesion or injury.       Urethra: No prolapse, urethral pain, urethral swelling or urethral lesion.      Vagina: No vaginal discharge, erythema, tenderness or lesions.   Musculoskeletal:         General: Normal range of motion.      Cervical back: Full passive range of motion without pain, normal range of motion and neck supple. No erythema, rigidity or crepitus. No pain with movement. Normal range of motion.   Skin:     General: Skin is warm and dry.      Findings: No rash.   Neurological:      Mental Status: She is alert. Mental status is at baseline.   Psychiatric:         Mood and Affect: Mood normal.         Behavior: Behavior normal.         Thought Content: Thought content normal.       Results for orders placed or performed in visit on 09/09/24   POCT Urinalysis   Result Value Ref Range    POC Color Yellow Negative    POC Appearance Clear Negative    POC Glucose Negative Negative mg/dL    POC Bilirubin Negative Negative mg/dL    POC Ketones Negative Negative mg/dL    POC Specific Gravity 1.020 <1.005 - >1.030    POC Blood Negative Negative    POC Urine PH 7.0 5.0 - 8.0    POC Protein Negative Negative mg/dL    POC Urobiligen 0.2 Negative (0.2) mg/dL    POC Nitrites Negative Negative    POC Leukocyte Esterase Trace Negative   POCT Pregnancy   Result Value Ref Range    POC Urine Pregnancy Test Negative     Internal Control Positive Positive     Internal Control Negative Negative      Assessment & Plan     1. Vaginal lump   - POCT Urinalysis  - Chlamydia/GC, PCR (Genital/Anal swab); Future  - VAGINAL PATHOGENS DNA PANEL; Future  - T.Pallidum AB ROSE (Screening); Future  - URINE CULTURE(NEW); Future  - POCT Pregnancy    2. Bacterial conjunctivitis   - erythromycin 5 MG/GM Ointment; Apply 1 Application to right eye 4 times a day for 7 days.  Dispense: 3.5 g; Refill: 0    3. Eye swelling, right   - dexamethasone (Decadron) injection  (check route below) 8 mg    4. Hordeolum externum of right upper eyelid     5. Acute cystitis without hematuria   - cefdinir (OMNICEF) 300 MG Cap; Take 1 Capsule by mouth 2 times a day for 5 days.  Dispense: 10 Capsule; Refill: 0       MDM/Comments:   History and examination most consistent with bacterial conjunctivitis and hordeolum externum, and is without any alarm features/red flag findings. Will treat with Erythromycin ointment. Red flag findings discussed.      - Cool compress to eyes   - OTC cetirizine PRN   Warm compresses to affected eye(s) 3 times daily  Hand hygiene discussed  Wash all recently used linens and towels in hot water  Do not touch dropper to eye (s)  Supportive care, differential diagnoses, and indications for immediate follow-up discussed with patient.    Pathogenesis of diagnosis discussed including typical length and natural progression.    Instructed to return to  or nearest emergency department if symptoms fail to improve, for any change in condition, further concerns, or new concerning symptoms.  Patient states understanding of the plan of care and discharge instructions        examination without any current lesion. Patient advised to return if vaginal lump returns.     9/12/24 - Urine culture positive for Streptococcus agalactiae (Group B). Will treat with Cefdinir. Patient notified.     Illness progression and alarm symptoms discussed with patient, emphasizing low threshold for returning to clinic/emergency department for worsening symptoms. Patient is agreeable to the plan and verbalizes understanding, and will follow up if warranted.        Differential diagnosis, natural history, supportive care, and indications for immediate follow-up discussed.       Follow-up as needed if symptoms worsen or fail to improve to PCP, Urgent care or Emergency Room.                                                                 Electronically signed by MAGO Ramirez

## 2024-09-10 LAB
C TRACH DNA GENITAL QL NAA+PROBE: NEGATIVE
CANDIDA DNA VAG QL PROBE+SIG AMP: NEGATIVE
G VAGINALIS DNA VAG QL PROBE+SIG AMP: NEGATIVE
N GONORRHOEA DNA GENITAL QL NAA+PROBE: NEGATIVE
SPECIMEN SOURCE: NORMAL
T VAGINALIS DNA VAG QL PROBE+SIG AMP: NEGATIVE

## 2024-09-11 ASSESSMENT — ENCOUNTER SYMPTOMS
TINGLING: 0
WHEEZING: 0
WEAKNESS: 0
BLURRED VISION: 0
DIZZINESS: 0
DIARRHEA: 0
VOMITING: 0
EYE DISCHARGE: 1
SHORTNESS OF BREATH: 0
HEADACHES: 0
ABDOMINAL PAIN: 0
NAUSEA: 0
PHOTOPHOBIA: 0
SORE THROAT: 0
STRIDOR: 0
SINUS PAIN: 0
SENSORY CHANGE: 0
MYALGIAS: 0
DOUBLE VISION: 0
NECK PAIN: 0
COUGH: 0
FLANK PAIN: 0
EYE PAIN: 0
CHILLS: 0
FEVER: 0
SPUTUM PRODUCTION: 0
EYE REDNESS: 1

## 2024-09-11 ASSESSMENT — VISUAL ACUITY: OU: 1

## 2024-09-12 LAB
BACTERIA UR CULT: ABNORMAL
BACTERIA UR CULT: ABNORMAL
SIGNIFICANT IND 70042: ABNORMAL
SITE SITE: ABNORMAL
SOURCE SOURCE: ABNORMAL

## 2024-09-12 RX ORDER — CEFDINIR 300 MG/1
300 CAPSULE ORAL 2 TIMES DAILY
Qty: 10 CAPSULE | Refills: 0 | Status: SHIPPED | OUTPATIENT
Start: 2024-09-12 | End: 2024-09-17

## 2024-09-29 ENCOUNTER — APPOINTMENT (OUTPATIENT)
Dept: RADIOLOGY | Facility: MEDICAL CENTER | Age: 29
End: 2024-09-29
Attending: EMERGENCY MEDICINE
Payer: MEDICAID

## 2024-09-29 ENCOUNTER — PHARMACY VISIT (OUTPATIENT)
Dept: PHARMACY | Facility: MEDICAL CENTER | Age: 29
End: 2024-09-29
Payer: COMMERCIAL

## 2024-09-29 ENCOUNTER — HOSPITAL ENCOUNTER (EMERGENCY)
Facility: MEDICAL CENTER | Age: 29
End: 2024-09-29
Attending: EMERGENCY MEDICINE
Payer: MEDICAID

## 2024-09-29 VITALS
WEIGHT: 127.21 LBS | DIASTOLIC BLOOD PRESSURE: 58 MMHG | BODY MASS INDEX: 24.84 KG/M2 | RESPIRATION RATE: 18 BRPM | OXYGEN SATURATION: 96 % | TEMPERATURE: 98.2 F | SYSTOLIC BLOOD PRESSURE: 109 MMHG | HEART RATE: 73 BPM

## 2024-09-29 DIAGNOSIS — E86.0 DEHYDRATION: ICD-10-CM

## 2024-09-29 DIAGNOSIS — R11.2 NAUSEA AND VOMITING, UNSPECIFIED VOMITING TYPE: ICD-10-CM

## 2024-09-29 DIAGNOSIS — R10.84 GENERALIZED ABDOMINAL PAIN: ICD-10-CM

## 2024-09-29 DIAGNOSIS — R19.7 DIARRHEA OF PRESUMED INFECTIOUS ORIGIN: ICD-10-CM

## 2024-09-29 LAB
ALBUMIN SERPL BCP-MCNC: 4.9 G/DL (ref 3.2–4.9)
ALBUMIN/GLOB SERPL: 1.5 G/DL
ALP SERPL-CCNC: 116 U/L (ref 30–99)
ALT SERPL-CCNC: 23 U/L (ref 2–50)
ANION GAP SERPL CALC-SCNC: 22 MMOL/L (ref 7–16)
APPEARANCE UR: CLEAR
AST SERPL-CCNC: 28 U/L (ref 12–45)
BACTERIA #/AREA URNS HPF: ABNORMAL /HPF
BASOPHILS # BLD AUTO: 0.1 % (ref 0–1.8)
BASOPHILS # BLD: 0.02 K/UL (ref 0–0.12)
BILIRUB SERPL-MCNC: 0.8 MG/DL (ref 0.1–1.5)
BILIRUB UR QL STRIP.AUTO: NEGATIVE
BUN SERPL-MCNC: 14 MG/DL (ref 8–22)
CALCIUM ALBUM COR SERPL-MCNC: 9.3 MG/DL (ref 8.5–10.5)
CALCIUM SERPL-MCNC: 10 MG/DL (ref 8.5–10.5)
CHLORIDE SERPL-SCNC: 103 MMOL/L (ref 96–112)
CO2 SERPL-SCNC: 17 MMOL/L (ref 20–33)
COLOR UR: YELLOW
CREAT SERPL-MCNC: 0.6 MG/DL (ref 0.5–1.4)
EOSINOPHIL # BLD AUTO: 0 K/UL (ref 0–0.51)
EOSINOPHIL NFR BLD: 0 % (ref 0–6.9)
EPI CELLS #/AREA URNS HPF: ABNORMAL /HPF
ERYTHROCYTE [DISTWIDTH] IN BLOOD BY AUTOMATED COUNT: 38.6 FL (ref 35.9–50)
GFR SERPLBLD CREATININE-BSD FMLA CKD-EPI: 125 ML/MIN/1.73 M 2
GLOBULIN SER CALC-MCNC: 3.3 G/DL (ref 1.9–3.5)
GLUCOSE SERPL-MCNC: 151 MG/DL (ref 65–99)
GLUCOSE UR STRIP.AUTO-MCNC: NEGATIVE MG/DL
HCG SERPL QL: NEGATIVE
HCT VFR BLD AUTO: 40.7 % (ref 37–47)
HGB BLD-MCNC: 13.6 G/DL (ref 12–16)
HYALINE CASTS #/AREA URNS LPF: ABNORMAL /LPF
IMM GRANULOCYTES # BLD AUTO: 0.07 K/UL (ref 0–0.11)
IMM GRANULOCYTES NFR BLD AUTO: 0.4 % (ref 0–0.9)
KETONES UR STRIP.AUTO-MCNC: 15 MG/DL
LEUKOCYTE ESTERASE UR QL STRIP.AUTO: NEGATIVE
LIPASE SERPL-CCNC: 10 U/L (ref 11–82)
LYMPHOCYTES # BLD AUTO: 0.93 K/UL (ref 1–4.8)
LYMPHOCYTES NFR BLD: 5 % (ref 22–41)
MCH RBC QN AUTO: 26.4 PG (ref 27–33)
MCHC RBC AUTO-ENTMCNC: 33.4 G/DL (ref 32.2–35.5)
MCV RBC AUTO: 79 FL (ref 81.4–97.8)
MICRO URNS: ABNORMAL
MONOCYTES # BLD AUTO: 0.47 K/UL (ref 0–0.85)
MONOCYTES NFR BLD AUTO: 2.5 % (ref 0–13.4)
NEUTROPHILS # BLD AUTO: 17.03 K/UL (ref 1.82–7.42)
NEUTROPHILS NFR BLD: 92 % (ref 44–72)
NITRITE UR QL STRIP.AUTO: NEGATIVE
NRBC # BLD AUTO: 0 K/UL
NRBC BLD-RTO: 0 /100 WBC (ref 0–0.2)
PH UR STRIP.AUTO: 7 [PH] (ref 5–8)
PLATELET # BLD AUTO: 306 K/UL (ref 164–446)
PMV BLD AUTO: 11.8 FL (ref 9–12.9)
POTASSIUM SERPL-SCNC: 3.5 MMOL/L (ref 3.6–5.5)
PROCALCITONIN SERPL-MCNC: <0.05 NG/ML
PROT SERPL-MCNC: 8.2 G/DL (ref 6–8.2)
PROT UR QL STRIP: 30 MG/DL
RBC # BLD AUTO: 5.15 M/UL (ref 4.2–5.4)
RBC # URNS HPF: ABNORMAL /HPF
RBC UR QL AUTO: NEGATIVE
SODIUM SERPL-SCNC: 142 MMOL/L (ref 135–145)
SP GR UR STRIP.AUTO: >=1.045
UROBILINOGEN UR STRIP.AUTO-MCNC: 0.2 MG/DL
WBC # BLD AUTO: 18.5 K/UL (ref 4.8–10.8)
WBC #/AREA URNS HPF: ABNORMAL /HPF

## 2024-09-29 PROCEDURE — 85025 COMPLETE CBC W/AUTO DIFF WBC: CPT

## 2024-09-29 PROCEDURE — 81001 URINALYSIS AUTO W/SCOPE: CPT

## 2024-09-29 PROCEDURE — 84145 PROCALCITONIN (PCT): CPT

## 2024-09-29 PROCEDURE — 74177 CT ABD & PELVIS W/CONTRAST: CPT

## 2024-09-29 PROCEDURE — 84703 CHORIONIC GONADOTROPIN ASSAY: CPT

## 2024-09-29 PROCEDURE — 83690 ASSAY OF LIPASE: CPT

## 2024-09-29 PROCEDURE — RXMED WILLOW AMBULATORY MEDICATION CHARGE: Performed by: EMERGENCY MEDICINE

## 2024-09-29 PROCEDURE — 700117 HCHG RX CONTRAST REV CODE 255: Mod: UD | Performed by: EMERGENCY MEDICINE

## 2024-09-29 PROCEDURE — 99285 EMERGENCY DEPT VISIT HI MDM: CPT

## 2024-09-29 PROCEDURE — 96374 THER/PROPH/DIAG INJ IV PUSH: CPT

## 2024-09-29 PROCEDURE — 700111 HCHG RX REV CODE 636 W/ 250 OVERRIDE (IP): Mod: JZ,UD | Performed by: EMERGENCY MEDICINE

## 2024-09-29 PROCEDURE — 80053 COMPREHEN METABOLIC PANEL: CPT

## 2024-09-29 PROCEDURE — 700105 HCHG RX REV CODE 258: Mod: UD | Performed by: EMERGENCY MEDICINE

## 2024-09-29 PROCEDURE — 36415 COLL VENOUS BLD VENIPUNCTURE: CPT

## 2024-09-29 RX ORDER — SODIUM CHLORIDE, SODIUM LACTATE, POTASSIUM CHLORIDE, CALCIUM CHLORIDE 600; 310; 30; 20 MG/100ML; MG/100ML; MG/100ML; MG/100ML
1000 INJECTION, SOLUTION INTRAVENOUS ONCE
Status: COMPLETED | OUTPATIENT
Start: 2024-09-29 | End: 2024-09-29

## 2024-09-29 RX ORDER — DICYCLOMINE HCL 20 MG
20 TABLET ORAL EVERY 6 HOURS
Qty: 20 TABLET | Refills: 0 | Status: SHIPPED | OUTPATIENT
Start: 2024-09-29 | End: 2024-10-04

## 2024-09-29 RX ORDER — ONDANSETRON 2 MG/ML
4 INJECTION INTRAMUSCULAR; INTRAVENOUS ONCE
Status: COMPLETED | OUTPATIENT
Start: 2024-09-29 | End: 2024-09-29

## 2024-09-29 RX ORDER — HALOPERIDOL 5 MG/ML
2.5 INJECTION INTRAMUSCULAR ONCE
Status: DISCONTINUED | OUTPATIENT
Start: 2024-09-29 | End: 2024-09-30 | Stop reason: HOSPADM

## 2024-09-29 RX ORDER — ONDANSETRON 4 MG/1
4 TABLET, ORALLY DISINTEGRATING ORAL EVERY 6 HOURS PRN
Qty: 10 TABLET | Refills: 0 | Status: SHIPPED | OUTPATIENT
Start: 2024-09-29

## 2024-09-29 RX ADMIN — SODIUM CHLORIDE, POTASSIUM CHLORIDE, SODIUM LACTATE AND CALCIUM CHLORIDE 1000 ML: 600; 310; 30; 20 INJECTION, SOLUTION INTRAVENOUS at 21:25

## 2024-09-29 RX ADMIN — ONDANSETRON 4 MG: 2 INJECTION INTRAMUSCULAR; INTRAVENOUS at 19:00

## 2024-09-29 RX ADMIN — SODIUM CHLORIDE, POTASSIUM CHLORIDE, SODIUM LACTATE AND CALCIUM CHLORIDE 1000 ML: 600; 310; 30; 20 INJECTION, SOLUTION INTRAVENOUS at 18:56

## 2024-09-29 RX ADMIN — IOHEXOL 90 ML: 350 INJECTION, SOLUTION INTRAVENOUS at 20:27

## 2024-09-29 ASSESSMENT — FIBROSIS 4 INDEX: FIB4 SCORE: 0.39

## 2024-09-29 ASSESSMENT — PAIN DESCRIPTION - PAIN TYPE
TYPE: ACUTE PAIN
TYPE: ACUTE PAIN

## 2024-09-30 NOTE — ED NOTES
"Ambulatory to 25 with same report as triage note.  Reports diarrheas also as \"a lot, just comes out\", denies blood in stool.  Denies Hx of cyclic vomiting  "

## 2024-09-30 NOTE — ED NOTES
Patient provided with turkey sandwich and Po fluids, patient tolerated well, denies any symptoms.

## 2024-09-30 NOTE — ED NOTES
Pt discharged . GCS 15. IV discontinued and gauze placed, pt in possession of belongings. Pt provided discharge education and information pertaining to medications and follow up appointments. Pt received copy of discharge instructions and verbalized understanding.     Vitals:    09/29/24 2230   BP: 109/58   Pulse: 73   Resp: 18   Temp: 36.8 °C (98.2 °F)   SpO2: 96%

## 2024-09-30 NOTE — ED NOTES
EOS, bedside report given to Blanca BEDOYA .  Advised of all critical interventions in place and IV/medication status.  Mariluz in low, locked position.

## 2024-09-30 NOTE — ED NOTES
Assumed care of patient, patient bedside report received from ELKE Pritchett . Pt AAO X 4 , respirations even and unlabored, on room air . Introduced self as pt RN, POC discussed, call light in reach, standard Fall risk interventions in place.

## 2024-09-30 NOTE — ED PROVIDER NOTES
"ED Provider Note    CHIEF COMPLAINT  Chief Complaint   Patient presents with    Abdominal Pain     Lower abd constant since last night    N/V     Started last night, vomited 20x since last night, denies blood in her vomit     EXTERNAL RECORDS REVIEWED  Note from encounter on 9/9 when the patient was seen and evaluated for concerns of a vaginal lump and had labs and assessment provided for.  At that time she had a diagnosis of new acute vaginal swab positive for strep agalactae and was placed on cefdinir.    HPI/ROS  LIMITATION TO HISTORY   Select: : None  OUTSIDE HISTORIAN(S):  none    Kristal Cesar is a 29 y.o. female who presents to the emergency room for complaints of ongoing abdominal cramping and multiple stools in the last 24 hours.  She reports \"it runs through me and it just comes out, I do not know fuck there is a lot.\"  She reports that this has happened over the last 24 hours, she had taken an antibiotic on 9/9 for which she was told was a vaginal infection, she states she has been doing well and says that these new symptoms came on very quickly.  She does not remember source that started with food ingestion, she is denying any regular alcohol or NSAID use, she says that she has not had any intra-abdominal surgeries.  She denies any bloody bowel movements or hematemesis.    PAST MEDICAL HISTORY   has a past medical history of Allergy, Anxiety, Depression, and Obesity (BMI 30-39.9).    SURGICAL HISTORY   has a past surgical history that includes ariana by laparoscopy (3/7/2016); other; and dilation and curettage (N/A, 2/1/2022).    FAMILY HISTORY  Family History   Problem Relation Age of Onset    No Known Problems Mother     Diabetes Father     Hypertension Father     Hyperlipidemia Father     No Known Problems Sister     Cancer Neg Hx      SOCIAL HISTORY  Social History     Tobacco Use    Smoking status: Never    Smokeless tobacco: Never   Vaping Use    Vaping status: Never Used   Substance " "and Sexual Activity    Alcohol use: No    Drug use: No    Sexual activity: Yes     Partners: Male     CURRENT MEDICATIONS  Home Medications       Reviewed by Andria Anderson R.N. (Registered Nurse) on 09/29/24 at 1830  Med List Status: Partial     Medication Last Dose Status   acetaminophen (TYLENOL) 325 MG Tab  Active   escitalopram (LEXAPRO) 10 MG Tab  Active   ibuprofen (MOTRIN) 800 MG Tab  Active   metoclopramide (REGLAN) 10 MG Tab  Active   moxifloxacin (VIGAMOX) 0.5 % Solution  Active   olopatadine (PATANOL) 0.1 % ophthalmic solution  Active   ondansetron (ZOFRAN ODT) 4 MG TABLET DISPERSIBLE  Active                  Audit from Redirected Encounters    **Home medications have not yet been reviewed for this encounter**       ALLERGIES  Allergies   Allergen Reactions    Other Environmental Rash     \"dogs\" - \"my skin turns red\"     PHYSICAL EXAM  VITAL SIGNS: /70   Pulse 80   Temp 36.2 °C (97.1 °F) (Temporal)   Resp 18   Wt 57.7 kg (127 lb 3.3 oz)   LMP 08/27/2024 (Approximate)   SpO2 96%   BMI 24.84 kg/m²    Genl: F sitting in [place] uncomfortably, speaking clearly, appears in mild distress   Head: NC/AT   ENT: Mucous membranes dry posterior pharynx clear, uvula midline, nares patent bilaterally   Eyes: Normal sclera, pupils equal round reactive to light  Neck: Supple, FROM, no LAD appreciated   Pulmonary: Lungs are clear to auscultation bilaterally  Chest: No TTP  CV:  RRR, no murmur appreciated  Abdomen: soft, gastric discomfort, inconsistent central abdominal discomfort without distention, no true rebound or guarding, no rigidity, no pain with bilateral straight leg raise. no HSM   : no CVA tenderness   Musculoskeletal: Pain free ROM of the neck. Moving upper and lower extremities in spontaneous and coordinated fashion  Neuro: A&Ox4 (person, place, time, situation), speech fluent, gait steady, no focal deficits appreciated  Skin: No rash or lesions.  No pallor or jaundice.  No cyanosis.  " Warm and dry.     EKG/LABS  Labs Reviewed   CBC WITH DIFFERENTIAL - Abnormal; Notable for the following components:       Result Value    WBC 18.5 (*)     MCV 79.0 (*)     MCH 26.4 (*)     Neutrophils-Polys 92.00 (*)     Lymphocytes 5.00 (*)     Neutrophils (Absolute) 17.03 (*)     Lymphs (Absolute) 0.93 (*)     All other components within normal limits   COMP METABOLIC PANEL - Abnormal; Notable for the following components:    Potassium 3.5 (*)     Co2 17 (*)     Anion Gap 22.0 (*)     Glucose 151 (*)     Alkaline Phosphatase 116 (*)     All other components within normal limits   LIPASE - Abnormal; Notable for the following components:    Lipase 10 (*)     All other components within normal limits   URINALYSIS,CULTURE IF INDICATED - Abnormal; Notable for the following components:    Specific Gravity >=1.045 (*)     Ketones 15 (*)     Protein 30 (*)     All other components within normal limits   URINE MICROSCOPIC (W/UA) - Abnormal; Notable for the following components:    Bacteria Few (*)     All other components within normal limits   HCG QUAL SERUM   PROCALCITONIN   ESTIMATED GFR   CDIFF BY PCR RFLX TOXIN     RADIOLOGY/PROCEDURES   I have independently interpreted the diagnostic imaging associated with this visit and am waiting the final reading from the radiologist.   My preliminary interpretation is as follows: No infectious etiology, no abscess, changes of the liver that suggested cardiomegaly    Radiologist interpretation:  CT-ABDOMEN-PELVIS WITH   Final Result         1.  Hepatomegaly          COURSE & MEDICAL DECISION MAKING    ASSESSMENT, COURSE AND PLAN  Care Narrative: Patient seen and evaluated for symptoms as described above.  Patient has had issues with some prior nausea and vomiting episodes and today she says she has had copious amounts of sudden onset abdominal cramping and pain.  She has had multiple episodes of retching and appears clinically dehydrated on initial assessment but she is  hemodynamically stable.  I admit primary concern is that the patient was recently started on cefdinir which has a propensity for possible C. difficile conversion and she has pain that seems to be out of proportion during my initial assessment.  With her age and repeat evaluations for abdominal pain it is also possible that this to be related to hyperemesis, chronic dehydration or dietary changes.  IV access was established, lab work was obtained.  This does show a leukocytosis of 18.5 that has leftward shift but no bandemia that would point to possibility of intra-abdominal infectious etiology and based on this alone I have ordered a CT scan of the abdomen pelvis.  Shortly thereafter she had negative hCG test, normal LFTs, changes of dehydration with anion gap of 22 and bicarb of 17 and normal glucose.  She has a negative procalcitonin and no evidence of obstructive hepatobiliary process.  Urine just shows ketones with no evidence of infection.    She is very fluid responsive, she has complete normalization of her symptoms with administration of 2 L of IV fluids, Zofran.    Gone back and reassessed the patient and asked if she could provide stool samples but she is unable to do so.  Her CT scan has resulted with no signs of colitis, he is relatively due symptoms we have discussed the pros and cons of starting oral vancomycin for her significant symptomology and the increased risk factors and she is amenable to trying this.  Additional symptomatic medications such as Zofran and bentyl will also be started.  We have discussed strict return precautions as abdominal bloating, bloody diarrhea and fevers should warrant immediate reevaluation here in the emergency department.  She feels improved, she will like to follow-up with her outpatient doctor and is discharged home in stable condition.    Hydration: Based on the patient's presentation of Acute Diarrhea, Acute Vomiting, and Dehydration the patient was given IV  fluids. IV Hydration was used because oral hydration was not as rapid as required. Upon recheck following hydration, the patient was improving.    DISPOSITION AND DISCUSSIONS  I have discussed management of the patient with the following physicians and BETTY's:  none    Discussion of management with other QHP or appropriate source(s): Pharmacy regarding vanco PO management      Escalation of care considered, and ultimately not performed:acute inpatient care management, however at this time, the patient is most appropriate for outpatient management    Barriers to care at this time, including but not limited to: none.     Decision tools and prescription drugs considered including, but not limited to: Antibiotics po vanco and bentyl/zofran .    FINAL DIAGNOSIS  1. Nausea and vomiting, unspecified vomiting type    2. Dehydration    3. Generalized abdominal pain    4. Diarrhea of presumed infectious origin      Electronically signed by: Phillip Landeros M.D., 9/29/2024 6:43 PM

## 2024-09-30 NOTE — ED NOTES
Patient tolerated 2 cups of juice with out any pain and nausea, patient requested additional 2 cups. Provided as requested, education given.

## 2024-11-10 ENCOUNTER — HOSPITAL ENCOUNTER (EMERGENCY)
Facility: MEDICAL CENTER | Age: 29
End: 2024-11-10
Attending: EMERGENCY MEDICINE
Payer: MEDICAID

## 2024-11-10 ENCOUNTER — APPOINTMENT (OUTPATIENT)
Dept: RADIOLOGY | Facility: MEDICAL CENTER | Age: 29
End: 2024-11-10
Attending: EMERGENCY MEDICINE
Payer: MEDICAID

## 2024-11-10 VITALS
DIASTOLIC BLOOD PRESSURE: 73 MMHG | TEMPERATURE: 96.6 F | SYSTOLIC BLOOD PRESSURE: 130 MMHG | HEIGHT: 60 IN | HEART RATE: 89 BPM | RESPIRATION RATE: 16 BRPM | WEIGHT: 130.29 LBS | BODY MASS INDEX: 25.58 KG/M2 | OXYGEN SATURATION: 95 %

## 2024-11-10 DIAGNOSIS — E86.0 DEHYDRATION: ICD-10-CM

## 2024-11-10 DIAGNOSIS — R19.7 DIARRHEA, UNSPECIFIED TYPE: ICD-10-CM

## 2024-11-10 DIAGNOSIS — R10.30 LOWER ABDOMINAL PAIN: ICD-10-CM

## 2024-11-10 DIAGNOSIS — R11.2 NAUSEA AND VOMITING, UNSPECIFIED VOMITING TYPE: ICD-10-CM

## 2024-11-10 LAB
ALBUMIN SERPL BCP-MCNC: 5.1 G/DL (ref 3.2–4.9)
ALBUMIN/GLOB SERPL: 1.5 G/DL
ALP SERPL-CCNC: 102 U/L (ref 30–99)
ALT SERPL-CCNC: 14 U/L (ref 2–50)
ANION GAP SERPL CALC-SCNC: 16 MMOL/L (ref 7–16)
AST SERPL-CCNC: 19 U/L (ref 12–45)
BASOPHILS # BLD AUTO: 0.3 % (ref 0–1.8)
BASOPHILS # BLD: 0.04 K/UL (ref 0–0.12)
BILIRUB SERPL-MCNC: 0.7 MG/DL (ref 0.1–1.5)
BUN SERPL-MCNC: 14 MG/DL (ref 8–22)
CALCIUM ALBUM COR SERPL-MCNC: 9.1 MG/DL (ref 8.5–10.5)
CALCIUM SERPL-MCNC: 10 MG/DL (ref 8.5–10.5)
CHLORIDE SERPL-SCNC: 105 MMOL/L (ref 96–112)
CO2 SERPL-SCNC: 19 MMOL/L (ref 20–33)
CREAT SERPL-MCNC: 0.58 MG/DL (ref 0.5–1.4)
EOSINOPHIL # BLD AUTO: 0.02 K/UL (ref 0–0.51)
EOSINOPHIL NFR BLD: 0.1 % (ref 0–6.9)
ERYTHROCYTE [DISTWIDTH] IN BLOOD BY AUTOMATED COUNT: 38.9 FL (ref 35.9–50)
FLUAV RNA SPEC QL NAA+PROBE: NEGATIVE
FLUBV RNA SPEC QL NAA+PROBE: NEGATIVE
GFR SERPLBLD CREATININE-BSD FMLA CKD-EPI: 126 ML/MIN/1.73 M 2
GLOBULIN SER CALC-MCNC: 3.4 G/DL (ref 1.9–3.5)
GLUCOSE SERPL-MCNC: 118 MG/DL (ref 65–99)
HCG SERPL QL: NEGATIVE
HCT VFR BLD AUTO: 47.3 % (ref 37–47)
HGB BLD-MCNC: 15.3 G/DL (ref 12–16)
IMM GRANULOCYTES # BLD AUTO: 0.05 K/UL (ref 0–0.11)
IMM GRANULOCYTES NFR BLD AUTO: 0.3 % (ref 0–0.9)
LIPASE SERPL-CCNC: 28 U/L (ref 11–82)
LYMPHOCYTES # BLD AUTO: 2.01 K/UL (ref 1–4.8)
LYMPHOCYTES NFR BLD: 13.3 % (ref 22–41)
MCH RBC QN AUTO: 26.2 PG (ref 27–33)
MCHC RBC AUTO-ENTMCNC: 32.3 G/DL (ref 32.2–35.5)
MCV RBC AUTO: 80.9 FL (ref 81.4–97.8)
MONOCYTES # BLD AUTO: 0.24 K/UL (ref 0–0.85)
MONOCYTES NFR BLD AUTO: 1.6 % (ref 0–13.4)
NEUTROPHILS # BLD AUTO: 12.7 K/UL (ref 1.82–7.42)
NEUTROPHILS NFR BLD: 84.4 % (ref 44–72)
NRBC # BLD AUTO: 0 K/UL
NRBC BLD-RTO: 0 /100 WBC (ref 0–0.2)
PLATELET # BLD AUTO: 339 K/UL (ref 164–446)
PMV BLD AUTO: 11.2 FL (ref 9–12.9)
POTASSIUM SERPL-SCNC: 3.5 MMOL/L (ref 3.6–5.5)
PROT SERPL-MCNC: 8.5 G/DL (ref 6–8.2)
RBC # BLD AUTO: 5.85 M/UL (ref 4.2–5.4)
RSV RNA SPEC QL NAA+PROBE: NEGATIVE
SARS-COV-2 RNA RESP QL NAA+PROBE: NOTDETECTED
SODIUM SERPL-SCNC: 140 MMOL/L (ref 135–145)
WBC # BLD AUTO: 15.1 K/UL (ref 4.8–10.8)

## 2024-11-10 PROCEDURE — 84703 CHORIONIC GONADOTROPIN ASSAY: CPT

## 2024-11-10 PROCEDURE — 700111 HCHG RX REV CODE 636 W/ 250 OVERRIDE (IP): Mod: JZ,UD | Performed by: EMERGENCY MEDICINE

## 2024-11-10 PROCEDURE — 36415 COLL VENOUS BLD VENIPUNCTURE: CPT

## 2024-11-10 PROCEDURE — 700117 HCHG RX CONTRAST REV CODE 255: Mod: UD | Performed by: EMERGENCY MEDICINE

## 2024-11-10 PROCEDURE — 96375 TX/PRO/DX INJ NEW DRUG ADDON: CPT

## 2024-11-10 PROCEDURE — 700105 HCHG RX REV CODE 258: Mod: UD | Performed by: EMERGENCY MEDICINE

## 2024-11-10 PROCEDURE — 0241U HCHG SARS-COV-2 COVID-19 NFCT DS RESP RNA 4 TRGT ED POC: CPT

## 2024-11-10 PROCEDURE — 74177 CT ABD & PELVIS W/CONTRAST: CPT

## 2024-11-10 PROCEDURE — 96374 THER/PROPH/DIAG INJ IV PUSH: CPT

## 2024-11-10 PROCEDURE — 83690 ASSAY OF LIPASE: CPT

## 2024-11-10 PROCEDURE — 80053 COMPREHEN METABOLIC PANEL: CPT

## 2024-11-10 PROCEDURE — 99285 EMERGENCY DEPT VISIT HI MDM: CPT

## 2024-11-10 PROCEDURE — 85025 COMPLETE CBC W/AUTO DIFF WBC: CPT

## 2024-11-10 RX ORDER — SODIUM CHLORIDE 9 MG/ML
1000 INJECTION, SOLUTION INTRAVENOUS ONCE
Status: COMPLETED | OUTPATIENT
Start: 2024-11-10 | End: 2024-11-10

## 2024-11-10 RX ORDER — MORPHINE SULFATE 4 MG/ML
4 INJECTION INTRAVENOUS ONCE
Status: COMPLETED | OUTPATIENT
Start: 2024-11-10 | End: 2024-11-10

## 2024-11-10 RX ORDER — ONDANSETRON 4 MG/1
4 TABLET, ORALLY DISINTEGRATING ORAL EVERY 8 HOURS PRN
Qty: 6 TABLET | Refills: 0 | Status: SHIPPED | OUTPATIENT
Start: 2024-11-10 | End: 2024-11-12

## 2024-11-10 RX ORDER — METOCLOPRAMIDE HYDROCHLORIDE 5 MG/ML
10 INJECTION INTRAMUSCULAR; INTRAVENOUS ONCE
Status: COMPLETED | OUTPATIENT
Start: 2024-11-10 | End: 2024-11-10

## 2024-11-10 RX ORDER — DIPHENHYDRAMINE HYDROCHLORIDE 50 MG/ML
12.5 INJECTION INTRAMUSCULAR; INTRAVENOUS ONCE
Status: COMPLETED | OUTPATIENT
Start: 2024-11-10 | End: 2024-11-10

## 2024-11-10 RX ORDER — ONDANSETRON 2 MG/ML
4 INJECTION INTRAMUSCULAR; INTRAVENOUS ONCE
Status: COMPLETED | OUTPATIENT
Start: 2024-11-10 | End: 2024-11-10

## 2024-11-10 RX ADMIN — SODIUM CHLORIDE 1000 ML: 9 INJECTION, SOLUTION INTRAVENOUS at 11:26

## 2024-11-10 RX ADMIN — MORPHINE SULFATE 4 MG: 4 INJECTION INTRAVENOUS at 12:43

## 2024-11-10 RX ADMIN — ONDANSETRON 4 MG: 2 INJECTION INTRAMUSCULAR; INTRAVENOUS at 11:24

## 2024-11-10 RX ADMIN — METOCLOPRAMIDE 10 MG: 5 INJECTION, SOLUTION INTRAMUSCULAR; INTRAVENOUS at 12:43

## 2024-11-10 RX ADMIN — DIPHENHYDRAMINE HYDROCHLORIDE 12.5 MG: 50 INJECTION, SOLUTION INTRAMUSCULAR; INTRAVENOUS at 12:43

## 2024-11-10 RX ADMIN — IOHEXOL 100 ML: 350 INJECTION, SOLUTION INTRAVENOUS at 14:30

## 2024-11-10 ASSESSMENT — FIBROSIS 4 INDEX: FIB4 SCORE: 0.53

## 2024-11-10 ASSESSMENT — PAIN DESCRIPTION - PAIN TYPE: TYPE: ACUTE PAIN

## 2024-11-10 NOTE — DISCHARGE INSTRUCTIONS
Return to the ER for any worsening vomiting, worsening diarrhea, blood in vomit, blood in stool, worsening abdominal pain, changing abdominal pain, fevers over 100.4, shaking chills, pain with urination, cloudy or foul-smelling urine, dizziness or lightheadedness, decreased urine output, or for any concerns.    Drink plenty of fluids to stay well-hydrated.  Drink Pedialyte and Gatorade as well as ginger ale and water to help stay hydrated.    Follow-up with your primary care doctor early this coming week.  Please call for appointment.

## 2024-11-10 NOTE — ED NOTES
Chief Complaint   Patient presents with    N/V    Diarrhea    Abdominal Pain     lower     Pt ambulated to triage with above complaints. Pt had diarrhea-incontinence while in triage. Provided pt with pants and brief, now in restroom.

## 2024-11-10 NOTE — ED PROVIDER NOTES
ED Provider Note    CHIEF COMPLAINT  Chief Complaint   Patient presents with    N/V    Diarrhea    Abdominal Pain     lower       EXTERNAL RECORDS REVIEWED  Outpatient Notes patient was seen in urgent care September 9, 2024.  She complained of right eye swelling and some redness with drainage.  She also reported a vaginal lump in the left labia which spontaneously drained and had since resolved.  STD testing was performed.  She was treated with erythromycin for her conjunctivitis.  She was thought to have cystitis and was sent home on Omnicef.    Patient was seen here in the ER on September 29, 2024.  She was here with lower abdominal pain and nausea and vomiting with multiple episodes of emesis.  CT scan done at that time showed hepatomegaly otherwise negative.  It was noted in that ER record that the patient reported that she has had issues with prior nausea and vomiting episodes.  At that time she had changes associated with dehydration and an anion gap of 22 with a bicarb of 17.  She was fluid responsive.  Procalcitonin was negative.  She was discharged home to follow-up with PMD.    HPI/ROS  LIMITATION TO HISTORY   Select: : None  OUTSIDE HISTORIAN(S):  none    Kristal Cesar is a 29 y.o. female who presents to the ER with complaint of nausea, vomiting, diarrhea and lower abdominal cramping.  Patient said at 2 in the morning she started vomiting.  She has had multiple episodes of nonbilious, nonbloody emesis.  She then started having diarrhea.  She has had multiple episodes of watery, nonbloody diarrhea.  She said shortly after vomiting and diarrhea started she developed lower abdominal cramping.  She ate [employer] yesterday.  She is unsure if she could have gotten some food poisoning from [employer] although other people ate [employer] as well and are not sick.  Her child is sick with cough and cold symptoms, but not vomiting or diarrhea.  Patient is had some chills but no fevers.  " No cough, runny nose or sore throat.  Patient reports that over the last year or so she has had multiple episodes of vomiting and diarrhea.  She says every 2 months or so she gets these severe bouts of vomiting and diarrhea in which she has multiple episodes of vomiting multiple episodes of watery diarrhea at a time.  She has not seen a GI doctor.  She says since she got COVID-19 in 2020, she has had soft stool, which was unusual for her prior to COVID.  Patient believes that stress and anxiety may trigger these bouts of vomiting and diarrhea.  She does not smoke marijuana.    PAST MEDICAL HISTORY   has a past medical history of Allergy, Anxiety, Depression, and Obesity (BMI 30-39.9).    SURGICAL HISTORY   has a past surgical history that includes ariana by laparoscopy (3/7/2016); other; and dilation and curettage (N/A, 2/1/2022).    FAMILY HISTORY  Family History   Problem Relation Age of Onset    No Known Problems Mother     Diabetes Father     Hypertension Father     Hyperlipidemia Father     No Known Problems Sister     Cancer Neg Hx        SOCIAL HISTORY  Social History     Tobacco Use    Smoking status: Never    Smokeless tobacco: Never   Vaping Use    Vaping status: Never Used   Substance and Sexual Activity    Alcohol use: No    Drug use: No    Sexual activity: Yes     Partners: Male       CURRENT MEDICATIONS  Home Medications    **Home medications have not yet been reviewed for this encounter**         ALLERGIES  Allergies   Allergen Reactions    Other Environmental Rash     \"dogs\" - \"my skin turns red\"       PHYSICAL EXAM  VITAL SIGNS: /73   Pulse 89   Temp 35.9 °C (96.6 °F) (Temporal)   Resp 16   Ht 1.524 m (5')   Wt 59.1 kg (130 lb 4.7 oz)   SpO2 95%   BMI 25.45 kg/m²    Constitutional:  Well developed, well nourished; uncomfortable.  Patient is having multiple episodes of diarrhea and vomiting here in the ER.  HENT: Normocephalic, Atraumatic, Bilateral external ears normal, slightly dry " mucous membranes.  Eyes: PERRL, EOMI, Conjunctiva normal, No discharge.   Neck: Normal range of motion, supple, nontender  Lymphatic: No lymphadenopathy noted.   Cardiovascular: Normal heart rate, Normal rhythm, No murmurs, rubs or gallops   Thorax & Lungs: CTA=bilaterally;  No respiratory distress,  No wheezing rales, or rhonchi; No chest tenderness. No crepitus or subQ air  Abdomen: soft, good bowel sounds, no guarding no rebound, no masses, no pulsatile mass, mildly tender bilateral lower abdomen.  No distention  Skin: Warm, Dry, No erythema, No rash.   Back: No tenderness, No CVA tenderness.   Extremities: 2+ dp and pt pulses bilateral LEs;  Nontender; no pretibial edema  Neurologic: Alert & oriented x 4, clear speech,   Psychiatric: appropriate, normal affect     EKG/LABS  Results for orders placed or performed during the hospital encounter of 11/10/24   POC CoV-2, FLU A/B, RSV by PCR    Collection Time: 11/10/24 10:27 AM   Result Value Ref Range    POC Influenza A RNA, PCR Negative Negative    POC Influenza B RNA, PCR Negative Negative    POC RSV, by PCR Negative Negative    POC SARS-CoV-2, PCR NotDetected NotDetected   CBC WITH DIFFERENTIAL    Collection Time: 11/10/24 11:16 AM   Result Value Ref Range    WBC 15.1 (H) 4.8 - 10.8 K/uL    RBC 5.85 (H) 4.20 - 5.40 M/uL    Hemoglobin 15.3 12.0 - 16.0 g/dL    Hematocrit 47.3 (H) 37.0 - 47.0 %    MCV 80.9 (L) 81.4 - 97.8 fL    MCH 26.2 (L) 27.0 - 33.0 pg    MCHC 32.3 32.2 - 35.5 g/dL    RDW 38.9 35.9 - 50.0 fL    Platelet Count 339 164 - 446 K/uL    MPV 11.2 9.0 - 12.9 fL    Neutrophils-Polys 84.40 (H) 44.00 - 72.00 %    Lymphocytes 13.30 (L) 22.00 - 41.00 %    Monocytes 1.60 0.00 - 13.40 %    Eosinophils 0.10 0.00 - 6.90 %    Basophils 0.30 0.00 - 1.80 %    Immature Granulocytes 0.30 0.00 - 0.90 %    Nucleated RBC 0.00 0.00 - 0.20 /100 WBC    Neutrophils (Absolute) 12.70 (H) 1.82 - 7.42 K/uL    Lymphs (Absolute) 2.01 1.00 - 4.80 K/uL    Monos (Absolute) 0.24 0.00  - 0.85 K/uL    Eos (Absolute) 0.02 0.00 - 0.51 K/uL    Baso (Absolute) 0.04 0.00 - 0.12 K/uL    Immature Granulocytes (abs) 0.05 0.00 - 0.11 K/uL    NRBC (Absolute) 0.00 K/uL   COMP METABOLIC PANEL    Collection Time: 11/10/24 11:16 AM   Result Value Ref Range    Sodium 140 135 - 145 mmol/L    Potassium 3.5 (L) 3.6 - 5.5 mmol/L    Chloride 105 96 - 112 mmol/L    Co2 19 (L) 20 - 33 mmol/L    Anion Gap 16.0 7.0 - 16.0    Glucose 118 (H) 65 - 99 mg/dL    Bun 14 8 - 22 mg/dL    Creatinine 0.58 0.50 - 1.40 mg/dL    Calcium 10.0 8.5 - 10.5 mg/dL    Correct Calcium 9.1 8.5 - 10.5 mg/dL    AST(SGOT) 19 12 - 45 U/L    ALT(SGPT) 14 2 - 50 U/L    Alkaline Phosphatase 102 (H) 30 - 99 U/L    Total Bilirubin 0.7 0.1 - 1.5 mg/dL    Albumin 5.1 (H) 3.2 - 4.9 g/dL    Total Protein 8.5 (H) 6.0 - 8.2 g/dL    Globulin 3.4 1.9 - 3.5 g/dL    A-G Ratio 1.5 g/dL   LIPASE    Collection Time: 11/10/24 11:16 AM   Result Value Ref Range    Lipase 28 11 - 82 U/L   HCG QUAL SERUM    Collection Time: 11/10/24 11:16 AM   Result Value Ref Range    Beta-Hcg Qualitative Serum Negative Negative   ESTIMATED GFR    Collection Time: 11/10/24 11:16 AM   Result Value Ref Range    GFR (CKD-EPI) 126 >60 mL/min/1.73 m 2          RADIOLOGY/PROCEDURES   I have independently interpreted the diagnostic imaging associated with this visit and am waiting the final reading from the radiologist.     My preliminary interpretation is as follows: ITALO BEAVER is reviewed the patient's CT scan.  No obvious obstruction.    Radiologist interpretation:  CT-ABDOMEN-PELVIS WITH   Final Result      1.  Post cholecystectomy.      2.  No acute abnormalities are identified in the abdomen or pelvis.          COURSE & MEDICAL DECISION MAKING    ASSESSMENT, COURSE AND PLAN  Care Narrative: Patient presents with nausea, vomiting and diarrhea which started at 2:00 this morning.  Shortly after onset of vomiting and diarrhea she started having lower abdominal pain.  She says she gets these  bouts of vomiting and diarrhea at least every few months.  This is a going on for the last year or so.  She thinks that stress triggers these episodes.  She presents with multiple episodes of vomiting since last night as well as multiple episodes of watery diarrhea.  She had multiple episodes of vomiting and diarrhea here in the ER.  She looked a little dry clinically.  She was uncomfortable given the lower abdominal cramping.  The patient had a white count of 15,000.  This may be due to some stress demargination from all the vomiting.  She has not had a fever.  No cough, runny nose or sore throat.  The patient had some mild lower abdominal tenderness.  Given how uncomfortable she was and the fact that she abdominal pain with a white count of 15,000 I went ahead and did a CT scan of the abdomen pelvis.  CT scan is negative.  She was given Zofran without any improvement in her nausea and vomiting.  She was then given Reglan and Benadryl with significant improvement.  She is now tolerating p.o. fluids.  She feels much better and she like to go home.  She is a little bit dehydrated with a anion gap of 16 and a CO2 of 19.  However, she was given a liter of fluid here in the ER and is now tolerating p.o. and I think she is safe and stable for outpatient management discharge home.  She is not pregnant.  No concern for ectopic or hyperemesis.  Lipase is normal.  No evidence of pancreatitis.  Her gallbladder is already been removed.  No concern for gallbladder pathology.  She is negative for COVID flu and RSV.  Sounds like she has recurrent episodes of nausea and vomiting.  If suggested she follow-up with a gastroenterologist for these recurrent episodes of nausea and vomiting.  She says she is going to talk to her primary care physician this coming week to get a GI referral.  Patient also admits to eating [employer] yesterday.  Perhaps this is a foodborne illness.  Additionally, worsening a lot of vomiting and  diarrhea going around the community right now.  Perhaps this is a viral GI syndrome.  Patient tells me that one of her family members just told her while she was here in the ER that someone else at home is now sick with vomiting and diarrhea.  Patient is feeling much better upon discharge.  Vitals are normal stable.  She has been given strict return precautions and discharge instructions and she understands treatment plan and follow-up.    Hydration: Based on the patient's presentation of Acute Vomiting the patient was given IV fluids. IV Hydration was used because oral hydration was not as rapid as required. Upon recheck following hydration, the patient was improved.     Patient is feeling much better after the Reglan and Benadryl.  She is tolerating p.o. fluids.  She is no longer vomiting.  She still having some diarrhea but not as much as before.  She says she feels better and she would like to go home.    ADDITIONAL PROBLEMS MANAGED  Problem #1: Nausea, vomiting and diarrhea  Problem #2: Abdominal pain    DISPOSITION AND DISCUSSIONS  I have discussed management of the patient with the following physicians and BETTY's: None    Discussion of management with other [facility] or appropriate source(s): None     Escalation of care considered, and ultimately not performed:acute inpatient care management, however at this time, the patient is most appropriate for outpatient management.  Patient is feeling better after Reglan and Benadryl.  She has no longer vomiting.  Diarrhea has decreased in terms of frequency.  She feels good and she wants to go home.  No need for hospitalization at this time.    Barriers to care at this time, including but not limited to: None    Decision tools and prescription drugs considered including, but not limited to: Antibiotics patient has been sick with nausea and vomiting and diarrhea since 2:00 this morning.  No evidence of any infectious process on CT scan.  No need for antibiotics. .    FINAL  DIAGNOSIS  1. Lower abdominal pain Acute   2. Diarrhea, unspecified type Acute   3. Dehydration Acute   4. Nausea and vomiting, unspecified vomiting type Acute        This dictation has been created using voice recognition software. The accuracy of the dictation is limited by the abilities of the software. I expect there may be some errors of grammar and possibly content. I made every attempt to manually correct the errors within my dictation. However, errors related to voice recognition software may still exist and should be interpreted within the appropriate context.     Electronically signed by: Kimberli Green M.D., 11/10/2024 10:41 AM

## 2024-11-10 NOTE — ED NOTES
Bedside report received from off going tech: Elvira assumed care of patient.  POC discussed with patient. Call light within reach, all needs addressed at this time.       Fall risk interventions in place: Keep floor surfaces clean and dry (all applicable per [place] Fall risk assessment)   Continuous monitoring: Pulse Ox or Blood Pressure  IVF/IV medications: Infusion per MAR (List Med(s)) NS bolus  Oxygen: Room Air  Bedside sitter: Not Applicable   Isolation: Not Applicable

## 2024-11-17 ENCOUNTER — HOSPITAL ENCOUNTER (EMERGENCY)
Facility: MEDICAL CENTER | Age: 29
End: 2024-11-17
Attending: EMERGENCY MEDICINE
Payer: MEDICAID

## 2024-11-17 VITALS
OXYGEN SATURATION: 97 % | RESPIRATION RATE: 16 BRPM | BODY MASS INDEX: 26.01 KG/M2 | WEIGHT: 132.5 LBS | HEART RATE: 89 BPM | DIASTOLIC BLOOD PRESSURE: 84 MMHG | HEIGHT: 60 IN | TEMPERATURE: 98.9 F | SYSTOLIC BLOOD PRESSURE: 125 MMHG

## 2024-11-17 DIAGNOSIS — K04.7 DENTAL INFECTION: ICD-10-CM

## 2024-11-17 DIAGNOSIS — J10.1 INFLUENZA A: ICD-10-CM

## 2024-11-17 LAB
EKG IMPRESSION: NORMAL
FLUAV RNA SPEC QL NAA+PROBE: POSITIVE
FLUBV RNA SPEC QL NAA+PROBE: NEGATIVE
RSV RNA SPEC QL NAA+PROBE: NEGATIVE
SARS-COV-2 RNA RESP QL NAA+PROBE: NOTDETECTED

## 2024-11-17 PROCEDURE — 99284 EMERGENCY DEPT VISIT MOD MDM: CPT

## 2024-11-17 PROCEDURE — 93005 ELECTROCARDIOGRAM TRACING: CPT

## 2024-11-17 PROCEDURE — 93005 ELECTROCARDIOGRAM TRACING: CPT | Performed by: EMERGENCY MEDICINE

## 2024-11-17 PROCEDURE — 96372 THER/PROPH/DIAG INJ SC/IM: CPT

## 2024-11-17 PROCEDURE — 700111 HCHG RX REV CODE 636 W/ 250 OVERRIDE (IP): Mod: JZ,UD | Performed by: EMERGENCY MEDICINE

## 2024-11-17 PROCEDURE — 700102 HCHG RX REV CODE 250 W/ 637 OVERRIDE(OP): Mod: UD | Performed by: EMERGENCY MEDICINE

## 2024-11-17 PROCEDURE — 0241U HCHG SARS-COV-2 COVID-19 NFCT DS RESP RNA 4 TRGT ED POC: CPT

## 2024-11-17 PROCEDURE — A9270 NON-COVERED ITEM OR SERVICE: HCPCS | Mod: UD | Performed by: EMERGENCY MEDICINE

## 2024-11-17 RX ORDER — ONDANSETRON 2 MG/ML
4 INJECTION INTRAMUSCULAR; INTRAVENOUS ONCE
Status: COMPLETED | OUTPATIENT
Start: 2024-11-17 | End: 2024-11-17

## 2024-11-17 RX ORDER — IBUPROFEN 600 MG/1
600 TABLET, FILM COATED ORAL ONCE
Status: COMPLETED | OUTPATIENT
Start: 2024-11-17 | End: 2024-11-17

## 2024-11-17 RX ORDER — ACETAMINOPHEN 325 MG/1
650 TABLET ORAL ONCE
Status: COMPLETED | OUTPATIENT
Start: 2024-11-17 | End: 2024-11-17

## 2024-11-17 RX ORDER — IBUPROFEN 800 MG/1
800 TABLET, FILM COATED ORAL EVERY 8 HOURS PRN
Qty: 30 TABLET | Refills: 0 | Status: SHIPPED | OUTPATIENT
Start: 2024-11-17

## 2024-11-17 RX ORDER — ONDANSETRON 4 MG/1
4 TABLET, ORALLY DISINTEGRATING ORAL EVERY 8 HOURS PRN
Qty: 10 TABLET | Refills: 0 | Status: SHIPPED | OUTPATIENT
Start: 2024-11-17

## 2024-11-17 RX ADMIN — ONDANSETRON 4 MG: 2 INJECTION INTRAMUSCULAR; INTRAVENOUS at 14:28

## 2024-11-17 RX ADMIN — ACETAMINOPHEN 650 MG: 325 TABLET ORAL at 14:28

## 2024-11-17 RX ADMIN — IBUPROFEN 600 MG: 600 TABLET, FILM COATED ORAL at 14:28

## 2024-11-17 ASSESSMENT — PAIN DESCRIPTION - PAIN TYPE: TYPE: ACUTE PAIN

## 2024-11-17 ASSESSMENT — FIBROSIS 4 INDEX: FIB4 SCORE: 0.42

## 2024-11-17 NOTE — ED PROVIDER NOTES
ER Provider Note    Scribed for Odessa Hernández M.d. by Cece Andersen. 11/17/2024  1:58 PM    Primary Care Provider: Margareth Vilelgas M.D.    CHIEF COMPLAINT  Chief Complaint   Patient presents with    Flu Like Symptoms     Pt c/o chills, fevers, diarrhea & cough/congestion x 4 days. Pt c/o nausea despite zofran.     LIMITATION TO HISTORY   Select: : None    HPI/ROS  OUTSIDE HISTORIAN(S):  None    EXTERNAL RECORDS REVIEWED  Outpatient Notes urgent care note reviewed from September she was seen for eye redness swelling and vaginal lump.  Patient was seen 7 days ago for diarrhea.    Kristal Cesar is a 29 y.o. female who presents to the ED for flu-like symptoms onset three days ago. The patient has associated fever, diarrhea, nausea, vomiting, teeth pain, cough, congestion and chest pain. She states that she has been taking Zofran, adding that she had taken some Zofran before reporting to the ED but had vomited it back up. The patient reports that she has also been taking Tylenol with minimal alleviation. She notes that her children were sick prior to onset.       PAST MEDICAL HISTORY  Past Medical History:   Diagnosis Date    Allergy     Anxiety     Depression     Obesity (BMI 30-39.9)        SURGICAL HISTORY  Past Surgical History:   Procedure Laterality Date    DILATION AND CURETTAGE N/A 2/1/2022    Procedure: DILATION AND CURETTAGE;  Surgeon: Arabella Lara M.D.;  Location: [facility];  Service: Labor and Delivery    REAGAN BY LAPAROSCOPY  3/7/2016    Procedure: REAGAN BY LAPAROSCOPY;  Surgeon: John H Ganser, M.D.;  Location: [facility];  Service:     OTHER      gallbladder removed       FAMILY HISTORY  Family History   Problem Relation Age of Onset    No Known Problems Mother     Diabetes Father     Hypertension Father     Hyperlipidemia Father     No Known Problems Sister     Cancer Neg Hx        SOCIAL HISTORY   reports that she has never smoked.  She has never used smokeless tobacco. She reports that she does not drink alcohol and does not use drugs.    CURRENT MEDICATIONS  Discharge Medication List as of 11/17/2024  4:26 PM        CONTINUE these medications which have NOT CHANGED    Details   acetaminophen (TYLENOL) 325 MG Tab Take 650 mg by mouth every four hours as needed., Historical Med      moxifloxacin (VIGAMOX) 0.5 % Solution Administer 1 Drop into both eyes 3 times a day., Disp-3 mL, R-0, Normal      escitalopram (LEXAPRO) 10 MG Tab TAKE 1 TABLET BY MOUTH EVERY DAY FOR 90 DAYS, Historical Med      olopatadine (PATANOL) 0.1 % ophthalmic solution Administer 1 Drop into both eyes 2 times a day. For allergic conjunctivitis, Disp-5 mL, R-0, Normal      ibuprofen (MOTRIN) 800 MG Tab Take 1 Tablet by mouth every 8 hours as needed for Mild Pain., Disp-30 Tablet, R-0, Normal      metoclopramide (REGLAN) 10 MG Tab Take 1 Tablet by mouth 4 times a day as needed (vomiting/nausea)., Disp-40 Tablet, R-0, Normal             ALLERGIES  Other environmental    PHYSICAL EXAM  /85   Pulse 94   Temp 36.9 °C (98.5 °F) (Temporal)   Resp 18   Ht 1.524 m (5')   Wt 60.1 kg (132 lb 7.9 oz)   LMP 11/17/2024 (Exact Date)   SpO2 98%   Breastfeeding No   BMI 25.88 kg/m²     Constitutional: Alert in no apparent distress.  HENT: No signs of trauma, Bilateral TMs have mild erythema, Bilateral external ears normal, Nose normal. Slightly congested  Eyes: Pupils are equal and reactive, Conjunctiva normal, Non-icteric.   Neck: No stridor.   Cardiovascular: Regular rate and rhythm, no murmurs.   Thorax & Lungs: Normal breath sounds, No respiratory distress, No wheezing, No chest tenderness.   Abdomen: Bowel sounds normal, Soft, No tenderness, No masses, No peritoneal signs.  Skin: Warm, Dry, No erythema, No rash.   Musculoskeletal:  No major deformities noted.   Neurologic: Alert, moving all extremities without difficulty, no focal deficits.      DIAGNOSTIC STUDIES &  PROCEDURES    Labs:   Results for orders placed or performed during the hospital encounter of 24   POC CoV-2, FLU A/B, RSV by PCR    Collection Time: 24 12:53 PM   Result Value Ref Range    POC Influenza A RNA, PCR POSITIVE (A) Negative    POC Influenza B RNA, PCR Negative Negative    POC RSV, by PCR Negative Negative    POC SARS-CoV-2, PCR NotDetected NotDetected   EKG    Collection Time: 24 12:55 PM   Result Value Ref Range    Report       [facility]    Test Date:  2024  Pt Name:    JEFERSON WASSERMAN        Department: ER  MRN:        [id number]                      Room:  Gender:     Female                       Technician: [id number]  :        [id number]                   Requested By:ER TRIAGE PROTOCOL  Order #:    [id number]                    Reading MD: JM BLANCO    Measurements  Intervals                                Axis  Rate:       86                           P:          82  WY:         127                          QRS:        32  QRSD:       98                           T:          45  QT:         356  QTc:        426    Interpretive Statements  Sinus rhythm  Compared to ECG 2023 12:03:56  No significant changes  Impression: Sinus rhythm without evidence of ischemia.  Electronically Signed On 2024 16:49:39 PST by JM BLANCO       All labs reviewed by me.    EKG:   I have independently interpreted this EKG as seen above.    COURSE & MEDICAL DECISION MAKING    ED Observation Status? No; Patient does not meet criteria for ED Observation.     1:58 PM - Patient seen and evaluated at bedside for flu-like symptoms onset three days ago. Discussed the plan of care with the patient including providing the patient with medications as well as ordering labs to further evaluate the patient's condition. The patient was able to ask questions at this time. Patient will be treated with Zofran 4 mg, Tylenol 650 mg and Motrin 600 mg for her  symptoms. Ordered POC CoV-2, Flu A/B, RSV by PCR and EKG to evaluate. Patient's Flu test resulted positive and I informed her of the results. She understands and agrees to the plan of care.    2:40 PM - The patient is stable for discharge at this time and will return for any new or worsening symptoms. Patient verbalizes understanding and support with my plan for discharge.       INITIAL ASSESSMENT AND PLAN  Care Narrative: This is a 21-year-old female who presents with ongoing symptoms consistent with a viral illness.  She is positive for influenza A.  I discussed with her that she treat her symptoms and let this virus.  She will has additional medications for nausea with Zofran and a prescription for ibuprofen.  She was noted to be able to ambulate down the hallway without difficulty.  Patient will be discharged.  She is not hypoxic I do not think additional labs or workup is indicated.                   DISPOSITION AND DISCUSSIONS  I have discussed management of the patient with the following physicians and BETTY's: None    Discussion of management with other [facility] [place] or appropriate source(s): None     Escalation of care considered, and ultimately not performed: Laboratory analysis and diagnostic imaging.    Barriers to care at this time, including but not limited to:  None present .     Decision tools and prescription drugs considered including, but not limited to:  none .    The patient will return for new or worsening symptoms and is stable at the time of discharge.    DISPOSITION:  Patient will be discharged home in stable condition.    FOLLOW UP:  Margareth Villegas M.D.  [address] [id number] [phone]      As needed    [facility], Emergency Dept  [address] [facility] [id number] [phone]    Return to the emergency department for worsening shortness of breath persistent vomiting or other concerns.    OUTPATIENT MEDICATIONS:  Discharge Medication List as of  11/17/2024  4:26 PM        START taking these medications    Details   ondansetron (ZOFRAN ODT) 4 MG TABLET DISPERSIBLE Take 1 Tablet by mouth every 8 hours as needed for Nausea/Vomiting., Disp-10 Tablet, R-0, Normal           FINAL IMPRESSION   1. Influenza A    2. Dental infection      ICece (Scribe), am scribing for, and in the presence of, Odessa Hernández M.D..    Electronically signed by: Cece Andersen (Scribe), 11/17/2024    IOdessa M.D. personally performed the services described in this documentation, as scribed by Cece Andersen in my presence, and it is both accurate and complete.    The note accurately reflects work and decisions made by me.  Odessa Hernández M.D.  11/17/2024  4:51 PM

## 2024-11-17 NOTE — ED TRIAGE NOTES
Kristal Cesar  29 y.o.  female  Chief Complaint   Patient presents with    Flu Like Symptoms     Pt c/o chills, fevers, diarrhea & cough/congestion x 4 days. Pt c/o nausea despite zofran.     Pt also reporting chest pain while in triage. EKG ordered. Patient educated on triage process, to alert staff if any changes in condition.    Viral swab ordered.

## 2024-11-18 NOTE — ED NOTES
Pt discharged to home. Pt was given follow up instructions and prescriptions for ibuprofen and zofran. Pt verbalized understanding of all instructions for discharge and is ambulatory out of ED with steady gait. AOx4

## 2024-12-31 ENCOUNTER — HOSPITAL ENCOUNTER (OUTPATIENT)
Facility: MEDICAL CENTER | Age: 29
End: 2024-12-31
Payer: MEDICAID

## 2024-12-31 ENCOUNTER — OFFICE VISIT (OUTPATIENT)
Dept: URGENT CARE | Facility: CLINIC | Age: 29
End: 2024-12-31
Payer: MEDICAID

## 2024-12-31 VITALS
RESPIRATION RATE: 12 BRPM | BODY MASS INDEX: 25.91 KG/M2 | DIASTOLIC BLOOD PRESSURE: 62 MMHG | HEART RATE: 78 BPM | TEMPERATURE: 97.7 F | OXYGEN SATURATION: 97 % | WEIGHT: 132 LBS | SYSTOLIC BLOOD PRESSURE: 98 MMHG | HEIGHT: 60 IN

## 2024-12-31 DIAGNOSIS — N30.00 ACUTE CYSTITIS WITHOUT HEMATURIA: ICD-10-CM

## 2024-12-31 DIAGNOSIS — N89.8 VAGINAL DISCHARGE: ICD-10-CM

## 2024-12-31 DIAGNOSIS — N39.0 URINARY TRACT INFECTION WITHOUT HEMATURIA, SITE UNSPECIFIED: Primary | ICD-10-CM

## 2024-12-31 DIAGNOSIS — Z71.1 CONCERN ABOUT STD IN FEMALE WITHOUT DIAGNOSIS: ICD-10-CM

## 2024-12-31 LAB
APPEARANCE UR: CLEAR
BILIRUB UR STRIP-MCNC: NEGATIVE MG/DL
COLOR UR AUTO: YELLOW
GLUCOSE UR STRIP.AUTO-MCNC: NEGATIVE MG/DL
KETONES UR STRIP.AUTO-MCNC: NEGATIVE MG/DL
LEUKOCYTE ESTERASE UR QL STRIP.AUTO: NORMAL
NITRITE UR QL STRIP.AUTO: NEGATIVE
PH UR STRIP.AUTO: 5.5 [PH] (ref 5–8)
PROT UR QL STRIP: 100 MG/DL
RBC UR QL AUTO: NORMAL
SP GR UR STRIP.AUTO: 1.03
UROBILINOGEN UR STRIP-MCNC: 0.2 MG/DL

## 2024-12-31 PROCEDURE — 87660 TRICHOMONAS VAGIN DIR PROBE: CPT

## 2024-12-31 PROCEDURE — 87480 CANDIDA DNA DIR PROBE: CPT

## 2024-12-31 PROCEDURE — 87491 CHLMYD TRACH DNA AMP PROBE: CPT

## 2024-12-31 PROCEDURE — 87510 GARDNER VAG DNA DIR PROBE: CPT

## 2024-12-31 PROCEDURE — 87591 N.GONORRHOEAE DNA AMP PROB: CPT

## 2024-12-31 RX ORDER — NITROFURANTOIN 25; 75 MG/1; MG/1
100 CAPSULE ORAL 2 TIMES DAILY
Qty: 10 CAPSULE | Refills: 0 | Status: SHIPPED | OUTPATIENT
Start: 2024-12-31 | End: 2025-01-05

## 2024-12-31 ASSESSMENT — FIBROSIS 4 INDEX: FIB4 SCORE: 0.42

## 2024-12-31 ASSESSMENT — ENCOUNTER SYMPTOMS
WEAKNESS: 0
LOSS OF CONSCIOUSNESS: 0
DIAPHORESIS: 0
FLANK PAIN: 0
NAUSEA: 0
FEVER: 0
ANOREXIA: 0
MYALGIAS: 0
FATIGUE: 0
PALPITATIONS: 0
SORE THROAT: 0
DIZZINESS: 0
COUGH: 0
VOMITING: 0
CHILLS: 0
CONSTIPATION: 0
SWOLLEN GLANDS: 0
NECK PAIN: 0
SHORTNESS OF BREATH: 0
JOINT SWELLING: 0
NUMBNESS: 0
BLURRED VISION: 0
VISUAL CHANGE: 0
ABDOMINAL PAIN: 1
HEADACHES: 0
VERTIGO: 0
CHANGE IN BOWEL HABIT: 0
VAGINITIS: 1
ARTHRALGIAS: 0
FOCAL WEAKNESS: 0
DIARRHEA: 0
BACK PAIN: 0
FALLS: 0

## 2024-12-31 NOTE — PROGRESS NOTES
Subjective:     Kristal Cesar is a 29 y.o. female who presents for UTI (Sx started 2 days ago, itchy, stinging when peeing, odor)      UTI  This is a new problem. The current episode started in the past 7 days. The problem occurs constantly. The problem has been gradually worsening. Associated symptoms include abdominal pain and urinary symptoms. Pertinent negatives include no anorexia, arthralgias, change in bowel habit, chest pain, chills, congestion, coughing, diaphoresis, fatigue, fever, headaches, joint swelling, myalgias, nausea, neck pain, numbness, rash, sore throat, swollen glands, vertigo, visual change, vomiting or weakness. Nothing aggravates the symptoms. She has tried nothing for the symptoms. The treatment provided no relief.   Vaginitis  The patient's primary symptoms include genital itching, a genital odor and vaginal discharge. The patient's pertinent negatives include no missed menses. This is a new problem. The current episode started in the past 7 days. The problem occurs constantly. The problem has been gradually worsening. The patient is experiencing no pain. The problem affects both sides. She is not pregnant. Associated symptoms include abdominal pain, discolored urine, dysuria, frequency and urgency. Pertinent negatives include no anorexia, back pain, chills, constipation, diarrhea, fever, flank pain, headaches, hematuria, joint pain, joint swelling, nausea, painful intercourse, rash, sore throat or vomiting. The vaginal discharge was copious, malodorous, yellow and thick. The vaginal bleeding is typical of menses. She has not been passing clots. She has not been passing tissue. The symptoms are aggravated by urinating. She has tried nothing for the symptoms. The treatment provided no relief. She is sexually active. It is possible that her partner has an STD. She uses an IUD for contraception. Her menstrual history has been irregular.       Review of Systems   Constitutional:  " Negative for chills, diaphoresis, fatigue, fever and malaise/fatigue.   HENT:  Negative for congestion, ear discharge and sore throat.    Eyes:  Negative for blurred vision.   Respiratory:  Negative for cough and shortness of breath.    Cardiovascular:  Negative for chest pain, palpitations and leg swelling.   Gastrointestinal:  Positive for abdominal pain. Negative for anorexia, change in bowel habit, constipation, diarrhea, nausea and vomiting.   Genitourinary:  Positive for dysuria, frequency, urgency and vaginal discharge. Negative for flank pain, hematuria and missed menses.   Musculoskeletal:  Negative for arthralgias, back pain, falls, joint pain, joint swelling, myalgias and neck pain.   Skin:  Negative for itching and rash.   Neurological:  Negative for dizziness, vertigo, focal weakness, loss of consciousness, weakness, numbness and headaches.        CURRENT MEDICATIONS:  acetaminophen Tabs  escitalopram Tabs  ibuprofen Tabs  metoclopramide Tabs  moxifloxacin Soln  olopatadine  ondansetron Tbdp    Allergies:     Allergies   Allergen Reactions   • Aspirin    • Other Environmental Rash     \"dogs\" - \"my skin turns red\"       Current Problems: Kristal Cesar does not have any pertinent problems on file.  Past Surgical Hx:    Past Surgical History:   Procedure Laterality Date   • DILATION AND CURETTAGE N/A 2/1/2022    Procedure: DILATION AND CURETTAGE;  Surgeon: Arabella Lara M.D.;  Location: [facility];  Service: Labor and Delivery   • REAGAN BY LAPAROSCOPY  3/7/2016    Procedure: REAGAN BY LAPAROSCOPY;  Surgeon: John H Ganser, M.D.;  Location: SURGERY [facility];  Service:    • OTHER      gallbladder removed      Past Social Hx:  reports that she has never smoked. She has never used smokeless tobacco. She reports that she does not drink alcohol and does not use drugs.   Past Family Hx:  Kristal Cesar family history includes Diabetes in her " father; Hyperlipidemia in her father; Hypertension in her father; No Known Problems in her mother and sister.     (Allergies, Medications, & Tobacco/Substance Use were reconciled by the Medical Assistant and reviewed by myself. The family history is prepopulated)       Objective:     BP 98/62   Pulse 78   Temp 36.5 °C (97.7 °F) (Temporal)   Resp 12   Ht 1.524 m (5')   Wt 59.9 kg (132 lb)   SpO2 97%   BMI 25.78 kg/m²     Physical Exam  Constitutional:       General: She is not in acute distress.     Appearance: Normal appearance. She is not ill-appearing, toxic-appearing or diaphoretic.   HENT:      Head: Normocephalic and atraumatic.      Nose: Nose normal.   Eyes:      General: No scleral icterus.        Right eye: No discharge.         Left eye: No discharge.   Cardiovascular:      Rate and Rhythm: Normal rate and regular rhythm.      Heart sounds: Normal heart sounds. No murmur heard.     No friction rub. No gallop.   Pulmonary:      Effort: Pulmonary effort is normal. No respiratory distress.      Breath sounds: No stridor. No wheezing, rhonchi or rales.   Chest:      Chest wall: No tenderness.   Abdominal:      General: Abdomen is flat.      Palpations: Abdomen is soft.   Genitourinary:     Vagina: Vaginal discharge present.   Musculoskeletal:         General: Normal range of motion.      Cervical back: No rigidity.   Skin:     General: Skin is warm and dry.   Neurological:      General: No focal deficit present.      Mental Status: She is alert and oriented to person, place, and time. Mental status is at baseline.   Psychiatric:         Behavior: Behavior normal.         Judgment: Judgment normal.       Assessment/Plan:     Kristal was seen today for uti.    Diagnoses and all orders for this visit:    Urinary tract infection without hematuria, site unspecified  -     POCT Urinalysis  -     URINE CULTURE    Vaginal discharge  -     VAGINAL PATHOGENS DNA PANEL; Future  -     Chlamydia/GC, PCR (Genital/Anal  swab); Future  -     HIV AG/AB COMBO ASSAY SCREENING; Future  -     RPR (SYPHILIS); Future    Acute cystitis without hematuria  -     nitrofurantoin (MACROBID) 100 MG Cap; Take 1 Capsule by mouth 2 times a day for 5 days.    Concern about STD in female without diagnosis       Based on history presenting illness, review of systems and physical exam findings including urinalysis, most likely etiology is acute cystitis, blood is present in the urine however patient is during her last day of her menses.  In addition she has significant vaginal discharge that is malodorous thick and yellow.  She does have concerns for possible sexually transmitted disease.  Discussed risks benefits indications of treatment with antibiotics for her acute cystitis in addition discussed ordering additional laboratory studies to assess for possible causes of vaginal discharge including bacterial vaginosis, yeast infection, or possible sexually transmitted disease.  Strict return precautions and ED precautions were discussed and all questions were answered.    Differential diagnosis, natural history, supportive care, and indications for immediate follow-up discussed.    Advised the patient to follow-up with the primary care physician for recheck, reevaluation, and consideration of further management.    Please note that this dictation was created using voice recognition software. I have made reasonable attempt to correct obvious errors, but I expect that there are errors of grammar and possibly content that I did not discover before finalizing the note.    This note was electronically signed by Lorin Yoder MD PhD

## 2025-01-05 ENCOUNTER — APPOINTMENT (OUTPATIENT)
Dept: RADIOLOGY | Facility: MEDICAL CENTER | Age: 30
End: 2025-01-05
Payer: MEDICAID

## 2025-01-05 ENCOUNTER — HOSPITAL ENCOUNTER (EMERGENCY)
Facility: MEDICAL CENTER | Age: 30
End: 2025-01-05
Attending: EMERGENCY MEDICINE
Payer: MEDICAID

## 2025-01-05 ENCOUNTER — APPOINTMENT (OUTPATIENT)
Dept: RADIOLOGY | Facility: MEDICAL CENTER | Age: 30
End: 2025-01-05
Attending: EMERGENCY MEDICINE
Payer: MEDICAID

## 2025-01-05 VITALS
SYSTOLIC BLOOD PRESSURE: 98 MMHG | TEMPERATURE: 96.8 F | OXYGEN SATURATION: 92 % | HEIGHT: 63 IN | BODY MASS INDEX: 23.04 KG/M2 | WEIGHT: 130 LBS | HEART RATE: 81 BPM | RESPIRATION RATE: 20 BRPM | DIASTOLIC BLOOD PRESSURE: 65 MMHG

## 2025-01-05 DIAGNOSIS — O36.80X0 PREGNANCY OF UNKNOWN ANATOMIC LOCATION: ICD-10-CM

## 2025-01-05 DIAGNOSIS — R11.10 VOMITING AND DIARRHEA: ICD-10-CM

## 2025-01-05 DIAGNOSIS — R19.7 VOMITING AND DIARRHEA: ICD-10-CM

## 2025-01-05 LAB
ALBUMIN SERPL BCP-MCNC: 5.2 G/DL (ref 3.2–4.9)
ALBUMIN/GLOB SERPL: 1.5 G/DL
ALP SERPL-CCNC: 102 U/L (ref 30–99)
ALT SERPL-CCNC: 10 U/L (ref 2–50)
ANION GAP SERPL CALC-SCNC: 18 MMOL/L (ref 7–16)
APPEARANCE UR: CLEAR
AST SERPL-CCNC: 22 U/L (ref 12–45)
BACTERIA #/AREA URNS HPF: ABNORMAL /HPF
BASOPHILS # BLD AUTO: 0.2 % (ref 0–1.8)
BASOPHILS # BLD: 0.02 K/UL (ref 0–0.12)
BILIRUB SERPL-MCNC: 0.6 MG/DL (ref 0.1–1.5)
BILIRUB UR QL STRIP.AUTO: NEGATIVE
BUN SERPL-MCNC: 12 MG/DL (ref 8–22)
CALCIUM ALBUM COR SERPL-MCNC: 9 MG/DL (ref 8.5–10.5)
CALCIUM SERPL-MCNC: 10 MG/DL (ref 8.5–10.5)
CASTS URNS QL MICRO: ABNORMAL /LPF (ref 0–2)
CHLORIDE SERPL-SCNC: 103 MMOL/L (ref 96–112)
CO2 SERPL-SCNC: 19 MMOL/L (ref 20–33)
COLOR UR: ABNORMAL
CREAT SERPL-MCNC: 0.65 MG/DL (ref 0.5–1.4)
EOSINOPHIL # BLD AUTO: 0.02 K/UL (ref 0–0.51)
EOSINOPHIL NFR BLD: 0.2 % (ref 0–6.9)
EPITHELIAL CELLS 1715: ABNORMAL /HPF (ref 0–5)
ERYTHROCYTE [DISTWIDTH] IN BLOOD BY AUTOMATED COUNT: 40.4 FL (ref 35.9–50)
FLUAV RNA SPEC QL NAA+PROBE: NEGATIVE
FLUBV RNA SPEC QL NAA+PROBE: NEGATIVE
GFR SERPLBLD CREATININE-BSD FMLA CKD-EPI: 123 ML/MIN/1.73 M 2
GLOBULIN SER CALC-MCNC: 3.4 G/DL (ref 1.9–3.5)
GLUCOSE SERPL-MCNC: 133 MG/DL (ref 65–99)
GLUCOSE UR STRIP.AUTO-MCNC: NEGATIVE MG/DL
HCG SERPL QL: POSITIVE
HCT VFR BLD AUTO: 43.5 % (ref 37–47)
HGB BLD-MCNC: 14.6 G/DL (ref 12–16)
IMM GRANULOCYTES # BLD AUTO: 0.05 K/UL (ref 0–0.11)
IMM GRANULOCYTES NFR BLD AUTO: 0.4 % (ref 0–0.9)
KETONES UR STRIP.AUTO-MCNC: 40 MG/DL
LACTATE SERPL-SCNC: 2.8 MMOL/L (ref 0.5–2)
LACTATE SERPL-SCNC: 3.4 MMOL/L (ref 0.5–2)
LEUKOCYTE ESTERASE UR QL STRIP.AUTO: ABNORMAL
LYMPHOCYTES # BLD AUTO: 2.33 K/UL (ref 1–4.8)
LYMPHOCYTES NFR BLD: 18 % (ref 22–41)
MCH RBC QN AUTO: 26.8 PG (ref 27–33)
MCHC RBC AUTO-ENTMCNC: 33.6 G/DL (ref 32.2–35.5)
MCV RBC AUTO: 79.8 FL (ref 81.4–97.8)
MICRO URNS: ABNORMAL
MONOCYTES # BLD AUTO: 0.34 K/UL (ref 0–0.85)
MONOCYTES NFR BLD AUTO: 2.6 % (ref 0–13.4)
MUCOUS THREADS URNS QL MICRO: PRESENT /HPF
NEUTROPHILS # BLD AUTO: 10.21 K/UL (ref 1.82–7.42)
NEUTROPHILS NFR BLD: 78.6 % (ref 44–72)
NITRITE UR QL STRIP.AUTO: NEGATIVE
NRBC # BLD AUTO: 0 K/UL
NRBC BLD-RTO: 0 /100 WBC (ref 0–0.2)
PH UR STRIP.AUTO: 7.5 [PH] (ref 5–8)
PLATELET # BLD AUTO: 363 K/UL (ref 164–446)
PMV BLD AUTO: 11.1 FL (ref 9–12.9)
POTASSIUM SERPL-SCNC: 3.4 MMOL/L (ref 3.6–5.5)
PROT SERPL-MCNC: 8.6 G/DL (ref 6–8.2)
PROT UR QL STRIP: 30 MG/DL
RBC # BLD AUTO: 5.45 M/UL (ref 4.2–5.4)
RBC # URNS HPF: ABNORMAL /HPF (ref 0–2)
RBC UR QL AUTO: ABNORMAL
RSV RNA SPEC QL NAA+PROBE: NEGATIVE
SARS-COV-2 RNA RESP QL NAA+PROBE: NOTDETECTED
SODIUM SERPL-SCNC: 140 MMOL/L (ref 135–145)
SP GR UR STRIP.AUTO: 1.03
UROBILINOGEN UR STRIP.AUTO-MCNC: 1 EU/DL
WBC # BLD AUTO: 13 K/UL (ref 4.8–10.8)
WBC #/AREA URNS HPF: ABNORMAL /HPF

## 2025-01-05 PROCEDURE — 0241U HCHG SARS-COV-2 COVID-19 NFCT DS RESP RNA 4 TRGT ED POC: CPT

## 2025-01-05 PROCEDURE — 96375 TX/PRO/DX INJ NEW DRUG ADDON: CPT

## 2025-01-05 PROCEDURE — 80053 COMPREHEN METABOLIC PANEL: CPT

## 2025-01-05 PROCEDURE — 700105 HCHG RX REV CODE 258: Mod: UD | Performed by: EMERGENCY MEDICINE

## 2025-01-05 PROCEDURE — 71045 X-RAY EXAM CHEST 1 VIEW: CPT

## 2025-01-05 PROCEDURE — 96365 THER/PROPH/DIAG IV INF INIT: CPT

## 2025-01-05 PROCEDURE — 87086 URINE CULTURE/COLONY COUNT: CPT

## 2025-01-05 PROCEDURE — 81001 URINALYSIS AUTO W/SCOPE: CPT

## 2025-01-05 PROCEDURE — 85025 COMPLETE CBC W/AUTO DIFF WBC: CPT

## 2025-01-05 PROCEDURE — 99285 EMERGENCY DEPT VISIT HI MDM: CPT

## 2025-01-05 PROCEDURE — 83605 ASSAY OF LACTIC ACID: CPT

## 2025-01-05 PROCEDURE — 87040 BLOOD CULTURE FOR BACTERIA: CPT | Mod: 91

## 2025-01-05 PROCEDURE — 84703 CHORIONIC GONADOTROPIN ASSAY: CPT

## 2025-01-05 PROCEDURE — 36415 COLL VENOUS BLD VENIPUNCTURE: CPT

## 2025-01-05 PROCEDURE — 700111 HCHG RX REV CODE 636 W/ 250 OVERRIDE (IP): Mod: JZ,UD | Performed by: EMERGENCY MEDICINE

## 2025-01-05 RX ORDER — METRONIDAZOLE 500 MG/100ML
500 INJECTION, SOLUTION INTRAVENOUS ONCE
Status: COMPLETED | OUTPATIENT
Start: 2025-01-05 | End: 2025-01-05

## 2025-01-05 RX ORDER — ONDANSETRON 4 MG/1
4 TABLET, ORALLY DISINTEGRATING ORAL EVERY 6 HOURS PRN
Qty: 10 TABLET | Refills: 0 | Status: SHIPPED | OUTPATIENT
Start: 2025-01-05

## 2025-01-05 RX ORDER — CEFTRIAXONE 2 G/1
2000 INJECTION, POWDER, FOR SOLUTION INTRAMUSCULAR; INTRAVENOUS ONCE
Status: COMPLETED | OUTPATIENT
Start: 2025-01-05 | End: 2025-01-05

## 2025-01-05 RX ORDER — HALOPERIDOL 5 MG/ML
5 INJECTION INTRAMUSCULAR ONCE
Status: COMPLETED | OUTPATIENT
Start: 2025-01-05 | End: 2025-01-05

## 2025-01-05 RX ORDER — SODIUM CHLORIDE, SODIUM LACTATE, POTASSIUM CHLORIDE, CALCIUM CHLORIDE 600; 310; 30; 20 MG/100ML; MG/100ML; MG/100ML; MG/100ML
1000 INJECTION, SOLUTION INTRAVENOUS ONCE
Status: COMPLETED | OUTPATIENT
Start: 2025-01-05 | End: 2025-01-05

## 2025-01-05 RX ORDER — SODIUM CHLORIDE, SODIUM LACTATE, POTASSIUM CHLORIDE, AND CALCIUM CHLORIDE .6; .31; .03; .02 G/100ML; G/100ML; G/100ML; G/100ML
30 INJECTION, SOLUTION INTRAVENOUS ONCE
Status: DISCONTINUED | OUTPATIENT
Start: 2025-01-05 | End: 2025-01-05 | Stop reason: HOSPADM

## 2025-01-05 RX ADMIN — SODIUM CHLORIDE, SODIUM LACTATE, POTASSIUM CHLORIDE, AND CALCIUM CHLORIDE 1000 ML: .6; .31; .03; .02 INJECTION, SOLUTION INTRAVENOUS at 13:20

## 2025-01-05 RX ADMIN — METRONIDAZOLE 500 MG: 500 INJECTION, SOLUTION INTRAVENOUS at 14:37

## 2025-01-05 RX ADMIN — CEFTRIAXONE SODIUM 2000 MG: 2 INJECTION, POWDER, FOR SOLUTION INTRAMUSCULAR; INTRAVENOUS at 14:29

## 2025-01-05 RX ADMIN — HALOPERIDOL LACTATE 5 MG: 5 INJECTION, SOLUTION INTRAMUSCULAR at 13:17

## 2025-01-05 ASSESSMENT — FIBROSIS 4 INDEX: FIB4 SCORE: 0.42

## 2025-01-05 NOTE — ED NOTES
Break rn note: erp at bedside  Iv started,blood sent to lab  Medicated per mar  Poc viral swab processed.   Rechecked vitals.

## 2025-01-05 NOTE — ED TRIAGE NOTES
Pt to ED with complaints of NVD and abdominal pain x2 days - today as streaks of blood in vomit. She does reports others in her household are sick with similar symptoms. She has taken oral Zofran at home several times without relief. She is self inducing vomiting in ED triage - encouraged to not do that anymore. Expresses she is feeling faint - pt in WC.   She scored a 3 on sepsis screening labs requested.

## 2025-01-05 NOTE — ED NOTES
Flagyl stopped. Pt continues to want to leave AMA. Discussed Hcg results with pt. Pt states she is going to follow up with primary physician. Pt agrees to drink plenty of fluids and follow up with primary. Pt signs AMA. Pt provided with clothing and provides urine sample. Urine to lab.

## 2025-01-05 NOTE — DISCHARGE INSTRUCTIONS
Please discuss the following findings with your regular doctor:  DX-CHEST-PORTABLE (1 VIEW)   Final Result         No acute cardiac or pulmonary abnormality is identified.      CT-ABDOMEN-PELVIS WITH    (Results Pending)     Labs Reviewed   LACTIC ACID - Abnormal; Notable for the following components:       Result Value    Lactic Acid 2.8 (*)     All other components within normal limits   LACTIC ACID - Abnormal; Notable for the following components:    Lactic Acid 3.4 (*)     All other components within normal limits   CBC WITH DIFFERENTIAL - Abnormal; Notable for the following components:    WBC 13.0 (*)     RBC 5.45 (*)     MCV 79.8 (*)     MCH 26.8 (*)     Neutrophils-Polys 78.60 (*)     Lymphocytes 18.00 (*)     Neutrophils (Absolute) 10.21 (*)     All other components within normal limits   COMP METABOLIC PANEL - Abnormal; Notable for the following components:    Potassium 3.4 (*)     Co2 19 (*)     Anion Gap 18.0 (*)     Glucose 133 (*)     Alkaline Phosphatase 102 (*)     Albumin 5.2 (*)     Total Protein 8.6 (*)     All other components within normal limits   HCG QUAL SERUM - Abnormal; Notable for the following components:    Beta-Hcg Qualitative Serum Positive (*)     All other components within normal limits   ESTIMATED GFR   LACTIC ACID   URINALYSIS   URINE CULTURE(NEW)   BLOOD CULTURE   BLOOD CULTURE   POC COV-2, FLU A/B, RSV BY PCR

## 2025-01-05 NOTE — ED NOTES
Pt continues to induce vomiting while waiting for phlebotomy. Pt carrying bag of bile colored emesis. No jayme blood noted. Pt wheeled to room. Pt states she needs to change because of lots of diarrhea to pants. Refusing to go to restroom. Pt ambulatory in room. Pt rips off hospital bracelet and throws it to floor. States its too much to use restroom. Pt to go to shower.

## 2025-01-05 NOTE — ED PROVIDER NOTES
ER Provider Note    Scribed for Cecil Schneider M.d. by Harpal Dangelo. 1/5/2025  1:10 PM    Primary Care Provider: Margareth Villegas M.D.    CHIEF COMPLAINT   Chief Complaint   Patient presents with    N/V    Nausea    Vomiting    Diarrhea    Abdominal Pain         HPI/ROS    OUTSIDE HISTORIAN(S):  None    Kristal Cesar is a 29 y.o. female who presents to the ED complaining of abdominal pain onset yesterday. The patient reports nausea, vomiting, and diarrhea. She states forcing emesis by placing finger down her throat. She states current episode of pain feels worse than previous ER visits. She notes dysuria. She denies chance of pregnancy due to IUD, marijuana, recreational drug use, and daily alcohol use.    PAST MEDICAL HISTORY  Past Medical History:   Diagnosis Date    Allergy     Anxiety     Depression     Obesity (BMI 30-39.9)        SURGICAL HISTORY  Past Surgical History:   Procedure Laterality Date    DILATION AND CURETTAGE N/A 2/1/2022    Procedure: DILATION AND CURETTAGE;  Surgeon: Arabella Lara M.D.;  Location: SUR[facility]ERY [facility];  Service: Labor and Delivery    REAGAN BY LAPAROSCOPY  3/7/2016    Procedure: REAGAN BY LAPAROSCOPY;  Surgeon: John H Ganser, M.D.;  Location: SURGERY [facility];  Service:     OTHER      gallbladder removed       FAMILY HISTORY  Family History   Problem Relation Age of Onset    No Known Problems Mother     Diabetes Father     Hypertension Father     Hyperlipidemia Father     No Known Problems Sister     Cancer Neg Hx        SOCIAL HISTORY   reports that she has never smoked. She has never used smokeless tobacco. She reports that she does not drink alcohol and does not use drugs.    CURRENT MEDICATIONS  Previous Medications    ACETAMINOPHEN (TYLENOL) 325 MG TAB    Take 650 mg by mouth every four hours as needed.    ESCITALOPRAM (LEXAPRO) 10 MG TAB    TAKE 1 TABLET BY MOUTH EVERY DAY FOR 90 DAYS    IBUPROFEN (MOTRIN) 800 MG TAB    Take  "1 Tablet by mouth every 8 hours as needed for Mild Pain.    METOCLOPRAMIDE (REGLAN) 10 MG TAB    Take 1 Tablet by mouth 4 times a day as needed (vomiting/nausea).    METRONIDAZOLE (FLAGYL) 500 MG TAB    Take 1 Tablet by mouth 2 times a day for 7 days.    MOXIFLOXACIN (VIGAMOX) 0.5 % SOLUTION    Administer 1 Drop into both eyes 3 times a day.    NITROFURANTOIN (MACROBID) 100 MG CAP    Take 1 Capsule by mouth 2 times a day for 5 days.    OLOPATADINE (PATANOL) 0.1 % OPHTHALMIC SOLUTION    Administer 1 Drop into both eyes 2 times a day. For allergic conjunctivitis    ONDANSETRON (ZOFRAN ODT) 4 MG TABLET DISPERSIBLE    Take 1 Tablet by mouth every 8 hours as needed for Nausea/Vomiting.       ALLERGIES  Aspirin and Other environmental    PHYSICAL EXAM  BP (!) 133/93   Pulse 99   Temp (!) 35.6 °C (96 °F) (Temporal)   Resp (!) 22   Ht 1.6 m (5' 3\")   Wt 59 kg (130 lb)   SpO2 98%   BMI 23.03 kg/m²   Constitutional: Alert in no apparent distress. Ill appearing  HENT: No signs of trauma, Bilateral external ears normal, Nose normal. Uvula midline.   Eyes: Pupils are equal and reactive, Conjunctiva normal, Non-icteric.   Neck: Normal range of motion, No tenderness, Supple, No stridor.   Lymphatic: No lymphadenopathy noted.   Cardiovascular: Regular rate and rhythm, no murmurs.   Thorax & Lungs: Normal breath sounds, No respiratory distress, No wheezing, No chest tenderness.   Abdomen: Bowel sounds normal, Soft, mild defuse abdominal tenderness, No peritoneal signs, No masses, No pulsatile masses.   Skin: Warm, Dry, No erythema, No rash.   Back: No bony tenderness, No CVA tenderness.   Extremities: Intact distal pulses, No edema, No tenderness, No cyanosis.  Musculoskeletal: Good range of motion in all major joints. No tenderness to palpation or major deformities noted.   Neurologic: Alert , Normal motor function, Normal sensory function, No focal deficits noted.   Psychiatric: Affect normal, Judgment normal, Mood normal. "     DIAGNOSTIC STUDIES    EKG/LABS  Results for orders placed or performed during the hospital encounter of 01/05/25   Lactic Acid    Collection Time: 01/05/25 12:35 PM   Result Value Ref Range    Lactic Acid 2.8 (H) 0.5 - 2.0 mmol/L   CBC with Differential    Collection Time: 01/05/25 12:35 PM   Result Value Ref Range    WBC 13.0 (H) 4.8 - 10.8 K/uL    RBC 5.45 (H) 4.20 - 5.40 M/uL    Hemoglobin 14.6 12.0 - 16.0 g/dL    Hematocrit 43.5 37.0 - 47.0 %    MCV 79.8 (L) 81.4 - 97.8 fL    MCH 26.8 (L) 27.0 - 33.0 pg    MCHC 33.6 32.2 - 35.5 g/dL    RDW 40.4 35.9 - 50.0 fL    Platelet Count 363 164 - 446 K/uL    MPV 11.1 9.0 - 12.9 fL    Neutrophils-Polys 78.60 (H) 44.00 - 72.00 %    Lymphocytes 18.00 (L) 22.00 - 41.00 %    Monocytes 2.60 0.00 - 13.40 %    Eosinophils 0.20 0.00 - 6.90 %    Basophils 0.20 0.00 - 1.80 %    Immature Granulocytes 0.40 0.00 - 0.90 %    Nucleated RBC 0.00 0.00 - 0.20 /100 WBC    Neutrophils (Absolute) 10.21 (H) 1.82 - 7.42 K/uL    Lymphs (Absolute) 2.33 1.00 - 4.80 K/uL    Monos (Absolute) 0.34 0.00 - 0.85 K/uL    Eos (Absolute) 0.02 0.00 - 0.51 K/uL    Baso (Absolute) 0.02 0.00 - 0.12 K/uL    Immature Granulocytes (abs) 0.05 0.00 - 0.11 K/uL    NRBC (Absolute) 0.00 K/uL   Complete Metabolic Panel    Collection Time: 01/05/25 12:35 PM   Result Value Ref Range    Sodium 140 135 - 145 mmol/L    Potassium 3.4 (L) 3.6 - 5.5 mmol/L    Chloride 103 96 - 112 mmol/L    Co2 19 (L) 20 - 33 mmol/L    Anion Gap 18.0 (H) 7.0 - 16.0    Glucose 133 (H) 65 - 99 mg/dL    Bun 12 8 - 22 mg/dL    Creatinine 0.65 0.50 - 1.40 mg/dL    Calcium 10.0 8.5 - 10.5 mg/dL    Correct Calcium 9.0 8.5 - 10.5 mg/dL    AST(SGOT) 22 12 - 45 U/L    ALT(SGPT) 10 2 - 50 U/L    Alkaline Phosphatase 102 (H) 30 - 99 U/L    Total Bilirubin 0.6 0.1 - 1.5 mg/dL    Albumin 5.2 (H) 3.2 - 4.9 g/dL    Total Protein 8.6 (H) 6.0 - 8.2 g/dL    Globulin 3.4 1.9 - 3.5 g/dL    A-G Ratio 1.5 g/dL   Blood Culture - Draw one from central line and one  from peripheral site    Collection Time: 01/05/25 12:35 PM    Specimen: Peripheral; Blood   Result Value Ref Range    Significant Indicator NEG     Source BLD     Site PERIPHERAL     Culture Result       No Growth  Note: Blood cultures are incubated for 5 days and  are monitored continuously.Positive blood cultures  are called to the RN and reported as soon as  they are identified.     ESTIMATED GFR    Collection Time: 01/05/25 12:35 PM   Result Value Ref Range    GFR (CKD-EPI) 123 >60 mL/min/1.73 m 2   HCG QUAL SERUM    Collection Time: 01/05/25 12:35 PM   Result Value Ref Range    Beta-Hcg Qualitative Serum Positive (A) Negative   Blood Culture - Draw one from central line and one from peripheral site    Collection Time: 01/05/25 12:36 PM    Specimen: Line; Blood   Result Value Ref Range    Significant Indicator NEG     Source BLD     Site Peripheral     Culture Result       No Growth  Note: Blood cultures are incubated for 5 days and  are monitored continuously.Positive blood cultures  are called to the RN and reported as soon as  they are identified.     Lactic Acid    Collection Time: 01/05/25  1:22 PM   Result Value Ref Range    Lactic Acid 3.4 (H) 0.5 - 2.0 mmol/L   POC CoV-2, FLU A/B, RSV by PCR    Collection Time: 01/05/25  1:29 PM   Result Value Ref Range    POC Influenza A RNA, PCR Negative Negative    POC Influenza B RNA, PCR Negative Negative    POC RSV, by PCR Negative Negative    POC SARS-CoV-2, PCR NotDetected NotDetected   Urinalysis    Collection Time: 01/05/25  3:30 PM    Specimen: Urine   Result Value Ref Range    Color Dark Yellow     Character Clear     Specific Gravity 1.027 <1.035    Ph 7.5 5.0 - 8.0    Glucose Negative Negative mg/dL    Ketones 40 (A) Negative mg/dL    Protein 30 (A) Negative mg/dL    Bilirubin Negative Negative    Urobilinogen, Urine 1.0 <=1.0 EU/dL    Nitrite Negative Negative    Leukocyte Esterase Small (A) Negative    Occult Blood Moderate (A) Negative    Micro Urine Req  Microscopic    URINE MICROSCOPIC (W/UA)    Collection Time: 01/05/25  3:30 PM   Result Value Ref Range    WBC 0-2 /hpf    RBC 3-5 (A) 0 - 2 /hpf    Bacteria None Seen None /hpf    Epithelial Cells 0-2 0 - 5 /hpf    Mucous Threads Present /hpf    Urine Casts 0-2 0 - 2 /lpf     *Note: Due to a large number of results and/or encounters for the requested time period, some results have not been displayed. A complete set of results can be found in Results Review.     RADIOLOGY/PROCEDURES   The attending emergency physician has independently interpreted the diagnostic imaging associated with this visit and am waiting the final reading from the radiologist.   My preliminary interpretation is a follows: no ptx    Radiologist interpretation:  DX-CHEST-PORTABLE (1 VIEW)   Final Result         No acute cardiac or pulmonary abnormality is identified.          COURSE & MEDICAL DECISION MAKING     ASSESSMENT, COURSE AND PLAN  Care Narrative:         1:10 PM - Patient seen and examined at bedside. Discussed plan of care, including labs and imaging for evaluation. Patient agrees to the plan of care. The patient will be  medicated with haldol 5 mg, flagyl 500 mg, and rocephin 2000 mg. Ordered for blood culture x2, UA, urine culture, CMP, CBC w diff, lactic acid, hcg qual serum and lactic acid to evaluate her symptoms.     Patient seemed ill-appearing with rising lactic acid level  Patient denied any possibility of pregnancy upon my initial evaluation was given Haldol  Patient tested positive for pregnancy.  Unclear if this represents ectopic pregnancy    Patient elected to flee the emergency department prior to my reevaluation  Attempts were made by nurse to help prevent patient from leaving AMA during a procedural sedation of another patient. Patient refused to wait despite risks and abnormal labs.     The patient is leaving against medical advice. The patient is not intoxicated.  The patient is a capable decision maker and  understands the risks and benefits of their decision.  While I certainly disagree with the patient's decision, I respect the patient's autonomy and will not keep them here against their will.     Despite patient's leaving in the middle of a procedure I still attempted to provide patient with basic discharge instructions including a prescription of Zofran as I am concerned for patient's persistent loss of fluids    8:18 PM patient called and VM left to remind patient that despite leaving AMA she is always welcome to return to the ED.         DISPOSITION AND DISCUSSIONS  I have discussed management of the patient with the following physicians and BETTY's:  None    Discussion of management with other QHP or appropriate source(s): None         Barriers to care at this time, including but not limited to:  None .       FINAL DIANGOSIS  1. Vomiting and diarrhea    2. Pregnancy of unknown anatomic location        The note accurately reflects work and decisions made by me.  Cecil Schneider M.D.  1/5/2025  8:18 PM

## 2025-01-05 NOTE — ED NOTES
Pt provided with additional blankets, socks, and mouth swab per request. Pt states she does not want CT scan because she has to  kids from school.

## 2025-01-07 LAB
BACTERIA UR CULT: NORMAL
SIGNIFICANT IND 70042: NORMAL
SITE SITE: NORMAL
SOURCE SOURCE: NORMAL

## 2025-01-09 ENCOUNTER — HOSPITAL ENCOUNTER (OUTPATIENT)
Dept: LAB | Facility: MEDICAL CENTER | Age: 30
End: 2025-01-09
Attending: PHYSICIAN ASSISTANT
Payer: MEDICAID

## 2025-01-09 LAB — B-HCG SERPL-ACNC: 1.7 MIU/ML (ref 0–5)

## 2025-01-09 PROCEDURE — 36415 COLL VENOUS BLD VENIPUNCTURE: CPT

## 2025-01-09 PROCEDURE — 84702 CHORIONIC GONADOTROPIN TEST: CPT

## 2025-01-10 LAB
BACTERIA BLD CULT: NORMAL
BACTERIA BLD CULT: NORMAL
SIGNIFICANT IND 70042: NORMAL
SIGNIFICANT IND 70042: NORMAL
SITE SITE: NORMAL
SITE SITE: NORMAL
SOURCE SOURCE: NORMAL
SOURCE SOURCE: NORMAL

## 2025-01-11 ENCOUNTER — HOSPITAL ENCOUNTER (OUTPATIENT)
Dept: LAB | Facility: MEDICAL CENTER | Age: 30
End: 2025-01-11
Attending: INTERNAL MEDICINE
Payer: MEDICAID

## 2025-01-11 LAB — B-HCG SERPL-ACNC: <1 MIU/ML (ref 0–5)

## 2025-01-11 PROCEDURE — 84702 CHORIONIC GONADOTROPIN TEST: CPT

## 2025-01-11 PROCEDURE — 36415 COLL VENOUS BLD VENIPUNCTURE: CPT

## 2025-01-14 ENCOUNTER — PHARMACY VISIT (OUTPATIENT)
Dept: PHARMACY | Facility: MEDICAL CENTER | Age: 30
End: 2025-01-14
Payer: COMMERCIAL

## 2025-01-14 PROCEDURE — RXMED WILLOW AMBULATORY MEDICATION CHARGE: Performed by: INTERNAL MEDICINE

## 2025-01-14 RX ORDER — INFLUENZA A VIRUS A/VICTORIA/4897/2022 IVR-238 (H1N1) ANTIGEN (FORMALDEHYDE INACTIVATED), INFLUENZA A VIRUS A/CALIFORNIA/122/2022 SAN-022 (H3N2) ANTIGEN (FORMALDEHYDE INACTIVATED), AND INFLUENZA B VIRUS B/MICHIGAN/01/2021 ANTIGEN (FORMALDEHYDE INACTIVATED) 15; 15; 15 MG/.5ML; MG/.5ML; MG/.5ML
INJECTION, SUSPENSION INTRAMUSCULAR
Qty: 0.5 ML | Refills: 0 | OUTPATIENT
Start: 2025-01-14

## 2025-02-15 ENCOUNTER — HOSPITAL ENCOUNTER (OUTPATIENT)
Facility: MEDICAL CENTER | Age: 30
End: 2025-02-15
Payer: MEDICAID

## 2025-02-15 ENCOUNTER — OFFICE VISIT (OUTPATIENT)
Dept: URGENT CARE | Facility: CLINIC | Age: 30
End: 2025-02-15
Payer: MEDICAID

## 2025-02-15 VITALS
HEIGHT: 62 IN | DIASTOLIC BLOOD PRESSURE: 66 MMHG | WEIGHT: 129.2 LBS | SYSTOLIC BLOOD PRESSURE: 108 MMHG | TEMPERATURE: 97.9 F | OXYGEN SATURATION: 98 % | BODY MASS INDEX: 23.77 KG/M2 | HEART RATE: 79 BPM | RESPIRATION RATE: 16 BRPM

## 2025-02-15 DIAGNOSIS — N76.0 VAGINOSIS: ICD-10-CM

## 2025-02-15 DIAGNOSIS — R59.1 LYMPHADENOPATHY: ICD-10-CM

## 2025-02-15 LAB
APPEARANCE UR: CLEAR
BILIRUB UR STRIP-MCNC: NEGATIVE MG/DL
CANDIDA DNA VAG QL PROBE+SIG AMP: NEGATIVE
COLOR UR AUTO: YELLOW
G VAGINALIS DNA VAG QL PROBE+SIG AMP: POSITIVE
GLUCOSE UR STRIP.AUTO-MCNC: NEGATIVE MG/DL
KETONES UR STRIP.AUTO-MCNC: NEGATIVE MG/DL
LEUKOCYTE ESTERASE UR QL STRIP.AUTO: NEGATIVE
NITRITE UR QL STRIP.AUTO: NEGATIVE
PH UR STRIP.AUTO: 6 [PH] (ref 5–8)
POCT INT CON NEG: NEGATIVE
POCT INT CON POS: POSITIVE
POCT URINE PREGNANCY TEST: NEGATIVE
PROT UR QL STRIP: NEGATIVE MG/DL
RBC UR QL AUTO: NORMAL
SP GR UR STRIP.AUTO: 1.02
T VAGINALIS DNA VAG QL PROBE+SIG AMP: NEGATIVE
UROBILINOGEN UR STRIP-MCNC: 0.2 MG/DL

## 2025-02-15 PROCEDURE — 99214 OFFICE O/P EST MOD 30 MIN: CPT

## 2025-02-15 PROCEDURE — 87660 TRICHOMONAS VAGIN DIR PROBE: CPT

## 2025-02-15 PROCEDURE — 87480 CANDIDA DNA DIR PROBE: CPT

## 2025-02-15 PROCEDURE — 87491 CHLMYD TRACH DNA AMP PROBE: CPT

## 2025-02-15 PROCEDURE — 81025 URINE PREGNANCY TEST: CPT

## 2025-02-15 PROCEDURE — 3078F DIAST BP <80 MM HG: CPT

## 2025-02-15 PROCEDURE — 87510 GARDNER VAG DNA DIR PROBE: CPT

## 2025-02-15 PROCEDURE — 81002 URINALYSIS NONAUTO W/O SCOPE: CPT

## 2025-02-15 PROCEDURE — 87591 N.GONORRHOEAE DNA AMP PROB: CPT

## 2025-02-15 PROCEDURE — 3074F SYST BP LT 130 MM HG: CPT

## 2025-02-15 RX ORDER — METRONIDAZOLE 7.5 MG/G
1 GEL TOPICAL
Qty: 45 G | Refills: 0 | Status: SHIPPED | OUTPATIENT
Start: 2025-02-15 | End: 2025-02-20

## 2025-02-15 ASSESSMENT — FIBROSIS 4 INDEX: FIB4 SCORE: 0.54

## 2025-02-15 ASSESSMENT — ENCOUNTER SYMPTOMS: FEVER: 0

## 2025-02-15 NOTE — PROGRESS NOTES
Verbal consent was acquired by the patient to use [facility] ambient listening note generation during this visit   Subjective:   Kristal Cesar is a 29 y.o. female who presents for UTI (Back pain, discharge, recently positive for BV but not feeling improvement. ) and Facial Swelling (A couple weeks: Swelling on R side of face)      HPI:  History of Present Illness  The patient is a 29-year-old female who presents for evaluation of bacterial vaginosis and lymphadenopathy.    Two weeks ago, she underwent a Pap smear and routine gynecological examination, during which she was diagnosed with bacterial vaginosis (BV). Despite completing the prescribed antibiotic regimen last Thursday, she continues to experience burning sensation, discomfort, and yellow discharge. She expresses concern about potential sexually transmitted diseases (STDs), suspecting her partner may be a carrier. She has not previously undergone intravaginal treatment for BV. Additionally, she reports mild urinary symptoms, including pain during urination and increased frequency, which started four days ago. She also notes slight pelvic discomfort.    She reports the presence of a small, painful swelling in her lymph nodes that began a week ago. She experiences mild throat pain, particularly in the mornings, but it does not persist throughout the day. She has not sought any treatment for these symptoms. She reports no ear-related issues or fevers.       Review of Systems   Constitutional:  Negative for fever.   Genitourinary:  Negative for dysuria.        +vaginal burning, +vaginal discharge       Medications:    Current Outpatient Medications on File Prior to Visit   Medication Sig Dispense Refill    IUD'S IU by Intrauterine route.      ondansetron (ZOFRAN ODT) 4 MG TABLET DISPERSIBLE Take 1 Tablet by mouth every 6 hours as needed for Nausea/Vomiting. 10 Tablet 0    ibuprofen (MOTRIN) 800 MG Tab Take 1 Tablet by mouth every 8 hours as  needed for Mild Pain. 30 Tablet 0    acetaminophen (TYLENOL) 325 MG Tab Take 650 mg by mouth every four hours as needed.      moxifloxacin (VIGAMOX) 0.5 % Solution Administer 1 Drop into both eyes 3 times a day. 3 mL 0    escitalopram (LEXAPRO) 10 MG Tab TAKE 1 TABLET BY MOUTH EVERY DAY FOR 90 DAYS      olopatadine (PATANOL) 0.1 % ophthalmic solution Administer 1 Drop into both eyes 2 times a day. For allergic conjunctivitis 5 mL 0    metoclopramide (REGLAN) 10 MG Tab Take 1 Tablet by mouth 4 times a day as needed (vomiting/nausea). 40 Tablet 0    influenza vaccine (FLUZONE) 0.5 ML Suspension Prefilled Syringe injection Inject  into the shoulder, thigh, or buttocks. (Patient not taking: Reported on 2/15/2025) 0.5 mL 0     No current facility-administered medications on file prior to visit.        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    Carpal tunnel syndrome of right wrist    Recurrent boils    Anxiety    Labor and delivery indication for care or intervention    Intentional drug overdose, initial encounter (HCC)    Suicide attempt (HCC)    Postpartum depression    QT prolongation        Surgical History:  Past Surgical History:   Procedure Laterality Date    DILATION AND CURETTAGE N/A 2/1/2022    Procedure: DILATION AND CURETTAGE;  Surgeon: Arabella Lara M.D.;  Location: [facility];  Service: Labor and Delivery    REAGAN BY LAPAROSCOPY  3/7/2016    Procedure: REAGAN BY LAPAROSCOPY;  Surgeon: John H Ganser, M.D.;  Location: SURGERY [facility];  Service:     OTHER      gallbladder removed       Past Social Hx:   Social History     Tobacco Use    Smoking status: Never    Smokeless tobacco: Never   Vaping Use    Vaping status: Never Used  "  Substance Use Topics    Alcohol use: No    Drug use: No          Problem list, medications, and allergies reviewed by myself today in Epic.     Objective:     /66   Pulse 79   Temp 36.6 °C (97.9 °F)   Resp 16   Ht 1.575 m (5' 2\")   Wt 58.6 kg (129 lb 3.2 oz)   LMP 02/10/2025 (Exact Date)   SpO2 98%   BMI 23.63 kg/m²     Physical Exam  Vitals and nursing note reviewed.   Constitutional:       General: She is not in acute distress.     Appearance: Normal appearance. She is normal weight. She is not ill-appearing, toxic-appearing or diaphoretic.   HENT:      Head: Normocephalic and atraumatic.      Right Ear: Tympanic membrane, ear canal and external ear normal. There is no impacted cerumen.      Left Ear: Tympanic membrane, ear canal and external ear normal. There is no impacted cerumen.      Nose: Nose normal. No congestion or rhinorrhea.      Mouth/Throat:      Mouth: Mucous membranes are moist.      Pharynx: Oropharynx is clear. No oropharyngeal exudate or posterior oropharyngeal erythema.   Cardiovascular:      Rate and Rhythm: Normal rate and regular rhythm.      Pulses: Normal pulses.      Heart sounds: Normal heart sounds. No murmur heard.     No friction rub. No gallop.   Pulmonary:      Effort: Pulmonary effort is normal. No respiratory distress.      Breath sounds: Normal breath sounds. No stridor. No wheezing, rhonchi or rales.   Chest:      Chest wall: No tenderness.   Musculoskeletal:      Cervical back: Neck supple. No tenderness.   Lymphadenopathy:      Cervical: Cervical adenopathy present.   Skin:     General: Skin is warm and dry.      Capillary Refill: Capillary refill takes less than 2 seconds.   Neurological:      General: No focal deficit present.      Mental Status: She is alert and oriented to person, place, and time. Mental status is at baseline.      Cranial Nerves: No cranial nerve deficit.      Motor: No weakness.      Gait: Gait normal.   Psychiatric:         Mood and " Affect: Mood normal.         Behavior: Behavior normal.         Thought Content: Thought content normal.         Judgment: Judgment normal.         Assessment/Plan:     Diagnosis and associated orders:   1. Vaginosis  POCT Urinalysis    POCT PREGNANCY    metronidazole (METROGEL) 0.75 % gel    VAGINAL PATHOGENS DNA PANEL    Chlamydia/GC, PCR (Genital/Anal swab)      2. Lymphadenopathy           Results for orders placed or performed in visit on 02/15/25   POCT Urinalysis    Collection Time: 02/15/25 10:10 AM   Result Value Ref Range    POC Color YELLOW Negative    POC Appearance CLEAR Negative    POC Glucose NEGATIVE Negative mg/dL    POC Bilirubin NEGATIVE Negative mg/dL    POC Ketones NEGATIVE Negative mg/dL    POC Specific Gravity 1.025 <1.005 - >1.030    POC Blood LARGE Negative    POC Urine PH 6.0 5.0 - 8.0    POC Protein NEGATIVE Negative mg/dL    POC Urobiligen 0.2 Negative (0.2) mg/dL    POC Nitrites NEGATIVE Negative    POC Leukocyte Esterase NEGATIVE Negative   POCT PREGNANCY    Collection Time: 02/15/25 10:10 AM   Result Value Ref Range    POC Urine Pregnancy Test Negative     Internal Control Positive Positive     Internal Control Negative Negative      *Note: Due to a large number of results and/or encounters for the requested time period, some results have not been displayed. A complete set of results can be found in Results Review.       Comments/MDM:   Pt is clinically stable at today's acute urgent care visit.  No acute distress noted. Appropriate for outpatient management at this time.     Assessment & Plan  1. Bacterial vaginosis (BV).  Her symptoms, including burning sensation, discomfort, and yellow discharge, suggest a persistent BV infection despite completing oral antibiotics. An intravaginal treatment with a suppository is recommended. A repeat swab will be conducted to confirm the effectiveness of the previous antibiotic treatment. Additional testing for gonorrhea and chlamydia will be  performed due to the presence of discharge. F/u with women's health recommended.    2. Lymphadenopathy.  The swelling and pain in her lymph nodes, which started a week ago, are likely benign. She is advised to take over-the-counter analgesics such as Tylenol or ibuprofen to alleviate the pain.            Discussed DDx, management options (risks,benefits, and alternatives to planned treatment), natural progression and supportive care.  Expressed understanding and the treatment plan was agreed upon. Questions were encouraged and answered   Return to urgent care prn if new or worsening sx or if there is no improvement in condition prn.    Educated in Red flags and indications to immediately call 911 or present to the Emergency Department.   Advised the patient to follow-up with the primary care physician for recheck, reevaluation, and consideration of further management.    I personally reviewed prior external notes and test results pertinent to today's visit.  I have independently reviewed and interpreted all diagnostics ordered during this urgent care acute visit.       Please note that this dictation was created using voice recognition software. I have made a reasonable attempt to correct obvious errors, but I expect that there are errors of grammar and possibly content that I did not discover before finalizing the note.    This note was electronically signed by MAGO Hathaway

## 2025-02-17 ENCOUNTER — RESULTS FOLLOW-UP (OUTPATIENT)
Dept: URGENT CARE | Facility: CLINIC | Age: 30
End: 2025-02-17

## 2025-03-25 ENCOUNTER — APPOINTMENT (OUTPATIENT)
Dept: URGENT CARE | Facility: CLINIC | Age: 30
End: 2025-03-25
Payer: MEDICAID

## 2025-04-03 ENCOUNTER — HOSPITAL ENCOUNTER (OUTPATIENT)
Dept: LAB | Facility: MEDICAL CENTER | Age: 30
End: 2025-04-03
Attending: ORTHOPAEDIC SURGERY
Payer: MEDICAID

## 2025-04-03 LAB — T PALLIDUM AB SER QL IA: NORMAL

## 2025-04-03 PROCEDURE — 86694 HERPES SIMPLEX NES ANTBDY: CPT

## 2025-04-03 PROCEDURE — 86696 HERPES SIMPLEX TYPE 2 TEST: CPT

## 2025-04-03 PROCEDURE — 86780 TREPONEMA PALLIDUM: CPT

## 2025-04-03 PROCEDURE — 87591 N.GONORRHOEAE DNA AMP PROB: CPT

## 2025-04-03 PROCEDURE — 86695 HERPES SIMPLEX TYPE 1 TEST: CPT

## 2025-04-03 PROCEDURE — 36415 COLL VENOUS BLD VENIPUNCTURE: CPT

## 2025-04-03 PROCEDURE — 87491 CHLMYD TRACH DNA AMP PROBE: CPT

## 2025-04-05 LAB
HSV1 GG IGG SER-ACNC: 39 IV
HSV1+2 IGG SER IA-ACNC: >22.4 IV
HSV2 GG IGG SER-ACNC: 0.12 IV

## 2025-07-16 ENCOUNTER — OFFICE VISIT (OUTPATIENT)
Dept: URGENT CARE | Facility: CLINIC | Age: 30
End: 2025-07-16
Payer: MEDICAID

## 2025-07-16 ENCOUNTER — HOSPITAL ENCOUNTER (OUTPATIENT)
Facility: MEDICAL CENTER | Age: 30
End: 2025-07-16
Attending: PHYSICIAN ASSISTANT
Payer: MEDICAID

## 2025-07-16 VITALS
HEIGHT: 60 IN | SYSTOLIC BLOOD PRESSURE: 102 MMHG | DIASTOLIC BLOOD PRESSURE: 50 MMHG | TEMPERATURE: 97 F | OXYGEN SATURATION: 100 % | BODY MASS INDEX: 25.48 KG/M2 | RESPIRATION RATE: 12 BRPM | WEIGHT: 129.8 LBS | HEART RATE: 69 BPM

## 2025-07-16 DIAGNOSIS — N76.0 ACUTE VAGINITIS: Primary | ICD-10-CM

## 2025-07-16 LAB
APPEARANCE UR: CLEAR
BILIRUB UR STRIP-MCNC: NEGATIVE MG/DL
COLOR UR AUTO: YELLOW
GLUCOSE UR STRIP.AUTO-MCNC: NEGATIVE MG/DL
KETONES UR STRIP.AUTO-MCNC: NEGATIVE MG/DL
LEUKOCYTE ESTERASE UR QL STRIP.AUTO: NEGATIVE
NITRITE UR QL STRIP.AUTO: NEGATIVE
PH UR STRIP.AUTO: 5.5 [PH] (ref 5–8)
POCT INT CON NEG: NEGATIVE
POCT INT CON POS: POSITIVE
POCT URINE PREGNANCY TEST: NEGATIVE
PROT UR QL STRIP: NEGATIVE MG/DL
RBC UR QL AUTO: NORMAL
SP GR UR STRIP.AUTO: 1.02
UROBILINOGEN UR STRIP-MCNC: 0.2 MG/DL

## 2025-07-16 PROCEDURE — 87480 CANDIDA DNA DIR PROBE: CPT

## 2025-07-16 PROCEDURE — 87510 GARDNER VAG DNA DIR PROBE: CPT

## 2025-07-16 PROCEDURE — 87660 TRICHOMONAS VAGIN DIR PROBE: CPT

## 2025-07-16 PROCEDURE — 3074F SYST BP LT 130 MM HG: CPT | Performed by: PHYSICIAN ASSISTANT

## 2025-07-16 PROCEDURE — 81002 URINALYSIS NONAUTO W/O SCOPE: CPT | Performed by: PHYSICIAN ASSISTANT

## 2025-07-16 PROCEDURE — 87591 N.GONORRHOEAE DNA AMP PROB: CPT

## 2025-07-16 PROCEDURE — 99213 OFFICE O/P EST LOW 20 MIN: CPT | Performed by: PHYSICIAN ASSISTANT

## 2025-07-16 PROCEDURE — 3078F DIAST BP <80 MM HG: CPT | Performed by: PHYSICIAN ASSISTANT

## 2025-07-16 PROCEDURE — 87491 CHLMYD TRACH DNA AMP PROBE: CPT

## 2025-07-16 PROCEDURE — 81025 URINE PREGNANCY TEST: CPT | Performed by: PHYSICIAN ASSISTANT

## 2025-07-16 ASSESSMENT — ENCOUNTER SYMPTOMS
FLANK PAIN: 0
NAUSEA: 0
ABDOMINAL PAIN: 1
VOMITING: 0
FEVER: 0

## 2025-07-16 ASSESSMENT — FIBROSIS 4 INDEX: FIB4 SCORE: 0.56

## 2025-07-16 NOTE — PROGRESS NOTES
Subjective:   Kristal Cesar  is a 30 y.o. female who presents for Vaginal Itching (Pt has vaginal itching, burning urination, white discharge x 1 week )      Vaginal Itching  This is a new problem. The current episode started in the past 7 days. Associated symptoms include abdominal pain. Pertinent negatives include no fever, nausea or vomiting.   Patient presents urgent care noting last 1 week of burning with urination.  She notes symptoms of vaginitis with irritation to vaginal area.  She has had white thin discharge.  Denies recent antibiotics.  Denies fevers or chills.  Denies flank pain.  Notes mild suprapubic discomfort.  Patient denies polyuria or hematuria.  Denies urinary urgency.  Patient denies nausea, mentions she had an episode of vomiting last week.  Her last menstrual period was 4 weeks ago.  She tried some topical metronidazole she had at home which did not change her symptoms.  Patient denies any specific concern but is amenable to gonorrhea and Chlamydia swab testing.    Review of Systems   Constitutional:  Negative for fever.   Gastrointestinal:  Positive for abdominal pain. Negative for nausea and vomiting.   Genitourinary:  Positive for dysuria. Negative for flank pain, frequency, hematuria and urgency.        Vaginal itching and burning       Allergies[1]     Objective:   /50 (BP Location: Right arm, Patient Position: Sitting, BP Cuff Size: Adult)   Pulse 69   Temp 36.1 °C (97 °F) (Temporal)   Resp 12   Ht 1.524 m (5')   Wt 58.9 kg (129 lb 12.8 oz)   SpO2 100%   BMI 25.35 kg/m²     Physical Exam  Vitals and nursing note reviewed.   Constitutional:       General: She is not in acute distress.     Appearance: Normal appearance. She is well-developed. She is not diaphoretic.   HENT:      Head: Normocephalic and atraumatic.      Right Ear: External ear normal.      Left Ear: External ear normal.      Nose: Nose normal.   Eyes:      General: Lids are normal. No scleral  "icterus.        Right eye: No discharge.         Left eye: No discharge.      Conjunctiva/sclera: Conjunctivae normal.   Pulmonary:      Effort: Pulmonary effort is normal. No respiratory distress.   Abdominal:      Palpations: Abdomen is soft. Abdomen is not rigid.      Tenderness: There is abdominal tenderness ( very mild suprapubic) in the suprapubic area. There is no right CVA tenderness, left CVA tenderness or guarding.   Musculoskeletal:         General: Normal range of motion.      Cervical back: Neck supple.   Skin:     General: Skin is warm and dry.      Coloration: Skin is not pale.      Findings: No erythema.   Neurological:      Mental Status: She is alert and oriented to person, place, and time. She is not disoriented.   Psychiatric:         Speech: Speech normal.         Behavior: Behavior normal.     Point-of-care urinalysis is negative normal  Point-of-care hCG is negative  Vaginal pathogen swab is pending  Gonorrhea chlamydia swab is pending    Assessment/Plan:   1. Acute vaginitis  - POCT Urinalysis  - VAGINAL PATHOGENS DNA PANEL; Future  - Chlamydia/GC, PCR (Genital/Anal swab); Future  - POCT PREGNANCY  With minimal change in symptoms following treatment with metronidazole we will hold off on empiric treatment until results received.  Will update patient via [employer] with results as able.  Return to clinic with lack of resolution or progression of symptoms.      I have worn an N95 mask, gloves and eye protection for the entire encounter with this patient.     Differential diagnosis, natural history, supportive care, and indications for immediate follow-up discussed.            [1]   Allergies  Allergen Reactions    Aspirin     Other Environmental Rash     \"dogs\" - \"my skin turns red\"     "

## 2025-07-17 DIAGNOSIS — B96.89 BV (BACTERIAL VAGINOSIS): Primary | ICD-10-CM

## 2025-07-17 DIAGNOSIS — N76.0 BV (BACTERIAL VAGINOSIS): Primary | ICD-10-CM

## 2025-07-17 DIAGNOSIS — N76.0 ACUTE VAGINITIS: ICD-10-CM

## 2025-07-17 RX ORDER — METRONIDAZOLE 500 MG/1
500 TABLET ORAL 2 TIMES DAILY
Qty: 14 TABLET | Refills: 0 | Status: SHIPPED | OUTPATIENT
Start: 2025-07-17 | End: 2025-07-24

## 2025-07-17 NOTE — PROGRESS NOTES
Vaginal swabs positive for bacterial vaginosis.  Negative for trichomoniasis or yeast vaginitis.  Flagyl/metronidazole sent to your pharmacy.  Avoid drinking alcohol taking this medication.    1. BV (bacterial vaginosis) (Primary)    - metroNIDAZOLE (FLAGYL) 500 MG Tab; Take 1 Tablet by mouth 2 times a day for 7 days.  Dispense: 14 Tablet; Refill: 0

## 2025-07-27 ENCOUNTER — HOSPITAL ENCOUNTER (EMERGENCY)
Facility: MEDICAL CENTER | Age: 30
End: 2025-07-27
Attending: EMERGENCY MEDICINE
Payer: MEDICAID

## 2025-07-27 VITALS
HEART RATE: 78 BPM | OXYGEN SATURATION: 99 % | HEIGHT: 60 IN | WEIGHT: 125 LBS | BODY MASS INDEX: 24.54 KG/M2 | DIASTOLIC BLOOD PRESSURE: 63 MMHG | TEMPERATURE: 96.8 F | RESPIRATION RATE: 18 BRPM | SYSTOLIC BLOOD PRESSURE: 111 MMHG

## 2025-07-27 DIAGNOSIS — E86.0 DEHYDRATION: ICD-10-CM

## 2025-07-27 DIAGNOSIS — R11.10 VOMITING AND DIARRHEA: Primary | ICD-10-CM

## 2025-07-27 DIAGNOSIS — R11.15 CYCLICAL VOMITING: ICD-10-CM

## 2025-07-27 DIAGNOSIS — R19.7 VOMITING AND DIARRHEA: Primary | ICD-10-CM

## 2025-07-27 LAB
ALBUMIN SERPL BCP-MCNC: 4.3 G/DL (ref 3.2–4.9)
ALBUMIN/GLOB SERPL: 1.7 G/DL
ALP SERPL-CCNC: 82 U/L (ref 30–99)
ALT SERPL-CCNC: 11 U/L (ref 2–50)
ANION GAP SERPL CALC-SCNC: 17 MMOL/L (ref 7–16)
AST SERPL-CCNC: 21 U/L (ref 12–45)
BASOPHILS # BLD AUTO: 0.2 % (ref 0–1.8)
BASOPHILS # BLD: 0.04 K/UL (ref 0–0.12)
BILIRUB SERPL-MCNC: 0.6 MG/DL (ref 0.1–1.5)
BUN SERPL-MCNC: 12 MG/DL (ref 8–22)
CALCIUM ALBUM COR SERPL-MCNC: 8.2 MG/DL (ref 8.5–10.5)
CALCIUM SERPL-MCNC: 8.4 MG/DL (ref 8.5–10.5)
CHLORIDE SERPL-SCNC: 110 MMOL/L (ref 96–112)
CO2 SERPL-SCNC: 15 MMOL/L (ref 20–33)
CREAT SERPL-MCNC: 0.6 MG/DL (ref 0.5–1.4)
EOSINOPHIL # BLD AUTO: 0 K/UL (ref 0–0.51)
EOSINOPHIL NFR BLD: 0 % (ref 0–6.9)
ERYTHROCYTE [DISTWIDTH] IN BLOOD BY AUTOMATED COUNT: 39.4 FL (ref 35.9–50)
GFR SERPLBLD CREATININE-BSD FMLA CKD-EPI: 124 ML/MIN/1.73 M 2
GLOBULIN SER CALC-MCNC: 2.5 G/DL (ref 1.9–3.5)
GLUCOSE SERPL-MCNC: 138 MG/DL (ref 65–99)
HCG SERPL QL: NEGATIVE
HCT VFR BLD AUTO: 36.1 % (ref 37–47)
HGB BLD-MCNC: 11.9 G/DL (ref 12–16)
IMM GRANULOCYTES # BLD AUTO: 0.11 K/UL (ref 0–0.11)
IMM GRANULOCYTES NFR BLD AUTO: 0.6 % (ref 0–0.9)
LIPASE SERPL-CCNC: 28 U/L (ref 11–82)
LYMPHOCYTES # BLD AUTO: 0.52 K/UL (ref 1–4.8)
LYMPHOCYTES NFR BLD: 2.9 % (ref 22–41)
MCH RBC QN AUTO: 26.7 PG (ref 27–33)
MCHC RBC AUTO-ENTMCNC: 33 G/DL (ref 32.2–35.5)
MCV RBC AUTO: 81.1 FL (ref 81.4–97.8)
MONOCYTES # BLD AUTO: 0.71 K/UL (ref 0–0.85)
MONOCYTES NFR BLD AUTO: 3.9 % (ref 0–13.4)
NEUTROPHILS # BLD AUTO: 16.85 K/UL (ref 1.82–7.42)
NEUTROPHILS NFR BLD: 92.4 % (ref 44–72)
NRBC # BLD AUTO: 0 K/UL
NRBC BLD-RTO: 0 /100 WBC (ref 0–0.2)
PLATELET # BLD AUTO: 264 K/UL (ref 164–446)
PMV BLD AUTO: 11.3 FL (ref 9–12.9)
POTASSIUM SERPL-SCNC: 3.4 MMOL/L (ref 3.6–5.5)
PROT SERPL-MCNC: 6.8 G/DL (ref 6–8.2)
RBC # BLD AUTO: 4.45 M/UL (ref 4.2–5.4)
SODIUM SERPL-SCNC: 142 MMOL/L (ref 135–145)
WBC # BLD AUTO: 18.2 K/UL (ref 4.8–10.8)

## 2025-07-27 PROCEDURE — 99284 EMERGENCY DEPT VISIT MOD MDM: CPT

## 2025-07-27 PROCEDURE — 96361 HYDRATE IV INFUSION ADD-ON: CPT

## 2025-07-27 PROCEDURE — 700105 HCHG RX REV CODE 258: Mod: UD | Performed by: EMERGENCY MEDICINE

## 2025-07-27 PROCEDURE — 80053 COMPREHEN METABOLIC PANEL: CPT

## 2025-07-27 PROCEDURE — 36415 COLL VENOUS BLD VENIPUNCTURE: CPT

## 2025-07-27 PROCEDURE — 84703 CHORIONIC GONADOTROPIN ASSAY: CPT

## 2025-07-27 PROCEDURE — 85025 COMPLETE CBC W/AUTO DIFF WBC: CPT

## 2025-07-27 PROCEDURE — 83690 ASSAY OF LIPASE: CPT

## 2025-07-27 PROCEDURE — 700111 HCHG RX REV CODE 636 W/ 250 OVERRIDE (IP): Mod: JZ,UD | Performed by: EMERGENCY MEDICINE

## 2025-07-27 PROCEDURE — 96374 THER/PROPH/DIAG INJ IV PUSH: CPT

## 2025-07-27 PROCEDURE — 96375 TX/PRO/DX INJ NEW DRUG ADDON: CPT

## 2025-07-27 RX ORDER — DIPHENHYDRAMINE HYDROCHLORIDE 50 MG/ML
25 INJECTION, SOLUTION INTRAMUSCULAR; INTRAVENOUS ONCE
Status: COMPLETED | OUTPATIENT
Start: 2025-07-27 | End: 2025-07-27

## 2025-07-27 RX ORDER — ONDANSETRON 4 MG/1
4 TABLET, ORALLY DISINTEGRATING ORAL EVERY 6 HOURS PRN
Qty: 15 TABLET | Refills: 0 | Status: SHIPPED | OUTPATIENT
Start: 2025-07-27

## 2025-07-27 RX ORDER — SODIUM CHLORIDE 9 MG/ML
1000 INJECTION, SOLUTION INTRAVENOUS ONCE
Status: COMPLETED | OUTPATIENT
Start: 2025-07-27 | End: 2025-07-27

## 2025-07-27 RX ORDER — PROMETHAZINE HYDROCHLORIDE 25 MG/1
25 SUPPOSITORY RECTAL EVERY 6 HOURS PRN
Qty: 15 SUPPOSITORY | Refills: 0 | Status: SHIPPED | OUTPATIENT
Start: 2025-07-27

## 2025-07-27 RX ORDER — ONDANSETRON 2 MG/ML
4 INJECTION INTRAMUSCULAR; INTRAVENOUS ONCE
Status: COMPLETED | OUTPATIENT
Start: 2025-07-27 | End: 2025-07-27

## 2025-07-27 RX ORDER — SODIUM CHLORIDE, SODIUM LACTATE, POTASSIUM CHLORIDE, CALCIUM CHLORIDE 600; 310; 30; 20 MG/100ML; MG/100ML; MG/100ML; MG/100ML
1000 INJECTION, SOLUTION INTRAVENOUS ONCE
Status: COMPLETED | OUTPATIENT
Start: 2025-07-27 | End: 2025-07-27

## 2025-07-27 RX ORDER — METOCLOPRAMIDE HYDROCHLORIDE 5 MG/ML
10 INJECTION INTRAMUSCULAR; INTRAVENOUS ONCE
Status: COMPLETED | OUTPATIENT
Start: 2025-07-27 | End: 2025-07-27

## 2025-07-27 RX ADMIN — ONDANSETRON 4 MG: 2 INJECTION INTRAMUSCULAR; INTRAVENOUS at 12:06

## 2025-07-27 RX ADMIN — SODIUM CHLORIDE, POTASSIUM CHLORIDE, SODIUM LACTATE AND CALCIUM CHLORIDE 1000 ML: 600; 310; 30; 20 INJECTION, SOLUTION INTRAVENOUS at 13:43

## 2025-07-27 RX ADMIN — DIPHENHYDRAMINE HYDROCHLORIDE 25 MG: 50 INJECTION, SOLUTION INTRAMUSCULAR; INTRAVENOUS at 13:03

## 2025-07-27 RX ADMIN — SODIUM CHLORIDE 1000 ML: 9 INJECTION, SOLUTION INTRAVENOUS at 12:09

## 2025-07-27 RX ADMIN — METOCLOPRAMIDE HYDROCHLORIDE 10 MG: 5 INJECTION INTRAMUSCULAR; INTRAVENOUS at 13:03

## 2025-07-27 ASSESSMENT — FIBROSIS 4 INDEX: FIB4 SCORE: 0.56

## 2025-07-27 NOTE — DISCHARGE INSTRUCTIONS
You were seen in the Emergency Department for vomiting and diarrhea.    Labs were completed without significant acute abnormalities other than elevation of your white blood cell count which we have seen in the past as well as some signs of mild dehydration.    Please use 1,000mg of tylenol or 600mg of ibuprofen every 6 hours as needed for pain.    Please follow up with your primary care physician.    Return to the Emergency Department with worsening abdominal pain, persistent vomiting, fevers greater than 100.4, or other concerns.

## 2025-07-27 NOTE — ED NOTES
Medicated for nausea. Pt continues to moan can't get comfortable. Reassured and assisted with repositioning

## 2025-07-27 NOTE — ED NOTES
Pt to room by w/c. Incontinent of urine. Dry heaving reports taking zofran at home. With no relief possible alcohol use denies drugs.

## 2025-07-27 NOTE — ED TRIAGE NOTES
"Chief Complaint   Patient presents with    N/V     N/V since three in the morning. Reports \"something I ate had alcohol in it.\"      Pt informed of wait times. Educated on triage process. Asked to return to triage RN for any new or worsening of symptoms. Thanked for patience.     "

## 2025-07-27 NOTE — ED PROVIDER NOTES
"ED Provider Note    CHIEF COMPLAINT  Chief Complaint   Patient presents with    N/V     N/V since three in the morning. Reports \"something I ate had alcohol in it.\"      EXTERNAL RECORDS REVIEWED  Review of records show patient was last seen at urgent care 7/16/25 for vaginitis.  She has been seen multiple times in the emergency department over the last year for recurrent vomiting and diarrhea, the last being in January 2025.  She was treated with Haldol at that time and left AGAINST MEDICAL ADVICE.  Last imaging of the abdomen was performed November 2024 and normal.  She does frequently have elevated white blood cell counts as well between 13 and 18.    HPI/ROS  LIMITATION TO HISTORY   Select: : None  OUTSIDE HISTORIAN(S):  None    Kristal Cesar is a 30 y.o. female who presents to the Emergency Department with nausea and vomiting onset 3 AM this morning. Patient states she went to a restaurant last night and believes she ate food with alcohol in it. She reports she was feeling normal last night.  She is also experiences shakiness, sweats, diffuse abdominal pain, and diarrhea. She denies any fevers. Patient states no one else she was with last night is experiencing similar symptoms. She states she has began taking metronidazole four days ago.  Patient states that she has had had similar symptoms in the past and states that it happens when she takes antibiotics.  She denies any significant alcohol or any marijuana use.  Patient does have Zofran at home and took this prior to arrival today.    PAST MEDICAL HISTORY  Past Medical History[1]     SURGICAL HISTORY  Past Surgical History[2]     FAMILY HISTORY  Family History   Problem Relation Age of Onset    No Known Problems Mother     Diabetes Father     Hypertension Father     Hyperlipidemia Father     No Known Problems Sister     Cancer Neg Hx        SOCIAL HISTORY   reports that she has never smoked. She has never used smokeless tobacco. She reports " that she does not drink alcohol and does not use drugs.    CURRENT MEDICATIONS  Discharge Medication List as of 7/27/2025  4:48 PM        CONTINUE these medications which have NOT CHANGED    Details   IUD'S IU by Intrauterine route., Historical Med      !! ondansetron (ZOFRAN ODT) 4 MG TABLET DISPERSIBLE Take 1 Tablet by mouth every 6 hours as needed for Nausea/Vomiting., Disp-10 Tablet, R-0, Normal      ibuprofen (MOTRIN) 800 MG Tab Take 1 Tablet by mouth every 8 hours as needed for Mild Pain., Disp-30 Tablet, R-0, Normal      acetaminophen (TYLENOL) 325 MG Tab Take 650 mg by mouth every four hours as needed., Historical Med      moxifloxacin (VIGAMOX) 0.5 % Solution Administer 1 Drop into both eyes 3 times a day., Disp-3 mL, R-0, Normal      escitalopram (LEXAPRO) 10 MG Tab TAKE 1 TABLET BY MOUTH EVERY DAY FOR 90 DAYS, Historical Med      olopatadine (PATANOL) 0.1 % ophthalmic solution Administer 1 Drop into both eyes 2 times a day. For allergic conjunctivitis, Disp-5 mL, R-0, Normal      metoclopramide (REGLAN) 10 MG Tab Take 1 Tablet by mouth 4 times a day as needed (vomiting/nausea)., Disp-40 Tablet, R-0, Normal      influenza vaccine (FLUZONE) 0.5 ML Suspension Prefilled Syringe injection Inject  into the shoulder, thigh, or buttocks., Disp-0.5 mL, R-0, Normal       !! - Potential duplicate medications found. Please discuss with provider.          ALLERGIES  Aspirin and Other environmental    PHYSICAL EXAM  /63   Pulse 78   Temp 36 °C (96.8 °F) (Temporal)   Resp 18   Ht 1.524 m (5')   Wt 56.7 kg (125 lb)   SpO2 99%    Constitutional: Nontoxic appearing. Alert, Uncomfortable appearing, Actively vomiting.   HENT: Normocephalic, Atraumatic. Bilateral external ears normal. Nose normal.  Moist mucous membranes.  Oropharynx clear.  Eyes: Pupils are equal and reactive. Conjunctiva normal.   Neck: Supple, full range of motion  Heart: Regular rate and rhythm.  No murmurs.    Lungs: No respiratory distress,  normal work of breathing. Lungs clear to auscultation bilaterally.  Abdomen Soft, no distention. Diffuse mild abdominal tenderness.   Musculoskeletal: Atraumatic. No obvious deformities noted.  No lower extremity edema.  Skin: Warm, Dry.  No erythema, No rash.   Neurologic: Alert and oriented x3. Moving all extremities spontaneously without focal deficits.  Psychiatric: Affect normal, Mood normal, Appears appropriate and not intoxicated.    DIAGNOSTIC STUDIES / PROCEDURES    LABS  Labs Reviewed   COMP METABOLIC PANEL - Abnormal; Notable for the following components:       Result Value    Potassium 3.4 (*)     Co2 15 (*)     Anion Gap 17.0 (*)     Glucose 138 (*)     Calcium 8.4 (*)     Correct Calcium 8.2 (*)     All other components within normal limits   CBC WITH DIFFERENTIAL - Abnormal; Notable for the following components:    WBC 18.2 (*)     Hemoglobin 11.9 (*)     Hematocrit 36.1 (*)     MCV 81.1 (*)     MCH 26.7 (*)     Neutrophils-Polys 92.40 (*)     Lymphocytes 2.90 (*)     Neutrophils (Absolute) 16.85 (*)     Lymphs (Absolute) 0.52 (*)     All other components within normal limits   LIPASE   HCG QUAL SERUM   ESTIMATED GFR   URINALYSIS   URINE DRUG SCREEN       COURSE & MEDICAL DECISION MAKING    11:22 AM - Patient seen and examined at bedside. Discussed plan of care, including fluid administration and lab work. Patient agrees to the plan of care. The patient will be resuscitated with 1L NS IV and zofran. Ordered for HCG Qual serum, Lipase, CMP, CBC with diff to evaluate her symptoms.     12:50 PM - I reevaluated patient at bedside. Patient is still vomiting and having diarrhea.  Patient medicated with Benadryl 25 mg injection and Reglan 10 mg injection.    2:46 PM - I reevaluated patient at bedside. She is now resting more comfortably, repeat abdominal exam is benign.  Will plan for PO challenge    ASSESSMENT, COURSE AND PLAN  Care Narrative: Young relatively healthy patient presents with acute onset of  vomiting and diarrhea which started last night.  She is very uncomfortable appearing on arrival, actively vomiting.  She does have normal vital signs and no fever.  On review of records, this seems to be recurrent issue with the patient concerning for cyclical vomiting although she denies any marijuana use.  The patient's labs did return with a leukocytosis of 18 although she has had similar results in the past therefore I think this is less likely due to infectious process.  I did consider diagnostic imaging however she does not have any focal tenderness that would be concerning for appendicitis, cholecystitis, diverticulitis.  This seems similar with her prior presentations as well.  She does not have any evidence of renal dysfunction.  No transaminitis or elevated lipase concerning for pancreatitis or cholecystitis.  She has borderline low potassium and calcium however no notable electrolyte abnormalities.  The patient is not pregnant.  Patient required multiple rounds of antiemetics and fluids however ultimately did improve.    4:12 PM - Upon reassessment, patient is resting comfortably with normal vital signs.  No new complaints at this time.  She is tolerating water without difficulty.  Repeat abdominal exam is benign.  Discussed results with patient and/or family as well as importance of primary care follow up.  Patient understands plan of care and strict return precautions for new or changing symptoms.    ADDITIONAL PROBLEM LIST  Problem #1: Acute vomiting and diarrhea -discharged with Zofran and Phenergan as well as diet recommendations    Problem #2: Dehydration -given IV fluids with improvement    Problem #3: Cyclic vomiting -will place GI referral due to recurrent symptoms and ER visits    DISPOSITION AND DISCUSSIONS    Escalation of care considered, and ultimately not performed:diagnostic imaging    Decision tools and prescription drugs considered including, but not limited to: Pain Medications OTC  medication should be sufficient.      The patient will return for new or worsening symptoms and is stable at the time of discharge.    The patient is referred to a primary physician for blood pressure management, diabetic screening, and for all other preventative health concerns.    DISPOSITION:  Patient will be discharged home in stable condition.    FOLLOW UP:  Amadeo Hope P.A.-C.  [address] [facility] [address]  [address]  [facility] [id number] [phone]    Schedule an appointment as soon as possible for a visit       [facility], Emergency Dept  [address] [facility] [address] [phone]    If symptoms worsen      OUTPATIENT MEDICATIONS:  Discharge Medication List as of 7/27/2025  4:48 PM        START taking these medications    Details   !! ondansetron (ZOFRAN ODT) 4 MG TABLET DISPERSIBLE Take 1 Tablet by mouth every 6 hours as needed for Nausea/Vomiting., Disp-15 Tablet, R-0, Normal      promethazine (PHENERGAN) 25 MG Suppos Insert 1 Suppository into the rectum every 6 hours as needed for Nausea/Vomiting., Disp-15 Suppository, R-0, Normal       !! - Potential duplicate medications found. Please discuss with provider.          FINAL DIAGNOSIS  1. Vomiting and diarrhea    2. Dehydration    3. Cyclical vomiting        The note accurately reflects work and decisions made by me.  Ester Haynes M.D.  7/27/2025  5:45 PM     Deysi CALERO (Errol), am scribing for, and in the presence of, Ester Haynes M.D..    Electronically signed by: Deysi Ortiz), 7/27/2025    Ester CALERO M.D. personally performed the services described in this documentation, as scribed by Deysi Biggs in my presence, and it is both accurate and complete.            [1]   Past Medical History:  Diagnosis Date    Allergy     Anxiety     Depression     Obesity (BMI 30-39.9)    [2]   Past Surgical History:  Procedure Laterality Date    DILATION AND CURETTAGE N/A 2/1/2022    Procedure:  DILATION AND CURETTAGE;  Surgeon: Arabella Lara M.D.;  Location: SURGERY [facility] DELIVERY;  Service: Labor and Delivery    REAGAN BY LAPAROSCOPY  3/7/2016    Procedure: REAGAN BY LAPAROSCOPY;  Surgeon: John H Ganser, M.D.;  Location: SURGERY [facility];  Service:     OTHER      gallbladder removed

## 2025-07-28 NOTE — ED NOTES
Discharged to [facility] per pt father is picking her up. Pt understands to  prescription x 2 with indication. Follow up with pcp. Pt to return for worsening symptoms or concerns.

## 2025-07-31 NOTE — Clinical Note
REFERRAL APPROVAL NOTICE         Sent on July 31, 2025                   Kristal Cesar  [address]  [place] [address]                   Dear Ms. Mansi Cesar,    After a careful review of the medical information and benefit coverage, [facility] has processed your referral. See below for additional details.    If applicable, you must be actively enrolled with your insurance for coverage of the authorized service. If you have any questions regarding your coverage, please contact your insurance directly.    REFERRAL INFORMATION   Referral #:  [id number]  Referred-To Provider    Referred-By Provider:  Gastroenterology    Ester Haynes M.D.   [facility]      [address] [place] [address] Emergency Room  [address]  [facility] [place] [id number] [phone] [address] [facility] [address]  [phone]    Referral Start Date:  07/27/2025  Referral End Date:   07/27/2026             SCHEDULING  If you do not already have an appointment, please call [phone] to make an appointment.     MORE INFORMATION  If you do not already have a [employer] account, sign up at: [email or web address]  You can access your medical information, make appointments, see lab results, billing information, and more.  If you have questions regarding this referral, please contact  the [facility] Referrals department at:             [phone]. Monday - Friday 8:00AM - 5:00PM.     Sincerely,    [facility]

## 2025-08-28 ENCOUNTER — PHARMACY VISIT (OUTPATIENT)
Dept: PHARMACY | Facility: MEDICAL CENTER | Age: 30
End: 2025-08-28
Payer: COMMERCIAL

## 2025-08-28 PROCEDURE — RXMED WILLOW AMBULATORY MEDICATION CHARGE: Performed by: EMERGENCY MEDICINE

## (undated) DEVICE — HEAD HOLDER JUNIOR/ADULT

## (undated) DEVICE — TUBING CLEARLINK DUO-VENT - C-FLO (48EA/CA)

## (undated) DEVICE — PACK DELIVERY ROOM (7EA/CA)

## (undated) DEVICE — SET EXTENSION WITH 2 PORTS (48EA/CA) ***PART #2C8610 IS A SUBSTITUTE*****

## (undated) DEVICE — CATHETER IV NON-SAFETY 18 GA X 1 1/4 (50/BX 4BX/CA)

## (undated) DEVICE — GLOVE BIOGEL SZ 6.5 SURGICAL PF LTX (50PR/BX 4BX/CA)

## (undated) DEVICE — TUBE SUCTION YANKAUER  1/4 X 6FT (20EA/CA)"

## (undated) DEVICE — KIT  I.V. START (100EA/CA)

## (undated) DEVICE — LEGGING LITHOTOMY 31 X 48 IN - (2EA/PK 20PK/CA)

## (undated) DEVICE — SOLUTION 10%PVP-IODINE 8OZ - (24/CA)

## (undated) DEVICE — WATER IRRIGATION STERILE 1000ML (12EA/CA)

## (undated) DEVICE — TRAY SRGPRP PVP IOD WT PRP - (20/CA)